# Patient Record
Sex: MALE | Race: WHITE | NOT HISPANIC OR LATINO | Employment: OTHER | URBAN - METROPOLITAN AREA
[De-identification: names, ages, dates, MRNs, and addresses within clinical notes are randomized per-mention and may not be internally consistent; named-entity substitution may affect disease eponyms.]

---

## 2017-12-13 ENCOUNTER — HOSPITAL ENCOUNTER (OUTPATIENT)
Dept: FAMILY MEDICINE CLINIC | Facility: CLINIC | Age: 67
Setting detail: SPECIMEN
Discharge: HOME | End: 2017-12-13
Attending: FAMILY MEDICINE | Admitting: FAMILY MEDICINE

## 2017-12-13 LAB
ALBUMIN SERPL-MCNC: 3.9 G/DL (ref 3.5–4.8)
ALBUMIN/GLOB SERPL: 1.2 {RATIO} (ref 1–1.7)
ALP SERPL-CCNC: 56 IU/L (ref 32–91)
ALT SERPL-CCNC: 14 IU/L (ref 17–63)
ANION GAP SERPL CALC-SCNC: 11.1 MMOL/L (ref 10–20)
AST SERPL-CCNC: 29 IU/L (ref 15–41)
BASOPHILS # BLD AUTO: 0 10*3/UL (ref 0–0.2)
BASOPHILS NFR BLD AUTO: 1 % (ref 0–2)
BILIRUB SERPL-MCNC: 0.5 MG/DL (ref 0.3–1.2)
BUN SERPL-MCNC: 15 MG/DL (ref 8–20)
BUN/CREAT SERPL: 13.6 (ref 6.2–20.3)
CALCIUM SERPL-MCNC: 9.2 MG/DL (ref 8.9–10.3)
CHLORIDE SERPL-SCNC: 99 MMOL/L (ref 101–111)
CHOLEST SERPL-MCNC: 168 MG/DL
CHOLEST/HDLC SERPL: 4.1 {RATIO}
CONV CO2: 31 MMOL/L (ref 22–32)
CONV LDL CHOLESTEROL DIRECT: 114 MG/DL (ref 0–100)
CONV TOTAL PROTEIN: 7.1 G/DL (ref 6.1–7.9)
CREAT UR-MCNC: 1.1 MG/DL (ref 0.7–1.2)
DIFFERENTIAL METHOD BLD: (no result)
EOSINOPHIL # BLD AUTO: 0.2 10*3/UL (ref 0–0.3)
EOSINOPHIL # BLD AUTO: 3 % (ref 0–3)
ERYTHROCYTE [DISTWIDTH] IN BLOOD BY AUTOMATED COUNT: 12.6 % (ref 11.5–14.5)
GLOBULIN UR ELPH-MCNC: 3.2 G/DL (ref 2.5–3.8)
GLUCOSE SERPL-MCNC: 102 MG/DL (ref 65–99)
HCT VFR BLD AUTO: 42 % (ref 40–54)
HDLC SERPL-MCNC: 41 MG/DL
HGB BLD-MCNC: 14.5 G/DL (ref 14–18)
LDLC/HDLC SERPL: 2.8 {RATIO}
LIPID INTERPRETATION: ABNORMAL
LYMPHOCYTES # BLD AUTO: 2.3 10*3/UL (ref 0.8–4.8)
LYMPHOCYTES NFR BLD AUTO: 35 % (ref 18–42)
MCH RBC QN AUTO: 33.5 PG (ref 26–32)
MCHC RBC AUTO-ENTMCNC: 34.4 G/DL (ref 32–36)
MCV RBC AUTO: 97.5 FL (ref 80–94)
MONOCYTES # BLD AUTO: 0.5 10*3/UL (ref 0.1–1.3)
MONOCYTES NFR BLD AUTO: 7 % (ref 2–11)
NEUTROPHILS # BLD AUTO: 3.6 10*3/UL (ref 2.3–8.6)
NEUTROPHILS NFR BLD AUTO: 54 % (ref 50–75)
NRBC BLD AUTO-RTO: 0 /100{WBCS}
NRBC/RBC NFR BLD MANUAL: 0 10*3/UL
PLATELET # BLD AUTO: 177 10*3/UL (ref 150–450)
PMV BLD AUTO: 8.1 FL (ref 7.4–10.4)
POTASSIUM SERPL-SCNC: 4.1 MMOL/L (ref 3.6–5.1)
RBC # BLD AUTO: 4.31 10*6/UL (ref 4.6–6)
SODIUM SERPL-SCNC: 137 MMOL/L (ref 136–144)
TRIGL SERPL-MCNC: 116 MG/DL
TSH SERPL-ACNC: 1.62 UIU/ML (ref 0.34–5.6)
VLDLC SERPL CALC-MCNC: 13.7 MG/DL
WBC # BLD AUTO: 6.6 10*3/UL (ref 4.5–11.5)

## 2019-01-16 ENCOUNTER — HOSPITAL ENCOUNTER (OUTPATIENT)
Dept: LAB | Facility: HOSPITAL | Age: 69
Discharge: HOME | End: 2019-01-16
Attending: OTOLARYNGOLOGY | Admitting: OTOLARYNGOLOGY

## 2019-01-18 ENCOUNTER — HOSPITAL ENCOUNTER (OUTPATIENT)
Dept: FAMILY MEDICINE CLINIC | Facility: CLINIC | Age: 69
Setting detail: SPECIMEN
Discharge: HOME | End: 2019-01-18
Attending: FAMILY MEDICINE | Admitting: FAMILY MEDICINE

## 2019-01-18 LAB — B PERT DNA SPEC QL NAA+PROBE: NOT DETECTED

## 2019-04-18 ENCOUNTER — HOSPITAL ENCOUNTER (OUTPATIENT)
Dept: FAMILY MEDICINE CLINIC | Facility: CLINIC | Age: 69
Setting detail: SPECIMEN
Discharge: HOME | End: 2019-04-18
Attending: FAMILY MEDICINE | Admitting: FAMILY MEDICINE

## 2019-04-18 LAB
ALBUMIN SERPL-MCNC: 3.5 G/DL (ref 3.5–4.8)
ALBUMIN/GLOB SERPL: 0.9 {RATIO} (ref 1–1.7)
ALP SERPL-CCNC: 58 IU/L (ref 32–91)
ALT SERPL-CCNC: 12 IU/L (ref 17–63)
ANION GAP SERPL CALC-SCNC: 13.5 MMOL/L (ref 10–20)
AST SERPL-CCNC: 30 IU/L (ref 15–41)
BASOPHILS # BLD AUTO: 0 10*3/UL (ref 0–0.2)
BASOPHILS NFR BLD AUTO: 0 % (ref 0–2)
BILIRUB SERPL-MCNC: 1.6 MG/DL (ref 0.3–1.2)
BILIRUB UR QL STRIP: NEGATIVE MG/DL
BUN SERPL-MCNC: 13 MG/DL (ref 8–20)
BUN/CREAT SERPL: 13 (ref 6.2–20.3)
CALCIUM SERPL-MCNC: 8.9 MG/DL (ref 8.9–10.3)
CASTS URNS QL MICRO: ABNORMAL /[LPF]
CHLORIDE SERPL-SCNC: 95 MMOL/L (ref 101–111)
COLOR UR: ABNORMAL
CONV BACTERIA IN URINE MICRO: NEGATIVE
CONV CLARITY OF URINE: CLEAR
CONV CO2: 27 MMOL/L (ref 22–32)
CONV HYALINE CASTS IN URINE MICRO: 2 /[LPF] (ref 0–5)
CONV PROTEIN IN URINE BY AUTOMATED TEST STRIP: 30 MG/DL
CONV SMALL ROUND CELLS: ABNORMAL /[HPF]
CONV TOTAL PROTEIN: 7.2 G/DL (ref 6.1–7.9)
CONV UROBILINOGEN IN URINE BY AUTOMATED TEST STRIP: 1 MG/DL
CREAT UR-MCNC: 1 MG/DL (ref 0.7–1.2)
CULTURE INDICATED?: ABNORMAL
DIFFERENTIAL METHOD BLD: (no result)
EOSINOPHIL # BLD AUTO: 0 % (ref 0–3)
EOSINOPHIL # BLD AUTO: 0 10*3/UL (ref 0–0.3)
ERYTHROCYTE [DISTWIDTH] IN BLOOD BY AUTOMATED COUNT: 13.1 % (ref 11.5–14.5)
GLOBULIN UR ELPH-MCNC: 3.7 G/DL (ref 2.5–3.8)
GLUCOSE SERPL-MCNC: 136 MG/DL (ref 65–99)
GLUCOSE UR QL: NEGATIVE MG/DL
HCT VFR BLD AUTO: 40.6 % (ref 40–54)
HGB BLD-MCNC: 13.8 G/DL (ref 14–18)
HGB UR QL STRIP: NEGATIVE
KETONES UR QL STRIP: NEGATIVE MG/DL
LEUKOCYTE ESTERASE UR QL STRIP: NEGATIVE
LYMPHOCYTES # BLD AUTO: 1 10*3/UL (ref 0.8–4.8)
LYMPHOCYTES NFR BLD AUTO: 9 % (ref 18–42)
MCH RBC QN AUTO: 33.1 PG (ref 26–32)
MCHC RBC AUTO-ENTMCNC: 33.8 G/DL (ref 32–36)
MCV RBC AUTO: 97.7 FL (ref 80–94)
MONOCYTES # BLD AUTO: 1.1 10*3/UL (ref 0.1–1.3)
MONOCYTES NFR BLD AUTO: 10 % (ref 2–11)
NEUTROPHILS # BLD AUTO: 9.2 10*3/UL (ref 2.3–8.6)
NEUTROPHILS NFR BLD AUTO: 81 % (ref 50–75)
NITRITE UR QL STRIP: NEGATIVE
NRBC BLD AUTO-RTO: 0 /100{WBCS}
NRBC/RBC NFR BLD MANUAL: 0 10*3/UL
PH UR STRIP.AUTO: 6 [PH] (ref 4.5–8)
PLATELET # BLD AUTO: 142 10*3/UL (ref 150–450)
PMV BLD AUTO: 8.8 FL (ref 7.4–10.4)
POTASSIUM SERPL-SCNC: 3.5 MMOL/L (ref 3.6–5.1)
RBC # BLD AUTO: 4.16 10*6/UL (ref 4.6–6)
RBC #/AREA URNS HPF: 0 /[HPF] (ref 0–3)
SODIUM SERPL-SCNC: 132 MMOL/L (ref 136–144)
SP GR UR: 1.01 (ref 1–1.03)
SPERM URNS QL MICRO: ABNORMAL /[HPF]
SQUAMOUS SPT QL MICRO: 0 /[HPF] (ref 0–5)
UNIDENT CRYS URNS QL MICRO: ABNORMAL /[HPF]
WBC # BLD AUTO: 11.3 10*3/UL (ref 4.5–11.5)
WBC #/AREA URNS HPF: 1 /[HPF] (ref 0–5)
YEAST SPEC QL WET PREP: ABNORMAL /[HPF]

## 2019-04-19 LAB
IRON SATN MFR SERPL: 12 % (ref 20–50)
IRON SERPL-MCNC: 29 UG/DL (ref 45–182)
MAGNESIUM UR-MCNC: 1.6 % (ref 0.5–1.5)
RETICS/RBC NFR MANUAL: 0 10*6/UL
TIBC SERPL-MCNC: 245 UG/DL (ref 228–428)

## 2019-07-17 RX ORDER — SIMVASTATIN 20 MG
TABLET ORAL
Qty: 90 TABLET | Refills: 0 | Status: SHIPPED | OUTPATIENT
Start: 2019-07-17 | End: 2019-10-15 | Stop reason: SDUPTHER

## 2019-07-22 RX ORDER — MONTELUKAST SODIUM 4 MG/1
TABLET, CHEWABLE ORAL
Qty: 360 TABLET | Refills: 1 | Status: SHIPPED | OUTPATIENT
Start: 2019-07-22 | End: 2020-05-11

## 2019-07-30 RX ORDER — MONTELUKAST SODIUM 10 MG/1
TABLET ORAL
Qty: 90 TABLET | Refills: 0 | Status: SHIPPED | OUTPATIENT
Start: 2019-07-30 | End: 2020-01-21

## 2019-09-25 RX ORDER — CHLORHEXIDINE GLUCONATE 0.12 MG/ML
15 RINSE ORAL 2 TIMES DAILY
Qty: 900 ML | Refills: 2 | Status: SHIPPED | OUTPATIENT
Start: 2019-09-25 | End: 2020-01-27

## 2019-09-25 RX ORDER — CHLORHEXIDINE GLUCONATE 0.12 MG/ML
RINSE ORAL
COMMUNITY
Start: 2017-12-03 | End: 2019-09-25 | Stop reason: SDUPTHER

## 2019-10-15 RX ORDER — SIMVASTATIN 20 MG
TABLET ORAL
Qty: 90 TABLET | Refills: 0 | Status: SHIPPED | OUTPATIENT
Start: 2019-10-15 | End: 2020-01-08

## 2020-01-08 RX ORDER — SIMVASTATIN 20 MG
TABLET ORAL
Qty: 90 TABLET | Refills: 0 | Status: SHIPPED | OUTPATIENT
Start: 2020-01-08 | End: 2020-04-12

## 2020-01-21 RX ORDER — MONTELUKAST SODIUM 10 MG/1
TABLET ORAL
Qty: 90 TABLET | Refills: 0 | Status: SHIPPED | OUTPATIENT
Start: 2020-01-21 | End: 2020-04-26

## 2020-01-27 RX ORDER — CHLORHEXIDINE GLUCONATE 0.12 MG/ML
RINSE ORAL
Qty: 946 ML | Refills: 0 | Status: SHIPPED | OUTPATIENT
Start: 2020-01-27 | End: 2020-06-29

## 2020-02-03 PROBLEM — E78.5 HYPERLIPIDEMIA: Status: ACTIVE | Noted: 2020-02-03

## 2020-02-03 PROBLEM — I10 HYPERTENSION: Status: ACTIVE | Noted: 2020-02-03

## 2020-02-03 PROBLEM — D64.9 ANEMIA: Status: ACTIVE | Noted: 2019-04-18

## 2020-02-03 PROBLEM — R05.9 COUGH: Status: ACTIVE | Noted: 2018-12-12

## 2020-02-03 PROBLEM — I50.9 CONGESTIVE HEART FAILURE (HCC): Status: ACTIVE | Noted: 2020-02-03

## 2020-02-03 RX ORDER — AMLODIPINE BESYLATE 2.5 MG/1
TABLET ORAL
COMMUNITY
Start: 2012-05-15 | End: 2022-05-12

## 2020-02-03 RX ORDER — ALBUTEROL SULFATE 90 UG/1
AEROSOL, METERED RESPIRATORY (INHALATION)
COMMUNITY
Start: 2018-08-22 | End: 2020-02-04

## 2020-02-03 RX ORDER — LORATADINE 10 MG/1
TABLET ORAL EVERY 24 HOURS
COMMUNITY
Start: 2018-12-13 | End: 2020-02-04

## 2020-02-04 ENCOUNTER — OFFICE VISIT (OUTPATIENT)
Dept: FAMILY MEDICINE CLINIC | Facility: CLINIC | Age: 70
End: 2020-02-04

## 2020-02-04 VITALS
HEART RATE: 55 BPM | BODY MASS INDEX: 20.39 KG/M2 | RESPIRATION RATE: 16 BRPM | SYSTOLIC BLOOD PRESSURE: 176 MMHG | TEMPERATURE: 97.6 F | WEIGHT: 108 LBS | HEIGHT: 61 IN | OXYGEN SATURATION: 97 % | DIASTOLIC BLOOD PRESSURE: 85 MMHG

## 2020-02-04 DIAGNOSIS — E78.2 MIXED HYPERLIPIDEMIA: ICD-10-CM

## 2020-02-04 DIAGNOSIS — Z00.00 MEDICARE ANNUAL WELLNESS VISIT, SUBSEQUENT: Primary | ICD-10-CM

## 2020-02-04 PROBLEM — R05.9 COUGH: Status: RESOLVED | Noted: 2018-12-12 | Resolved: 2020-02-04

## 2020-02-04 LAB
ALBUMIN SERPL-MCNC: 4.3 G/DL (ref 3.5–5.2)
ALBUMIN/GLOB SERPL: 1.3 G/DL
ALP SERPL-CCNC: 66 U/L (ref 39–117)
ALT SERPL W P-5'-P-CCNC: 9 U/L (ref 1–41)
ANION GAP SERPL CALCULATED.3IONS-SCNC: 12 MMOL/L (ref 5–15)
AST SERPL-CCNC: 26 U/L (ref 1–40)
BASOPHILS # BLD AUTO: 0.03 10*3/MM3 (ref 0–0.2)
BASOPHILS NFR BLD AUTO: 0.4 % (ref 0–1.5)
BILIRUB SERPL-MCNC: 0.9 MG/DL (ref 0.2–1.2)
BUN BLD-MCNC: 10 MG/DL (ref 8–23)
BUN/CREAT SERPL: 9.9 (ref 7–25)
CALCIUM SPEC-SCNC: 9.4 MG/DL (ref 8.6–10.5)
CHLORIDE SERPL-SCNC: 96 MMOL/L (ref 98–107)
CHOLEST SERPL-MCNC: 173 MG/DL (ref 0–200)
CO2 SERPL-SCNC: 29 MMOL/L (ref 22–29)
CREAT BLD-MCNC: 1.01 MG/DL (ref 0.76–1.27)
DEPRECATED RDW RBC AUTO: 41.8 FL (ref 37–54)
EOSINOPHIL # BLD AUTO: 0.35 10*3/MM3 (ref 0–0.4)
EOSINOPHIL NFR BLD AUTO: 4.9 % (ref 0.3–6.2)
ERYTHROCYTE [DISTWIDTH] IN BLOOD BY AUTOMATED COUNT: 12 % (ref 12.3–15.4)
GFR SERPL CREATININE-BSD FRML MDRD: 73 ML/MIN/1.73
GLOBULIN UR ELPH-MCNC: 3.3 GM/DL
GLUCOSE BLD-MCNC: 101 MG/DL (ref 65–99)
HCT VFR BLD AUTO: 41.8 % (ref 37.5–51)
HDLC SERPL-MCNC: 48 MG/DL (ref 40–60)
HGB BLD-MCNC: 14.7 G/DL (ref 13–17.7)
IMM GRANULOCYTES # BLD AUTO: 0.02 10*3/MM3 (ref 0–0.05)
IMM GRANULOCYTES NFR BLD AUTO: 0.3 % (ref 0–0.5)
LDLC SERPL CALC-MCNC: 100 MG/DL (ref 0–100)
LDLC/HDLC SERPL: 2.09 {RATIO}
LYMPHOCYTES # BLD AUTO: 1.93 10*3/MM3 (ref 0.7–3.1)
LYMPHOCYTES NFR BLD AUTO: 26.9 % (ref 19.6–45.3)
MCH RBC QN AUTO: 33.6 PG (ref 26.6–33)
MCHC RBC AUTO-ENTMCNC: 35.2 G/DL (ref 31.5–35.7)
MCV RBC AUTO: 95.4 FL (ref 79–97)
MONOCYTES # BLD AUTO: 0.62 10*3/MM3 (ref 0.1–0.9)
MONOCYTES NFR BLD AUTO: 8.6 % (ref 5–12)
NEUTROPHILS # BLD AUTO: 4.23 10*3/MM3 (ref 1.7–7)
NEUTROPHILS NFR BLD AUTO: 58.9 % (ref 42.7–76)
NRBC BLD AUTO-RTO: 0 /100 WBC (ref 0–0.2)
PLATELET # BLD AUTO: 177 10*3/MM3 (ref 140–450)
PMV BLD AUTO: 10.3 FL (ref 6–12)
POTASSIUM BLD-SCNC: 4.3 MMOL/L (ref 3.5–5.2)
PROT SERPL-MCNC: 7.6 G/DL (ref 6–8.5)
RBC # BLD AUTO: 4.38 10*6/MM3 (ref 4.14–5.8)
SODIUM BLD-SCNC: 137 MMOL/L (ref 136–145)
TRIGL SERPL-MCNC: 124 MG/DL (ref 0–150)
TSH SERPL DL<=0.05 MIU/L-ACNC: 2.06 UIU/ML (ref 0.27–4.2)
VLDLC SERPL-MCNC: 24.8 MG/DL (ref 5–40)
WBC NRBC COR # BLD: 7.18 10*3/MM3 (ref 3.4–10.8)

## 2020-02-04 PROCEDURE — 80053 COMPREHEN METABOLIC PANEL: CPT | Performed by: FAMILY MEDICINE

## 2020-02-04 PROCEDURE — 80061 LIPID PANEL: CPT | Performed by: FAMILY MEDICINE

## 2020-02-04 PROCEDURE — 36415 COLL VENOUS BLD VENIPUNCTURE: CPT | Performed by: FAMILY MEDICINE

## 2020-02-04 PROCEDURE — G0439 PPPS, SUBSEQ VISIT: HCPCS | Performed by: FAMILY MEDICINE

## 2020-02-04 PROCEDURE — 85025 COMPLETE CBC W/AUTO DIFF WBC: CPT | Performed by: FAMILY MEDICINE

## 2020-02-04 PROCEDURE — 84443 ASSAY THYROID STIM HORMONE: CPT | Performed by: FAMILY MEDICINE

## 2020-02-04 RX ORDER — APIXABAN 5 MG/1
5 TABLET, FILM COATED ORAL 2 TIMES DAILY
COMMUNITY
Start: 2020-01-27

## 2020-02-04 RX ORDER — SOTALOL HYDROCHLORIDE 80 MG/1
80 TABLET ORAL 2 TIMES DAILY
COMMUNITY
Start: 2019-11-07 | End: 2022-04-27 | Stop reason: HOSPADM

## 2020-02-04 NOTE — PROGRESS NOTES
Subsequent Medicare Wellness Visit   The ABC's of the Annual Wellness Visit    Chief Complaint   Patient presents with   • Medicare Wellness-subsequent       HPI:  Drake Beth, -1950, is a 69 y.o. male who presents for a Subsequent Medicare Wellness Visit.  He is here today with his brother who is his primary caregiver.  Patient has mild mental retardation.  He is overall doing well.  He is able to provide some history but the majority of the history comes from his brother.  He has good appetite and he is staying active he is interested in the world around him and likes to help his brother in the shop.  He refuses immunizations.  He has never had colonoscopy and his family does not want him to get a colonoscopy or even Cologuard.  He is happy and no signs of depression are noted.  No balance issues or falls reported. Patient denies any chest pain, shortness of breath, dizziness, nausea, vomiting, or diarrhea. No visual issues reported. No headaches, numbness or tingling. No urinary issues. Patient has good appetite and denies any weight changes. No swelling reported. No emotional issues.      Recent Hospitalizations:  No hospitalization(s) within the last year..    Current Medical Providers:  Patient Care Team:  Amada Haskins MD as PCP - General  Am[facility] Haskins MD [facility] PCP [facility]ing Phys[facility] (Cardiology)    Health Habits and Functional and Cognitive Screening and Depression Screening:  Functional & Cognitive Status 2020   Do you have difficulty preparing food and eating? Yes   Do you have difficulty bathing yourself, getting dressed or grooming yourself? No   Do you have difficulty using the toilet? No   Do you have difficulty moving around from place to place? No   Do you have trouble with steps or getting out of a bed or a chair? No   Current Diet Well Balanced Diet   Dental Exam Up to date   Eye Exam Up to date   Exercise (times per week) 0  times per week   Current Exercise Activities Include None   Do you need help using the phone?  No   Are you deaf or do you have serious difficulty hearing?  No   Do you need help with transportation? Yes   Do you need help shopping? Yes   Do you need help preparing meals?  Yes   Do you need help with housework?  Yes   Do you need help with laundry? Yes   Do you need help taking your medications? Yes   Do you need help managing money? Yes   Do you ever drive or ride in a car without wearing a seat belt? No   Have you felt unusual stress, anger or loneliness in the last month? No   Who do you live with? Other   If you need help, do you have trouble finding someone available to you? No   Have you been bothered in the last four weeks by sexual problems? No   Do you have difficulty concentrating, remembering or making decisions? No       Compared to one year ago, the patient feels his physical health is the same and his mental health is the same.    Depression Screen:  PHQ-2/PHQ-9 Depression Screening 2/4/2020   Little interest or pleasure in doing things 0   Feeling down, depressed, or hopeless 0   Total Score 0         Past Medical/Family/Social History:  The following portions of the patient's history were reviewed and updated as appropriate: allergies, current medications, past family history, past medical history, past social history, past surgical history and problem list.    No Known Allergies      Current Outpatient Medications:   •  amLODIPine (NORVASC) 2.5 MG tablet, AMLODIPINE BESYLATE 2.5 MG TABS, Disp: , Rfl:   •  metoprolol tartrate (LOPRESSOR) 25 MG tablet, METOPROLOL TARTRATE 25 MG TABS, Disp: , Rfl:   •  chlorhexidine (PERIDEX) 0.12 % solution, RINSE AND GARGLE 15 ML BY MOUTH OR THROAT TWICE DAILY, Disp: 946 mL, Rfl: 0  •  colestipol (COLESTID) 1 g tablet, TAKE 1 TABLET BY MOUTH FOUR TIMES DAILY, Disp: 360 tablet, Rfl: 1  •  ELIQUIS 5 MG tablet tablet, Take 5 mg by mouth 2 (Two) Times a Day., Disp: ,  Rfl:   •  montelukast (SINGULAIR) 10 MG tablet, TAKE 1 TABLET BY MOUTH DAILY, Disp: 90 tablet, Rfl: 0  •  simvastatin (ZOCOR) 20 MG tablet, TAKE 1 TABLET BY MOUTH DAILY AT BEDTIME, Disp: 90 tablet, Rfl: 0  •  sotalol (BETAPACE) 80 MG tablet, Take 80 mg by mouth 2 (Two) Times a Day., Disp: , Rfl:     Aspirin use counseling: Does not need ASA but is currently taking (advised patient that ASA is not indicated and patient chooses to stop it)    Current medication list contains no high risk medications. Some potential harmful drug interactions identified. Plan of action: monitoring    Family History   Problem Relation Age of Onset   • Coronary artery disease Mother 64   • Hyperlipidemia Sister    • Liver disease Sister         FATTY LIVER   • Rheum arthritis Sister    • Lung cancer Sister    • Hypertension Brother    • Other Brother    • Rheum arthritis Brother        Social History     Tobacco Use   • Smoking status: Never Smoker   • Smokeless tobacco: Never Used   Substance Use Topics   • Alcohol use: Never     Frequency: Never       Past Surgical History:   Procedure Laterality Date   • CORONARY ARTERY BYPASS GRAFT  2008    DR GOINS   • MITRAL VALVE REPLACEMENT  04/2008    WITH BIOPROSTHETIC TISSUE VALVE       Patient Active Problem List   Diagnosis   • Allergic rhinitis   • Anemia   • Atrial fibrillation (CMS/HCC)   • Congestive heart failure (CMS/HCC)   • Coronary artery disease   • Hyperlipidemia   • Hypertension   • Medicare annual wellness visit, subsequent       Review of Systems   Constitutional: Negative for activity change, fatigue and fever.   Respiratory: Negative for cough, shortness of breath and wheezing.    Cardiovascular: Negative for chest pain, palpitations and leg swelling.   Gastrointestinal: Negative for constipation, diarrhea and indigestion.   Skin: Negative for color change, dry skin and rash.   Neurological: Negative for tremors and headache.       Objective     Vitals:    02/04/20 1110   BP:  "176/85   BP Location: Left arm   Patient Position: Sitting   Cuff Size: Adult   Pulse: 55   Resp: 16   Temp: 97.6 °F (36.4 °C)   TempSrc: Oral   SpO2: 97%   Weight: 49 kg (108 lb)   Height: 154.9 cm (61\")       Patient's Body mass index is 20.41 kg/m². BMI is within normal parameters. No follow-up required..      No exam data present    The patient has potential evidence of moderate cognitive impairment. 1/3 items were correctly remembered at 5 minutes.     Physical Exam   Constitutional: He is oriented to person, place, and time. He appears well-developed and well-nourished.   HENT:   Head: Normocephalic and atraumatic.   Eyes: Pupils are equal, round, and reactive to light. Conjunctivae and EOM are normal.   Neck: Normal range of motion. Neck supple.   Cardiovascular: Normal rate, regular rhythm, normal heart sounds and intact distal pulses. Exam reveals no gallop.   No murmur heard.  Pulmonary/Chest: Effort normal and breath sounds normal. No respiratory distress. He has no rales. He exhibits no tenderness.   Musculoskeletal: Normal range of motion. He exhibits no edema or deformity.   Neurological: He is alert and oriented to person, place, and time.   Skin: Skin is warm and dry.   Nursing note and vitals reviewed.      Recent Lab Results:     Lab Results   Component Value Date    CHOL 168 12/13/2017    TRIG 116 12/13/2017    HDL 41 12/13/2017    LDLHDL 2.8 12/13/2017       Assessment/Plan   Age-appropriate Screening Schedule:  Refer to the list below for future screening recommendations based on patient's age, sex and/or medical conditions.      Health Maintenance   Topic Date Due   • TDAP/TD VACCINES (1 - Tdap) 10/17/1962   • ZOSTER VACCINE (1 of 2) 10/17/2001   • INFLUENZA VACCINE  08/01/2019   • LIPID PANEL  02/03/2020   • COLONOSCOPY  03/01/2021 (Originally 9/25/2019)       Medicare Risks and Personalized Health Plan:  Advance Directive Discussion  Cardiovascular risk  Dementia/Memory   Fall " Risk  Immunizations Discussed/Encouraged (specific immunizations; Influenza )      CMS-Preventive Services Quick Reference  Medicare Preventive Services Addressed:  Annual Wellness Visit (AWV)  Colorectal Cancer Screening, Colonoscopy    Advance Care Planning:  ACP discussion was held with the patient during this visit.    Diagnoses and all orders for this visit:    1. Medicare annual wellness visit, subsequent (Primary)    2. Mixed hyperlipidemia  -     CBC Auto Differential  -     Comprehensive Metabolic Panel  -     Lipid Panel  -     TSH    Medicare wellness exam was done today.  I will be getting fasting blood work.  His medical problems and medicines were also reviewed.  He will continue the same medicines.  He is followed by cardiologist.  He is refusing any immunization.  He and his family are refusing colonoscopy or even Cologuard.  Healthy lifestyle was discussed and reinforced.  Fall prevention was also discussed and stressed.  No falls however reported.  His blood pressure was noted to be high today, but his brother reports checking it at home periodically and getting good readings.  They will continue to monitor his blood pressure.    An After Visit Summary and PPPS with all of these plans were given to the patient.      Follow Up:  Return in about 1 year (around 2/4/2021) for Medicare Wellness.

## 2020-04-12 RX ORDER — SIMVASTATIN 20 MG
TABLET ORAL
Qty: 90 TABLET | Refills: 0 | Status: SHIPPED | OUTPATIENT
Start: 2020-04-12 | End: 2020-07-11

## 2020-04-26 RX ORDER — MONTELUKAST SODIUM 10 MG/1
TABLET ORAL
Qty: 90 TABLET | Refills: 2 | Status: SHIPPED | OUTPATIENT
Start: 2020-04-26 | End: 2021-01-14

## 2020-05-11 RX ORDER — MONTELUKAST SODIUM 4 MG/1
TABLET, CHEWABLE ORAL
Qty: 360 TABLET | Refills: 1 | Status: SHIPPED | OUTPATIENT
Start: 2020-05-11 | End: 2020-12-27

## 2020-06-29 RX ORDER — CHLORHEXIDINE GLUCONATE 0.12 MG/ML
RINSE ORAL
Qty: 946 ML | Refills: 0 | Status: SHIPPED | OUTPATIENT
Start: 2020-06-29 | End: 2021-02-05 | Stop reason: SDUPTHER

## 2020-07-11 DIAGNOSIS — E78.2 MIXED HYPERLIPIDEMIA: Primary | ICD-10-CM

## 2020-07-11 RX ORDER — SIMVASTATIN 20 MG
TABLET ORAL
Qty: 90 TABLET | Refills: 1 | Status: SHIPPED | OUTPATIENT
Start: 2020-07-11 | End: 2021-01-07

## 2020-12-09 ENCOUNTER — TELEPHONE (OUTPATIENT)
Dept: FAMILY MEDICINE CLINIC | Facility: CLINIC | Age: 70
End: 2020-12-09

## 2020-12-09 NOTE — TELEPHONE ENCOUNTER
REYNA HAS THE SAME HACKING COUGH HE HAS HAD BEFORE, CAN YOU CALL SOMETHING IN FOR HIM?    THANK YOU

## 2020-12-14 ENCOUNTER — OFFICE VISIT (OUTPATIENT)
Dept: FAMILY MEDICINE CLINIC | Facility: CLINIC | Age: 70
End: 2020-12-14

## 2020-12-14 VITALS
SYSTOLIC BLOOD PRESSURE: 165 MMHG | RESPIRATION RATE: 16 BRPM | HEIGHT: 61 IN | BODY MASS INDEX: 20.77 KG/M2 | DIASTOLIC BLOOD PRESSURE: 83 MMHG | HEART RATE: 61 BPM | TEMPERATURE: 96.9 F | WEIGHT: 110 LBS | OXYGEN SATURATION: 93 %

## 2020-12-14 DIAGNOSIS — J40 BRONCHITIS: Primary | ICD-10-CM

## 2020-12-14 DIAGNOSIS — J40 BRONCHITIS: ICD-10-CM

## 2020-12-14 DIAGNOSIS — R05.9 COUGH: ICD-10-CM

## 2020-12-14 DIAGNOSIS — J30.1 SEASONAL ALLERGIC RHINITIS DUE TO POLLEN: ICD-10-CM

## 2020-12-14 PROCEDURE — 99214 OFFICE O/P EST MOD 30 MIN: CPT | Performed by: FAMILY MEDICINE

## 2020-12-14 RX ORDER — PREDNISONE 20 MG/1
20 TABLET ORAL DAILY
Qty: 10 TABLET | Refills: 0 | Status: SHIPPED | OUTPATIENT
Start: 2020-12-14 | End: 2022-02-17

## 2020-12-14 RX ORDER — PROMETHAZINE HYDROCHLORIDE AND CODEINE PHOSPHATE 6.25; 1 MG/5ML; MG/5ML
5 SOLUTION ORAL EVERY 4 HOURS PRN
Qty: 118 ML | Refills: 0 | Status: SHIPPED | OUTPATIENT
Start: 2020-12-14 | End: 2021-02-05

## 2020-12-14 RX ORDER — ALBUTEROL SULFATE 90 UG/1
2 AEROSOL, METERED RESPIRATORY (INHALATION) EVERY 6 HOURS PRN
Qty: 18 G | Refills: 0 | Status: SHIPPED | OUTPATIENT
Start: 2020-12-14 | End: 2022-02-17

## 2020-12-14 RX ORDER — ALBUTEROL SULFATE 90 UG/1
AEROSOL, METERED RESPIRATORY (INHALATION)
Qty: 54 G | OUTPATIENT
Start: 2020-12-14

## 2020-12-14 RX ORDER — CEFDINIR 300 MG/1
300 CAPSULE ORAL 2 TIMES DAILY
Qty: 20 CAPSULE | Refills: 0 | Status: SHIPPED | OUTPATIENT
Start: 2020-12-14 | End: 2021-02-05

## 2020-12-14 NOTE — PROGRESS NOTES
Subjective   Chief Complaint   Patient presents with   • Cough   • Nasal Congestion   • Allergic Rhinitis     Drake Beth is a 70 y.o. male.     Patient Care Team:  Amada Haskins MD as PCP - [facility], Pedro Luis Garcia MD as Consulting Physician (Cardiology)    He is coming in today with his brother due to recurrent cough.  His brother reports that he has been coughing for the last month or so.  The cough is primarily wet, however he does not really bring stuff up.  The last week seems to be somehow worse and he has even at times cough spells.  He is not running fever, he denies any chest pains or shortness of breath.  He had similar issues with ongoing recurrent cough about 2 years ago.  His brother also tells me that about a month ago he possibly could have been exposed to COVID-19 and at that time he was tested, but he is not sure about the results of the test.  He denies any nausea, vomiting, or diarrhea.  No headaches or fever are reported.  No change in the perception of taste or smell.       The following portions of the patient's history were reviewed and updated as appropriate: allergies, current medications, past family history, past medical history, past social history, past surgical history and problem list.  Past Medical History:   Diagnosis Date   • Allergic rhinitis    • Anemia    • Atrial fibrillation (CMS/HCC)    • Chesty cough    • CHF (congestive heart failure) (CMS/HCC)    • Coronary artery disease    • Cough    • Fever    • Hyperlipidemia    • Hypertension    • Mentally challenged    • MVP (mitral valve prolapse)      Past Surgical History:   Procedure Laterality Date   • COLONOSCOPY      He has never had the test done.  His family is refusing any form of colon cancer screening for him.   • CORONARY ARTERY BYPASS GRAFT  2008    DR GRAMAJO   • MITRAL VALVE REPLACEMENT  04/2008    WITH BIOPROSTHETIC TISSUE VALVE     The patient has a family history of  Family History   Problem Relation  "Age of Onset   • Coronary artery disease Mother 64   • Hyperlipidemia Sister    • Liver disease Sister         FATTY LIVER   • Rheum arthritis Sister    • Lung cancer Sister    • Hypertension Brother    • Other Brother    • Rheum arthritis Brother      Social History     Socioeconomic History   • Marital status: Single     Spouse name: Not on file   • Number of children: Not on file   • Years of education: Not on file   • Highest education level: Not on file   Tobacco Use   • Smoking status: Never Smoker   • Smokeless tobacco: Never Used   Substance and Sexual Activity   • Alcohol use: Never     Frequency: Never   • Drug use: Never   • Sexual activity: Never       Review of Systems   Constitutional: Negative for activity change, appetite change, chills and fever.   HENT: Positive for congestion and postnasal drip. Negative for ear pain, sinus pressure, sore throat and swollen glands.    Respiratory: Positive for cough and chest tightness. Negative for choking, shortness of breath, wheezing and stridor.    Cardiovascular: Negative for chest pain.   Skin: Negative for dry skin and rash.     Visit Vitals  /83 (BP Location: Left arm, Patient Position: Sitting, Cuff Size: Large Adult)   Pulse 61   Temp 96.9 °F (36.1 °C) (Temporal)   Resp 16   Ht 154.9 cm (61\")   Wt 49.9 kg (110 lb)   SpO2 93%   BMI 20.78 kg/m²       Current Outpatient Medications:   •  albuterol sulfate  (90 Base) MCG/ACT inhaler, Inhale 2 puffs Every 6 (Six) Hours As Needed for Wheezing. Please dispense with a spacer, Disp: 18 g, Rfl: 0  •  amLODIPine (NORVASC) 2.5 MG tablet, AMLODIPINE BESYLATE 2.5 MG TABS, Disp: , Rfl:   •  cefdinir (OMNICEF) 300 MG capsule, Take 1 capsule by mouth 2 (Two) Times a Day., Disp: 20 capsule, Rfl: 0  •  chlorhexidine (PERIDEX) 0.12 % solution, RINSE AND GARGLE 15 ML BY MOUTH OR THROAT TWICE DAILY, Disp: 946 mL, Rfl: 0  •  colestipol (COLESTID) 1 g tablet, TAKE 1 TABLET BY MOUTH FOUR TIMES DAILY, Disp: 360 " tablet, Rfl: 1  •  ELIQUIS 5 MG tablet tablet, Take 5 mg by mouth 2 (Two) Times a Day., Disp: , Rfl:   •  metoprolol tartrate (LOPRESSOR) 25 MG tablet, METOPROLOL TARTRATE 25 MG TABS, Disp: , Rfl:   •  montelukast (SINGULAIR) 10 MG tablet, TAKE 1 TABLET BY MOUTH DAILY, Disp: 90 tablet, Rfl: 2  •  predniSONE (DELTASONE) 20 MG tablet, Take 1 tablet by mouth Daily., Disp: 10 tablet, Rfl: 0  •  promethazine-codeine (PHENERGAN with CODEINE) 6.25-10 MG/5ML solution, Take 5 mL by mouth Every 4 (Four) Hours As Needed for Cough., Disp: 118 mL, Rfl: 0  •  simvastatin (ZOCOR) 20 MG tablet, TAKE 1 TABLET BY MOUTH DAILY AT BEDTIME, Disp: 90 tablet, Rfl: 1  •  sotalol (BETAPACE) 80 MG tablet, Take 80 mg by mouth 2 (Two) Times a Day., Disp: , Rfl:     Objective   Physical Exam  Vitals signs and nursing note reviewed.   Constitutional:       Appearance: Normal appearance. He is well-developed.      Comments: Wet cough noted.   HENT:      Head: Normocephalic and atraumatic.   Eyes:      Conjunctiva/sclera: Conjunctivae normal.      Pupils: Pupils are equal, round, and reactive to light.   Neck:      Musculoskeletal: Normal range of motion and neck supple.   Cardiovascular:      Rate and Rhythm: Normal rate and regular rhythm.      Heart sounds: Normal heart sounds. No murmur. No gallop.    Pulmonary:      Effort: Pulmonary effort is normal. No respiratory distress.      Breath sounds: Normal breath sounds. No wheezing, rhonchi or rales.   Chest:      Chest wall: No tenderness.   Musculoskeletal: Normal range of motion.         General: No swelling or deformity.   Skin:     General: Skin is warm and dry.   Neurological:      General: No focal deficit present.      Mental Status: He is alert and oriented to person, place, and time.              No visits with results within 7 Day(s) from this visit.   Latest known visit with results is:   Office Visit on 02/04/2020   Component Date Value Ref Range Status   • WBC 02/04/2020 7.18  3.40  - 10.80 10*3/mm3 Final   • RBC 02/04/2020 4.38  4.14 - 5.80 10*6/mm3 Final   • Hemoglobin 02/04/2020 14.7  13.0 - 17.7 g/dL Final   • Hematocrit 02/04/2020 41.8  37.5 - 51.0 % Final   • MCV 02/04/2020 95.4  79.0 - 97.0 fL Final   • MCH 02/04/2020 33.6* 26.6 - 33.0 pg Final   • MCHC 02/04/2020 35.2  31.5 - 35.7 g/dL Final   • RDW 02/04/2020 12.0* 12.3 - 15.4 % Final   • RDW-SD 02/04/2020 41.8  37.0 - 54.0 fl Final   • MPV 02/04/2020 10.3  6.0 - 12.0 fL Final   • Platelets 02/04/2020 177  140 - 450 10*3/mm3 Final   • Neutrophil % 02/04/2020 58.9  42.7 - 76.0 % Final   • Lymphocyte % 02/04/2020 26.9  19.6 - 45.3 % Final   • Monocyte % 02/04/2020 8.6  5.0 - 12.0 % Final   • Eosinophil % 02/04/2020 4.9  0.3 - 6.2 % Final   • Basophil % 02/04/2020 0.4  0.0 - 1.5 % Final   • Immature Grans % 02/04/2020 0.3  0.0 - 0.5 % Final   • Neutrophils, Absolute 02/04/2020 4.23  1.70 - 7.00 10*3/mm3 Final   • Lymphocytes, Absolute 02/04/2020 1.93  0.70 - 3.10 10*3/mm3 Final   • Monocytes, Absolute 02/04/2020 0.62  0.10 - 0.90 10*3/mm3 Final   • Eosinophils, Absolute 02/04/2020 0.35  0.00 - 0.40 10*3/mm3 Final   • Basophils, Absolute 02/04/2020 0.03  0.00 - 0.20 10*3/mm3 Final   • Immature Grans, Absolute 02/04/2020 0.02  0.00 - 0.05 10*3/mm3 Final   • nRBC 02/04/2020 0.0  0.0 - 0.2 /100 WBC Final   • Glucose 02/04/2020 101* 65 - 99 mg/dL Final   • BUN 02/04/2020 10  8 - 23 mg/dL Final   • Creatinine 02/04/2020 1.01  0.76 - 1.27 mg/dL Final   • Sodium 02/04/2020 137  136 - 145 mmol/L Final   • Potassium 02/04/2020 4.3  3.5 - 5.2 mmol/L Final   • Chloride 02/04/2020 96* 98 - 107 mmol/L Final   • CO2 02/04/2020 29.0  22.0 - 29.0 mmol/L Final   • Calcium 02/04/2020 9.4  8.6 - 10.5 mg/dL Final   • Total Protein 02/04/2020 7.6  6.0 - 8.5 g/dL Final   • Albumin 02/04/2020 4.30  3.50 - 5.20 g/dL Final   • ALT (SGPT) 02/04/2020 9  1 - 41 U/L Final   • AST (SGOT) 02/04/2020 26  1 - 40 U/L Final   • Alkaline Phosphatase 02/04/2020 66  39 - 117  U/L Final   • Total Bilirubin 02/04/2020 0.9  0.2 - 1.2 mg/dL Final   • eGFR Non African Amer 02/04/2020 73  >60 mL/min/1.73 Final   • Globulin 02/04/2020 3.3  gm/dL Final   • A/G Ratio 02/04/2020 1.3  g/dL Final   • BUN/Creatinine Ratio 02/04/2020 9.9  7.0 - 25.0 Final   • Anion Gap 02/04/2020 12.0  5.0 - 15.0 mmol/L Final   • Total Cholesterol 02/04/2020 173  0 - 200 mg/dL Final   • Triglycerides 02/04/2020 124  0 - 150 mg/dL Final   • HDL Cholesterol 02/04/2020 48  40 - 60 mg/dL Final   • LDL Cholesterol  02/04/2020 100  0 - 100 mg/dL Final   • VLDL Cholesterol 02/04/2020 24.8  5 - 40 mg/dL Final   • LDL/HDL Ratio 02/04/2020 2.09   Final   • TSH 02/04/2020 2.060  0.270 - 4.200 uIU/mL Final                 Assessment/Plan   Diagnoses and all orders for this visit:    1. Bronchitis (Primary)  -     albuterol sulfate  (90 Base) MCG/ACT inhaler; Inhale 2 puffs Every 6 (Six) Hours As Needed for Wheezing. Please dispense with a spacer  Dispense: 18 g; Refill: 0  -     predniSONE (DELTASONE) 20 MG tablet; Take 1 tablet by mouth Daily.  Dispense: 10 tablet; Refill: 0  -     cefdinir (OMNICEF) 300 MG capsule; Take 1 capsule by mouth 2 (Two) Times a Day.  Dispense: 20 capsule; Refill: 0  -     promethazine-codeine (PHENERGAN with CODEINE) 6.25-10 MG/5ML solution; Take 5 mL by mouth Every 4 (Four) Hours As Needed for Cough.  Dispense: 118 mL; Refill: 0    2. Cough    3. Seasonal allergic rhinitis due to pollen      I reviewed his symptoms and concerns.  I will be starting him on antibiotic and I also gave him prescription for some prednisone and albuterol inhaler.  I also gave him prescription for cough medicine.  We also talked about possibly testing him for COVID-19 virus, however his brother prefers if this is not being done and he will just continue to quarantine.  He primarily stays at home and does not go around the people much at all.  He was strongly advised to monitor his symptoms and call us back if any  worsening or new symptoms develop.             Requested Prescriptions     Signed Prescriptions Disp Refills   • albuterol sulfate  (90 Base) MCG/ACT inhaler 18 g 0     Sig: Inhale 2 puffs Every 6 (Six) Hours As Needed for Wheezing. Please dispense with a spacer   • predniSONE (DELTASONE) 20 MG tablet 10 tablet 0     Sig: Take 1 tablet by mouth Daily.   • cefdinir (OMNICEF) 300 MG capsule 20 capsule 0     Sig: Take 1 capsule by mouth 2 (Two) Times a Day.   • promethazine-codeine (PHENERGAN with CODEINE) 6.25-10 MG/5ML solution 118 mL 0     Sig: Take 5 mL by mouth Every 4 (Four) Hours As Needed for Cough.

## 2020-12-27 RX ORDER — MONTELUKAST SODIUM 4 MG/1
TABLET, CHEWABLE ORAL
Qty: 360 TABLET | Refills: 0 | Status: SHIPPED | OUTPATIENT
Start: 2020-12-27 | End: 2021-06-09

## 2021-01-07 DIAGNOSIS — E78.2 MIXED HYPERLIPIDEMIA: ICD-10-CM

## 2021-01-07 RX ORDER — SIMVASTATIN 20 MG
TABLET ORAL
Qty: 90 TABLET | Refills: 1 | Status: SHIPPED | OUTPATIENT
Start: 2021-01-07 | End: 2021-07-06

## 2021-01-14 RX ORDER — MONTELUKAST SODIUM 10 MG/1
TABLET ORAL
Qty: 90 TABLET | Refills: 0 | Status: SHIPPED | OUTPATIENT
Start: 2021-01-14 | End: 2021-04-15

## 2021-02-05 ENCOUNTER — OFFICE VISIT (OUTPATIENT)
Dept: FAMILY MEDICINE CLINIC | Facility: CLINIC | Age: 71
End: 2021-02-05

## 2021-02-05 VITALS
DIASTOLIC BLOOD PRESSURE: 78 MMHG | RESPIRATION RATE: 16 BRPM | TEMPERATURE: 96.9 F | SYSTOLIC BLOOD PRESSURE: 140 MMHG | HEART RATE: 104 BPM | OXYGEN SATURATION: 95 % | BODY MASS INDEX: 20.39 KG/M2 | HEIGHT: 61 IN | WEIGHT: 108 LBS

## 2021-02-05 DIAGNOSIS — I10 ESSENTIAL HYPERTENSION: ICD-10-CM

## 2021-02-05 DIAGNOSIS — Z00.00 MEDICARE ANNUAL WELLNESS VISIT, SUBSEQUENT: Primary | ICD-10-CM

## 2021-02-05 DIAGNOSIS — Z12.5 PROSTATE CANCER SCREENING: ICD-10-CM

## 2021-02-05 DIAGNOSIS — Z11.59 NEED FOR HEPATITIS C SCREENING TEST: ICD-10-CM

## 2021-02-05 DIAGNOSIS — E78.2 MIXED HYPERLIPIDEMIA: ICD-10-CM

## 2021-02-05 PROCEDURE — G0439 PPPS, SUBSEQ VISIT: HCPCS | Performed by: FAMILY MEDICINE

## 2021-02-05 RX ORDER — CHLORHEXIDINE GLUCONATE 0.12 MG/ML
15 RINSE ORAL 2 TIMES DAILY
Qty: 946 ML | Refills: 1 | Status: SHIPPED | OUTPATIENT
Start: 2021-02-05 | End: 2021-07-18

## 2021-02-05 NOTE — PROGRESS NOTES
Subsequent Medicare Wellness Visit   The ABC's of the Annual Wellness Visit    Chief Complaint   Patient presents with   • Medicare Wellness-subsequent       HPI:  Drake Beth, -1950, is a 70 y.o. male who presents for a Subsequent Medicare Wellness Visit.  He is here today with his brother who is his primary caregiver.  Adriel is mentally challenged and has needed assistance in care from his family members for all his life.  However he is doing very well and he enjoys his life.  He keeps himself busy working around the house and helping his brother with house tasks.  He is followed by cardiologist due to his heart issues.  He denies any complaints, certainly denies any depression symptoms.  No balance issues reported and no falls. Patient denies any chest pain, shortness of breath, dizziness, nausea, vomiting, or diarrhea. No visual issues reported. No headaches, numbness or tingling. No urinary issues reported like urgency, frequency, or discomfort upon urination. Patient has good appetite and denies any weight changes. No swelling reported.  No rashes or any other skin issues reported.  Patient denies polyuria or polydipsia as well as heat or cold intolerance.  No emotional issues or insomnia.      Recent Hospitalizations:  No hospitalization(s) within the last year..    Current Medical Providers:  Patient Care Team:  Amada Haskins MD as PCP - General  [facility], Pedro Luis Garcia MD as Consulting Physician (Cardiology)  Josr Rodriguez DPM as Consulting Physician (Podiatry)    Health Habits and Functional and Cognitive Screening and Depression Screening:  Functional & Cognitive Status 2021   Do you have difficulty preparing food and eating? Yes   Do you have difficulty bathing yourself, getting dressed or grooming yourself? No   Do you have difficulty using the toilet? No   Do you have difficulty moving around from place to place? No   Do you have trouble with steps or getting out of a bed or a  chair? No   Current Diet Well Balanced Diet   Dental Exam Up to date   Eye Exam Up to date   Exercise (times per week) 3 times per week   Current Exercise Activities Include Walking   Do you need help using the phone?  No   Are you deaf or do you have serious difficulty hearing?  No   Do you need help with transportation? No   Do you need help shopping? Yes   Do you need help preparing meals?  Yes   Do you need help with housework?  Yes   Do you need help with laundry? Yes   Do you need help taking your medications? Yes   Do you need help managing money? Yes   Do you ever drive or ride in a car without wearing a seat belt? No   Have you felt unusual stress, anger or loneliness in the last month? No   Who do you live with? Sibling   If you need help, do you have trouble finding someone available to you? No   Have you been bothered in the last four weeks by sexual problems? No   Do you have difficulty concentrating, remembering or making decisions? No       Compared to one year ago, the patient feels his physical health is the same and his mental health is the same.    Depression Screen:  PHQ-2/PHQ-9 Depression Screening 2/5/2021   Little interest or pleasure in doing things 0   Feeling down, depressed, or hopeless 0   Total Score 0         Past Medical/Family/Social History:  The following portions of the patient's history were reviewed and updated as appropriate: allergies, current medications, past family history, past medical history, past social history, past surgical history and problem list.    No Known Allergies      Current Outpatient Medications:   •  albuterol sulfate  (90 Base) MCG/ACT inhaler, Inhale 2 puffs Every 6 (Six) Hours As Needed for Wheezing. Please dispense with a spacer, Disp: 18 g, Rfl: 0  •  amLODIPine (NORVASC) 2.5 MG tablet, AMLODIPINE BESYLATE 2.5 MG TABS, Disp: , Rfl:   •  chlorhexidine (PERIDEX) 0.12 % solution, Apply 15 mL to the mouth or throat 2 (Two) Times a Day., Disp: 074  mL, Rfl: 1  •  colestipol (COLESTID) 1 g tablet, TAKE 1 TABLET BY MOUTH FOUR TIMES DAILY, Disp: 360 tablet, Rfl: 0  •  ELIQUIS 5 MG tablet tablet, Take 5 mg by mouth 2 (Two) Times a Day., Disp: , Rfl:   •  metoprolol tartrate (LOPRESSOR) 25 MG tablet, METOPROLOL TARTRATE 25 MG TABS, Disp: , Rfl:   •  montelukast (SINGULAIR) 10 MG tablet, TAKE 1 TABLET BY MOUTH DAILY, Disp: 90 tablet, Rfl: 0  •  predniSONE (DELTASONE) 20 MG tablet, Take 1 tablet by mouth Daily., Disp: 10 tablet, Rfl: 0  •  simvastatin (ZOCOR) 20 MG tablet, TAKE 1 TABLET BY MOUTH DAILY AT BEDTIME, Disp: 90 tablet, Rfl: 1  •  sotalol (BETAPACE) 80 MG tablet, Take 80 mg by mouth 2 (Two) Times a Day., Disp: , Rfl:     Aspirin use counseling: Does not need ASA (and currently is not on it)    Current medication list contains no high risk medications.  No harmful drug interactions have been identified.     Family History   Problem Relation Age of Onset   • Coronary artery disease Mother 64   • Hyperlipidemia Sister    • Liver disease Sister         FATTY LIVER   • Rheum arthritis Sister    • Lung cancer Sister    • Hypertension Brother    • Other Brother    • Rheum arthritis Brother        Social History     Tobacco Use   • Smoking status: Never Smoker   • Smokeless tobacco: Never Used   Substance Use Topics   • Alcohol use: Never     Frequency: Never       Past Surgical History:   Procedure Laterality Date   • COLONOSCOPY      He has never had the test done.  His family is refusing any form of colon cancer screening for him.   • CORONARY ARTERY BYPASS GRAFT  2008    DR GOINS   • MITRAL VALVE REPLACEMENT  04/2008    WITH BIOPROSTHETIC TISSUE VALVE       Patient Active Problem List   Diagnosis   • Allergic rhinitis   • Anemia   • Atrial fibrillation (CMS/HCC)   • Congestive heart failure (CMS/HCC)   • Coronary artery disease   • Cough   • Hyperlipidemia   • Hypertension   • Medicare annual wellness visit, subsequent   • Mentally challenged   • Prostate  "cancer screening   • Need for hepatitis C screening test       Review of Systems   Constitutional: Negative for activity change, fatigue and fever.   Respiratory: Negative for cough, shortness of breath and wheezing.    Cardiovascular: Negative for chest pain, palpitations and leg swelling.   Gastrointestinal: Negative for constipation, diarrhea and indigestion.   Skin: Negative for color change, dry skin and rash.   Neurological: Negative for tremors and headache.       Objective     Vitals:    02/05/21 1344   BP: 140/78   BP Location: Left arm   Patient Position: Sitting   Cuff Size: Adult   Pulse: 104   Resp: 16   Temp: 96.9 °F (36.1 °C)   TempSrc: Temporal   SpO2: 95%   Weight: 49 kg (108 lb)   Height: 154.9 cm (61\")       Patient's Body mass index is 20.41 kg/m². BMI is within normal parameters. No follow-up required..      No exam data present    The patient has potential evidence of mild cognitive impairment. 2/3 items were correctly remembered at 5 minutes.    Physical Exam  Vitals signs and nursing note reviewed.   Constitutional:       Appearance: Normal appearance. He is well-developed.   HENT:      Head: Normocephalic and atraumatic.   Cardiovascular:      Rate and Rhythm: Normal rate and regular rhythm.      Heart sounds: Normal heart sounds. No murmur. No gallop.    Pulmonary:      Effort: Pulmonary effort is normal. No respiratory distress.      Breath sounds: Normal breath sounds. No wheezing, rhonchi or rales.   Chest:      Chest wall: No tenderness.   Neurological:      General: No focal deficit present.      Mental Status: He is alert and oriented to person, place, and time. Mental status is at baseline.         Recent Lab Results:     Lab Results   Component Value Date    CHOL 173 02/04/2020    TRIG 124 02/04/2020    HDL 48 02/04/2020    VLDL 24.8 02/04/2020    LDLHDL 2.09 02/04/2020       Assessment/Plan   Age-appropriate Screening Schedule:  Refer to the list below for future screening " recommendations based on patient's age, sex and/or medical conditions.      Health Maintenance   Topic Date Due   • TDAP/TD VACCINES (1 - Tdap) 10/17/1970   • ZOSTER VACCINE (1 of 2) 10/17/2001   • INFLUENZA VACCINE  08/01/2020   • LIPID PANEL  02/04/2021   • COLONOSCOPY  Discontinued       Medicare Risks and Personalized Health Plan:  Advance Directive Discussion  Cardiovascular risk  Colon Cancer Screening  Dementia/Memory   Depression/Dysphoria  Fall Risk  Immunizations Discussed/Encouraged (specific immunizations; Influenza )      CMS-Preventive Services Quick Reference  Medicare Preventive Services Addressed:  Annual Wellness Visit (AWV)  Colorectal Cancer Screening, Colonoscopy  Depression Screening (15 minutes face to face, Code )    Advance Care Planning:  ACP discussion was held with the patient during this visit. Patient has an advance directive in EMR which is still valid.     Diagnoses and all orders for this visit:    1. Medicare annual wellness visit, subsequent (Primary)    2. Mixed hyperlipidemia  -     CBC Auto Differential  -     Comprehensive Metabolic Panel  -     Lipid Panel  -     TSH    3. Prostate cancer screening  -     PSA Screen    4. Need for hepatitis C screening test  -     Hepatitis C Antibody    5. Essential hypertension  -     SARS-CoV-2 Antibodies (Roche); Future    Other orders  -     chlorhexidine (PERIDEX) 0.12 % solution; Apply 15 mL to the mouth or throat 2 (Two) Times a Day.  Dispense: 946 mL; Refill: 1    Medicare wellness exam was done today.  I reviewed his medical problems and his medications.  I will be getting fasting blood work.  Majority of the history and decision making is being done today by his brother who is his primary caregiver and power of  due to Mike's mental challenge.  He has never had colonoscopy and his family does not want him to proceed with that due to his mental issues.  He has never had any immunization and they do not want him to get any  shots at this point.  Healthy lifestyle was discussed and reinforced.    An After Visit Summary and PPPS with all of these plans were given to the patient.      Follow Up:  Return in about 1 year (around 2/5/2022) for Medicare Wellness.        Requested Prescriptions     Signed Prescriptions Disp Refills   • chlorhexidine (PERIDEX) 0.12 % solution 946 mL 1     Sig: Apply 15 mL to the mouth or throat 2 (Two) Times a Day.

## 2021-02-06 PROBLEM — Z12.5 PROSTATE CANCER SCREENING: Status: ACTIVE | Noted: 2021-02-06

## 2021-02-06 PROBLEM — Z11.59 NEED FOR HEPATITIS C SCREENING TEST: Status: ACTIVE | Noted: 2021-02-06

## 2021-02-06 PROBLEM — F79 MENTALLY CHALLENGED: Status: ACTIVE | Noted: 2021-02-06

## 2021-02-08 ENCOUNTER — LAB (OUTPATIENT)
Dept: LAB | Facility: HOSPITAL | Age: 71
End: 2021-02-08

## 2021-02-08 DIAGNOSIS — I10 ESSENTIAL HYPERTENSION: ICD-10-CM

## 2021-02-08 LAB
ALBUMIN SERPL-MCNC: 4.3 G/DL (ref 3.5–5.2)
ALBUMIN/GLOB SERPL: 1.3 G/DL
ALP SERPL-CCNC: 65 U/L (ref 39–117)
ALT SERPL W P-5'-P-CCNC: 8 U/L (ref 1–41)
ANION GAP SERPL CALCULATED.3IONS-SCNC: 7.9 MMOL/L (ref 5–15)
AST SERPL-CCNC: 26 U/L (ref 1–40)
BASOPHILS # BLD AUTO: 0.03 10*3/MM3 (ref 0–0.2)
BASOPHILS NFR BLD AUTO: 0.4 % (ref 0–1.5)
BILIRUB SERPL-MCNC: 0.5 MG/DL (ref 0–1.2)
BUN SERPL-MCNC: 16 MG/DL (ref 8–23)
BUN/CREAT SERPL: 18 (ref 7–25)
CALCIUM SPEC-SCNC: 9.7 MG/DL (ref 8.6–10.5)
CHLORIDE SERPL-SCNC: 99 MMOL/L (ref 98–107)
CHOLEST SERPL-MCNC: 159 MG/DL (ref 0–200)
CO2 SERPL-SCNC: 30.1 MMOL/L (ref 22–29)
CREAT SERPL-MCNC: 0.89 MG/DL (ref 0.76–1.27)
DEPRECATED RDW RBC AUTO: 42.8 FL (ref 37–54)
EOSINOPHIL # BLD AUTO: 0.28 10*3/MM3 (ref 0–0.4)
EOSINOPHIL NFR BLD AUTO: 4.1 % (ref 0.3–6.2)
ERYTHROCYTE [DISTWIDTH] IN BLOOD BY AUTOMATED COUNT: 12.3 % (ref 12.3–15.4)
GFR SERPL CREATININE-BSD FRML MDRD: 85 ML/MIN/1.73
GLOBULIN UR ELPH-MCNC: 3.2 GM/DL
GLUCOSE SERPL-MCNC: 99 MG/DL (ref 65–99)
HCT VFR BLD AUTO: 43.9 % (ref 37.5–51)
HCV AB SER DONR QL: NORMAL
HDLC SERPL-MCNC: 48 MG/DL (ref 40–60)
HGB BLD-MCNC: 15.3 G/DL (ref 13–17.7)
IMM GRANULOCYTES # BLD AUTO: 0.04 10*3/MM3 (ref 0–0.05)
IMM GRANULOCYTES NFR BLD AUTO: 0.6 % (ref 0–0.5)
LDLC SERPL CALC-MCNC: 91 MG/DL (ref 0–100)
LDLC/HDLC SERPL: 1.86 {RATIO}
LYMPHOCYTES # BLD AUTO: 1.8 10*3/MM3 (ref 0.7–3.1)
LYMPHOCYTES NFR BLD AUTO: 26.7 % (ref 19.6–45.3)
MCH RBC QN AUTO: 33.4 PG (ref 26.6–33)
MCHC RBC AUTO-ENTMCNC: 34.9 G/DL (ref 31.5–35.7)
MCV RBC AUTO: 95.9 FL (ref 79–97)
MONOCYTES # BLD AUTO: 0.7 10*3/MM3 (ref 0.1–0.9)
MONOCYTES NFR BLD AUTO: 10.4 % (ref 5–12)
NEUTROPHILS NFR BLD AUTO: 3.9 10*3/MM3 (ref 1.7–7)
NEUTROPHILS NFR BLD AUTO: 57.8 % (ref 42.7–76)
NRBC BLD AUTO-RTO: 0 /100 WBC (ref 0–0.2)
PLATELET # BLD AUTO: 173 10*3/MM3 (ref 140–450)
PMV BLD AUTO: 10.1 FL (ref 6–12)
POTASSIUM SERPL-SCNC: 4.6 MMOL/L (ref 3.5–5.2)
PROT SERPL-MCNC: 7.5 G/DL (ref 6–8.5)
PSA SERPL-MCNC: 2.06 NG/ML (ref 0–4)
RBC # BLD AUTO: 4.58 10*6/MM3 (ref 4.14–5.8)
SODIUM SERPL-SCNC: 137 MMOL/L (ref 136–145)
TRIGL SERPL-MCNC: 109 MG/DL (ref 0–150)
TSH SERPL DL<=0.05 MIU/L-ACNC: 3.5 UIU/ML (ref 0.27–4.2)
VLDLC SERPL-MCNC: 20 MG/DL (ref 5–40)
WBC # BLD AUTO: 6.75 10*3/MM3 (ref 3.4–10.8)

## 2021-02-08 PROCEDURE — 86803 HEPATITIS C AB TEST: CPT | Performed by: FAMILY MEDICINE

## 2021-02-08 PROCEDURE — 85025 COMPLETE CBC W/AUTO DIFF WBC: CPT | Performed by: FAMILY MEDICINE

## 2021-02-08 PROCEDURE — 36415 COLL VENOUS BLD VENIPUNCTURE: CPT | Performed by: FAMILY MEDICINE

## 2021-02-08 PROCEDURE — 80053 COMPREHEN METABOLIC PANEL: CPT | Performed by: FAMILY MEDICINE

## 2021-02-08 PROCEDURE — G0103 PSA SCREENING: HCPCS | Performed by: FAMILY MEDICINE

## 2021-02-08 PROCEDURE — 84443 ASSAY THYROID STIM HORMONE: CPT | Performed by: FAMILY MEDICINE

## 2021-02-08 PROCEDURE — 80061 LIPID PANEL: CPT | Performed by: FAMILY MEDICINE

## 2021-02-08 PROCEDURE — 86769 SARS-COV-2 COVID-19 ANTIBODY: CPT

## 2021-02-09 LAB — SARS-COV-2 AB SERPL QL IA: POSITIVE

## 2021-02-10 ENCOUNTER — TELEPHONE (OUTPATIENT)
Dept: FAMILY MEDICINE CLINIC | Facility: CLINIC | Age: 71
End: 2021-02-10

## 2021-02-10 NOTE — TELEPHONE ENCOUNTER
Caller: PETEY YAO    Relationship: Emergency Contact    Best call back number: [phone]    Caller requesting test results: patients brother    What test was performed: lab resutls    When was the test performed:2/8/2021    Where was the test performed:[facility]    Additional notes: patients brother would like the test results for the patient

## 2021-03-12 ENCOUNTER — TELEPHONE (OUTPATIENT)
Dept: FAMILY MEDICINE CLINIC | Facility: CLINIC | Age: 71
End: 2021-03-12

## 2021-03-12 NOTE — TELEPHONE ENCOUNTER
PATIENTS EMERGENCY CONTACT, PETEY CALLED ABOUT   colestipol (COLESTID) 1 g tablet  CONFUSION ON WHAT THE PATIENT NEEDED, THEYRE NEEDING CONFIRMATION OF HIM TAKING THIS MEDICATION AT HOME OR A CENTER. PLEASE RETURN CALL TO CLARIFY THIS             EMERGENCY CONTACT PHONE:[phone]

## 2021-03-15 NOTE — TELEPHONE ENCOUNTER
Demetrio is asking for a statement to be faxed to [facility] stating Adriel will take all his meds at home. They have to renew meds every year and its easier for them to give it to him at home then to do all the paper work they want for 1 med.

## 2021-03-15 NOTE — TELEPHONE ENCOUNTER
He lives with his brother so he always has been helping him with medications and as far as I know he has been taking his medications at home.  What is [facility]?

## 2021-04-15 RX ORDER — MONTELUKAST SODIUM 10 MG/1
TABLET ORAL
Qty: 90 TABLET | Refills: 2 | Status: SHIPPED | OUTPATIENT
Start: 2021-04-15 | End: 2022-01-08

## 2021-06-09 RX ORDER — MONTELUKAST SODIUM 4 MG/1
TABLET, CHEWABLE ORAL
Qty: 360 TABLET | Refills: 0 | Status: SHIPPED | OUTPATIENT
Start: 2021-06-09 | End: 2021-12-21

## 2021-07-06 DIAGNOSIS — E78.2 MIXED HYPERLIPIDEMIA: ICD-10-CM

## 2021-07-06 RX ORDER — SIMVASTATIN 20 MG
TABLET ORAL
Qty: 90 TABLET | Refills: 1 | Status: SHIPPED | OUTPATIENT
Start: 2021-07-06 | End: 2022-01-02

## 2021-07-18 RX ORDER — CHLORHEXIDINE GLUCONATE 0.12 MG/ML
RINSE ORAL
Qty: 946 ML | Refills: 1 | Status: SHIPPED | OUTPATIENT
Start: 2021-07-18 | End: 2021-09-20

## 2021-09-20 RX ORDER — CHLORHEXIDINE GLUCONATE 0.12 MG/ML
RINSE ORAL
Qty: 946 ML | Refills: 1 | Status: SHIPPED | OUTPATIENT
Start: 2021-09-20 | End: 2022-05-12 | Stop reason: SDUPTHER

## 2021-12-21 RX ORDER — MONTELUKAST SODIUM 4 MG/1
TABLET, CHEWABLE ORAL
Qty: 360 TABLET | Refills: 0 | Status: SHIPPED | OUTPATIENT
Start: 2021-12-21 | End: 2022-03-18

## 2022-01-02 DIAGNOSIS — E78.2 MIXED HYPERLIPIDEMIA: ICD-10-CM

## 2022-01-02 RX ORDER — SIMVASTATIN 20 MG
TABLET ORAL
Qty: 90 TABLET | Refills: 1 | Status: SHIPPED | OUTPATIENT
Start: 2022-01-02 | End: 2022-04-27 | Stop reason: HOSPADM

## 2022-01-08 RX ORDER — MONTELUKAST SODIUM 10 MG/1
TABLET ORAL
Qty: 90 TABLET | Refills: 1 | Status: SHIPPED | OUTPATIENT
Start: 2022-01-08 | End: 2022-05-12 | Stop reason: SDUPTHER

## 2022-02-17 ENCOUNTER — OFFICE VISIT (OUTPATIENT)
Dept: FAMILY MEDICINE CLINIC | Facility: CLINIC | Age: 72
End: 2022-02-17

## 2022-02-17 VITALS
OXYGEN SATURATION: 96 % | HEART RATE: 54 BPM | BODY MASS INDEX: 20.89 KG/M2 | HEIGHT: 60 IN | TEMPERATURE: 97.1 F | DIASTOLIC BLOOD PRESSURE: 109 MMHG | WEIGHT: 106.4 LBS | SYSTOLIC BLOOD PRESSURE: 180 MMHG | RESPIRATION RATE: 16 BRPM

## 2022-02-17 DIAGNOSIS — Z00.00 MEDICARE ANNUAL WELLNESS VISIT, SUBSEQUENT: Primary | ICD-10-CM

## 2022-02-17 DIAGNOSIS — I50.32 CHRONIC DIASTOLIC CONGESTIVE HEART FAILURE: ICD-10-CM

## 2022-02-17 DIAGNOSIS — I48.20 CHRONIC ATRIAL FIBRILLATION: ICD-10-CM

## 2022-02-17 DIAGNOSIS — R19.7 DIARRHEA, UNSPECIFIED TYPE: ICD-10-CM

## 2022-02-17 PROBLEM — Z11.59 NEED FOR HEPATITIS C SCREENING TEST: Status: RESOLVED | Noted: 2021-02-06 | Resolved: 2022-02-17

## 2022-02-17 PROBLEM — R05.9 COUGH: Status: RESOLVED | Noted: 2018-12-12 | Resolved: 2022-02-17

## 2022-02-17 PROCEDURE — 1159F MED LIST DOCD IN RCRD: CPT | Performed by: FAMILY MEDICINE

## 2022-02-17 PROCEDURE — 1170F FXNL STATUS ASSESSED: CPT | Performed by: FAMILY MEDICINE

## 2022-02-17 PROCEDURE — G0439 PPPS, SUBSEQ VISIT: HCPCS | Performed by: FAMILY MEDICINE

## 2022-02-17 NOTE — PROGRESS NOTES
Subsequent Medicare Wellness Visit   The ABC's of the Annual Wellness Visit    Chief Complaint   Patient presents with   • Medicare Wellness-subsequent   • GI Problem       HPI:  Drake Beth, -1950, is a 71 y.o. male who presents for a Subsequent Medicare Wellness Visit.  He is here today with his brother who he lives with.  His brother is his primary caregiver.  Patient is mentally challenged, however he is well functioning with assistance.  He is closely followed by cardiologist and he is being scheduled for a pacemaker placement in near future.  His brother reports that for some time, probably over a year he has been having some issues with bowel movements.  He has diarrhea periodically and sometimes he cannot even make it to the bathroom and there is a seepage of stool in his underwear.  He also seems to be going more frequently.  He has never had colonoscopy and his brother does not really want him to proceed with that due to his overall mental functioning.  He has been dealing with constipation for a long time and was even on medication in the past.  He is currently on colestipol, but this medication was started several years ago to help with his cholesterol per his brother report.    Few months BM issues, diarrhea, cannot make it to the bathroom, goes more frequently    Recent Hospitalizations:  No hospitalization(s) within the last year..    Current Medical Providers:  Patient Care Team:  Amada Haskins MD as PCP - General  [facility], Pedro Luis Garcia MD as Consulting Physician (Cardiology)  Josr Rodriguez DPM as Consulting Physician (Podiatry)    Health Habits and Functional and Cognitive Screening and Depression Screening:  Functional & Cognitive Status 2022   Do you have difficulty preparing food and eating? Yes   Do you have difficulty bathing yourself, getting dressed or grooming yourself? No   Do you have difficulty using the toilet? No   Do you have difficulty moving around from  place to place? No   Do you have trouble with steps or getting out of a bed or a chair? No   Current Diet Limited Junk Food   Dental Exam Not up to date   Eye Exam Not up to date   Exercise (times per week) 3 times per week   Current Exercises Include Walking   Current Exercise Activities Include -   Do you need help using the phone?  No   Are you deaf or do you have serious difficulty hearing?  No   Do you need help with transportation? Yes   Do you need help shopping? Yes   Do you need help preparing meals?  Yes   Do you need help with housework?  Yes   Do you need help with laundry? Yes   Do you need help taking your medications? Yes   Do you need help managing money? Yes   Do you ever drive or ride in a car without wearing a seat belt? No   Have you felt unusual stress, anger or loneliness in the last month? No   Who do you live with? Sibling   If you need help, do you have trouble finding someone available to you? No   Have you been bothered in the last four weeks by sexual problems? -   Do you have difficulty concentrating, remembering or making decisions? No       Compared to one year ago, the patient feels his physical health is the same and his mental health is the same.    Depression Screen:  PHQ-2/PHQ-9 Depression Screening 2/17/2022   Little interest or pleasure in doing things 0   Feeling down, depressed, or hopeless 0   Total Score 0         Past Medical/Family/Social History:  The following portions of the patient's history were reviewed and updated as appropriate: allergies, current medications, past family history, past medical history, past social history, past surgical history and problem list.    No Known Allergies      Current Outpatient Medications:   •  amLODIPine (NORVASC) 2.5 MG tablet, AMLODIPINE BESYLATE 2.5 MG TABS, Disp: , Rfl:   •  chlorhexidine (PERIDEX) 0.12 % solution, RINSE AND GARGLE 15 ML BY MOUTH OR THROAT TWICE DAILY, Disp: 946 mL, Rfl: 1  •  colestipol (COLESTID) 1 g tablet,  TAKE 1 TABLET BY MOUTH FOUR TIMES DAILY, Disp: 360 tablet, Rfl: 0  •  ELIQUIS 5 MG tablet tablet, Take 5 mg by mouth 2 (Two) Times a Day., Disp: , Rfl:   •  metoprolol tartrate (LOPRESSOR) 25 MG tablet, METOPROLOL TARTRATE 25 MG TABS, Disp: , Rfl:   •  montelukast (SINGULAIR) 10 MG tablet, TAKE 1 TABLET BY MOUTH DAILY, Disp: 90 tablet, Rfl: 1  •  simvastatin (ZOCOR) 20 MG tablet, TAKE 1 TABLET BY MOUTH DAILY AT BEDTIME, Disp: 90 tablet, Rfl: 1  •  sotalol (BETAPACE) 80 MG tablet, Take 80 mg by mouth 2 (Two) Times a Day., Disp: , Rfl:     Aspirin use counseling: Does not need ASA (and currently is not on it)    Current medication list contains no high risk medications.  No harmful drug interactions have been identified.     Family History   Problem Relation Age of Onset   • Coronary artery disease Mother 64   • Hyperlipidemia Sister    • Liver disease Sister         FATTY LIVER   • Rheum arthritis Sister    • Lung cancer Sister    • Hypertension Brother    • Other Brother    • Rheum arthritis Brother        Social History     Tobacco Use   • Smoking status: Never Smoker   • Smokeless tobacco: Never Used   Substance Use Topics   • Alcohol use: Never       Past Surgical History:   Procedure Laterality Date   • COLONOSCOPY      He has never had the test done.  His family is refusing any form of colon cancer screening for him.   • CORONARY ARTERY BYPASS GRAFT  2008    DR GOINS   • MITRAL VALVE REPLACEMENT  04/2008    WITH BIOPROSTHETIC TISSUE VALVE       Patient Active Problem List   Diagnosis   • Allergic rhinitis   • Anemia   • Atrial fibrillation (HCC)   • Congestive heart failure (HCC)   • Coronary artery disease   • Hyperlipidemia   • Hypertension   • Medicare annual wellness visit, subsequent   • Mentally challenged   • Prostate cancer screening   • Diarrhea       Review of Systems   Constitutional: Negative for activity change, fatigue and fever.   Respiratory: Negative for cough, shortness of breath and wheezing.  "   Cardiovascular: Negative for chest pain, palpitations and leg swelling.   Gastrointestinal: Positive for diarrhea. Negative for constipation and indigestion.   Skin: Negative for color change, dry skin and rash.   Neurological: Negative for tremors and headache.       Objective     Vitals:    02/17/22 1507   BP: (!) 180/109   BP Location: Right arm   Patient Position: Sitting   Cuff Size: Adult   Pulse: 54   Resp: 16   Temp: 97.1 °F (36.2 °C)   SpO2: 96%   Weight: 48.3 kg (106 lb 6.4 oz)   Height: 152.4 cm (60\")       Patient's Body mass index is 20.78 kg/m². indicating that he is within normal range (BMI 18.5-24.9). No BMI management plan needed..      No exam data present    The patient has potential evidence of mild cognitive impairment. 2/3 items were correctly remembered at 5 minutes.    Physical Exam  Vitals and nursing note reviewed.   Constitutional:       General: He is not in acute distress.     Appearance: Normal appearance. He is well-developed. He is not ill-appearing.   HENT:      Head: Normocephalic and atraumatic.   Cardiovascular:      Rate and Rhythm: Normal rate and regular rhythm.      Heart sounds: Normal heart sounds. No murmur heard.  No gallop.    Pulmonary:      Effort: Pulmonary effort is normal. No respiratory distress.      Breath sounds: Normal breath sounds. No wheezing, rhonchi or rales.   Chest:      Chest wall: No tenderness.   Abdominal:      General: There is no distension.      Palpations: There is no mass.      Tenderness: There is no abdominal tenderness. There is no guarding or rebound.      Hernia: No hernia is present.   Musculoskeletal:      Cervical back: Normal range of motion and neck supple.   Neurological:      General: No focal deficit present.      Mental Status: He is alert and oriented to person, place, and time. Mental status is at baseline.   Psychiatric:         Mood and Affect: Mood normal.         Recent Lab Results:     Lab Results   Component Value Date    " CHOL 159 02/08/2021    TRIG 109 02/08/2021    HDL 48 02/08/2021    VLDL 20 02/08/2021    LDLHDL 1.86 02/08/2021       Assessment/Plan   Age-appropriate Screening Schedule:  Refer to the list below for future screening recommendations based on patient's age, sex and/or medical conditions.      Health Maintenance   Topic Date Due   • TDAP/TD VACCINES (1 - Tdap) Never done   • ZOSTER VACCINE (1 of 2) Never done   • INFLUENZA VACCINE  Never done   • LIPID PANEL  02/08/2022       Medicare Risks and Personalized Health Plan:  Advance Directive Discussion  Cardiovascular risk  Colon Cancer Screening  Dementia/Memory   Depression/Dysphoria  Diabetic Lab Screening   Immunizations Discussed/Encouraged (specific immunizations; Shingrix and COVID19 )      CMS-Preventive Services Quick Reference  Medicare Preventive Services Addressed:  Annual Wellness Visit (AWV)  Cardiovascular Disease Screening Tests (may do this order every 5 years in beneficiaries without signs or symptoms of cardiovascular disease)  Colorectal Cancer Screening, Colonoscopy    Advance Care Planning:  ACP discussion was held with the patient during this visit. Patient has an advance directive (not in EMR), copy requested.    Diagnoses and all orders for this visit:    1. Medicare annual wellness visit, subsequent (Primary)    2. Diarrhea, unspecified type  -     XR Abdomen KUB; Future    3. Chronic diastolic congestive heart failure (HCC)    4. Chronic atrial fibrillation (HCC)    Medicare wellness exam was done today.  He is closely being followed by cardiologist and he is being scheduled for the pacemaker placement in near future.  I also reviewed his GI issues and on and off diarrhea and stool incontinence.  I will be getting KUB to rule out constipation as a cause of his possible overflow diarrhea.  He definitely would benefit from colonoscopy as he has never had this done, but his brother does not want him to proceed with that due to his mental  functioning.  He also declined rectal exam today.  He is vaccinated against COVID-19 and booster shot was recommended.    An After Visit Summary and PPPS with all of these plans were given to the patient.      Follow Up:  Return in about 1 year (around 2/17/2023) for Medicare Wellness.        Requested Prescriptions      No prescriptions requested or ordered in this encounter

## 2022-02-25 ENCOUNTER — TELEPHONE (OUTPATIENT)
Dept: FAMILY MEDICINE CLINIC | Facility: CLINIC | Age: 72
End: 2022-02-25

## 2022-03-18 RX ORDER — MONTELUKAST SODIUM 4 MG/1
TABLET, CHEWABLE ORAL
Qty: 360 TABLET | Refills: 0 | Status: SHIPPED | OUTPATIENT
Start: 2022-03-18 | End: 2022-07-07

## 2022-04-22 ENCOUNTER — PREP FOR SURGERY (OUTPATIENT)
Dept: OTHER | Facility: HOSPITAL | Age: 72
End: 2022-04-22

## 2022-04-22 ENCOUNTER — OFFICE VISIT (OUTPATIENT)
Dept: CARDIOLOGY | Facility: CLINIC | Age: 72
End: 2022-04-22

## 2022-04-22 VITALS
DIASTOLIC BLOOD PRESSURE: 81 MMHG | HEIGHT: 60 IN | BODY MASS INDEX: 20.42 KG/M2 | OXYGEN SATURATION: 100 % | HEART RATE: 52 BPM | WEIGHT: 104 LBS | SYSTOLIC BLOOD PRESSURE: 150 MMHG

## 2022-04-22 DIAGNOSIS — Z95.2 MITRAL VALVE REPLACED: ICD-10-CM

## 2022-04-22 DIAGNOSIS — T82.7XXA PACEMAKER INFECTION, INITIAL ENCOUNTER: Primary | ICD-10-CM

## 2022-04-22 DIAGNOSIS — T82.7XXA PACEMAKER INFECTION: Primary | ICD-10-CM

## 2022-04-22 DIAGNOSIS — I48.0 PAROXYSMAL ATRIAL FIBRILLATION: ICD-10-CM

## 2022-04-22 PROCEDURE — 99204 OFFICE O/P NEW MOD 45 MIN: CPT | Performed by: INTERNAL MEDICINE

## 2022-04-22 RX ORDER — SODIUM CHLORIDE 0.9 % (FLUSH) 0.9 %
3-10 SYRINGE (ML) INJECTION AS NEEDED
Status: CANCELLED | OUTPATIENT
Start: 2022-04-22

## 2022-04-22 RX ORDER — SODIUM CHLORIDE 0.9 % (FLUSH) 0.9 %
3 SYRINGE (ML) INJECTION EVERY 12 HOURS SCHEDULED
Status: CANCELLED | OUTPATIENT
Start: 2022-04-22

## 2022-04-22 RX ORDER — SULFAMETHOXAZOLE AND TRIMETHOPRIM 400; 80 MG/1; MG/1
1 TABLET ORAL
Status: ON HOLD | COMMUNITY
Start: 2022-04-19 | End: 2022-04-26

## 2022-04-22 NOTE — H&P (VIEW-ONLY)
Progress note      Name: Drake Beth ADMIT: (Not on file)   : 1950  PCP: Amada Haskins MD    MRN: [id number] LOS: 0 days   AGE/SEX: 71 y.o. male  ROOM: Room/bed info not found     Chief Complaint   Patient presents with   • Consult     INFECTED PACEMAKER       Subjective       History of present illness  Drake Beth is a 71-year-old male patient who has history of coronary artery disease status post CABG as well as bioprosthetic mitral valve in , also hypertension, developmental delay, paroxysmal atrial fibrillation.  Patient receives cardiac care at [facility], he had a loop recorder which showed sick sinus syndrome and therefore he underwent a dual-chamber pacemaker implantation [employer] on 3/11/2022.  About 2 weeks following pacemaker implant however patient noticed significant swelling and redness around the site of the incision.  He was prescribed antibiotics and was evaluated by his cardiologist who determined that the pacemaker needs to be explanted.  Patient was referred to me for consideration of pacemaker extraction.  Since starting antibiotics however the swelling has gone down.  Patient denies having any fevers or chills.        Past Medical History:   Diagnosis Date   • Allergic rhinitis    • Anemia    • Atrial fibrillation (HCC)    • Chesty cough    • CHF (congestive heart failure) (HCC)    • Coronary artery disease    • Cough    • Fever    • Hyperlipidemia    • Hypertension    • Mentally challenged    • MVP (mitral valve prolapse)      Past Surgical History:   Procedure Laterality Date   • COLONOSCOPY      He has never had the test done.  His family is refusing any form of colon cancer screening for him.   • CORONARY ARTERY BYPASS GRAFT      DR GOINS   • MITRAL VALVE REPLACEMENT  2008    WITH BIOPROSTHETIC TISSUE VALVE     Family History   Problem Relation Age of Onset   • Coronary artery disease Mother 64   • Hyperlipidemia Sister    • Liver disease Sister          FATTY LIVER   • Rheum arthritis Sister    • Lung cancer Sister    • Hypertension Brother    • Other Brother    • Rheum arthritis Brother      Social History     Tobacco Use   • Smoking status: Never Smoker   • Smokeless tobacco: Never Used   Vaping Use   • Vaping Use: Never used   Substance Use Topics   • Alcohol use: Never   • Drug use: Never     (Not in a hospital admission)    Allergies:  Patient has no known allergies.      Physical Exam  VITALS REVIEWED    General:      well developed, in no acute distress.    Head:      normocephalic and atraumatic.    Eyes:      PERRL/EOM intact, conjunctiva and sclera clear with out nystagmus.    Neck:      no masses, thyromegaly,  trachea central with normal respiratory effort and PMI displaced laterally  Lungs:      Clear to auscultation bilaterally  Heart:       regular rate and rhythm  Msk:      no deformity or scoliosis noted of thoracic or lumbar spine.    Pulses:      pulses normal in all 4 extremities.    Extremities:       no lower extremity edema  Neurologic:      no focal deficits.   alert oriented x3  Skin:      intact without lesions or rashes.    Psych:      alert and cooperative; normal mood and affect; normal attention span and concentration.      Result Review :                   Procedures        Assessment and Plan      Drake Beth is a 71-year-old male patient who has sick sinus syndrome and symptomatic bradycardia, paroxysmal atrial fibrillation, history of CAD status post CABG with bioprosthetic mitral valve replacement, is here today for consideration of pacemaker extraction.  He had a dual-chamber pacemaker implanted on 3/11/2022 and about 2 weeks after the pacemaker implant he started developing swelling and redness around the pacemaker site.  His brother showed me pictures of the pacemaker site at that time, it was very swollen and very erythematous with scaling noted over the skin.  He was started on p.o. antibiotics and remarkably the  swelling and redness have decreased.  I had a long discussion with the patient and his brother who is a caregiver and I expressed my concerns for pacemaker pocket infection, despite the fact that antibiotics had improved that.  I am particularly worried because he has a bioprosthetic valve which can get infected as well.  We performed a quick device interrogation which showed that he is not pacemaker dependent, he is atrial paced at 70% but 0% ventricular paced.  The patient and his brother are agreeable to proceed with pacemaker extraction.  We will perform procedure next week.  For now continue oral antibiotics.  If needed we can reimplant the pacemaker on the right side.      Diagnoses and all orders for this visit:    1. Pacemaker infection, initial encounter (HCC) (Primary)  Overview:  Added automatically from request for surgery [id number]      2. Paroxysmal atrial fibrillation (HCC)    3. Mitral valve replaced         No follow-ups on file.  Patient was given instructions and counseling regarding his condition or for health maintenance advice. Please see specific information pulled into the AVS if appropriate.

## 2022-04-22 NOTE — PROGRESS NOTES
Progress note      Name: Drake Beth ADMIT: (Not on file)   : 1950  PCP: Amada Haskins MD    MRN: [id number] LOS: 0 days   AGE/SEX: 71 y.o. male  ROOM: Room/bed info not found     Chief Complaint   Patient presents with   • Consult     INFECTED PACEMAKER       Subjective       History of present illness  Drake Beth is a 71-year-old male patient who has history of coronary artery disease status post CABG as well as bioprosthetic mitral valve in , also hypertension, developmental delay, paroxysmal atrial fibrillation.  Patient receives cardiac care at [facility], he had a loop recorder which showed sick sinus syndrome and therefore he underwent a dual-chamber pacemaker implantation [employer] on 3/11/2022.  About 2 weeks following pacemaker implant however patient noticed significant swelling and redness around the site of the incision.  He was prescribed antibiotics and was evaluated by his cardiologist who determined that the pacemaker needs to be explanted.  Patient was referred to me for consideration of pacemaker extraction.  Since starting antibiotics however the swelling has gone down.  Patient denies having any fevers or chills.        Past Medical History:   Diagnosis Date   • Allergic rhinitis    • Anemia    • Atrial fibrillation (HCC)    • Chesty cough    • CHF (congestive heart failure) (HCC)    • Coronary artery disease    • Cough    • Fever    • Hyperlipidemia    • Hypertension    • Mentally challenged    • MVP (mitral valve prolapse)      Past Surgical History:   Procedure Laterality Date   • COLONOSCOPY      He has never had the test done.  His family is refusing any form of colon cancer screening for him.   • CORONARY ARTERY BYPASS GRAFT      DR GOINS   • MITRAL VALVE REPLACEMENT  2008    WITH BIOPROSTHETIC TISSUE VALVE     Family History   Problem Relation Age of Onset   • Coronary artery disease Mother 64   • Hyperlipidemia Sister    • Liver disease Sister          FATTY LIVER   • Rheum arthritis Sister    • Lung cancer Sister    • Hypertension Brother    • Other Brother    • Rheum arthritis Brother      Social History     Tobacco Use   • Smoking status: Never Smoker   • Smokeless tobacco: Never Used   Vaping Use   • Vaping Use: Never used   Substance Use Topics   • Alcohol use: Never   • Drug use: Never     (Not in a hospital admission)    Allergies:  Patient has no known allergies.      Physical Exam  VITALS REVIEWED    General:      well developed, in no acute distress.    Head:      normocephalic and atraumatic.    Eyes:      PERRL/EOM intact, conjunctiva and sclera clear with out nystagmus.    Neck:      no masses, thyromegaly,  trachea central with normal respiratory effort and PMI displaced laterally  Lungs:      Clear to auscultation bilaterally  Heart:       regular rate and rhythm  Msk:      no deformity or scoliosis noted of thoracic or lumbar spine.    Pulses:      pulses normal in all 4 extremities.    Extremities:       no lower extremity edema  Neurologic:      no focal deficits.   alert oriented x3  Skin:      intact without lesions or rashes.    Psych:      alert and cooperative; normal mood and affect; normal attention span and concentration.      Result Review :                   Procedures        Assessment and Plan      Drake Beth is a 71-year-old male patient who has sick sinus syndrome and symptomatic bradycardia, paroxysmal atrial fibrillation, history of CAD status post CABG with bioprosthetic mitral valve replacement, is here today for consideration of pacemaker extraction.  He had a dual-chamber pacemaker implanted on 3/11/2022 and about 2 weeks after the pacemaker implant he started developing swelling and redness around the pacemaker site.  His brother showed me pictures of the pacemaker site at that time, it was very swollen and very erythematous with scaling noted over the skin.  He was started on p.o. antibiotics and remarkably the  swelling and redness have decreased.  I had a long discussion with the patient and his brother who is a caregiver and I expressed my concerns for pacemaker pocket infection, despite the fact that antibiotics had improved that.  I am particularly worried because he has a bioprosthetic valve which can get infected as well.  We performed a quick device interrogation which showed that he is not pacemaker dependent, he is atrial paced at 70% but 0% ventricular paced.  The patient and his brother are agreeable to proceed with pacemaker extraction.  We will perform procedure next week.  For now continue oral antibiotics.  If needed we can reimplant the pacemaker on the right side.      Diagnoses and all orders for this visit:    1. Pacemaker infection, initial encounter (HCC) (Primary)  Overview:  Added automatically from request for surgery [id number]      2. Paroxysmal atrial fibrillation (HCC)    3. Mitral valve replaced         No follow-ups on file.  Patient was given instructions and counseling regarding his condition or for health maintenance advice. Please see specific information pulled into the AVS if appropriate.

## 2022-04-26 ENCOUNTER — ANESTHESIA EVENT (OUTPATIENT)
Dept: CARDIOLOGY | Facility: HOSPITAL | Age: 72
End: 2022-04-26

## 2022-04-26 ENCOUNTER — ANESTHESIA (OUTPATIENT)
Dept: CARDIOLOGY | Facility: HOSPITAL | Age: 72
End: 2022-04-26

## 2022-04-26 ENCOUNTER — LAB (OUTPATIENT)
Dept: LAB | Facility: HOSPITAL | Age: 72
End: 2022-04-26

## 2022-04-26 ENCOUNTER — HOSPITAL ENCOUNTER (OUTPATIENT)
Facility: HOSPITAL | Age: 72
Discharge: HOME | End: 2022-04-27
Attending: INTERNAL MEDICINE | Admitting: INTERNAL MEDICINE

## 2022-04-26 DIAGNOSIS — T82.7XXA PACEMAKER INFECTION: ICD-10-CM

## 2022-04-26 LAB
ALBUMIN SERPL-MCNC: 4.4 G/DL (ref 3.5–5.2)
ALBUMIN/GLOB SERPL: 1.2 G/DL
ALP SERPL-CCNC: 80 U/L (ref 39–117)
ALT SERPL W P-5'-P-CCNC: 16 U/L (ref 1–41)
ANION GAP SERPL CALCULATED.3IONS-SCNC: 11 MMOL/L (ref 5–15)
AST SERPL-CCNC: 35 U/L (ref 1–40)
BASOPHILS # BLD AUTO: 0.1 10*3/MM3 (ref 0–0.2)
BASOPHILS NFR BLD AUTO: 0.8 % (ref 0–1.5)
BILIRUB SERPL-MCNC: 0.6 MG/DL (ref 0–1.2)
BUN SERPL-MCNC: 16 MG/DL (ref 8–23)
BUN/CREAT SERPL: 14.8 (ref 7–25)
CALCIUM SPEC-SCNC: 9.2 MG/DL (ref 8.6–10.5)
CHLORIDE SERPL-SCNC: 99 MMOL/L (ref 98–107)
CO2 SERPL-SCNC: 26 MMOL/L (ref 22–29)
CREAT SERPL-MCNC: 1.08 MG/DL (ref 0.76–1.27)
DEPRECATED RDW RBC AUTO: 43.8 FL (ref 37–54)
EGFRCR SERPLBLD CKD-EPI 2021: 73.8 ML/MIN/1.73
EOSINOPHIL # BLD AUTO: 0.5 10*3/MM3 (ref 0–0.4)
EOSINOPHIL NFR BLD AUTO: 7.2 % (ref 0.3–6.2)
ERYTHROCYTE [DISTWIDTH] IN BLOOD BY AUTOMATED COUNT: 12.9 % (ref 12.3–15.4)
GLOBULIN UR ELPH-MCNC: 3.6 GM/DL
GLUCOSE SERPL-MCNC: 96 MG/DL (ref 65–99)
HCT VFR BLD AUTO: 41.3 % (ref 37.5–51)
HGB BLD-MCNC: 14.1 G/DL (ref 13–17.7)
LYMPHOCYTES # BLD AUTO: 1.8 10*3/MM3 (ref 0.7–3.1)
LYMPHOCYTES NFR BLD AUTO: 26 % (ref 19.6–45.3)
MAGNESIUM SERPL-MCNC: 1.9 MG/DL (ref 1.6–2.4)
MCH RBC QN AUTO: 33 PG (ref 26.6–33)
MCHC RBC AUTO-ENTMCNC: 34.2 G/DL (ref 31.5–35.7)
MCV RBC AUTO: 96.5 FL (ref 79–97)
MONOCYTES # BLD AUTO: 0.6 10*3/MM3 (ref 0.1–0.9)
MONOCYTES NFR BLD AUTO: 8.9 % (ref 5–12)
NEUTROPHILS NFR BLD AUTO: 4.1 10*3/MM3 (ref 1.7–7)
NEUTROPHILS NFR BLD AUTO: 57.1 % (ref 42.7–76)
NRBC BLD AUTO-RTO: 0 /100 WBC (ref 0–0.2)
PLATELET # BLD AUTO: 152 10*3/MM3 (ref 140–450)
PMV BLD AUTO: 7.3 FL (ref 6–12)
POTASSIUM SERPL-SCNC: 4.9 MMOL/L (ref 3.5–5.2)
PROT SERPL-MCNC: 8 G/DL (ref 6–8.5)
RBC # BLD AUTO: 4.28 10*6/MM3 (ref 4.14–5.8)
SARS-COV-2 RNA RESP QL NAA+PROBE: NOT DETECTED
SODIUM SERPL-SCNC: 136 MMOL/L (ref 136–145)
WBC NRBC COR # BLD: 7.1 10*3/MM3 (ref 3.4–10.8)

## 2022-04-26 PROCEDURE — U0005 INFEC AGEN DETEC AMPLI PROBE: HCPCS

## 2022-04-26 PROCEDURE — 33235 REMOVAL PACEMAKER ELECTRODE: CPT | Performed by: INTERNAL MEDICINE

## 2022-04-26 PROCEDURE — 25010000002 CEFAZOLIN PER 500 MG: Performed by: INTERNAL MEDICINE

## 2022-04-26 PROCEDURE — C9803 HOPD COVID-19 SPEC COLLECT: HCPCS

## 2022-04-26 PROCEDURE — 25010000002 PROPOFOL 1000 MG/100ML EMULSION: Performed by: ANESTHESIOLOGY

## 2022-04-26 PROCEDURE — U0003 INFECTIOUS AGENT DETECTION BY NUCLEIC ACID (DNA OR RNA); SEVERE ACUTE RESPIRATORY SYNDROME CORONAVIRUS 2 (SARS-COV-2) (CORONAVIRUS DISEASE [COVID-19]), AMPLIFIED PROBE TECHNIQUE, MAKING USE OF HIGH THROUGHPUT TECHNOLOGIES AS DESCRIBED BY CMS-2020-01-R: HCPCS

## 2022-04-26 PROCEDURE — 33233 REMOVAL OF PM GENERATOR: CPT | Performed by: INTERNAL MEDICINE

## 2022-04-26 PROCEDURE — 83735 ASSAY OF MAGNESIUM: CPT

## 2022-04-26 PROCEDURE — 80053 COMPREHEN METABOLIC PANEL: CPT

## 2022-04-26 PROCEDURE — A9270 NON-COVERED ITEM OR SERVICE: HCPCS | Performed by: INTERNAL MEDICINE

## 2022-04-26 PROCEDURE — G0378 HOSPITAL OBSERVATION PER HR: HCPCS

## 2022-04-26 PROCEDURE — 36415 COLL VENOUS BLD VENIPUNCTURE: CPT

## 2022-04-26 PROCEDURE — 85025 COMPLETE CBC W/AUTO DIFF WBC: CPT

## 2022-04-26 PROCEDURE — 63710000001 ATORVASTATIN 10 MG TABLET: Performed by: INTERNAL MEDICINE

## 2022-04-26 PROCEDURE — 87070 CULTURE OTHR SPECIMN AEROBIC: CPT | Performed by: INTERNAL MEDICINE

## 2022-04-26 PROCEDURE — 87205 SMEAR GRAM STAIN: CPT | Performed by: INTERNAL MEDICINE

## 2022-04-26 PROCEDURE — 63710000001 MONTELUKAST 10 MG TABLET: Performed by: INTERNAL MEDICINE

## 2022-04-26 RX ORDER — EPHEDRINE SULFATE 5 MG/ML
INJECTION INTRAVENOUS AS NEEDED
Status: DISCONTINUED | OUTPATIENT
Start: 2022-04-26 | End: 2022-04-26 | Stop reason: SURG

## 2022-04-26 RX ORDER — GLYCOPYRROLATE 0.2 MG/ML
INJECTION INTRAMUSCULAR; INTRAVENOUS AS NEEDED
Status: DISCONTINUED | OUTPATIENT
Start: 2022-04-26 | End: 2022-04-26 | Stop reason: SURG

## 2022-04-26 RX ORDER — ATORVASTATIN CALCIUM 10 MG/1
10 TABLET, FILM COATED ORAL NIGHTLY
Refills: 1 | Status: DISCONTINUED | OUTPATIENT
Start: 2022-04-26 | End: 2022-04-27 | Stop reason: HOSPADM

## 2022-04-26 RX ORDER — SODIUM CHLORIDE 0.9 % (FLUSH) 0.9 %
3-10 SYRINGE (ML) INJECTION AS NEEDED
Status: DISCONTINUED | OUTPATIENT
Start: 2022-04-26 | End: 2022-04-26

## 2022-04-26 RX ORDER — LIDOCAINE HYDROCHLORIDE AND EPINEPHRINE BITARTRATE 20; .01 MG/ML; MG/ML
INJECTION, SOLUTION SUBCUTANEOUS AS NEEDED
Status: DISCONTINUED | OUTPATIENT
Start: 2022-04-26 | End: 2022-04-26 | Stop reason: HOSPADM

## 2022-04-26 RX ORDER — PROPOFOL 10 MG/ML
INJECTION, EMULSION INTRAVENOUS CONTINUOUS PRN
Status: DISCONTINUED | OUTPATIENT
Start: 2022-04-26 | End: 2022-04-26 | Stop reason: SURG

## 2022-04-26 RX ORDER — AMLODIPINE BESYLATE 5 MG/1
2.5 TABLET ORAL
Status: DISCONTINUED | OUTPATIENT
Start: 2022-04-26 | End: 2022-04-27 | Stop reason: HOSPADM

## 2022-04-26 RX ORDER — SOTALOL HYDROCHLORIDE 80 MG/1
80 TABLET ORAL 2 TIMES DAILY
Status: DISCONTINUED | OUTPATIENT
Start: 2022-04-26 | End: 2022-04-27 | Stop reason: HOSPADM

## 2022-04-26 RX ORDER — SODIUM CHLORIDE 9 MG/ML
INJECTION, SOLUTION INTRAVENOUS CONTINUOUS PRN
Status: DISCONTINUED | OUTPATIENT
Start: 2022-04-26 | End: 2022-04-26 | Stop reason: SURG

## 2022-04-26 RX ORDER — MONTELUKAST SODIUM 10 MG/1
10 TABLET ORAL DAILY
Status: DISCONTINUED | OUTPATIENT
Start: 2022-04-26 | End: 2022-04-27 | Stop reason: HOSPADM

## 2022-04-26 RX ORDER — SODIUM CHLORIDE 0.9 % (FLUSH) 0.9 %
3 SYRINGE (ML) INJECTION EVERY 12 HOURS SCHEDULED
Status: DISCONTINUED | OUTPATIENT
Start: 2022-04-26 | End: 2022-04-26

## 2022-04-26 RX ADMIN — ATORVASTATIN CALCIUM 10 MG: 10 TABLET, FILM COATED ORAL at 22:22

## 2022-04-26 RX ADMIN — SODIUM CHLORIDE: 0.9 INJECTION, SOLUTION INTRAVENOUS at 17:56

## 2022-04-26 RX ADMIN — MONTELUKAST 10 MG: 10 TABLET, FILM COATED ORAL at 22:22

## 2022-04-26 RX ADMIN — GLYCOPYRROLATE 0.2 MG: 0.2 INJECTION INTRAMUSCULAR; INTRAVENOUS at 17:59

## 2022-04-26 RX ADMIN — CEFAZOLIN 2 G: 2 INJECTION, POWDER, FOR SOLUTION INTRAMUSCULAR; INTRAVENOUS at 17:28

## 2022-04-26 RX ADMIN — PROPOFOL 100 MCG/KG/MIN: 10 INJECTION, EMULSION INTRAVENOUS at 17:57

## 2022-04-26 RX ADMIN — EPHEDRINE SULFATE 5 MG: 5 INJECTION INTRAVENOUS at 18:00

## 2022-04-27 ENCOUNTER — APPOINTMENT (OUTPATIENT)
Dept: RESPIRATORY THERAPY | Facility: HOSPITAL | Age: 72
End: 2022-04-27

## 2022-04-27 ENCOUNTER — READMISSION MANAGEMENT (OUTPATIENT)
Dept: CALL CENTER | Facility: HOSPITAL | Age: 72
End: 2022-04-27

## 2022-04-27 VITALS
DIASTOLIC BLOOD PRESSURE: 77 MMHG | WEIGHT: 102.95 LBS | HEART RATE: 63 BPM | BODY MASS INDEX: 20.21 KG/M2 | OXYGEN SATURATION: 96 % | TEMPERATURE: 97.8 F | RESPIRATION RATE: 18 BRPM | HEIGHT: 60 IN | SYSTOLIC BLOOD PRESSURE: 155 MMHG

## 2022-04-27 PROCEDURE — 63710000001 AMLODIPINE 5 MG TABLET: Performed by: INTERNAL MEDICINE

## 2022-04-27 PROCEDURE — 99024 POSTOP FOLLOW-UP VISIT: CPT | Performed by: NURSE PRACTITIONER

## 2022-04-27 PROCEDURE — 25010000002 CEFAZOLIN PER 500 MG: Performed by: INTERNAL MEDICINE

## 2022-04-27 PROCEDURE — 63710000001 MONTELUKAST 10 MG TABLET: Performed by: INTERNAL MEDICINE

## 2022-04-27 PROCEDURE — A9270 NON-COVERED ITEM OR SERVICE: HCPCS | Performed by: INTERNAL MEDICINE

## 2022-04-27 PROCEDURE — 63710000001 SOTALOL 80 MG TABLET: Performed by: INTERNAL MEDICINE

## 2022-04-27 PROCEDURE — 93246 EXT ECG>7D<15D RECORDING: CPT

## 2022-04-27 PROCEDURE — G0378 HOSPITAL OBSERVATION PER HR: HCPCS

## 2022-04-27 RX ORDER — AMLODIPINE BESYLATE 2.5 MG/1
2.5 TABLET ORAL
Qty: 30 TABLET | Refills: 1 | Status: SHIPPED | OUTPATIENT
Start: 2022-04-27 | End: 2022-06-26

## 2022-04-27 RX ORDER — CEPHALEXIN 500 MG/1
500 CAPSULE ORAL 2 TIMES DAILY
Qty: 10 CAPSULE | Refills: 0 | Status: SHIPPED | OUTPATIENT
Start: 2022-04-27 | End: 2022-05-02

## 2022-04-27 RX ORDER — SOTALOL HYDROCHLORIDE 80 MG/1
40 TABLET ORAL 2 TIMES DAILY
Qty: 30 TABLET | Refills: 0 | Status: SHIPPED | OUTPATIENT
Start: 2022-04-27 | End: 2022-10-10 | Stop reason: SDUPTHER

## 2022-04-27 RX ORDER — ATORVASTATIN CALCIUM 10 MG/1
10 TABLET, FILM COATED ORAL NIGHTLY
Qty: 30 TABLET | Refills: 1 | Status: SHIPPED | OUTPATIENT
Start: 2022-04-27 | End: 2022-05-27

## 2022-04-27 RX ADMIN — SOTALOL HYDROCHLORIDE 40 MG: 80 TABLET ORAL at 09:51

## 2022-04-27 RX ADMIN — MONTELUKAST 10 MG: 10 TABLET, FILM COATED ORAL at 09:51

## 2022-04-27 RX ADMIN — AMLODIPINE BESYLATE 2.5 MG: 5 TABLET ORAL at 09:51

## 2022-04-27 RX ADMIN — CEFAZOLIN 2 G: 2 INJECTION, POWDER, FOR SOLUTION INTRAMUSCULAR; INTRAVENOUS at 09:50

## 2022-04-27 RX ADMIN — CEFAZOLIN 2 G: 2 INJECTION, POWDER, FOR SOLUTION INTRAMUSCULAR; INTRAVENOUS at 00:55

## 2022-04-27 NOTE — DISCHARGE INSTRUCTIONS
Pacemaker DC Instructions    After Procedure:    Avoid shoulder motion for the first twenty-four (24) hours.  DO NOT:    Raise your arm on the operative side above your head for 2 weeks  Perform strenuous upper body work using the arm on the operative side for 2 weeks.    Wear arm sling, if ordered by physician, to help decrease the use of arm on operative side.    After Discharge:      We recommend you wear a MEDIC-ALERT bracelet or necklace to alert medical personnel  A topical skin adhesive may be used to close the incision.  DO NOT rub, scratch, or pick at the wound.  Keep wound dry.  Avoid topical ointments.  Protect the wound from prolonged exposure to sunlight.  If steri strips are used, they should be left in place until seen by physician.  No driving until after your follow-up appointment with the Cardiologist.   Sponge bath, keeping dressing dry.Change dressing every 3 days until appointment    **Please be sure to read the pacemaker booklet given to you at time of discharge**

## 2022-04-27 NOTE — PLAN OF CARE
Goal Outcome Evaluation:  Plan of Care Reviewed With: patient, family        Progress: improving  Discussed care with brothers and pt. Pt mentally challenged but pleasant and cooperative.  Plan to be discharged in am,  Pt resides with brother, who will be driving pt home.

## 2022-04-27 NOTE — DISCHARGE SUMMARY
[facility]    Date of Admission: 4/26/2022    Date of Discharge:  4/27/2022    Length of stay:  LOS: 0 days     Admission Diagnosis: pacemaker infection    Discharge Diagnosis: s/p pacemaker removal    Presenting Problem/History of Present Illness  Active Hospital Problems    Diagnosis  POA   • **Pacemaker infection (HCC) [T82.7XXA]  Unknown      Resolved Hospital Problems   No resolved problems to display.        Hospital Course  Drake Beth is a 71 y.o. male presented for an elective pacemaker removal due to infection at surgical site. No procedural complications, patient was seen and examined this morning, no bleeding or hematoma at surgical incision site.  Device interrogation showed appropriate function and chest x-ray showed good lead positioning.  Patient will be discharged home today in a stable condition with Keflex for 5 days.  We will do a 2-week wound check appointment and I will see him in 2 months for follow-up.        Past Medical History:   Diagnosis Date   • Allergic rhinitis    • Anemia    • Atrial fibrillation (HCC)    • Chesty cough    • CHF (congestive heart failure) (HCC)    • Coronary artery disease    • Cough    • Fever    • Hyperlipidemia    • Hypertension    • Mentally challenged    • MVP (mitral valve prolapse)      Past Surgical History:   Procedure Laterality Date   • COLONOSCOPY      He has never had the test done.  His family is refusing any form of colon cancer screening for him.   • CORONARY ARTERY BYPASS GRAFT  2008    DR GOINS   • MITRAL VALVE REPLACEMENT  04/2008    WITH BIOPROSTHETIC TISSUE VALVE     Social History     Socioeconomic History   • Marital status: Single   Tobacco Use   • Smoking status: Never Smoker   • Smokeless tobacco: Never Used   Vaping Use   • Vaping Use: Never used   Substance and Sexual Activity   • Alcohol use: Never   • Drug use: Never   • Sexual activity: Never       Procedures Performed extraction of infected pacemaker       Consults:   Consulting  "Physician(s)             None            Pertinent Test Results:     Lab Results (last 72 hours)     Procedure Component Value Units Date/Time    Wound Culture - Surgical Site, Chest, Left [[id number]] Collected: 04/26/22 1908    Specimen: Surgical Site from Chest, Left Updated: 04/26/22 2101     Gram Stain Rare (1+) WBCs per low power field      No organisms seen    Wound Culture - Surgical Site, Chest, Left [[id number]] Collected: 04/26/22 1908    Specimen: Surgical Site from Chest, Left Updated: 04/26/22 2101     Gram Stain Rare (1+) WBCs per low power field      No organisms seen    Wound Culture - Surgical Site, Chest, Left [[id number]] Collected: 04/26/22 1908    Specimen: Surgical Site from Chest, Left Updated: 04/26/22 2100     Gram Stain Rare (1+) WBCs per low power field      No organisms seen              Imaging Results (Last 72 Hours)     ** No results found for the last 72 hours. **            Condition on Discharge: Stable    Vital Signs  /77 (BP Location: Right arm, Patient Position: Sitting)   Pulse 63   Temp 97.8 °F (36.6 °C) (Oral)   Resp 18   Ht 148.6 cm (58.5\")   Wt 46.7 kg (102 lb 15.3 oz)   SpO2 96%   BMI 21.15 kg/m²     Physical Exam  Vitals reviewed.   Constitutional:       Appearance: Normal appearance.   HENT:      Head: Normocephalic.   Eyes:      Conjunctiva/sclera: Conjunctivae normal.   Cardiovascular:      Rate and Rhythm: Normal rate and regular rhythm.      Pulses: Normal pulses.      Heart sounds: Normal heart sounds.   Pulmonary:      Effort: Pulmonary effort is normal.      Breath sounds: Normal breath sounds.   Abdominal:      General: Bowel sounds are normal.      Palpations: Abdomen is soft.   Skin:     General: Skin is warm and dry.   Neurological:      General: No focal deficit present.      Mental Status: He is alert and oriented to person, place, and time.         Discharge Disposition  Home or Self Care    Discharge Medications     Discharge Medications      New " Medications      Instructions Start Date   atorvastatin 10 MG tablet  Commonly known as: LIPITOR  Replaces: simvastatin 20 MG tablet   10 mg, Oral, Nightly      cephalexin 500 MG capsule  Commonly known as: Keflex   500 mg, Oral, 2 Times Daily         Changes to Medications      Instructions Start Date   amLODIPine 2.5 MG tablet  Commonly known as: NORVASC  What changed: Another medication with the same name was added. Make sure you understand how and when to take each.   AMLODIPINE BESYLATE 2.5 MG TABS      amLODIPine 2.5 MG tablet  Commonly known as: NORVASC  What changed: You were already taking a medication with the same name, and this prescription was added. Make sure you understand how and when to take each.   2.5 mg, Oral, Every 24 Hours Scheduled      sotalol 80 MG tablet  Commonly known as: BETAPACE  What changed: how much to take   40 mg, Oral, 2 Times Daily         Continue These Medications      Instructions Start Date   chlorhexidine 0.12 % solution  Commonly known as: PERIDEX   RINSE AND GARGLE 15 ML BY MOUTH OR THROAT TWICE DAILY      colestipol 1 g tablet  Commonly known as: COLESTID   TAKE 1 TABLET BY MOUTH FOUR TIMES DAILY      Eliquis 5 MG tablet tablet  Generic drug: apixaban   5 mg, Oral, 2 Times Daily      montelukast 10 MG tablet  Commonly known as: SINGULAIR   TAKE 1 TABLET BY MOUTH DAILY         Stop These Medications    metoprolol tartrate 25 MG tablet  Commonly known as: LOPRESSOR     simvastatin 20 MG tablet  Commonly known as: ZOCOR  Replaced by: atorvastatin 10 MG tablet            Discharge Diet: Cardiac    Activity at Discharge: Temporary restrictions regarding left arm movement were given to patient    Follow-up Appointments  Future Appointments   Date Time Provider Department Center   5/11/2022 11:30 AM [facility]       Risk for Readmission (LACE) Score: 3 (4/27/2022  6:01 AM)      Ansley Tomlin, IZAIAH  04/27/22  09:20 EDT    Electronically signed by IZAIAH Thapa, 04/27/22, 12:50 PM EDT.

## 2022-04-27 NOTE — OUTREACH NOTE
Prep Survey    Flowsheet Row Responses   [facility] patient discharged from? [facility]   Is LACE score < 7 ? Yes   Emergency Room discharge w/ pulse ox? No   Eligibility [facility]   Date of Admission 04/26/22   Date of Discharge 04/27/22   Discharge Disposition Home or Self Care   Discharge diagnosis Pacemaker infection w/ removal   Does the patient have one of the following disease processes/diagnoses(primary or secondary)? General Surgery   Does the patient have Home health ordered? No   Is there a DME ordered? No   Prep survey completed? Yes          ZURI Hood Registered Nurse

## 2022-04-28 ENCOUNTER — TRANSITIONAL CARE MANAGEMENT TELEPHONE ENCOUNTER (OUTPATIENT)
Dept: CALL CENTER | Facility: HOSPITAL | Age: 72
End: 2022-04-28

## 2022-04-28 NOTE — OUTREACH NOTE
Call Center TCM Note    Flowsheet Row Responses   [facility] facility patient discharged from? [facility]   Does the patient have one of the following disease processes/diagnoses(primary or secondary)? General Surgery   TCM attempt successful? No   Unsuccessful attempts Attempt 2          Sally Funes RN    4/28/2022, 16:17 EDT

## 2022-04-28 NOTE — OUTREACH NOTE
Call Center TCM Note    Flowsheet Row Responses   [facility] facility patient discharged from? [facility]   Does the patient have one of the following disease processes/diagnoses(primary or secondary)? General Surgery   TCM attempt successful? No   Unsuccessful attempts Attempt 1          Sally Funes RN    4/28/2022, 13:18 EDT

## 2022-04-29 ENCOUNTER — TRANSITIONAL CARE MANAGEMENT TELEPHONE ENCOUNTER (OUTPATIENT)
Dept: CALL CENTER | Facility: HOSPITAL | Age: 72
End: 2022-04-29

## 2022-04-29 ENCOUNTER — CLINICAL SUPPORT NO REQUIREMENTS (OUTPATIENT)
Dept: CARDIOLOGY | Facility: CLINIC | Age: 72
End: 2022-04-29

## 2022-04-29 DIAGNOSIS — T82.7XXA PACEMAKER INFECTION, INITIAL ENCOUNTER: Primary | ICD-10-CM

## 2022-04-29 LAB
BACTERIA SPEC AEROBE CULT: NORMAL
GRAM STN SPEC: NORMAL

## 2022-04-29 NOTE — OUTREACH NOTE
Call Center TCM Note    Flowsheet Row Responses   [facility] facility patient discharged from? [facility]   Does the patient have one of the following disease processes/diagnoses(primary or secondary)? General Surgery   TCM attempt successful? No   Unsuccessful attempts Attempt 3          Sally Funes RN    4/29/2022, 08:40 EDT

## 2022-04-29 NOTE — PROGRESS NOTES
Patient was here today to have his incision checked. Pacemaker explanted due to infection. Steri strips are intact, some dark red blood present. Applied telfa and tegaderm and will remove dressing on 5/11/22 when he comes in today. NC.

## 2022-05-05 NOTE — ANESTHESIA POSTPROCEDURE EVALUATION
Patient: Drake Beth    Procedure Summary     Date: 04/26/22 Room / Location: [facility] CATH LAB 3 / [facility] CATH INVASIVE LOCATION    Anesthesia Start: 1756 Anesthesia Stop: 1902    Procedure: PACEMAKER EXTRACTION (N/A ) Diagnosis:       Pacemaker infection (HCC)      (Infected pacemaker)    Providers: Rj Dominique MD Provider: Ana Mike MD    Anesthesia Type: MAC ASA Status: 4          Anesthesia Type: MAC    Vitals  Vitals Value Taken Time   /66 04/27/22 0031   Temp     Pulse 61 04/27/22 0031   Resp     SpO2 95 % 04/27/22 0000           Post Anesthesia Care and Evaluation    Patient location during evaluation: PACU  Patient participation: complete - patient participated  Level of consciousness: awake and alert  Pain management: satisfactory to patient  Airway patency: patent  Anesthetic complications: No anesthetic complications  PONV Status: none  Cardiovascular status: acceptable  Respiratory status: acceptable  Hydration status: acceptable

## 2022-05-06 NOTE — TELEPHONE ENCOUNTER
Please call his brother to see what clarifications he needs.  It looks like he is on colestipol and is supposed to take 4 pills a day.  Thank you.   right normal/left normal

## 2022-05-11 ENCOUNTER — CLINICAL SUPPORT NO REQUIREMENTS (OUTPATIENT)
Dept: CARDIOLOGY | Facility: CLINIC | Age: 72
End: 2022-05-11

## 2022-05-11 DIAGNOSIS — T82.7XXD PACEMAKER INFECTION, SUBSEQUENT ENCOUNTER: Primary | ICD-10-CM

## 2022-05-11 NOTE — PROGRESS NOTES
Patient was in office today s/p pacemaker explant. Removed dressing with no issues. Telfa was moist and skin telfa was against was dark pink and irritated in color. Incision was well approximated and healed. No heat, swelling or drainage noted.NC, follow up with Dr Dominique scheduled.

## 2022-05-12 ENCOUNTER — OFFICE VISIT (OUTPATIENT)
Dept: FAMILY MEDICINE CLINIC | Facility: CLINIC | Age: 72
End: 2022-05-12

## 2022-05-12 VITALS
DIASTOLIC BLOOD PRESSURE: 78 MMHG | WEIGHT: 104.8 LBS | BODY MASS INDEX: 20.58 KG/M2 | OXYGEN SATURATION: 97 % | TEMPERATURE: 98.2 F | HEIGHT: 60 IN | RESPIRATION RATE: 16 BRPM | HEART RATE: 59 BPM | SYSTOLIC BLOOD PRESSURE: 152 MMHG

## 2022-05-12 DIAGNOSIS — I10 PRIMARY HYPERTENSION: ICD-10-CM

## 2022-05-12 DIAGNOSIS — I48.0 PAROXYSMAL ATRIAL FIBRILLATION: ICD-10-CM

## 2022-05-12 DIAGNOSIS — R19.7 DIARRHEA, UNSPECIFIED TYPE: Primary | ICD-10-CM

## 2022-05-12 DIAGNOSIS — E78.2 MIXED HYPERLIPIDEMIA: ICD-10-CM

## 2022-05-12 DIAGNOSIS — K05.10 GINGIVITIS, CHRONIC: ICD-10-CM

## 2022-05-12 DIAGNOSIS — J30.1 SEASONAL ALLERGIC RHINITIS DUE TO POLLEN: ICD-10-CM

## 2022-05-12 PROCEDURE — 99214 OFFICE O/P EST MOD 30 MIN: CPT | Performed by: FAMILY MEDICINE

## 2022-05-12 RX ORDER — CHLORHEXIDINE GLUCONATE 0.12 MG/ML
15 RINSE ORAL 2 TIMES DAILY
Qty: 946 ML | Refills: 1 | Status: SHIPPED | OUTPATIENT
Start: 2022-05-12 | End: 2022-07-22

## 2022-05-12 RX ORDER — MONTELUKAST SODIUM 10 MG/1
10 TABLET ORAL DAILY
Qty: 90 TABLET | Refills: 1 | Status: SHIPPED | OUTPATIENT
Start: 2022-05-12 | End: 2023-01-02

## 2022-05-12 NOTE — PROGRESS NOTES
Subjective   Chief Complaint   Patient presents with   • Med Refill   • sugery f/u   • Hyperlipidemia   • Hypertension   • GI Problem   • Atrial Fibrillation   • Allergic Rhinitis     Drake Beth is a 71 y.o. male.     Patient Care Team:  Amada Haskins MD as PCP - General  Josr Rodriguez DPM as Consulting Physician (Podiatry)  Rj Dominique MD as Consulting Physician (Cardiology)  Rj Dominique MD as Consulting Physician (Cardiology)    Amber is coming in today with his brother to follow-up on his chronic medical problems and his medications and also his recent pacemaker placement.  We are addressing his hypertension, hyperlipidemia, seasonal allergies, atrial fibrillation, stomatitis, and chronic bowel movement issues.  This is a patient with mental disability.  He lives with his older brother who helps take care of him.  He is followed by cardiology.  He has been having issues with some chronic diarrhea and loose stools for a long time.  He has been on colestipol for many years.  Over the last few months his brother noted that he goes to the bathroom more frequently especially in the morning and at times his bowel movement is loose, however patient himself does not seem to be bothered by that and does not complain.  He is in his regular self, he has good appetite.       The following portions of the patient's history were reviewed and updated as appropriate: allergies, current medications, past family history, past medical history, past social history, past surgical history and problem list.  Past Medical History:   Diagnosis Date   • Allergic rhinitis    • Anemia    • Atrial fibrillation (HCC)    • Chesty cough    • CHF (congestive heart failure) (HCC)    • Coronary artery disease    • Cough    • Fever    • Hyperlipidemia    • Hypertension    • Mentally challenged    • MVP (mitral valve prolapse)      Past Surgical History:   Procedure Laterality Date   • CARDIAC ELECTROPHYSIOLOGY  "PROCEDURE N/A 4/26/2022    Procedure: PACEMAKER EXTRACTION;  Surgeon: Rj Dominique MD;  Location: [facility] CATH INVASIVE LOCATION;  Service: Cardiology;  Laterality: N/A;   • COLONOSCOPY      He has never had the test done.  His family is refusing any form of colon cancer screening for him.   • CORONARY ARTERY BYPASS GRAFT  2008    DR GOINS   • MITRAL VALVE REPLACEMENT  04/2008    WITH BIOPROSTHETIC TISSUE VALVE     The patient has a family history of  Family History   Problem Relation Age of Onset   • Coronary artery disease Mother 64   • Hyperlipidemia Sister    • Liver disease Sister         FATTY LIVER   • Rheum arthritis Sister    • Lung cancer Sister    • Hypertension Brother    • Other Brother    • Rheum arthritis Brother      Social History     Socioeconomic History   • Marital status: Single   Tobacco Use   • Smoking status: Never Smoker   • Smokeless tobacco: Never Used   Vaping Use   • Vaping Use: Never used   Substance and Sexual Activity   • Alcohol use: Never   • Drug use: Never   • Sexual activity: Never       Review of Systems   Constitutional: Negative for activity change, fatigue and fever.   Respiratory: Negative for shortness of breath and wheezing.    Cardiovascular: Negative for chest pain, palpitations and leg swelling.   Gastrointestinal: Positive for diarrhea.   Musculoskeletal: Negative for arthralgias and back pain.   Skin: Negative for rash.   Neurological: Negative for tremors and headache.     Visit Vitals  /78 (BP Location: Left arm, Patient Position: Sitting, Cuff Size: Adult)   Pulse 59   Temp 98.2 °F (36.8 °C) (Temporal)   Resp 16   Ht 148.6 cm (58.5\")   Wt 47.5 kg (104 lb 12.8 oz)   SpO2 97%   BMI 21.53 kg/m²       Current Outpatient Medications:   •  amLODIPine (NORVASC) 2.5 MG tablet, Take 1 tablet by mouth Daily for 60 days., Disp: 30 tablet, Rfl: 1  •  atorvastatin (LIPITOR) 10 MG tablet, Take 1 tablet by mouth Every Night for 30 days., Disp: 30 tablet, Rfl: 1  •  " chlorhexidine (PERIDEX) 0.12 % solution, Apply 15 mL to the mouth or throat 2 (Two) Times a Day., Disp: 946 mL, Rfl: 1  •  colestipol (COLESTID) 1 g tablet, TAKE 1 TABLET BY MOUTH FOUR TIMES DAILY, Disp: 360 tablet, Rfl: 0  •  ELIQUIS 5 MG tablet tablet, Take 5 mg by mouth 2 (Two) Times a Day., Disp: , Rfl:   •  montelukast (SINGULAIR) 10 MG tablet, Take 1 tablet by mouth Daily., Disp: 90 tablet, Rfl: 1  •  sotalol (BETAPACE) 80 MG tablet, Take 0.5 tablets by mouth 2 (Two) Times a Day for 30 days., Disp: 30 tablet, Rfl: 0    Objective   Physical Exam  Constitutional:       General: He is not in acute distress.     Appearance: Normal appearance. He is well-developed. He is not ill-appearing or diaphoretic.      Comments: Patient is in no distress, patient has normal voice and speech.  Normal respiratory effort.   HENT:      Head: Normocephalic and atraumatic.   Pulmonary:      Effort: Pulmonary effort is normal.   Musculoskeletal:      Cervical back: Normal range of motion and neck supple.   Neurological:      General: No focal deficit present.      Mental Status: He is alert and oriented to person, place, and time. Mental status is at baseline.   Psychiatric:         Mood and Affect: Mood normal.       CMP    CMP 4/26/22   Glucose 96   BUN 16   Creatinine 1.08   Sodium 136   Potassium 4.9   Chloride 99   Calcium 9.2   Albumin 4.40   Total Bilirubin 0.6   Alkaline Phosphatase 80   AST (SGOT) 35   ALT (SGPT) 16           CBC    CBC 4/26/22   WBC 7.10   RBC 4.28   Hemoglobin 14.1   Hematocrit 41.3   MCV 96.5   MCH 33.0   MCHC 34.2   RDW 12.9   Platelets 152               Assessment & Plan   Diagnoses and all orders for this visit:    1. Diarrhea, unspecified type (Primary)    2. Paroxysmal atrial fibrillation (HCC)    3. Primary hypertension    4. Mixed hyperlipidemia    5. Gingivitis, chronic  -     chlorhexidine (PERIDEX) 0.12 % solution; Apply 15 mL to the mouth or throat 2 (Two) Times a Day.  Dispense: 946 mL;  Refill: 1    6. Seasonal allergic rhinitis due to pollen  -     montelukast (SINGULAIR) 10 MG tablet; Take 1 tablet by mouth Daily.  Dispense: 90 tablet; Refill: 1      I reviewed his medical problems and his medications.  He is overall doing pretty well.  He has been having issues with some chronic diarrhea, which has been going on for years.  His brother noted possible worsening of the symptoms lately, but patient is not bothered by that.  He has never had colonoscopy and his family does not wish for him to proceed with that test due to his mental impairment.  He will continue his current medicines including colestipol.          Return in about 1 year (around 5/12/2023) for Medicare Wellness.    Requested Prescriptions     Signed Prescriptions Disp Refills   • chlorhexidine (PERIDEX) 0.12 % solution 946 mL 1     Sig: Apply 15 mL to the mouth or throat 2 (Two) Times a Day.   • montelukast (SINGULAIR) 10 MG tablet 90 tablet 1     Sig: Take 1 tablet by mouth Daily.

## 2022-05-22 PROCEDURE — 93248 EXT ECG>7D<15D REV&INTERPJ: CPT | Performed by: INTERNAL MEDICINE

## 2022-06-07 ENCOUNTER — OFFICE VISIT (OUTPATIENT)
Dept: CARDIOLOGY | Facility: CLINIC | Age: 72
End: 2022-06-07

## 2022-06-07 VITALS
DIASTOLIC BLOOD PRESSURE: 81 MMHG | OXYGEN SATURATION: 98 % | HEART RATE: 52 BPM | HEIGHT: 60 IN | SYSTOLIC BLOOD PRESSURE: 117 MMHG | BODY MASS INDEX: 20.27 KG/M2 | WEIGHT: 103.25 LBS

## 2022-06-07 DIAGNOSIS — I48.0 PAROXYSMAL ATRIAL FIBRILLATION: ICD-10-CM

## 2022-06-07 DIAGNOSIS — T82.7XXD PACEMAKER INFECTION, SUBSEQUENT ENCOUNTER: Primary | ICD-10-CM

## 2022-06-07 DIAGNOSIS — E78.2 MIXED HYPERLIPIDEMIA: ICD-10-CM

## 2022-06-07 DIAGNOSIS — I10 PRIMARY HYPERTENSION: ICD-10-CM

## 2022-06-07 PROCEDURE — 99213 OFFICE O/P EST LOW 20 MIN: CPT | Performed by: INTERNAL MEDICINE

## 2022-06-07 NOTE — PROGRESS NOTES
Progress note      Name: Drake Beth ADMIT: (Not on file)   : 1950  PCP: Amada Haskins MD    MRN: [id number] LOS: 0 days   AGE/SEX: 71 y.o. male  ROOM: Room/bed info not found     Chief Complaint   Patient presents with   • Pacemaker Check     PACEMAKER REMOVAL F/U       Subjective       History of present illness  Drake Beth is a 71-year-old male patient who has history of coronary artery disease status post CABG as well as bioprosthetic mitral valve in , also hypertension, developmental delay, paroxysmal atrial fibrillation.  Patient had a [employer] dual-chamber pacemaker implantation on 3/11/2022, however about 2 weeks later he developed pocket infection and pacemaker extraction was done on 2022.  Patient is here today for follow-up.  He is doing well denies having any chest pain or shortness of breath, no palpitations no lower extremity edema no syncopal episodes.    Past Medical History:   Diagnosis Date   • Allergic rhinitis    • Anemia    • Atrial fibrillation (HCC)    • Chesty cough    • CHF (congestive heart failure) (HCC)    • Coronary artery disease    • Cough    • Fever    • Hyperlipidemia    • Hypertension    • Mentally challenged    • MVP (mitral valve prolapse)      Past Surgical History:   Procedure Laterality Date   • CARDIAC ELECTROPHYSIOLOGY PROCEDURE N/A 2022    Procedure: PACEMAKER EXTRACTION;  Surgeon: Rj Dominique MD;  Location: [facility] INVASIVE LOCATION;  Service: Cardiology;  Laterality: N/A;   • COLONOSCOPY      He has never had the test done.  His family is refusing any form of colon cancer screening for him.   • CORONARY ARTERY BYPASS GRAFT      DR GOINS   • MITRAL VALVE REPLACEMENT  2008    WITH BIOPROSTHETIC TISSUE VALVE     Family History   Problem Relation Age of Onset   • Coronary artery disease Mother 64   • Hyperlipidemia Sister    • Liver disease Sister         FATTY LIVER   • Rheum arthritis Sister    • Lung cancer  Sister    • Hypertension Brother    • Other Brother    • Rheum arthritis Brother      Social History     Tobacco Use   • Smoking status: Never Smoker   • Smokeless tobacco: Never Used   Vaping Use   • Vaping Use: Never used   Substance Use Topics   • Alcohol use: Never   • Drug use: Never     (Not in a hospital admission)    Allergies:  Patient has no known allergies.      Physical Exam  VITALS REVIEWED    General:      well developed, in no acute distress.    Head:      normocephalic and atraumatic.    Eyes:      PERRL/EOM intact, conjunctiva and sclera clear with out nystagmus.    Neck:      no masses, thyromegaly,  trachea central with normal respiratory effort and PMI displaced laterally  Lungs:      Clear to auscultation bilaterally  Heart:       Regular rate and rhythm  Msk:      no deformity or scoliosis noted of thoracic or lumbar spine.    Pulses:      pulses normal in all 4 extremities.    Extremities:       No lower extremity edema  Neurologic:      no focal deficits.   alert oriented x3  Skin:      intact without lesions or rashes.    Psych:      alert and cooperative; normal mood and affect; normal attention span and concentration.      Result Review :               Pertinent cardiac workup    1.  Holter monitor for 11 days starting on 4/27/2022 showed sinus rhythm throughout with an average rate of 55 bpm, no A. fib no significant bradycardia.      Procedures        Assessment and Plan      Drake Beth is a 71-year-old male patient was history of CABG, bioprosthetic mitral valve, sick sinus syndrome and had a dual-chamber pacemaker implanted in March 2022 in an outside facility with subsequent pocket infection.  The system was extracted on 4/26/2022.  Following extraction patient had a Holter monitor which did not show any A. fib or significant bradycardia.  He remains on sotalol 40 twice a day as well as Eliquis for stroke prevention.  Since he is doing well for now we will continue same therapy  and I will see him in follow-up in 6 months.  We will need to do echocardiograms in the future to assess his mitral valve and cardiac function.    Diagnoses and all orders for this visit:    1. Pacemaker infection, subsequent encounter (Primary)  Overview:  Added automatically from request for surgery [id number]      2. Paroxysmal atrial fibrillation (HCC)    3. Mixed hyperlipidemia    4. Primary hypertension           Return in about 6 months (around 12/7/2022).  Patient was given instructions and counseling regarding his condition or for health maintenance advice. Please see specific information pulled into the AVS if appropriate.

## 2022-06-24 ENCOUNTER — TELEPHONE (OUTPATIENT)
Dept: CARDIOLOGY | Facility: CLINIC | Age: 72
End: 2022-06-24

## 2022-06-24 RX ORDER — ATORVASTATIN CALCIUM 10 MG/1
10 TABLET, FILM COATED ORAL DAILY
Qty: 90 TABLET | Refills: 3 | Status: SHIPPED | OUTPATIENT
Start: 2022-06-24

## 2022-06-24 RX ORDER — ATORVASTATIN CALCIUM 10 MG/1
10 TABLET, FILM COATED ORAL DAILY
COMMUNITY
End: 2022-06-24 | Stop reason: SDUPTHER

## 2022-06-24 NOTE — TELEPHONE ENCOUNTER
Caller: Drake Beth    Relationship: Self    Best call back number: [phone]    What medications are you currently taking:   Current Outpatient Medications on File Prior to Visit   Medication Sig Dispense Refill   • amLODIPine (NORVASC) 2.5 MG tablet Take 1 tablet by mouth Daily for 60 days. 30 tablet 1   • chlorhexidine (PERIDEX) 0.12 % solution Apply 15 mL to the mouth or throat 2 (Two) Times a Day. 946 mL 1   • colestipol (COLESTID) 1 g tablet TAKE 1 TABLET BY MOUTH FOUR TIMES DAILY 360 tablet 0   • ELIQUIS 5 MG tablet tablet Take 5 mg by mouth 2 (Two) Times a Day.     • montelukast (SINGULAIR) 10 MG tablet Take 1 tablet by mouth Daily. 90 tablet 1   • sotalol (BETAPACE) 80 MG tablet Take 0.5 tablets by mouth 2 (Two) Times a Day for 30 days. 30 tablet 0     No current facility-administered medications on file prior to visit.          When did you start taking these medications: FROM THE LAST OFFICE VISIT WITH DR ALMARAZ    Which medication are you concerned about: JUDY    Who prescribed you this medication: DR GONZALEZ    What are your concerns: PT DOES NOT HAVE THE MEDICATION LISTED SO CANT REFILL - ONLY HAS 3 DAYS LEFT FOR SUPPLY.

## 2022-06-24 NOTE — TELEPHONE ENCOUNTER
Rx Refill Note  Requested Prescriptions      No prescriptions requested or ordered in this encounter      Last office visit with prescribing clinician: 6/7/2022      Next office visit with prescribing clinician: 12/13/2022            Blanca Steiner MA  06/24/22, 09:55 EDT

## 2022-07-01 DIAGNOSIS — E78.2 MIXED HYPERLIPIDEMIA: ICD-10-CM

## 2022-07-01 RX ORDER — SIMVASTATIN 20 MG
TABLET ORAL
Qty: 90 TABLET | Refills: 1 | OUTPATIENT
Start: 2022-07-01

## 2022-07-07 RX ORDER — MONTELUKAST SODIUM 4 MG/1
TABLET, CHEWABLE ORAL
Qty: 360 TABLET | Refills: 0 | Status: SHIPPED | OUTPATIENT
Start: 2022-07-07 | End: 2022-10-06

## 2022-07-22 DIAGNOSIS — K05.10 GINGIVITIS, CHRONIC: ICD-10-CM

## 2022-07-22 RX ORDER — CHLORHEXIDINE GLUCONATE 0.12 MG/ML
RINSE ORAL
Qty: 946 ML | Refills: 1 | Status: SHIPPED | OUTPATIENT
Start: 2022-07-22 | End: 2023-01-03

## 2022-10-06 RX ORDER — MONTELUKAST SODIUM 4 MG/1
TABLET, CHEWABLE ORAL
Qty: 360 TABLET | Refills: 0 | Status: SHIPPED | OUTPATIENT
Start: 2022-10-06 | End: 2023-01-02

## 2022-10-10 RX ORDER — SOTALOL HYDROCHLORIDE 80 MG/1
40 TABLET ORAL 2 TIMES DAILY
Qty: 90 TABLET | Refills: 3 | Status: SHIPPED | OUTPATIENT
Start: 2022-10-10 | End: 2022-11-09

## 2022-10-10 NOTE — TELEPHONE ENCOUNTER
Rx Refill Note  Requested Prescriptions     Pending Prescriptions Disp Refills   • sotalol (BETAPACE) 80 MG tablet 30 tablet 0     Sig: Take 0.5 tablets by mouth 2 (Two) Times a Day for 30 days.      Last office visit with prescribing clinician: 6/7/2022      Next office visit with prescribing clinician: 12/13/2022            Blanca Steiner MA  10/10/22, 15:19 EDT

## 2022-10-10 NOTE — TELEPHONE ENCOUNTER
Caller: MAXIMILIANPETEY    Relationship: Emergency Contact    Best call back number: [phone]    Requested Prescriptions:   Requested Prescriptions     Pending Prescriptions Disp Refills   • sotalol (BETAPACE) 80 MG tablet 30 tablet 0     Sig: Take 0.5 tablets by mouth 2 (Two) Times a Day for 30 days.        Pharmacy where request should be sent: [id number] - [place], [place] - [address] - [phone] [facility] [phone] FX     Additional details provided by patient:  PATIENT CURRENTLY HAS 1 WEEK OF MEDICATION ON HAND     Does the patient have less than a 3 day supply:  [] Yes  [x] No    David Varela Rep   10/10/22 15:10 EDT

## 2022-12-08 ENCOUNTER — TELEPHONE (OUTPATIENT)
Dept: FAMILY MEDICINE CLINIC | Facility: CLINIC | Age: 72
End: 2022-12-08

## 2022-12-08 ENCOUNTER — OFFICE VISIT (OUTPATIENT)
Dept: FAMILY MEDICINE CLINIC | Facility: CLINIC | Age: 72
End: 2022-12-08

## 2022-12-08 VITALS
WEIGHT: 99.8 LBS | HEIGHT: 60 IN | RESPIRATION RATE: 16 BRPM | SYSTOLIC BLOOD PRESSURE: 149 MMHG | HEART RATE: 102 BPM | TEMPERATURE: 98 F | BODY MASS INDEX: 19.59 KG/M2 | OXYGEN SATURATION: 95 % | DIASTOLIC BLOOD PRESSURE: 85 MMHG

## 2022-12-08 DIAGNOSIS — J20.9 ACUTE BRONCHITIS, UNSPECIFIED ORGANISM: Primary | ICD-10-CM

## 2022-12-08 PROBLEM — J06.9 ACUTE URI: Status: ACTIVE | Noted: 2022-12-08

## 2022-12-08 PROCEDURE — 99213 OFFICE O/P EST LOW 20 MIN: CPT | Performed by: FAMILY MEDICINE

## 2022-12-08 RX ORDER — PROMETHAZINE HYDROCHLORIDE AND CODEINE PHOSPHATE 6.25; 1 MG/5ML; MG/5ML
5 SYRUP ORAL EVERY 4 HOURS PRN
Qty: 118 ML | Refills: 0 | Status: SHIPPED | OUTPATIENT
Start: 2022-12-08 | End: 2022-12-15 | Stop reason: SDUPTHER

## 2022-12-08 RX ORDER — AMLODIPINE BESYLATE 2.5 MG/1
2.5 TABLET ORAL DAILY
COMMUNITY
Start: 2022-12-02 | End: 2023-02-02

## 2022-12-08 RX ORDER — CEFDINIR 300 MG/1
300 CAPSULE ORAL 2 TIMES DAILY
Qty: 14 CAPSULE | Refills: 0 | Status: SHIPPED | OUTPATIENT
Start: 2022-12-08

## 2022-12-08 RX ORDER — PROMETHAZINE HYDROCHLORIDE AND CODEINE PHOSPHATE 6.25; 1 MG/5ML; MG/5ML
5 SYRUP ORAL EVERY 4 HOURS PRN
Qty: 118 ML | Refills: 0 | Status: SHIPPED | OUTPATIENT
Start: 2022-12-08 | End: 2022-12-08 | Stop reason: SDUPTHER

## 2022-12-08 RX ORDER — PREDNISONE 20 MG/1
20 TABLET ORAL 2 TIMES DAILY
Qty: 10 TABLET | Refills: 0 | Status: SHIPPED | OUTPATIENT
Start: 2022-12-08 | End: 2022-12-27

## 2022-12-08 NOTE — PROGRESS NOTES
Subjective   Chief Complaint   Patient presents with   • Cough   • URI     Lost appetite     Drake Beth is a 72 y.o. male.     Patient Care Team:  Amada Haskins MD as PCP - General  Josr Rodriguez DPM as Consulting Physician (Podiatry)  Rj Dominique MD as Consulting Physician (Cardiology)    History of Present Illness  He is coming in today with his brother who is his caregiver.  He reports that the patient has been sick for the last 10 days or so.  He has been experiencing a lot of cough and congestion.  No fever, wheezing, chest pains, vomiting, or diarrhea reported.  He was seen at urgent care last week and was given some cough medicine which did not really help.  The cough is still present.  It affects his daily functioning, but also affects his sleep.  His energy level is somehow lower than it is typical for him.  He is able to drink just fine, his appetite however seems to be decreased       The following portions of the patient's history were reviewed and updated as appropriate: allergies, current medications, past family history, past medical history, past social history, past surgical history and problem list.  Past Medical History:   Diagnosis Date   • Allergic rhinitis    • Anemia    • Atrial fibrillation (HCC)    • Chesty cough    • CHF (congestive heart failure) (HCC)    • Coronary artery disease    • Cough    • Fever    • Hyperlipidemia    • Hypertension    • Mentally challenged    • MVP (mitral valve prolapse)      Past Surgical History:   Procedure Laterality Date   • CARDIAC ELECTROPHYSIOLOGY PROCEDURE N/A 4/26/2022    Procedure: PACEMAKER EXTRACTION;  Surgeon: Rj Dominique MD;  Location: [facility] INVASIVE LOCATION;  Service: Cardiology;  Laterality: N/A;   • COLONOSCOPY      He has never had the test done.  His family is refusing any form of colon cancer screening for him.   • CORONARY ARTERY BYPASS GRAFT  2008    DR GOINS   • MITRAL VALVE REPLACEMENT  04/2008    WITH  "BIOPROSTHETIC TISSUE VALVE     The patient has a family history of  Family History   Problem Relation Age of Onset   • Coronary artery disease Mother 64   • Hyperlipidemia Sister    • Liver disease Sister         FATTY LIVER   • Rheum arthritis Sister    • Lung cancer Sister    • Hypertension Brother    • Other Brother    • Rheum arthritis Brother      Social History     Socioeconomic History   • Marital status: Single   Tobacco Use   • Smoking status: Never   • Smokeless tobacco: Never   Vaping Use   • Vaping Use: Never used   Substance and Sexual Activity   • Alcohol use: Never   • Drug use: Never   • Sexual activity: Never       Review of Systems   Constitutional: Negative for activity change, appetite change, chills and fever.   HENT: Positive for congestion. Negative for ear pain, postnasal drip, sinus pressure, sore throat and swollen glands.    Respiratory: Positive for cough. Negative for choking, chest tightness, shortness of breath, wheezing and stridor.    Cardiovascular: Negative for chest pain.   Skin: Negative for dry skin and rash.     Visit Vitals  /85 (BP Location: Left arm, Patient Position: Sitting, Cuff Size: Adult)   Pulse 102   Temp 98 °F (36.7 °C) (Temporal)   Resp 16   Ht 148.6 cm (58.5\")   Wt 45.3 kg (99 lb 12.8 oz)   SpO2 95%   BMI 20.50 kg/m²       Current Outpatient Medications:   •  amLODIPine (NORVASC) 2.5 MG tablet, Take 2.5 mg by mouth Daily., Disp: , Rfl:   •  atorvastatin (LIPITOR) 10 MG tablet, Take 1 tablet by mouth Daily., Disp: 90 tablet, Rfl: 3  •  chlorhexidine (PERIDEX) 0.12 % solution, RINSE AND GARGLE 15 ML BY MOUTH OR THROAT TWICE DAILY, Disp: 946 mL, Rfl: 1  •  colestipol (COLESTID) 1 g tablet, TAKE 1 TABLET BY MOUTH FOUR TIMES DAILY, Disp: 360 tablet, Rfl: 0  •  ELIQUIS 5 MG tablet tablet, Take 5 mg by mouth 2 (Two) Times a Day., Disp: , Rfl:   •  montelukast (SINGULAIR) 10 MG tablet, Take 1 tablet by mouth Daily., Disp: 90 tablet, Rfl: 1  •  promethazine-codeine " (PHENERGAN with CODEINE) 6.25-10 MG/5ML syrup, Take 5 mL by mouth Every 4 (Four) Hours As Needed for Cough., Disp: 118 mL, Rfl: 0  •  cefdinir (OMNICEF) 300 MG capsule, Take 1 capsule by mouth 2 (Two) Times a Day., Disp: 14 capsule, Rfl: 0  •  predniSONE (DELTASONE) 20 MG tablet, Take 1 tablet by mouth 2 (Two) Times a Day., Disp: 10 tablet, Rfl: 0  •  sotalol (BETAPACE) 80 MG tablet, Take 0.5 tablets by mouth 2 (Two) Times a Day for 30 days., Disp: 90 tablet, Rfl: 3    Objective   Physical Exam  Vitals and nursing note reviewed.   Constitutional:       General: He is not in acute distress.     Appearance: He is well-developed.   HENT:      Head: Normocephalic and atraumatic.      Comments: Wet cough noted.     Right Ear: External ear normal.      Left Ear: External ear normal.      Mouth/Throat:      Pharynx: No oropharyngeal exudate.   Eyes:      Conjunctiva/sclera: Conjunctivae normal.      Pupils: Pupils are equal, round, and reactive to light.   Cardiovascular:      Rate and Rhythm: Normal rate and regular rhythm.      Heart sounds: Normal heart sounds.   Pulmonary:      Effort: Pulmonary effort is normal. No respiratory distress.      Breath sounds: Normal breath sounds. No wheezing or rales.      Comments: Somewhat sounds noted primarily on the left lung base.  Musculoskeletal:      Cervical back: Normal range of motion and neck supple.   Skin:     General: Skin is warm and dry.      Findings: No rash.           Assessment & Plan   Diagnoses and all orders for this visit:    1. Acute bronchitis, unspecified organism (Primary)  -     predniSONE (DELTASONE) 20 MG tablet; Take 1 tablet by mouth 2 (Two) Times a Day.  Dispense: 10 tablet; Refill: 0  -     cefdinir (OMNICEF) 300 MG capsule; Take 1 capsule by mouth 2 (Two) Times a Day.  Dispense: 14 capsule; Refill: 0  -     Discontinue: promethazine-codeine (PHENERGAN with CODEINE) 6.25-10 MG/5ML syrup; Take 5 mL by mouth Every 4 (Four) Hours As Needed for Cough.   Dispense: 118 mL; Refill: 0  -     promethazine-codeine (PHENERGAN with CODEINE) 6.25-10 MG/5ML syrup; Take 5 mL by mouth Every 4 (Four) Hours As Needed for Cough.  Dispense: 118 mL; Refill: 0      I will be starting him on antibiotic and I also gave her prescription for prednisone.  Cough medication was also given.  I suggested to do the chest x-ray, but his further who is his primary caregiver prefers not to do it at this time as this is a patient with developmental delay and the family prefers to limit the testing if possible.  They will closely monitor the symptoms and contact us back if no improvement.        Return if symptoms worsen or fail to improve, for Recheck.    Requested Prescriptions     Signed Prescriptions Disp Refills   • predniSONE (DELTASONE) 20 MG tablet 10 tablet 0     Sig: Take 1 tablet by mouth 2 (Two) Times a Day.   • cefdinir (OMNICEF) 300 MG capsule 14 capsule 0     Sig: Take 1 capsule by mouth 2 (Two) Times a Day.   • promethazine-codeine (PHENERGAN with CODEINE) 6.25-10 MG/5ML syrup 118 mL 0     Sig: Take 5 mL by mouth Every 4 (Four) Hours As Needed for Cough.

## 2022-12-08 NOTE — TELEPHONE ENCOUNTER
I called patient brother Demetrio and informed him prescription was sent to [facility]

## 2022-12-08 NOTE — TELEPHONE ENCOUNTER
I spoke with Patient brother Demetrio and he would like the phenergan DM syrup called into [facility] on [address] please    Thank You

## 2022-12-15 ENCOUNTER — TELEPHONE (OUTPATIENT)
Dept: FAMILY MEDICINE CLINIC | Facility: CLINIC | Age: 72
End: 2022-12-15

## 2022-12-15 DIAGNOSIS — J20.9 ACUTE BRONCHITIS, UNSPECIFIED ORGANISM: ICD-10-CM

## 2022-12-15 DIAGNOSIS — R05.2 SUBACUTE COUGH: Primary | ICD-10-CM

## 2022-12-15 RX ORDER — PROMETHAZINE HYDROCHLORIDE AND CODEINE PHOSPHATE 6.25; 1 MG/5ML; MG/5ML
5 SYRUP ORAL EVERY 4 HOURS PRN
Qty: 118 ML | Refills: 0 | Status: SHIPPED | OUTPATIENT
Start: 2022-12-15 | End: 2022-12-27

## 2022-12-15 NOTE — TELEPHONE ENCOUNTER
I spoke to patient brother Demetrio and he will take him to [facility] today since I faxed the order. Demetrio states Adriel cough is dry and hacky and he is out of cough syrup.    Can you call into [facility] please

## 2022-12-15 NOTE — TELEPHONE ENCOUNTER
I put order for chest x-ray into his chart.  Please fax to [facility] and notified patient.  Thank you.

## 2022-12-15 NOTE — TELEPHONE ENCOUNTER
Caller: PETEY YAO    Relationship: Emergency Contact    Best call back number: [phone]    What is the medical concern/diagnosis: CHEST XRAY FOR A COUGH THAT WON'T GO AWAY.    What specialty or service is being requested: REFERRAL ORDER    What is the provider, practice or medical service name: [facility]

## 2022-12-16 ENCOUNTER — TELEPHONE (OUTPATIENT)
Dept: FAMILY MEDICINE CLINIC | Facility: CLINIC | Age: 72
End: 2022-12-16

## 2022-12-16 RX ORDER — BUDESONIDE AND FORMOTEROL FUMARATE DIHYDRATE 160; 4.5 UG/1; UG/1
AEROSOL RESPIRATORY (INHALATION)
Qty: 30.6 G | OUTPATIENT
Start: 2022-12-16

## 2022-12-16 RX ORDER — BUDESONIDE AND FORMOTEROL FUMARATE DIHYDRATE 160; 4.5 UG/1; UG/1
2 AEROSOL RESPIRATORY (INHALATION)
Qty: 10.2 G | Refills: 0 | Status: SHIPPED | OUTPATIENT
Start: 2022-12-16

## 2022-12-16 NOTE — TELEPHONE ENCOUNTER
I spoke with Demetrio patient brother and he verbally understood the results. He says patient sees the cardiologist regularly due to his enlarged heart. He would like an inhaler with spacer sent to [facility] [address]

## 2022-12-16 NOTE — TELEPHONE ENCOUNTER
Caller: PETEY YAO    Relationship: Emergency Contact    Best call back number: [phone]     Caller requesting test results: PETEY FLEMING    What test was performed: CHEST X-RAY    When was the test performed: 12/15/2022    Where was the test performed: [facility]    Additional notes: PATIENT'S BROTHER IS CALLING TO REQUEST A CALL WITH THE RESULTS OF HIS CXR DONE YESTERDAY.    PLEASE ADVISE.

## 2022-12-16 NOTE — TELEPHONE ENCOUNTER
Please advise the patient that his chest x-ray does not show any acute changes, no pneumonia.  He has some chronic changes in his lungs which might be possibly pointing towards chronic bronchitis.  The chest x-ray also shows enlarged heart and he is already followed by cardiologist.  I do recommend to try an inhaler on regular basis like Symbicort.  I note that patient might have some issues with using the inhaler due to his mental delay, however I can send prescription for the inhaler with a spacer which should be easier for patient to use if somebody assists him.  I suspect that his cough in part is due to inflammation in the bronchi and inhaler should help with that.  Let me know if they would agree to that so I can send prescription to the pharmacy.  Thank you.

## 2022-12-27 ENCOUNTER — OFFICE VISIT (OUTPATIENT)
Dept: CARDIOLOGY | Facility: CLINIC | Age: 72
End: 2022-12-27

## 2022-12-27 ENCOUNTER — OFFICE VISIT (OUTPATIENT)
Dept: FAMILY MEDICINE CLINIC | Facility: CLINIC | Age: 72
End: 2022-12-27

## 2022-12-27 VITALS
DIASTOLIC BLOOD PRESSURE: 68 MMHG | BODY MASS INDEX: 19.44 KG/M2 | HEART RATE: 65 BPM | OXYGEN SATURATION: 99 % | HEIGHT: 60 IN | WEIGHT: 99 LBS | SYSTOLIC BLOOD PRESSURE: 125 MMHG

## 2022-12-27 VITALS
HEART RATE: 64 BPM | DIASTOLIC BLOOD PRESSURE: 76 MMHG | WEIGHT: 99.2 LBS | HEIGHT: 60 IN | OXYGEN SATURATION: 98 % | SYSTOLIC BLOOD PRESSURE: 129 MMHG | BODY MASS INDEX: 19.48 KG/M2 | RESPIRATION RATE: 16 BRPM | TEMPERATURE: 97.5 F

## 2022-12-27 DIAGNOSIS — T82.7XXD PACEMAKER INFECTION, SUBSEQUENT ENCOUNTER: ICD-10-CM

## 2022-12-27 DIAGNOSIS — I48.0 PAROXYSMAL ATRIAL FIBRILLATION: Primary | ICD-10-CM

## 2022-12-27 DIAGNOSIS — I10 PRIMARY HYPERTENSION: ICD-10-CM

## 2022-12-27 DIAGNOSIS — R19.7 DIARRHEA, UNSPECIFIED TYPE: ICD-10-CM

## 2022-12-27 DIAGNOSIS — J06.9 ACUTE URI: Primary | ICD-10-CM

## 2022-12-27 PROCEDURE — 99214 OFFICE O/P EST MOD 30 MIN: CPT | Performed by: INTERNAL MEDICINE

## 2022-12-27 PROCEDURE — 93000 ELECTROCARDIOGRAM COMPLETE: CPT | Performed by: INTERNAL MEDICINE

## 2022-12-27 PROCEDURE — 99214 OFFICE O/P EST MOD 30 MIN: CPT | Performed by: FAMILY MEDICINE

## 2022-12-27 RX ORDER — DOXYCYCLINE HYCLATE 100 MG
100 TABLET ORAL 2 TIMES DAILY
Qty: 14 TABLET | Refills: 0 | Status: SHIPPED | OUTPATIENT
Start: 2022-12-27 | End: 2023-01-03

## 2022-12-27 RX ORDER — INHALER, ASSIST DEVICES
SPACER (EA) MISCELLANEOUS
COMMUNITY
Start: 2022-12-16

## 2022-12-27 NOTE — PROGRESS NOTES
Subjective   Chief Complaint   Patient presents with   • Cough   • Nose Bleed     On & off every day   • Diarrhea   • URI     Drake Beth is a 72 y.o. male.     Patient Care Team:  Amada Haskins MD as PCP - General  Josr Rodriguez DPM as Consulting Physician (Podiatry)  Rj Dominique MD as Consulting Physician (Cardiology)    History of Present Illness  He is coming in today with his brother to follow-up on ongoing cough which he has been dealing with for the last month or so.  He was originally seen at urgent care and also later on here.  He was treated with a course of antibiotic and steroid.  He was given cough medicine.  We also did a chest x-ray.  He is somehow better, cough is not as frequent, however still present.  No fever reported.  Patient has developmental delay and the majority of the history is being provided by his brother.  He was given prescription for the inhaler, which he was supposed to use with spacer.  However his brother thinks that he might not be getting all the medicine anyways.  He also wants to talk about the diarrhea which has been present for the last several days.  However patient has dealt with diarrhea on and off for many years.  He is on colestipol.  It was previously discussed with the family that he might have overflow diarrhea and he even tried a course of MiraLAX earlier this year which did not seem to help.  Patient has never had colonoscopy as family felt somehow reluctant to proceed with that due to his developmental delay, but they are now open to it.       The following portions of the patient's history were reviewed and updated as appropriate: allergies, current medications, past family history, past medical history, past social history, past surgical history and problem list.  Past Medical History:   Diagnosis Date   • Allergic rhinitis    • Anemia    • Atrial fibrillation (HCC)    • Chesty cough    • CHF (congestive heart failure) (HCC)    • Coronary  "artery disease    • Cough    • Fever    • Hyperlipidemia    • Hypertension    • Mentally challenged    • MVP (mitral valve prolapse)      Past Surgical History:   Procedure Laterality Date   • CARDIAC ELECTROPHYSIOLOGY PROCEDURE N/A 4/26/2022    Procedure: PACEMAKER EXTRACTION;  Surgeon: Rj Dominique MD;  Location: [facility] CATH INVASIVE LOCATION;  Service: Cardiology;  Laterality: N/A;   • COLONOSCOPY      He has never had the test done.  His family is refusing any form of colon cancer screening for him.   • CORONARY ARTERY BYPASS GRAFT  2008    DR GOINS   • MITRAL VALVE REPLACEMENT  04/2008    WITH BIOPROSTHETIC TISSUE VALVE     The patient has a family history of  Family History   Problem Relation Age of Onset   • Coronary artery disease Mother 64   • Hyperlipidemia Sister    • Liver disease Sister         FATTY LIVER   • Rheum arthritis Sister    • Lung cancer Sister    • Hypertension Brother    • Other Brother    • Rheum arthritis Brother      Social History     Socioeconomic History   • Marital status: Single   Tobacco Use   • Smoking status: Never   • Smokeless tobacco: Never   Vaping Use   • Vaping Use: Never used   Substance and Sexual Activity   • Alcohol use: Never   • Drug use: Never   • Sexual activity: Never       Review of Systems   Constitutional: Negative for activity change, appetite change, chills and fever.   HENT: Positive for congestion. Negative for ear pain, postnasal drip, sinus pressure, sore throat and swollen glands.    Respiratory: Positive for cough. Negative for choking, chest tightness, shortness of breath, wheezing and stridor.    Cardiovascular: Negative for chest pain.   Gastrointestinal: Positive for diarrhea. Negative for abdominal pain, nausea and vomiting.   Skin: Negative for dry skin and rash.     Visit Vitals  /76 (BP Location: Right arm, Patient Position: Sitting, Cuff Size: Adult)   Pulse 64   Temp 97.5 °F (36.4 °C) (Temporal)   Resp 16   Ht 148.6 cm (58.5\")   Wt " 45 kg (99 lb 3.2 oz)   SpO2 98%   BMI 20.38 kg/m²       Current Outpatient Medications:   •  amLODIPine (NORVASC) 2.5 MG tablet, Take 2.5 mg by mouth Daily., Disp: , Rfl:   •  atorvastatin (LIPITOR) 10 MG tablet, Take 1 tablet by mouth Daily., Disp: 90 tablet, Rfl: 3  •  budesonide-formoterol (Symbicort) 160-4.5 MCG/ACT inhaler, Inhale 2 puffs 2 (Two) Times a Day. Please dispense with a spacer, Disp: 10.2 g, Rfl: 0  •  cefdinir (OMNICEF) 300 MG capsule, Take 1 capsule by mouth 2 (Two) Times a Day., Disp: 14 capsule, Rfl: 0  •  chlorhexidine (PERIDEX) 0.12 % solution, RINSE AND GARGLE 15 ML BY MOUTH OR THROAT TWICE DAILY, Disp: 946 mL, Rfl: 1  •  colestipol (COLESTID) 1 g tablet, TAKE 1 TABLET BY MOUTH FOUR TIMES DAILY, Disp: 360 tablet, Rfl: 0  •  ELIQUIS 5 MG tablet tablet, Take 5 mg by mouth 2 (Two) Times a Day., Disp: , Rfl:   •  montelukast (SINGULAIR) 10 MG tablet, Take 1 tablet by mouth Daily., Disp: 90 tablet, Rfl: 1  •  Spacer/Aero-Holding Chambers (Valved Holding Chamber) device, USE WITH SYMBICORT INHALER AS DIRECTED, Disp: , Rfl:   •  doxycycline (VIBRAMYICN) 100 MG tablet, Take 1 tablet by mouth 2 (Two) Times a Day for 7 days., Disp: 14 tablet, Rfl: 0  •  sotalol (BETAPACE) 80 MG tablet, Take 0.5 tablets by mouth 2 (Two) Times a Day for 30 days., Disp: 90 tablet, Rfl: 3    Objective   Physical Exam  Vitals and nursing note reviewed.   Constitutional:       General: He is not in acute distress.     Appearance: He is well-developed.   HENT:      Head: Normocephalic and atraumatic.      Right Ear: External ear normal.      Left Ear: External ear normal.      Mouth/Throat:      Pharynx: No oropharyngeal exudate.   Eyes:      Conjunctiva/sclera: Conjunctivae normal.      Pupils: Pupils are equal, round, and reactive to light.   Cardiovascular:      Rate and Rhythm: Normal rate and regular rhythm.      Heart sounds: Normal heart sounds.   Pulmonary:      Effort: Pulmonary effort is normal. No respiratory  distress.      Breath sounds: Normal breath sounds. No stridor. No wheezing, rhonchi or rales.   Musculoskeletal:      Cervical back: Normal range of motion and neck supple.   Skin:     General: Skin is warm and dry.      Findings: No rash.       CMP    CMP 4/26/22   Glucose 96   BUN 16   Creatinine 1.08   eGFR 73.8   Sodium 136   Potassium 4.9   Chloride 99   Calcium 9.2   Total Protein 8.0   Albumin 4.40   Globulin 3.6   Total Bilirubin 0.6   Alkaline Phosphatase 80   AST (SGOT) 35   ALT (SGPT) 16   Albumin/Globulin Ratio 1.2   BUN/Creatinine Ratio 14.8   Anion Gap 11.0      Comments are available for some flowsheets but are not being displayed.           CBC    CBC 4/26/22   WBC 7.10   RBC 4.28   Hemoglobin 14.1   Hematocrit 41.3   MCV 96.5   MCH 33.0   MCHC 34.2   RDW 12.9   Platelets 152               Assessment & Plan   Diagnoses and all orders for this visit:    1. Acute URI (Primary)  -     doxycycline (VIBRAMYICN) 100 MG tablet; Take 1 tablet by mouth 2 (Two) Times a Day for 7 days.  Dispense: 14 tablet; Refill: 0    2. Diarrhea, unspecified type  -     Ambulatory Referral to Gastroenterology  -     Clostridioides difficile EIA - Stool, Per Rectum; Future  -     Gastrointestinal Panel, PCR - Stool, Per Rectum; Future      I reviewed his symptoms and concerns.  I will be starting him on doxycycline.  He may continue inhaler as prescribed previously.  He will need assistance from his siblings due to his developmental delay and they have been provided a great assistance.  I also addressed his on and off diarrhea issues which seems to be getting worse lately.  I will be getting stool studies.  I once again discussed with the family to proceed with possible colonoscopy and they would like to see the GI to discuss that.  He may continue colestipol.        Return if symptoms worsen or fail to improve, for Recheck.    Requested Prescriptions     Signed Prescriptions Disp Refills   • doxycycline (VIBRAMYICN) 100 MG  tablet 14 tablet 0     Sig: Take 1 tablet by mouth 2 (Two) Times a Day for 7 days.

## 2022-12-27 NOTE — PROGRESS NOTES
Progress note      Name: Drake Beth ADMIT: (Not on file)   : 1950  PCP: Amada Haskins MD    MRN: [id number] LOS: 0 days   AGE/SEX: 72 y.o. male  ROOM: Room/bed info not found     Chief Complaint   Patient presents with   • Follow-up     6 month follow up          Subjective       History of present illness  Drake Beth is a 71-year-old male patient who has history of coronary artery disease status post CABG as well as bioprosthetic mitral valve in , also hypertension, developmental delay, paroxysmal atrial fibrillation.  Patient had a [employer] dual-chamber pacemaker implantation on 3/11/2022 at an outside facility, however about 2 weeks later he developed pocket infection and pacemaker extraction was done on 2022.  Patient is here today for follow-up, since the pacemaker extraction, he has not had any significant issues, here lately he developed a cough but denies having any chest pain, no palpitations no lower extremity edema no syncopal episodes.    Past Medical History:   Diagnosis Date   • Allergic rhinitis    • Anemia    • Atrial fibrillation (HCC)    • Chesty cough    • CHF (congestive heart failure) (HCC)    • Coronary artery disease    • Cough    • Fever    • Hyperlipidemia    • Hypertension    • Mentally challenged    • MVP (mitral valve prolapse)      Past Surgical History:   Procedure Laterality Date   • CARDIAC ELECTROPHYSIOLOGY PROCEDURE N/A 2022    Procedure: PACEMAKER EXTRACTION;  Surgeon: Rj Dominique MD;  Location: [facility] INVASIVE LOCATION;  Service: Cardiology;  Laterality: N/A;   • COLONOSCOPY      He has never had the test done.  His family is refusing any form of colon cancer screening for him.   • CORONARY ARTERY BYPASS GRAFT      DR GOINS   • MITRAL VALVE REPLACEMENT  2008    WITH BIOPROSTHETIC TISSUE VALVE     Family History   Problem Relation Age of Onset   • Coronary artery disease Mother 64   • Hyperlipidemia Sister    • Liver  disease Sister         FATTY LIVER   • Rheum arthritis Sister    • Lung cancer Sister    • Hypertension Brother    • Other Brother    • Rheum arthritis Brother      Social History     Tobacco Use   • Smoking status: Never   • Smokeless tobacco: Never   Vaping Use   • Vaping Use: Never used   Substance Use Topics   • Alcohol use: Never   • Drug use: Never     (Not in a hospital admission)    Allergies:  Patient has no known allergies.      Physical Exam  VITALS REVIEWED    General:      well developed, in no acute distress.    Head:      normocephalic and atraumatic.    Eyes:      PERRL/EOM intact, conjunctiva and sclera clear with out nystagmus.    Neck:      no masses, thyromegaly,  trachea central with normal respiratory effort and PMI displaced laterally  Lungs:      Clear to auscultation bilaterally  Heart:       Regular rate and rhythm  Msk:      no deformity or scoliosis noted of thoracic or lumbar spine.    Pulses:      pulses normal in all 4 extremities.    Extremities:       No lower extremity edema  Neurologic:      no focal deficits.   alert oriented x3  Skin:      intact without lesions or rashes.    Psych:      alert and cooperative; normal mood and affect; normal attention span and concentration.      Result Review :               Pertinent cardiac workup    1. Holter monitor for 11 days starting on 4/27/2022, showed sinus rhythm with an average rate of 55, no A. fib, no significant bradycardia.        ECG 12 Lead    Date/Time: 12/27/2022 3:43 PM  Performed by: Rj Dominique MD  Authorized by: Rj Dominique MD   Comparison: compared with previous ECG   Similar to previous ECG  Rhythm: sinus rhythm  Rate: normal  BPM: 62  Conduction: conduction normal  ST Segments: ST segments normal  T Waves: T waves normal  QRS axis: normal                  Assessment and Plan      Drake Beth is a 72-year-old male patient was history of CABG, bioprosthetic mitral valve, sick sinus syndrome, dual-chamber  pacemaker implanted in March 2022 in an outside facility with subsequent pocket infection.  System was extracted on 4/26/2022.  Following extraction, she had a Holter monitor which did not show any significant bradycardia.  He remains on sotalol 40 mg twice a day as well as Eliquis for stroke prevention.  Today's EKG also shows normal sinus rhythm.  He does not have any anginal symptoms or CHF symptoms, he is on Lipitor and his last LDL was 91 in February 2021.  For now continue same therapy, we will see him in follow-up in 6 months.    Diagnoses and all orders for this visit:    1. Paroxysmal atrial fibrillation (HCC) (Primary)    2. Primary hypertension    3. Pacemaker infection, subsequent encounter  Overview:  Added automatically from request for surgery [id number]             Return in about 6 months (around 6/27/2023).  Patient was given instructions and counseling regarding his condition or for health maintenance advice. Please see specific information pulled into the AVS if appropriate.

## 2022-12-28 ENCOUNTER — TELEPHONE (OUTPATIENT)
Dept: FAMILY MEDICINE CLINIC | Facility: CLINIC | Age: 72
End: 2022-12-28

## 2022-12-28 DIAGNOSIS — R05.2 SUBACUTE COUGH: Primary | ICD-10-CM

## 2022-12-28 RX ORDER — PROMETHAZINE HYDROCHLORIDE AND CODEINE PHOSPHATE 6.25; 1 MG/5ML; MG/5ML
5 SYRUP ORAL EVERY 4 HOURS PRN
Qty: 180 ML | Refills: 0 | Status: SHIPPED | OUTPATIENT
Start: 2022-12-28

## 2022-12-28 NOTE — TELEPHONE ENCOUNTER
Caller: PETEY YAO    Relationship: Emergency Contact    Best call back number: [phone]    What medication are you requesting: COUGH SYRUP     What are your current symptoms: COUGH     How long have you been experiencing symptoms: 4 WEEKS     Have you had these symptoms before:    [x] Yes  [] No    Have you been treated for these symptoms before:   [x] Yes  [] No    If a prescription is needed, what is your preferred pharmacy and phone number: [facility], [place] -[id number] [facility] [place] - [address] [facility] [phone] [facility] [phone]          Additional noteS: PATIENTS BROTHER STATES THAT THE PATIENT DENIED A COUGH MEDICATION AT HIS 12/27/22 APPOINTMENT, BUT HAS NOT BEEN ABLE TO GET OVER THE COUGH.     PATIENTS BROTHER IS REQUESTING A MEDICATION TO HELP.

## 2023-01-01 DIAGNOSIS — J30.1 SEASONAL ALLERGIC RHINITIS DUE TO POLLEN: ICD-10-CM

## 2023-01-02 RX ORDER — MONTELUKAST SODIUM 4 MG/1
TABLET, CHEWABLE ORAL
Qty: 360 TABLET | Refills: 0 | Status: SHIPPED | OUTPATIENT
Start: 2023-01-02 | End: 2023-03-28

## 2023-01-02 RX ORDER — MONTELUKAST SODIUM 10 MG/1
10 TABLET ORAL DAILY
Qty: 90 TABLET | Refills: 1 | Status: SHIPPED | OUTPATIENT
Start: 2023-01-02

## 2023-01-03 DIAGNOSIS — K05.10 GINGIVITIS, CHRONIC: ICD-10-CM

## 2023-01-03 RX ORDER — CHLORHEXIDINE GLUCONATE 0.12 MG/ML
RINSE ORAL
Qty: 946 ML | Refills: 1 | Status: SHIPPED | OUTPATIENT
Start: 2023-01-03

## 2023-01-06 ENCOUNTER — TELEPHONE (OUTPATIENT)
Dept: FAMILY MEDICINE CLINIC | Facility: CLINIC | Age: 73
End: 2023-01-06

## 2023-01-06 NOTE — TELEPHONE ENCOUNTER
Caller: PETEY YAO    Relationship: Emergency Contact    Best call back number:     What is the best time to reach you:     Who are you requesting to speak with (clinical staff, provider,  specific staff member):     Do you know the name of the person who called:     What was the call regarding: PATIENTS BROTHER PETEY IS CALLING IN TO REQUEST A CALL BACK AS HE NEEDS TO DISCUSS A STOOL SAMPLE THAT THE PATIENT WAS TO HAVE.     Do you require a callback: YES

## 2023-01-06 NOTE — TELEPHONE ENCOUNTER
I called patient brother Demetrio and apparently, on Monday Drake started having normal solid Bowel movements/ they are going to monitor/ If it continues to be Normal BM's they will not see GSI or do stool kit.      Thank You

## 2023-02-02 RX ORDER — AMLODIPINE BESYLATE 2.5 MG/1
TABLET ORAL
Qty: 90 TABLET | Refills: 2 | Status: SHIPPED | OUTPATIENT
Start: 2023-02-02

## 2023-02-02 NOTE — TELEPHONE ENCOUNTER
Rx Refill Note  Requested Prescriptions     Pending Prescriptions Disp Refills   • amLODIPine (NORVASC) 2.5 MG tablet [Pharmacy Med Name: AMLODIPINE BESYLATE 2.5MG TABLETS] 30 tablet      Sig: TAKE 1 TABLET BY MOUTH DAILY      Last office visit with prescribing clinician: 12/27/2023   Next office visit with prescribing clinician: 6/27/23                         Would you like a call back once the refill request has been completed: [] Yes [] No    If the office needs to give you a call back, can they leave a voicemail: [] Yes [] No    Blanca Steiner MA  02/02/23, 09:57 EST

## 2023-03-28 RX ORDER — MONTELUKAST SODIUM 4 MG/1
TABLET, CHEWABLE ORAL
Qty: 360 TABLET | Refills: 0 | Status: SHIPPED | OUTPATIENT
Start: 2023-03-28

## 2023-05-12 DIAGNOSIS — K05.10 GINGIVITIS, CHRONIC: ICD-10-CM

## 2023-05-12 RX ORDER — CHLORHEXIDINE GLUCONATE 0.12 MG/ML
RINSE ORAL
Qty: 946 ML | Refills: 1 | Status: SHIPPED | OUTPATIENT
Start: 2023-05-12

## 2023-06-12 DIAGNOSIS — E78.2 MIXED HYPERLIPIDEMIA: Primary | ICD-10-CM

## 2023-06-12 DIAGNOSIS — I10 PRIMARY HYPERTENSION: ICD-10-CM

## 2023-06-12 RX ORDER — ATORVASTATIN CALCIUM 10 MG/1
10 TABLET, FILM COATED ORAL DAILY
Qty: 90 TABLET | Refills: 3 | Status: SHIPPED | OUTPATIENT
Start: 2023-06-12

## 2023-06-14 NOTE — TELEPHONE ENCOUNTER
Spoke with patient brother Richi. Informed patient needs to have labs done. Patients brother voiced understanding

## 2023-07-25 ENCOUNTER — OFFICE VISIT (OUTPATIENT)
Dept: CARDIOLOGY | Facility: CLINIC | Age: 73
End: 2023-07-25
Payer: MEDICARE

## 2023-07-25 VITALS
OXYGEN SATURATION: 98 % | BODY MASS INDEX: 19.36 KG/M2 | SYSTOLIC BLOOD PRESSURE: 141 MMHG | DIASTOLIC BLOOD PRESSURE: 90 MMHG | HEIGHT: 60 IN | WEIGHT: 98.6 LBS | HEART RATE: 94 BPM

## 2023-07-25 DIAGNOSIS — I48.92 ATRIAL FLUTTER WITH CONTROLLED RESPONSE: ICD-10-CM

## 2023-07-25 DIAGNOSIS — I35.0 NONRHEUMATIC AORTIC VALVE STENOSIS: Primary | ICD-10-CM

## 2023-07-25 PROCEDURE — 93000 ELECTROCARDIOGRAM COMPLETE: CPT | Performed by: INTERNAL MEDICINE

## 2023-07-25 PROCEDURE — 99213 OFFICE O/P EST LOW 20 MIN: CPT | Performed by: INTERNAL MEDICINE

## 2023-07-25 PROCEDURE — 3077F SYST BP >= 140 MM HG: CPT | Performed by: INTERNAL MEDICINE

## 2023-07-25 PROCEDURE — 1159F MED LIST DOCD IN RCRD: CPT | Performed by: INTERNAL MEDICINE

## 2023-07-25 PROCEDURE — 1160F RVW MEDS BY RX/DR IN RCRD: CPT | Performed by: INTERNAL MEDICINE

## 2023-07-25 PROCEDURE — 3080F DIAST BP >= 90 MM HG: CPT | Performed by: INTERNAL MEDICINE

## 2023-07-25 RX ORDER — SOTALOL HYDROCHLORIDE 80 MG/1
TABLET ORAL
Qty: 180 TABLET | Refills: 1 | Status: SHIPPED | OUTPATIENT
Start: 2023-07-25

## 2023-07-25 NOTE — PROGRESS NOTES
Progress note      Name: Drake Beth ADMIT: (Not on file)   : 1950  PCP: Amada Haskins MD    MRN: [id number] LOS: 0 days   AGE/SEX: 72 y.o. male  ROOM: Room/bed info not found     Chief Complaint   Patient presents with    Follow-up     6 mo       Subjective       History of present illness  Drake Beth is a 72-year-old male patient who has history of coronary artery disease status post CABG as well as bioprosthetic mitral valve in , also hypertension, developmental delay, paroxysmal atrial fibrillation.  Patient had a [employer] dual-chamber pacemaker implantation on 3/11/2022 at an outside facility, however about 2 weeks later he developed pocket infection and pacemaker extraction was done on 2022.   Patient is here today for follow-up, he is doing well denies any chest pain or shortness of breath, no palpitations no lower extremity edema no syncopal episodes.    Past Medical History:   Diagnosis Date    Allergic rhinitis     Anemia     Atrial fibrillation     Chesty cough     CHF (congestive heart failure)     Coronary artery disease     Cough     Fever     Hyperlipidemia     Hypertension     Mentally challenged     MVP (mitral valve prolapse)      Past Surgical History:   Procedure Laterality Date    CARDIAC ELECTROPHYSIOLOGY PROCEDURE N/A 2022    Procedure: PACEMAKER EXTRACTION;  Surgeon: Rj Dominique MD;  Location: [facility] CATH INVASIVE LOCATION;  Service: Cardiology;  Laterality: N/A;    COLONOSCOPY      He has never had the test done.  His family is refusing any form of colon cancer screening for him.    CORONARY ARTERY BYPASS GRAFT      DR SWEAT    MITRAL VALVE REPLACEMENT  2008    WITH BIOPROSTHETIC TISSUE VALVE     Family History   Problem Relation Age of Onset    Coronary artery disease Mother 64    Hyperlipidemia Sister     Liver disease Sister         FATTY LIVER    Rheum arthritis Sister     Lung cancer Sister     Hypertension Brother     Other  Brother     Rheum arthritis Brother      Social History     Tobacco Use    Smoking status: Never    Smokeless tobacco: Never   Vaping Use    Vaping Use: Never used   Substance Use Topics    Alcohol use: Never    Drug use: Never     (Not in a hospital admission)    Allergies:  Patient has no known allergies.      Physical Exam  VITALS REVIEWED    General:      well developed, in no acute distress.    Head:      normocephalic and atraumatic.    Eyes:      PERRL/EOM intact, conjunctiva and sclera clear with out nystagmus.    Neck:      no masses, thyromegaly,  trachea central with normal respiratory effort and PMI displaced laterally  Lungs:      Clear to auscultation bilaterally  Heart:       Regular rate and rhythm  Msk:      no deformity or scoliosis noted of thoracic or lumbar spine.    Pulses:      pulses normal in all 4 extremities.    Extremities:       No lower extremity edema  Neurologic:      no focal deficits.   alert oriented x3  Skin:      intact without lesions or rashes.    Psych:      alert and cooperative; normal mood and affect; normal attention span and concentration.      Result Review :               Pertinent cardiac workup    Holter monitor for 11 days starting on 4/27/2022, showed sinus rhythm with an average rate of 55, no A. fib, no significant bradycardia.         ECG 12 Lead    Date/Time: 7/25/2023 3:10 PM  Performed by: Rj Dominique MD  Authorized by: Rj Dominique MD   Comparison: compared with previous ECG   Comparison to previous ECG: Previously in sinus  Rhythm: atrial flutter  Rate: normal  BPM: 93  Conduction: conduction normal  ST Segments: ST segments normal  QRS axis: normal  Other findings: non-specific ST-T wave changes    Clinical impression: abnormal EKG            Assessment and Plan      Drake Beth is a 72-year-old male patient with history of CABG, bioprosthetic mitral valve, sick sinus syndrome, dual-chamber pacemaker implanted in March 2022 at an outside  facility with subsequent pocket infection.  System was extracted on 4/26/2022.  Following extraction, he had a Holter monitor which did not show any significant bradycardia.  Patient had been on sotalol 40 mg twice a day along with Eliquis for stroke prevention.  His EKG on his previous visit had shown sinus rhythm, however today he is in atrial flutter with slow cycle length and slow ventricular response in the 90s.  Possibly atypical flutter given his history of mitral valve surgery.  I had a long discussion with the patient's brother who is usually present with his office visits, they would like to avoid invasive procedures if all possible.  So for now I will increase sotalol to 80 mg twice a day, bring him back for a visit in about a month we will get an EKG as well as an echocardiogram which is needed to assess the valve as well.    Diagnoses and all orders for this visit:    1. Nonrheumatic aortic valve stenosis (Primary)  -     Adult Transthoracic Echo Complete W/ Cont if Necessary Per Protocol; Future    2. Atrial flutter with controlled response    Other orders  -     Sotalol HCl AF 80 MG tablet; Take 1 tablet by mouth 2 (Two) Times a Day.  Dispense: 60 tablet; Refill: 3  -     ECG 12 Lead           Return 1 month with echo and ekg.  Patient was given instructions and counseling regarding his condition or for health maintenance advice. Please see specific information pulled into the AVS if appropriate.

## 2023-07-25 NOTE — TELEPHONE ENCOUNTER
Rx Refill Note  Requested Prescriptions     Pending Prescriptions Disp Refills    sotalol (BETAPACE AF) 80 MG tablet tablet [Pharmacy Med Name: SOTALOL AF 80MG TABLETS] 180 tablet 1     Sig: TAKE 1 TABLET BY MOUTH TWICE DAILY      Last office visit with prescribing clinician: 7/25/2023   Last telemedicine visit with prescribing clinician: Visit date not found   Next office visit with prescribing clinician: 9/6/2023                         Would you like a call back once the refill request has been completed: [] Yes [] No    If the office needs to give you a call back, can they leave a voicemail: [] Yes [] No    Ashley Schwab MA  07/25/23, 16:57 EDT

## 2023-09-06 ENCOUNTER — HOSPITAL ENCOUNTER (OUTPATIENT)
Dept: CARDIOLOGY | Facility: HOSPITAL | Age: 73
Discharge: HOME | End: 2023-09-06
Payer: MEDICARE

## 2023-09-06 ENCOUNTER — OFFICE VISIT (OUTPATIENT)
Dept: CARDIOLOGY | Facility: CLINIC | Age: 73
End: 2023-09-06
Payer: MEDICARE

## 2023-09-06 VITALS
SYSTOLIC BLOOD PRESSURE: 137 MMHG | HEART RATE: 94 BPM | DIASTOLIC BLOOD PRESSURE: 91 MMHG | OXYGEN SATURATION: 99 % | HEIGHT: 60 IN | WEIGHT: 100.5 LBS | BODY MASS INDEX: 19.73 KG/M2

## 2023-09-06 VITALS
DIASTOLIC BLOOD PRESSURE: 94 MMHG | SYSTOLIC BLOOD PRESSURE: 147 MMHG | HEIGHT: 60 IN | WEIGHT: 98 LBS | BODY MASS INDEX: 19.24 KG/M2

## 2023-09-06 DIAGNOSIS — I35.0 NONRHEUMATIC AORTIC VALVE STENOSIS: ICD-10-CM

## 2023-09-06 DIAGNOSIS — Z95.2 MITRAL VALVE REPLACED: ICD-10-CM

## 2023-09-06 DIAGNOSIS — T82.7XXD PACEMAKER INFECTION, SUBSEQUENT ENCOUNTER: ICD-10-CM

## 2023-09-06 DIAGNOSIS — I25.10 CORONARY ARTERY DISEASE INVOLVING NATIVE CORONARY ARTERY OF NATIVE HEART WITHOUT ANGINA PECTORIS: ICD-10-CM

## 2023-09-06 DIAGNOSIS — I48.92 ATRIAL FLUTTER WITH CONTROLLED RESPONSE: Primary | ICD-10-CM

## 2023-09-06 LAB
BH CV ECHO MEAS - AI P1/2T: 1033 MSEC
BH CV ECHO MEAS - AO MAX PG: 5.2 MMHG
BH CV ECHO MEAS - AO MEAN PG: 2.6 MMHG
BH CV ECHO MEAS - AO ROOT DIAM: 2.9 CM
BH CV ECHO MEAS - AO V2 MAX: 114.4 CM/SEC
BH CV ECHO MEAS - AO V2 VTI: 22.5 CM
BH CV ECHO MEAS - AVA(I,D): 0.83 CM2
BH CV ECHO MEAS - EDV(CUBED): 44.7 ML
BH CV ECHO MEAS - EDV(MOD-SP4): 39.4 ML
BH CV ECHO MEAS - EF(MOD-BP): 53 %
BH CV ECHO MEAS - EF(MOD-SP4): 52.8 %
BH CV ECHO MEAS - ESV(CUBED): 22.1 ML
BH CV ECHO MEAS - ESV(MOD-SP4): 18.6 ML
BH CV ECHO MEAS - FS: 20.9 %
BH CV ECHO MEAS - IVS/LVPW: 0.9 CM
BH CV ECHO MEAS - IVSD: 0.91 CM
BH CV ECHO MEAS - LA DIMENSION: 4 CM
BH CV ECHO MEAS - LV MASS(C)D: 100.3 GRAMS
BH CV ECHO MEAS - LV MAX PG: 0.52 MMHG
BH CV ECHO MEAS - LV MEAN PG: 0.31 MMHG
BH CV ECHO MEAS - LV V1 MAX: 36 CM/SEC
BH CV ECHO MEAS - LV V1 VTI: 7.2 CM
BH CV ECHO MEAS - LVIDD: 3.5 CM
BH CV ECHO MEAS - LVIDS: 2.8 CM
BH CV ECHO MEAS - LVOT AREA: 2.6 CM2
BH CV ECHO MEAS - LVOT DIAM: 1.81 CM
BH CV ECHO MEAS - LVPWD: 1.02 CM
BH CV ECHO MEAS - MR MAX PG: 57.5 MMHG
BH CV ECHO MEAS - MR MAX VEL: 367.3 CM/SEC
BH CV ECHO MEAS - MV E MAX VEL: 161.4 CM/SEC
BH CV ECHO MEAS - MV MAX PG: 13.7 MMHG
BH CV ECHO MEAS - MV MEAN PG: 5.3 MMHG
BH CV ECHO MEAS - MV V2 VTI: 25 CM
BH CV ECHO MEAS - MVA(VTI): 0.74 CM2
BH CV ECHO MEAS - PA V2 MAX: 53 CM/SEC
BH CV ECHO MEAS - RAP SYSTOLE: 3 MMHG
BH CV ECHO MEAS - RV MAX PG: 0.53 MMHG
BH CV ECHO MEAS - RV V1 MAX: 36.3 CM/SEC
BH CV ECHO MEAS - RV V1 VTI: 8.1 CM
BH CV ECHO MEAS - SV(LVOT): 18.5 ML
BH CV ECHO MEAS - SV(MOD-SP4): 20.8 ML

## 2023-09-06 PROCEDURE — 93306 TTE W/DOPPLER COMPLETE: CPT

## 2023-09-06 NOTE — PROGRESS NOTES
Progress note      Name: Drake Beth ADMIT: (Not on file)   : 1950  PCP: Amada Haskins MD    MRN: [id number] LOS: 0 days   AGE/SEX: 72 y.o. male  ROOM: Room/bed info not found     Chief Complaint   Patient presents with    Follow-up       Subjective       History of present illness  Drake Beth is a 72-year-old male patient who has history of coronary artery disease status post CABG as well as bioprosthetic mitral valve in , also hypertension, developmental delay, paroxysmal atrial fibrillation.  Patient had a [employer] dual-chamber pacemaker implantation on 3/11/2022 at an outside facility, however about 2 weeks later he developed pocket infection and pacemaker extraction was done on 2022.    On 2023, he was seen for a follow-up and he was found to be in atrial flutter with rapid ventricular rates.  His sotalol was increased to 80 mg twice a day and he is here today for a follow-up with an echocardiogram and EKG.  He is still feeling well, denies any complaints.    Past Medical History:   Diagnosis Date    Allergic rhinitis     Anemia     Atrial fibrillation     Chesty cough     CHF (congestive heart failure)     Coronary artery disease     Cough     Fever     Hyperlipidemia     Hypertension     Mentally challenged     MVP (mitral valve prolapse)      Past Surgical History:   Procedure Laterality Date    CARDIAC ELECTROPHYSIOLOGY PROCEDURE N/A 2022    Procedure: PACEMAKER EXTRACTION;  Surgeon: Rj Dominique MD;  Location: [facility] INVASIVE LOCATION;  Service: Cardiology;  Laterality: N/A;    COLONOSCOPY      He has never had the test done.  His family is refusing any form of colon cancer screening for him.    CORONARY ARTERY BYPASS GRAFT      DR SWEAT    MITRAL VALVE REPLACEMENT  2008    WITH BIOPROSTHETIC TISSUE VALVE     Family History   Problem Relation Age of Onset    Coronary artery disease Mother 64    Hyperlipidemia Sister     Liver disease  Sister         FATTY LIVER    Rheum arthritis Sister     Lung cancer Sister     Hypertension Brother     Other Brother     Rheum arthritis Brother      Social History     Tobacco Use    Smoking status: Never    Smokeless tobacco: Never   Vaping Use    Vaping Use: Never used   Substance Use Topics    Alcohol use: Never    Drug use: Never       Current Outpatient Medications:     amLODIPine (NORVASC) 2.5 MG tablet, TAKE 1 TABLET BY MOUTH DAILY, Disp: 90 tablet, Rfl: 2    atorvastatin (LIPITOR) 10 MG tablet, TAKE 1 TABLET BY MOUTH DAILY, Disp: 90 tablet, Rfl: 3    budesonide-formoterol (Symbicort) 160-4.5 MCG/ACT inhaler, Inhale 2 puffs 2 (Two) Times a Day. Please dispense with a spacer, Disp: 10.2 g, Rfl: 0    cefdinir (OMNICEF) 300 MG capsule, Take 1 capsule by mouth 2 (Two) Times a Day., Disp: 14 capsule, Rfl: 0    chlorhexidine (PERIDEX) 0.12 % solution, RINSE AND GARGLE 15 ML BY MOUTH OR THROAT TWICE DAILY, Disp: 946 mL, Rfl: 1    colestipol (COLESTID) 1 g tablet, TAKE 1 TABLET BY MOUTH FOUR TIMES DAILY, Disp: 360 tablet, Rfl: 0    ELIQUIS 5 MG tablet tablet, Take 1 tablet by mouth 2 (Two) Times a Day., Disp: , Rfl:     montelukast (SINGULAIR) 10 MG tablet, TAKE 1 TABLET BY MOUTH DAILY, Disp: 90 tablet, Rfl: 0    promethazine-codeine (PHENERGAN with CODEINE) 6.25-10 MG/5ML syrup, Take 5 mL by mouth Every 4 (Four) Hours As Needed for Cough., Disp: 180 mL, Rfl: 0    sotalol (BETAPACE AF) 80 MG tablet tablet, TAKE 1 TABLET BY MOUTH TWICE DAILY, Disp: 180 tablet, Rfl: 1    Spacer/Aero-Holding Chambers (Valved Holding Chamber) device, USE WITH SYMBICORT INHALER AS DIRECTED, Disp: , Rfl:   Allergies:  Patient has no known allergies.      Physical Exam  VITALS REVIEWED    General:      well developed, in no acute distress.    Head:      normocephalic and atraumatic.    Eyes:      PERRL/EOM intact, conjunctiva and sclera clear with out nystagmus.    Neck:      no masses, thyromegaly,  trachea central with normal  respiratory effort and PMI displaced laterally  Lungs:      Clear to auscultation bilaterally  Heart:       Irregular rhythm:  Clear  Msk:      no deformity or scoliosis noted of thoracic or lumbar spine.    Pulses:      pulses normal in all 4 extremities.    Extremities:       No lower extremity edema  Neurologic:      no focal deficits.   alert oriented x3  Skin:      intact without lesions or rashes.    Psych:      alert and cooperative; normal mood and affect; normal attention span and concentration.      Result Review :               Pertinent cardiac workup    Holter monitor for 11 days starting on 4/27/2022, showed sinus rhythm with an average rate of 55, no A. fib, no significant bradycardia.   Echo 9/6/2023 normal EF, bioprosthetic valve mitral okay  EKG 7/25/2023 atrial flutter.        ECG 12 Lead    Date/Time: 9/6/2023 1:40 PM  Performed by: Rj Dominique MD  Authorized by: Rj Dominique MD   Comparison: compared with previous ECG   Similar to previous ECG  Rhythm: atrial flutter  Rate: normal  BPM: 80  Conduction: conduction normal  ST Segments: ST segments normal  QRS axis: normal  Other findings: non-specific ST-T wave changes            Assessment and Plan      Drake Coombs a 72-year-old male patient with history of CABG, bioprosthetic mitral valve, sick sinus syndrome, dual-chamber pacemaker implanted in March 2022 at an outside facility with subsequent pocket infection. System was extracted on 4/26/2022. Following extraction, he had a Holter monitor which did not show any significant bradycardia.   Patient was found to be in atrial flutter in July 2023.  At that time I had a long discussion with him and his brother about rhythm management options, they did not seem to want any invasive measures and for that reason we increased his sotalol to 80 twice a day.  Today his heart rate is controlled but is still in arrhythmia.  Echocardiogram shows normal EF, bioprosthetic mitral valve is  functioning appropriately.  He is on Eliquis for stroke prevention.  Today I spoke with his brother again about ablation, still they are not convinced about invasive measures.  He would like to come back for follow-up in 4 months.    Diagnoses and all orders for this visit:    1. Atrial flutter with controlled response (Primary)    2. Mitral valve replaced    3. Coronary artery disease involving native coronary artery of native heart without angina pectoris    4. Pacemaker infection, subsequent encounter  Overview:  Added automatically from request for surgery [id number]      Other orders  -     ECG 12 Lead           Return in about 4 months (around 1/6/2024).  Patient was given instructions and counseling regarding his condition or for health maintenance advice. Please see specific information pulled into the AVS if appropriate.

## 2023-09-27 DIAGNOSIS — J30.1 SEASONAL ALLERGIC RHINITIS DUE TO POLLEN: ICD-10-CM

## 2023-09-27 RX ORDER — MONTELUKAST SODIUM 10 MG/1
10 TABLET ORAL DAILY
Qty: 90 TABLET | Refills: 0 | Status: SHIPPED | OUTPATIENT
Start: 2023-09-27

## 2023-10-20 RX ORDER — AMLODIPINE BESYLATE 2.5 MG/1
TABLET ORAL
Qty: 90 TABLET | Refills: 2 | Status: SHIPPED | OUTPATIENT
Start: 2023-10-20

## 2023-10-20 NOTE — TELEPHONE ENCOUNTER
Rx Refill Note  Requested Prescriptions     Pending Prescriptions Disp Refills    amLODIPine (NORVASC) 2.5 MG tablet [Pharmacy Med Name: AMLODIPINE BESYLATE 2.5MG TABLETS] 90 tablet 2     Sig: TAKE 1 TABLET BY MOUTH DAILY      Last office visit with prescribing clinician: 9/6/2023   Last telemedicine visit with prescribing clinician: Visit date not found   Next office visit with prescribing clinician: 1/9/2024                         Would you like a call back once the refill request has been completed: [] Yes [] No    If the office needs to give you a call back, can they leave a voicemail: [] Yes [] No    Neha Kay MA  10/20/23, 11:58 EDT

## 2023-11-16 RX ORDER — SOTALOL HYDROCHLORIDE 80 MG/1
TABLET ORAL
Qty: 60 TABLET | Refills: 5 | Status: SHIPPED | OUTPATIENT
Start: 2023-11-16

## 2023-12-29 DIAGNOSIS — J30.1 SEASONAL ALLERGIC RHINITIS DUE TO POLLEN: ICD-10-CM

## 2023-12-29 RX ORDER — MONTELUKAST SODIUM 10 MG/1
10 TABLET ORAL DAILY
Qty: 30 TABLET | Refills: 0 | Status: SHIPPED | OUTPATIENT
Start: 2023-12-29

## 2024-01-22 RX ORDER — MONTELUKAST SODIUM 4 MG/1
TABLET, CHEWABLE ORAL
Qty: 360 TABLET | Refills: 0 | Status: SHIPPED | OUTPATIENT
Start: 2024-01-22

## 2024-01-23 ENCOUNTER — OFFICE VISIT (OUTPATIENT)
Dept: CARDIOLOGY | Facility: CLINIC | Age: 74
End: 2024-01-23
Payer: MEDICARE

## 2024-01-23 VITALS
OXYGEN SATURATION: 79 % | HEIGHT: 60 IN | SYSTOLIC BLOOD PRESSURE: 151 MMHG | WEIGHT: 102.4 LBS | DIASTOLIC BLOOD PRESSURE: 85 MMHG | BODY MASS INDEX: 20.1 KG/M2 | HEART RATE: 71 BPM

## 2024-01-23 DIAGNOSIS — I10 PRIMARY HYPERTENSION: ICD-10-CM

## 2024-01-23 DIAGNOSIS — Z95.2 MITRAL VALVE REPLACED: ICD-10-CM

## 2024-01-23 DIAGNOSIS — T82.7XXD PACEMAKER INFECTION, SUBSEQUENT ENCOUNTER: ICD-10-CM

## 2024-01-23 DIAGNOSIS — I48.92 ATRIAL FLUTTER WITH CONTROLLED RESPONSE: Primary | ICD-10-CM

## 2024-01-23 PROCEDURE — 1160F RVW MEDS BY RX/DR IN RCRD: CPT | Performed by: INTERNAL MEDICINE

## 2024-01-23 PROCEDURE — 3077F SYST BP >= 140 MM HG: CPT | Performed by: INTERNAL MEDICINE

## 2024-01-23 PROCEDURE — 1159F MED LIST DOCD IN RCRD: CPT | Performed by: INTERNAL MEDICINE

## 2024-01-23 PROCEDURE — 99213 OFFICE O/P EST LOW 20 MIN: CPT | Performed by: INTERNAL MEDICINE

## 2024-01-23 PROCEDURE — 3079F DIAST BP 80-89 MM HG: CPT | Performed by: INTERNAL MEDICINE

## 2024-01-23 NOTE — PROGRESS NOTES
Progress note      Name: Drake Beth ADMIT: (Not on file)   : 1950  PCP: Amada Haskins MD    MRN: [id number] LOS: 0 days   AGE/SEX: 73 y.o. male  ROOM: Room/bed info not found     Chief Complaint   Patient presents with    Follow-up     4 month  f/u       Subjective       History of present illness  Drake Beth is a 73-year-old male patient who has history of coronary artery disease status post CABG as well as bioprosthetic mitral valve in , also hypertension, developmental delay, paroxysmal atrial fibrillation.  Patient had a [employer] dual-chamber pacemaker implantation on 3/11/2022 at an outside facility, however about 2 weeks later he developed pocket infection and pacemaker extraction was done on 2022.    On 2023, he was seen for a follow-up and he was found to be in atrial flutter with rapid ventricular rates.  His sotalol was increased to 80 mg twice a day.  He is doing well, denies having any cardiac symptoms.    Past Medical History:   Diagnosis Date    Allergic rhinitis     Anemia     Atrial fibrillation     Chesty cough     CHF (congestive heart failure)     Coronary artery disease     Cough     Fever     Hyperlipidemia     Hypertension     Mentally challenged     MVP (mitral valve prolapse)      Past Surgical History:   Procedure Laterality Date    CARDIAC ELECTROPHYSIOLOGY PROCEDURE N/A 2022    Procedure: PACEMAKER EXTRACTION;  Surgeon: Rj Dominique MD;  Location: [facility] INVASIVE LOCATION;  Service: Cardiology;  Laterality: N/A;    COLONOSCOPY      He has never had the test done.  His family is refusing any form of colon cancer screening for him.    CORONARY ARTERY BYPASS GRAFT      DR SWEAT    MITRAL VALVE REPLACEMENT  2008    WITH BIOPROSTHETIC TISSUE VALVE     Family History   Problem Relation Age of Onset    Coronary artery disease Mother 64    Hyperlipidemia Sister     Liver disease Sister         FATTY LIVER    Rheum arthritis  Sister     Lung cancer Sister     Hypertension Brother     Other Brother     Rheum arthritis Brother      Social History     Tobacco Use    Smoking status: Never    Smokeless tobacco: Never   Vaping Use    Vaping Use: Never used   Substance Use Topics    Alcohol use: Never    Drug use: Never       Current Outpatient Medications:     amLODIPine (NORVASC) 2.5 MG tablet, TAKE 1 TABLET BY MOUTH DAILY, Disp: 90 tablet, Rfl: 2    atorvastatin (LIPITOR) 10 MG tablet, TAKE 1 TABLET BY MOUTH DAILY, Disp: 90 tablet, Rfl: 3    budesonide-formoterol (Symbicort) 160-4.5 MCG/ACT inhaler, Inhale 2 puffs 2 (Two) Times a Day. Please dispense with a spacer, Disp: 10.2 g, Rfl: 0    cefdinir (OMNICEF) 300 MG capsule, Take 1 capsule by mouth 2 (Two) Times a Day., Disp: 14 capsule, Rfl: 0    chlorhexidine (PERIDEX) 0.12 % solution, RINSE AND GARGLE 15 ML BY MOUTH OR THROAT TWICE DAILY, Disp: 946 mL, Rfl: 1    colestipol (COLESTID) 1 g tablet, TAKE 1 TABLET BY MOUTH FOUR TIMES DAILY, Disp: 360 tablet, Rfl: 0    ELIQUIS 5 MG tablet tablet, Take 1 tablet by mouth 2 (Two) Times a Day., Disp: , Rfl:     montelukast (SINGULAIR) 10 MG tablet, TAKE 1 TABLET BY MOUTH DAILY, Disp: 30 tablet, Rfl: 0    promethazine-codeine (PHENERGAN with CODEINE) 6.25-10 MG/5ML syrup, Take 5 mL by mouth Every 4 (Four) Hours As Needed for Cough., Disp: 180 mL, Rfl: 0    sotalol (BETAPACE AF) 80 MG tablet tablet, TAKE 1 TABLET BY MOUTH TWICE DAILY, Disp: 60 tablet, Rfl: 5    Spacer/Aero-Holding Chambers (Valved Holding Chamber) device, USE WITH SYMBICORT INHALER AS DIRECTED, Disp: , Rfl:   Allergies:  Patient has no known allergies.      Physical Exam  VITALS REVIEWED    General:      well developed, in no acute distress.    Head:      normocephalic and atraumatic.    Eyes:      PERRL/EOM intact, conjunctiva and sclera clear with out nystagmus.    Neck:      no masses, thyromegaly,  trachea central with normal respiratory effort and PMI displaced laterally  Lungs:       Clear to auscultation bilaterally  Heart:       Regular rate and rhythm  Msk:      no deformity or scoliosis noted of thoracic or lumbar spine.    Pulses:      pulses normal in all 4 extremities.    Extremities:       No lower extremity edema  Neurologic:      no focal deficits.   alert oriented x3  Skin:      intact without lesions or rashes.    Psych:      alert and cooperative; normal mood and affect; normal attention span and concentration.      Result Review :               Pertinent cardiac workup    Holter monitor for 11 days starting on 4/27/2022, showed sinus rhythm with an average rate of 55, no A. fib, no significant bradycardia.   Echo 9/6/2023 normal EF, bioprosthetic valve mitral okay  EKG 7/25/2023 and 9/6/2023 atrial flutter.      Procedures        Assessment and Plan      Drake Beth is a 73-year-old male patient with history of CABG, bioprosthetic mitral valve, sick sinus syndrome, dual-chamber pacemaker implanted in March 2022 at an outside facility with subsequent pocket infection. System was extracted on 4/26/2022. Following extraction, he had a Holter monitor which did not show any significant bradycardia.   Patient was found to be in atrial flutter in July 2023.   At that time I had discussed with him and his brother about rhythm management options, however they were not interested and wanted to pursue a minimalistic approach.  His sotalol was increased to 80 mg twice a day which is the current dose.  They brought an EKG report from 10/9/2023 at [place] which is interpreted as sinus bradycardia at 41 bpm.  For now we will continue same therapy as mentioned earlier they are not interested in any invasive measures.  Will see him in follow-up in 6 months.    Diagnoses and all orders for this visit:    1. Atrial flutter with controlled response (Primary)    2. Pacemaker infection, subsequent encounter  Overview:  Added automatically from request for surgery [id number]      3. Primary  hypertension    4. Mitral valve replaced           Return in about 6 months (around 7/23/2024).  Patient was given instructions and counseling regarding his condition or for health maintenance advice. Please see specific information pulled into the AVS if appropriate.

## 2024-01-25 RX ORDER — APIXABAN 5 MG/1
5 TABLET, FILM COATED ORAL 2 TIMES DAILY
Qty: 180 TABLET | Refills: 3 | Status: SHIPPED | OUTPATIENT
Start: 2024-01-25

## 2024-01-25 NOTE — TELEPHONE ENCOUNTER
Rx Refill Note  Requested Prescriptions     Pending Prescriptions Disp Refills    Eliquis 5 MG tablet tablet [Pharmacy Med Name: ELIQUIS 5MG TABLETS] 180 tablet 3     Sig: TAKE 1 TABLET BY MOUTH TWICE DAILY      Last office visit with prescribing clinician: 1/23/2024   Last telemedicine visit with prescribing clinician: Visit date not found   Next office visit with prescribing clinician: 7/23/2024                         Would you like a call back once the refill request has been completed: [] Yes [] No    If the office needs to give you a call back, can they leave a voicemail: [] Yes [] No    Adwoa Escalante MA  01/25/24, 11:13 EST

## 2024-02-02 DIAGNOSIS — J30.1 SEASONAL ALLERGIC RHINITIS DUE TO POLLEN: ICD-10-CM

## 2024-02-02 RX ORDER — MONTELUKAST SODIUM 10 MG/1
10 TABLET ORAL DAILY
Qty: 30 TABLET | Refills: 0 | Status: SHIPPED | OUTPATIENT
Start: 2024-02-02 | End: 2024-02-02

## 2024-02-02 RX ORDER — MONTELUKAST SODIUM 10 MG/1
10 TABLET ORAL DAILY
Qty: 90 TABLET | Refills: 0 | Status: SHIPPED | OUTPATIENT
Start: 2024-02-02

## 2024-02-05 ENCOUNTER — OFFICE VISIT (OUTPATIENT)
Dept: FAMILY MEDICINE CLINIC | Facility: CLINIC | Age: 74
End: 2024-02-05
Payer: MEDICARE

## 2024-02-05 ENCOUNTER — LAB (OUTPATIENT)
Dept: FAMILY MEDICINE CLINIC | Facility: CLINIC | Age: 74
End: 2024-02-05
Payer: MEDICARE

## 2024-02-05 VITALS
SYSTOLIC BLOOD PRESSURE: 148 MMHG | HEIGHT: 60 IN | BODY MASS INDEX: 19.83 KG/M2 | OXYGEN SATURATION: 95 % | WEIGHT: 101 LBS | DIASTOLIC BLOOD PRESSURE: 88 MMHG | HEART RATE: 92 BPM | RESPIRATION RATE: 16 BRPM | TEMPERATURE: 97.8 F

## 2024-02-05 DIAGNOSIS — I10 PRIMARY HYPERTENSION: ICD-10-CM

## 2024-02-05 DIAGNOSIS — Z00.00 MEDICARE ANNUAL WELLNESS VISIT, SUBSEQUENT: Primary | ICD-10-CM

## 2024-02-05 PROBLEM — R19.7 DIARRHEA: Status: RESOLVED | Noted: 2022-02-17 | Resolved: 2024-02-05

## 2024-02-05 PROBLEM — J06.9 ACUTE URI: Status: RESOLVED | Noted: 2022-12-08 | Resolved: 2024-02-05

## 2024-02-05 PROCEDURE — 36415 COLL VENOUS BLD VENIPUNCTURE: CPT | Performed by: FAMILY MEDICINE

## 2024-02-05 PROCEDURE — 84443 ASSAY THYROID STIM HORMONE: CPT | Performed by: FAMILY MEDICINE

## 2024-02-05 PROCEDURE — G0439 PPPS, SUBSEQ VISIT: HCPCS | Performed by: FAMILY MEDICINE

## 2024-02-05 PROCEDURE — 80053 COMPREHEN METABOLIC PANEL: CPT | Performed by: FAMILY MEDICINE

## 2024-02-05 PROCEDURE — 3077F SYST BP >= 140 MM HG: CPT | Performed by: FAMILY MEDICINE

## 2024-02-05 PROCEDURE — 85025 COMPLETE CBC W/AUTO DIFF WBC: CPT | Performed by: FAMILY MEDICINE

## 2024-02-05 PROCEDURE — 1170F FXNL STATUS ASSESSED: CPT | Performed by: FAMILY MEDICINE

## 2024-02-05 PROCEDURE — 3079F DIAST BP 80-89 MM HG: CPT | Performed by: FAMILY MEDICINE

## 2024-02-05 NOTE — PROGRESS NOTES
Subsequent Medicare Wellness Visit   The ABC's of the Annual Wellness Visit    Chief Complaint   Patient presents with    Medicare Wellness-subsequent       HPI:  Drake Beth, -1950, is a 73 y.o. male who presents for a Subsequent Medicare Wellness Visit.  He is here today with his brother.  He lives with his brother who assists with his needs.  Patient is also followed by cardiologist due to CAD and atrial fibrillation.  He enjoys dancing and he goes dancing every Saturday. Patient does not report any chest pain, shortness of breath, dizziness, nausea, vomiting, or diarrhea, visual issues, headaches, numbness or tingling. No urinary issues reported like urgency, frequency, or discomfort upon urination.  No significant weight changes reported.  No swelling reported.  No rashes or any other skin issues reported. No emotional issues or insomnia.    Recent Hospitalizations:  No hospitalization(s) within the last year..    Current Medical Providers:  Patient Care Team:  Amada Haskins MD as PCP - Josr Delgado DPM as Consulting Physician (Podiatry)  Rj Dominique MD as Consulting Physician (Cardiology)    Health Habits and Functional and Cognitive Screening and Depression Screenin/5/2024     2:13 PM   Functional & Cognitive Status   Do you have difficulty preparing food and eating? Yes   Do you have difficulty bathing yourself, getting dressed or grooming yourself? Yes   Do you have difficulty using the toilet? No   Do you have difficulty moving around from place to place? No   Do you have trouble with steps or getting out of a bed or a chair? No   Current Diet Limited Junk Food   Dental Exam Up to date   Eye Exam Up to date   Exercise (times per week) 0 times per week   Current Exercises Include No Regular Exercise   Do you need help using the phone?  No   Are you deaf or do you have serious difficulty hearing?  No   Do you need help to go to places out of walking distance? No    Do you need help shopping? Yes   Do you need help preparing meals?  Yes   Do you need help with housework?  Yes   Do you need help with laundry? Yes   Do you need help taking your medications? Yes   Do you need help managing money? Yes   Do you ever drive or ride in a car without wearing a seat belt? No   Have you felt unusual stress, anger or loneliness in the last month? No   Who do you live with? Sibling   If you need help, do you have trouble finding someone available to you? No   Do you have difficulty concentrating, remembering or making decisions? No       Compared to one year ago, the patient feels his physical health is the same and his mental health is the same.    Depression Screen:      2/5/2024     2:03 PM   PHQ-2/PHQ-9 Depression Screening   Little Interest or Pleasure in Doing Things 0-->not at all   Feeling Down, Depressed or Hopeless 0-->not at all   PHQ-9: Brief Depression Severity Measure Score 0         Past Medical/Family/Social History:  The following portions of the patient's history were reviewed and updated as appropriate: allergies, current medications, past family history, past medical history, past social history, past surgical history, and problem list.    No Known Allergies      Current Outpatient Medications:     amLODIPine (NORVASC) 2.5 MG tablet, TAKE 1 TABLET BY MOUTH DAILY, Disp: 90 tablet, Rfl: 2    apixaban (Eliquis) 5 MG tablet tablet, TAKE 1 TABLET BY MOUTH TWICE DAILY, Disp: 180 tablet, Rfl: 3    atorvastatin (LIPITOR) 10 MG tablet, TAKE 1 TABLET BY MOUTH DAILY, Disp: 90 tablet, Rfl: 3    budesonide-formoterol (Symbicort) 160-4.5 MCG/ACT inhaler, Inhale 2 puffs 2 (Two) Times a Day. Please dispense with a spacer, Disp: 10.2 g, Rfl: 0    chlorhexidine (PERIDEX) 0.12 % solution, RINSE AND GARGLE 15 ML BY MOUTH OR THROAT TWICE DAILY, Disp: 946 mL, Rfl: 1    colestipol (COLESTID) 1 g tablet, TAKE 1 TABLET BY MOUTH FOUR TIMES DAILY, Disp: 360 tablet, Rfl: 0    montelukast  (SINGULAIR) 10 MG tablet, TAKE 1 TABLET BY MOUTH DAILY, Disp: 90 tablet, Rfl: 0    sotalol (BETAPACE AF) 80 MG tablet tablet, TAKE 1 TABLET BY MOUTH TWICE DAILY, Disp: 60 tablet, Rfl: 5    Spacer/Aero-Holding Chambers (Valved Holding Chamber) device, USE WITH SYMBICORT INHALER AS DIRECTED, Disp: , Rfl:     Aspirin use counseling: Does not need ASA (and currently is not on it)    Current medication list contains no high risk medications.  No harmful drug interactions have been identified.     Family History   Problem Relation Age of Onset    Coronary artery disease Mother 64    Hyperlipidemia Sister     Liver disease Sister         FATTY LIVER    Rheum arthritis Sister     Lung cancer Sister     Hypertension Brother     Other Brother     Rheum arthritis Brother        Social History     Tobacco Use    Smoking status: Never    Smokeless tobacco: Never   Substance Use Topics    Alcohol use: Never       Past Surgical History:   Procedure Laterality Date    CARDIAC ELECTROPHYSIOLOGY PROCEDURE N/A 4/26/2022    Procedure: PACEMAKER EXTRACTION;  Surgeon: Rj Dominique MD;  Location: [facility] INVASIVE LOCATION;  Service: Cardiology;  Laterality: N/A;    COLONOSCOPY      He has never had the test done.  His family is refusing any form of colon cancer screening for him.    CORONARY ARTERY BYPASS GRAFT  2008    DR SWEAT    MITRAL VALVE REPLACEMENT  04/2008    WITH BIOPROSTHETIC TISSUE VALVE       Patient Active Problem List   Diagnosis    Allergic rhinitis    Anemia    Atrial fibrillation    Congestive heart failure    Coronary artery disease    Hyperlipidemia    Hypertension    Medicare annual wellness visit, subsequent    Mentally challenged    Prostate cancer screening    Pacemaker infection    Mitral valve replaced    Gingivitis, chronic    Atrial flutter with controlled response       Review of Systems   Constitutional:  Negative for activity change, fatigue and fever.   Respiratory:  Negative for shortness of  "breath and wheezing.    Cardiovascular:  Negative for chest pain, palpitations and leg swelling.   Musculoskeletal:  Negative for arthralgias and back pain.   Skin:  Negative for rash.   Neurological:  Negative for tremors and headache.       Objective     Vitals:    02/05/24 1408   BP: 148/88   BP Location: Left arm   Patient Position: Sitting   Cuff Size: Adult   Pulse: 92   Resp: 16   Temp: 97.8 °F (36.6 °C)   TempSrc: Infrared   SpO2: 95%   Weight: 45.8 kg (101 lb)   Height: 148.6 cm (58.5\")   PainSc: 0-No pain       BMI is within normal parameters. No other follow-up for BMI required.      No results found.    The patient has no evidence of cognitve impairment.     Physical Exam  Vitals and nursing note reviewed.   Constitutional:       General: He is not in acute distress.     Appearance: Normal appearance. He is well-developed. He is not ill-appearing.   HENT:      Head: Normocephalic and atraumatic.   Cardiovascular:      Rate and Rhythm: Normal rate and regular rhythm.      Heart sounds: Normal heart sounds. No murmur heard.     No gallop.   Pulmonary:      Effort: Pulmonary effort is normal. No respiratory distress.      Breath sounds: Normal breath sounds. No wheezing, rhonchi or rales.   Chest:      Chest wall: No tenderness.   Musculoskeletal:      Cervical back: Normal range of motion and neck supple.   Neurological:      General: No focal deficit present.      Mental Status: He is alert and oriented to person, place, and time. Mental status is at baseline.   Psychiatric:         Mood and Affect: Mood normal.         Recent Lab Results:     Lab Results   Component Value Date    CHOL 156 06/19/2023    TRIG 116 06/19/2023    HDL 49 06/19/2023    VLDL 21 06/19/2023    LDLHDL 1.71 06/19/2023       Assessment & Plan   Age-appropriate Screening Schedule:  Refer to the list below for future screening recommendations based on patient's age, sex and/or medical conditions.      Health Maintenance   Topic Date " Due    Pneumococcal Vaccine 65+ (1 of 2 - PCV) Never done    TDAP/TD VACCINES (1 - Tdap) Never done    ZOSTER VACCINE (1 of 2) Never done    INFLUENZA VACCINE  Never done    COVID-19 Vaccine (3 - 2023-24 season) 09/01/2023    LIPID PANEL  06/19/2024    ANNUAL WELLNESS VISIT  02/05/2025    HEPATITIS C SCREENING  Completed    COLORECTAL CANCER SCREENING  Discontinued       Medicare Risks and Personalized Health Plan:  Advance Directive Discussion  Breast Cancer/Mammogram Screening  Cardiovascular risk  Colon Cancer Screening  Dementia/Memory   Depression/Dysphoria  Diabetic Lab Screening   Fall Risk  Immunizations Discussed/Encouraged (specific immunizations; Pneumococcal 23, COVID19, and RSV (Respiratory Syncytial Virus) )  Obesity/Overweight   Osteoporosis Risk      CMS-Preventive Services Quick Reference  Medicare Preventive Services Addressed:  Annual Wellness Visit (AWV)  Bone Density Measurements  Cardiovascular Disease Screening Tests (may do this order every 5 years in beneficiaries without signs or symptoms of cardiovascular disease)  Colorectal Cancer Screening, Colonoscopy  Prostate Cancer Screening     Advance Care Planning:  ACP discussion was held with the patient during this visit. Patient has an advance directive in EMR which is still valid.     Diagnoses and all orders for this visit:    1. Medicare annual wellness visit, subsequent (Primary)    2. Primary hypertension  -     Comprehensive Metabolic Panel  -     CBC Auto Differential  -     TSH      Medicare wellness exam was done today.  I will be getting labs.  Patient is not fasting however.  Immunization was reviewed and recommended, but patient's family does not wish to proceed he is not up to speed with colon cancer screening and his family does not wish to proceed with this either.  Healthy lifestyle was reinforced.      An After Visit Summary and PPPS with all of these plans were given to the patient.      Follow Up:  Return in about 1 year  (around 2/5/2025) for Medicare Wellness.        Requested Prescriptions      No prescriptions requested or ordered in this encounter

## 2024-02-06 LAB
ALBUMIN SERPL-MCNC: 4.3 G/DL (ref 3.5–5.2)
ALBUMIN/GLOB SERPL: 1.1 G/DL
ALP SERPL-CCNC: 68 U/L (ref 39–117)
ALT SERPL W P-5'-P-CCNC: 12 U/L (ref 1–41)
ANION GAP SERPL CALCULATED.3IONS-SCNC: 11.8 MMOL/L (ref 5–15)
AST SERPL-CCNC: 32 U/L (ref 1–40)
BASOPHILS # BLD AUTO: 0.03 10*3/MM3 (ref 0–0.2)
BASOPHILS NFR BLD AUTO: 0.4 % (ref 0–1.5)
BILIRUB SERPL-MCNC: 0.5 MG/DL (ref 0–1.2)
BUN SERPL-MCNC: 13 MG/DL (ref 8–23)
BUN/CREAT SERPL: 13.5 (ref 7–25)
CALCIUM SPEC-SCNC: 9.4 MG/DL (ref 8.6–10.5)
CHLORIDE SERPL-SCNC: 96 MMOL/L (ref 98–107)
CO2 SERPL-SCNC: 29.2 MMOL/L (ref 22–29)
CREAT SERPL-MCNC: 0.96 MG/DL (ref 0.76–1.27)
DEPRECATED RDW RBC AUTO: 44.7 FL (ref 37–54)
EGFRCR SERPLBLD CKD-EPI 2021: 84 ML/MIN/1.73
EOSINOPHIL # BLD AUTO: 0.28 10*3/MM3 (ref 0–0.4)
EOSINOPHIL NFR BLD AUTO: 3.9 % (ref 0.3–6.2)
ERYTHROCYTE [DISTWIDTH] IN BLOOD BY AUTOMATED COUNT: 12.5 % (ref 12.3–15.4)
GLOBULIN UR ELPH-MCNC: 3.9 GM/DL
GLUCOSE SERPL-MCNC: 95 MG/DL (ref 65–99)
HCT VFR BLD AUTO: 44.5 % (ref 37.5–51)
HGB BLD-MCNC: 15.4 G/DL (ref 13–17.7)
IMM GRANULOCYTES # BLD AUTO: 0.03 10*3/MM3 (ref 0–0.05)
IMM GRANULOCYTES NFR BLD AUTO: 0.4 % (ref 0–0.5)
LYMPHOCYTES # BLD AUTO: 1.73 10*3/MM3 (ref 0.7–3.1)
LYMPHOCYTES NFR BLD AUTO: 24.2 % (ref 19.6–45.3)
MCH RBC QN AUTO: 33.8 PG (ref 26.6–33)
MCHC RBC AUTO-ENTMCNC: 34.6 G/DL (ref 31.5–35.7)
MCV RBC AUTO: 97.6 FL (ref 79–97)
MONOCYTES # BLD AUTO: 0.68 10*3/MM3 (ref 0.1–0.9)
MONOCYTES NFR BLD AUTO: 9.5 % (ref 5–12)
NEUTROPHILS NFR BLD AUTO: 4.4 10*3/MM3 (ref 1.7–7)
NEUTROPHILS NFR BLD AUTO: 61.6 % (ref 42.7–76)
NRBC BLD AUTO-RTO: 0 /100 WBC (ref 0–0.2)
PLATELET # BLD AUTO: 162 10*3/MM3 (ref 140–450)
PMV BLD AUTO: 10.6 FL (ref 6–12)
POTASSIUM SERPL-SCNC: 4 MMOL/L (ref 3.5–5.2)
PROT SERPL-MCNC: 8.2 G/DL (ref 6–8.5)
RBC # BLD AUTO: 4.56 10*6/MM3 (ref 4.14–5.8)
SODIUM SERPL-SCNC: 137 MMOL/L (ref 136–145)
TSH SERPL DL<=0.05 MIU/L-ACNC: 1.84 UIU/ML (ref 0.27–4.2)
WBC NRBC COR # BLD AUTO: 7.15 10*3/MM3 (ref 3.4–10.8)

## 2024-04-09 ENCOUNTER — OFFICE VISIT (OUTPATIENT)
Dept: FAMILY MEDICINE CLINIC | Facility: CLINIC | Age: 74
End: 2024-04-09
Payer: MEDICARE

## 2024-04-09 VITALS
HEART RATE: 92 BPM | TEMPERATURE: 97.8 F | HEIGHT: 60 IN | BODY MASS INDEX: 20.03 KG/M2 | WEIGHT: 102 LBS | RESPIRATION RATE: 16 BRPM | OXYGEN SATURATION: 95 % | SYSTOLIC BLOOD PRESSURE: 158 MMHG | DIASTOLIC BLOOD PRESSURE: 91 MMHG

## 2024-04-09 DIAGNOSIS — J06.9 ACUTE URI: Primary | ICD-10-CM

## 2024-04-09 DIAGNOSIS — R05.2 SUBACUTE COUGH: ICD-10-CM

## 2024-04-09 PROCEDURE — 3077F SYST BP >= 140 MM HG: CPT | Performed by: FAMILY MEDICINE

## 2024-04-09 PROCEDURE — 3080F DIAST BP >= 90 MM HG: CPT | Performed by: FAMILY MEDICINE

## 2024-04-09 PROCEDURE — 99213 OFFICE O/P EST LOW 20 MIN: CPT | Performed by: FAMILY MEDICINE

## 2024-04-09 RX ORDER — PREDNISONE 20 MG/1
20 TABLET ORAL 2 TIMES DAILY
Qty: 10 TABLET | Refills: 0 | Status: SHIPPED | OUTPATIENT
Start: 2024-04-09

## 2024-04-09 RX ORDER — IPRATROPIUM BROMIDE AND ALBUTEROL SULFATE 2.5; .5 MG/3ML; MG/3ML
SOLUTION RESPIRATORY (INHALATION)
OUTPATIENT
Start: 2024-04-09

## 2024-04-09 RX ORDER — DEXTROMETHORPHAN HYDROBROMIDE AND PROMETHAZINE HYDROCHLORIDE 15; 6.25 MG/5ML; MG/5ML
5 SYRUP ORAL 4 TIMES DAILY PRN
Qty: 180 ML | Refills: 0 | Status: SHIPPED | OUTPATIENT
Start: 2024-04-09

## 2024-04-09 RX ORDER — IPRATROPIUM BROMIDE AND ALBUTEROL SULFATE 2.5; .5 MG/3ML; MG/3ML
3 SOLUTION RESPIRATORY (INHALATION)
Qty: 360 ML | Refills: 0 | Status: SHIPPED | OUTPATIENT
Start: 2024-04-09

## 2024-04-09 NOTE — PROGRESS NOTES
Subjective   Chief Complaint   Patient presents with    Cough     Drake Beth is a 73 y.o. male.     Patient Care Team:  Amada Haskins MD as PCP - General  Josr Rodriguez DPM as Consulting Physician (Podiatry)  Rj Dominique MD as Consulting Physician (Cardiology)    History of Present Illness  He is coming in today with his brother due to some cough which she has been experiencing over the last 2 weeks.  The cough is dry and present throughout the day and night.  He was somehow rundown to begin with, but this has resolved and overall he feels better over the last 4 days, but the cough is not better.  No fever or chills are being reported.  Patient is already taking Zyrtec over-the-counter and cough medicine over-the-counter.  He has had previously some balances of upper respiratory infection with prolonged cough in the past and that typically happens in the winter.  He is not really able to use inhaler due to his developmental issue and he is not really able to cooperate with that.  He is not a smoker and never has been.       The following portions of the patient's history were reviewed and updated as appropriate: allergies, current medications, past family history, past medical history, past social history, past surgical history, and problem list.  Past Medical History:   Diagnosis Date    Allergic rhinitis     Anemia     Atrial fibrillation     Chesty cough     CHF (congestive heart failure)     Coronary artery disease     Cough     Fever     Hyperlipidemia     Hypertension     Mentally challenged     MVP (mitral valve prolapse)      Past Surgical History:   Procedure Laterality Date    CARDIAC ELECTROPHYSIOLOGY PROCEDURE N/A 4/26/2022    Procedure: PACEMAKER EXTRACTION;  Surgeon: Rj Dominique MD;  Location: [facility] INVASIVE LOCATION;  Service: Cardiology;  Laterality: N/A;    COLONOSCOPY      He has never had the test done.  His family is refusing any form of colon cancer screening for  "him.    CORONARY ARTERY BYPASS GRAFT  2008    DR SWEAT    MITRAL VALVE REPLACEMENT  04/2008    WITH BIOPROSTHETIC TISSUE VALVE     The patient has a family history of  Family History   Problem Relation Age of Onset    Coronary artery disease Mother 64    Hyperlipidemia Sister     Liver disease Sister         FATTY LIVER    Rheum arthritis Sister     Lung cancer Sister     Hypertension Brother     Other Brother     Rheum arthritis Brother      Social History     Socioeconomic History    Marital status: Single   Tobacco Use    Smoking status: Never    Smokeless tobacco: Never   Vaping Use    Vaping status: Never Used   Substance and Sexual Activity    Alcohol use: Never    Drug use: Never    Sexual activity: Never       Review of Systems   Constitutional:  Negative for activity change, appetite change, chills and fever.   HENT:  Negative for congestion, ear pain, postnasal drip, sinus pressure, sore throat and swollen glands.    Respiratory:  Positive for cough. Negative for choking, chest tightness, shortness of breath, wheezing and stridor.    Cardiovascular:  Negative for chest pain.   Skin:  Negative for dry skin and rash.     Visit Vitals  /91 (BP Location: Left arm, Patient Position: Sitting, Cuff Size: Adult)   Pulse 92   Temp 97.8 °F (36.6 °C) (Infrared)   Resp 16   Ht 148.6 cm (58.5\")   Wt 46.3 kg (102 lb)   SpO2 95%   BMI 20.95 kg/m²       BMI is within normal parameters. No other follow-up for BMI required.      Current Outpatient Medications:     amLODIPine (NORVASC) 2.5 MG tablet, TAKE 1 TABLET BY MOUTH DAILY, Disp: 90 tablet, Rfl: 2    apixaban (Eliquis) 5 MG tablet tablet, TAKE 1 TABLET BY MOUTH TWICE DAILY, Disp: 180 tablet, Rfl: 3    atorvastatin (LIPITOR) 10 MG tablet, TAKE 1 TABLET BY MOUTH DAILY, Disp: 90 tablet, Rfl: 3    budesonide-formoterol (Symbicort) 160-4.5 MCG/ACT inhaler, Inhale 2 puffs 2 (Two) Times a Day. Please dispense with a spacer, Disp: 10.2 g, Rfl: 0    chlorhexidine " (PERIDEX) 0.12 % solution, RINSE AND GARGLE 15 ML BY MOUTH OR THROAT TWICE DAILY, Disp: 946 mL, Rfl: 1    colestipol (COLESTID) 1 g tablet, TAKE 1 TABLET BY MOUTH FOUR TIMES DAILY, Disp: 360 tablet, Rfl: 0    montelukast (SINGULAIR) 10 MG tablet, TAKE 1 TABLET BY MOUTH DAILY, Disp: 90 tablet, Rfl: 0    sotalol (BETAPACE AF) 80 MG tablet tablet, TAKE 1 TABLET BY MOUTH TWICE DAILY, Disp: 60 tablet, Rfl: 5    Spacer/Aero-Holding Chambers (Valved Holding Chamber) device, USE WITH SYMBICORT INHALER AS DIRECTED, Disp: , Rfl:     ipratropium-albuterol (DUO-NEB) 0.5-2.5 mg/3 ml nebulizer, Take 3 mL by nebulization 4 (Four) Times a Day., Disp: 360 mL, Rfl: 0    predniSONE (DELTASONE) 20 MG tablet, Take 1 tablet by mouth 2 (Two) Times a Day., Disp: 10 tablet, Rfl: 0    promethazine-dextromethorphan (PROMETHAZINE-DM) 6.25-15 MG/5ML syrup, Take 5 mL by mouth 4 (Four) Times a Day As Needed for Cough., Disp: 180 mL, Rfl: 0    Objective   Physical Exam  Vitals and nursing note reviewed.   Constitutional:       General: He is not in acute distress.     Appearance: He is well-developed.   HENT:      Head: Normocephalic and atraumatic.      Right Ear: External ear normal.      Left Ear: External ear normal.      Mouth/Throat:      Pharynx: No oropharyngeal exudate.   Eyes:      Conjunctiva/sclera: Conjunctivae normal.      Pupils: Pupils are equal, round, and reactive to light.   Cardiovascular:      Rate and Rhythm: Normal rate and regular rhythm.      Heart sounds: Normal heart sounds.   Pulmonary:      Effort: Pulmonary effort is normal. No respiratory distress.      Breath sounds: Normal breath sounds. No wheezing or rales.   Musculoskeletal:      Cervical back: Normal range of motion and neck supple.   Skin:     General: Skin is warm and dry.      Findings: No rash.         Assessment & Plan   Diagnoses and all orders for this visit:    1. Acute URI (Primary)  -     predniSONE (DELTASONE) 20 MG tablet; Take 1 tablet by mouth 2  (Two) Times a Day.  Dispense: 10 tablet; Refill: 0    2. Subacute cough  -     Home Nebulizer  -     ipratropium-albuterol (DUO-NEB) 0.5-2.5 mg/3 ml nebulizer; Take 3 mL by nebulization 4 (Four) Times a Day.  Dispense: 360 mL; Refill: 0  -     promethazine-dextromethorphan (PROMETHAZINE-DM) 6.25-15 MG/5ML syrup; Take 5 mL by mouth 4 (Four) Times a Day As Needed for Cough.  Dispense: 180 mL; Refill: 0  -     predniSONE (DELTASONE) 20 MG tablet; Take 1 tablet by mouth 2 (Two) Times a Day.  Dispense: 10 tablet; Refill: 0      His exam today is intact, I will be starting him on cough medicine and short course of prednisone.  Patient would benefit from using an inhaler, but he has not previously cooperated well with that even with using the spacer.  I will give him prescription for nebulizer.        Return if symptoms worsen or fail to improve, for Recheck.    Requested Prescriptions     Signed Prescriptions Disp Refills    ipratropium-albuterol (DUO-NEB) 0.5-2.5 mg/3 ml nebulizer 360 mL 0     Sig: Take 3 mL by nebulization 4 (Four) Times a Day.    promethazine-dextromethorphan (PROMETHAZINE-DM) 6.25-15 MG/5ML syrup 180 mL 0     Sig: Take 5 mL by mouth 4 (Four) Times a Day As Needed for Cough.    predniSONE (DELTASONE) 20 MG tablet 10 tablet 0     Sig: Take 1 tablet by mouth 2 (Two) Times a Day.       Amada Haskins MD

## 2024-05-04 DIAGNOSIS — R05.2 SUBACUTE COUGH: ICD-10-CM

## 2024-05-04 RX ORDER — IPRATROPIUM BROMIDE AND ALBUTEROL SULFATE 2.5; .5 MG/3ML; MG/3ML
SOLUTION RESPIRATORY (INHALATION)
Qty: 360 ML | Refills: 0 | Status: SHIPPED | OUTPATIENT
Start: 2024-05-04

## 2024-05-13 RX ORDER — SOTALOL HYDROCHLORIDE 80 MG/1
TABLET ORAL
Qty: 180 TABLET | Refills: 1 | Status: SHIPPED | OUTPATIENT
Start: 2024-05-13

## 2024-05-13 NOTE — TELEPHONE ENCOUNTER
Rx Refill Note  Requested Prescriptions     Pending Prescriptions Disp Refills    sotalol (BETAPACE AF) 80 MG tablet tablet [Pharmacy Med Name: SOTALOL AF 80MG TABLETS] 180 tablet      Sig: TAKE 1 TABLET BY MOUTH TWICE DAILY      Last office visit with prescribing clinician: 1/23/2024   Last telemedicine visit with prescribing clinician: Visit date not found   Next office visit with prescribing clinician: 7/23/2024                         Would you like a call back once the refill request has been completed: [] Yes [] No    If the office needs to give you a call back, can they leave a voicemail: [] Yes [] No    Sylvie Guerrero MA  05/13/24, 10:51 EDT

## 2024-06-02 RX ORDER — LINACLOTIDE 72 UG/1
72 CAPSULE, GELATIN COATED ORAL
Qty: 30 CAPSULE | Refills: 0 | Status: SHIPPED | OUTPATIENT
Start: 2024-06-02

## 2024-06-04 ENCOUNTER — TELEPHONE (OUTPATIENT)
Dept: FAMILY MEDICINE CLINIC | Facility: CLINIC | Age: 74
End: 2024-06-04

## 2024-06-04 NOTE — TELEPHONE ENCOUNTER
Caller: PETEY YAO    Relationship: Emergency Contact    Best call back number: [phone]     Which medication are you concerned about:     Linzess 72 MCG capsule capsule       What are your concerns: PATIENTS BROTHER CALLING STATING THAT THIS MEDICATION HAS NOT HELPED HIM, HE WOULD LIKE TO KNOW WHAT OTHER SUGGESTION  WOULD HAVE

## 2024-06-05 NOTE — TELEPHONE ENCOUNTER
He currently takes Linzess 72 mcg.  I recommend to increase the dose to 145 mg.  If he still has some capsules of Linzess 72 mcg left he can take 2 of these at the time.  Thank you.

## 2024-06-07 DIAGNOSIS — J30.1 SEASONAL ALLERGIC RHINITIS DUE TO POLLEN: ICD-10-CM

## 2024-06-07 DIAGNOSIS — E78.2 MIXED HYPERLIPIDEMIA: ICD-10-CM

## 2024-06-07 RX ORDER — MONTELUKAST SODIUM 10 MG/1
10 TABLET ORAL DAILY
Qty: 90 TABLET | Refills: 1 | Status: SHIPPED | OUTPATIENT
Start: 2024-06-07

## 2024-06-07 RX ORDER — ATORVASTATIN CALCIUM 10 MG/1
10 TABLET, FILM COATED ORAL DAILY
Qty: 90 TABLET | Refills: 3 | Status: SHIPPED | OUTPATIENT
Start: 2024-06-07

## 2024-06-20 ENCOUNTER — TELEPHONE (OUTPATIENT)
Dept: FAMILY MEDICINE CLINIC | Facility: CLINIC | Age: 74
End: 2024-06-20

## 2024-06-20 RX ORDER — LUBIPROSTONE 8 UG/1
8 CAPSULE ORAL 2 TIMES DAILY WITH MEALS
Qty: 60 CAPSULE | Refills: 0 | Status: SHIPPED | OUTPATIENT
Start: 2024-06-20

## 2024-06-20 NOTE — TELEPHONE ENCOUNTER
I recommend to increase the dose of Linzess from 72 mg to 145 mg.  I can either send a new prescription or if he still has some 72 mg strength capsules left he can start taking 2 of these at the time.  Make sure that patient takes these first thing in the morning on empty stomach with a big glass of water.  Thank you.

## 2024-06-20 NOTE — TELEPHONE ENCOUNTER
Then I recommend to start Amitiza which is a different type of medication for constipation.  Amitiza is taken 2 times a day.  If in agreement I will send a prescription to the pharmacy.  Thank you.

## 2024-06-20 NOTE — TELEPHONE ENCOUNTER
I spoke with Jv - he informed me that he has been taking the 145 mg since 06-05-24 when he spoke to Elli about this and it has not helped.   He is asking if you have any other suggestions

## 2024-06-20 NOTE — TELEPHONE ENCOUNTER
Caller: PETEY YAO    Relationship: Emergency Contact    Best call back number: [phone]     What was the call regarding: PATIENTS BROTHER STATED THAT THE     Linzess 72 MCG capsule capsule    IS NOT WORKING    PLEASE ADVISE

## 2024-06-20 NOTE — TELEPHONE ENCOUNTER
Patient brother Demetrio notified and verbally understood.  Please call in to [facility] at [address] in [place]

## 2024-06-26 ENCOUNTER — TELEPHONE (OUTPATIENT)
Dept: FAMILY MEDICINE CLINIC | Facility: CLINIC | Age: 74
End: 2024-06-26

## 2024-06-26 NOTE — TELEPHONE ENCOUNTER
Caller: Drake Beth    Relationship: Self    Best call back number: [phone]    CALLING TO CHECK STATUS ON lubiprostone (Amitiza) 8 MCG capsule [38178] (Order [id number]) . HAS NOT HEARD IF INSURANCE HAS APPROVED

## 2024-06-27 RX ORDER — LACTULOSE 10 G/15ML
10 SOLUTION ORAL 2 TIMES DAILY PRN
Qty: 900 ML | Refills: 0 | Status: SHIPPED | OUTPATIENT
Start: 2024-06-27 | End: 2024-07-27

## 2024-06-27 NOTE — TELEPHONE ENCOUNTER
We can try some lactulose, this is generic medication and I do not expect any problems on the insurance part not to cover.  If they agree to that I will send a prescription to the pharmacy.  Thank you.

## 2024-07-08 ENCOUNTER — TELEPHONE (OUTPATIENT)
Dept: FAMILY MEDICINE CLINIC | Facility: CLINIC | Age: 74
End: 2024-07-08

## 2024-07-08 DIAGNOSIS — K59.09 CHRONIC CONSTIPATION: Primary | ICD-10-CM

## 2024-07-08 NOTE — TELEPHONE ENCOUNTER
Please call the patient's brother to see what questions they have.  Most recently I sent a prescription for lactulose for him to help with constipation.  Thank you.

## 2024-07-08 NOTE — TELEPHONE ENCOUNTER
Caller: PETEY YAO    Relationship: Emergency Contact    Best call back number:     What is the best time to reach you:     Who are you requesting to speak with (clinical staff, provider,  specific staff member): DR BUCK OR STAFF    Do you know the name of the person who called:     What was the call regarding: PATIENT IS CALLING IN TO REQUEST A CALL FROM DR BUCK OR STAFF TO DISCUSS A ISSUE THAT THE PATIENT IS HAVING WITH HIS BOWELS.     Is it okay if the provider responds through MyChart:

## 2024-07-08 NOTE — TELEPHONE ENCOUNTER
I called his brother Viral.  He reports that patient actually has been going to the bathroom with lactulose, he has been having however loose and watery stools.  He has been having some on and off abdominal discomfort, but he certainly does not look sick, no vomiting.  I gave him order for KUB and he is being referred to GI.  I will advise further once the KUB report is available.

## 2024-07-08 NOTE — TELEPHONE ENCOUNTER
I spoke to the patient brother Demetrio.. new medication not working. He and his older brother  Richi who share as caregivers are asking for an x ray of the abdomen to check for a blockage and see GI please.

## 2024-07-11 ENCOUNTER — APPOINTMENT (OUTPATIENT)
Dept: GENERAL RADIOLOGY | Facility: HOSPITAL | Age: 74
End: 2024-07-11
Payer: MEDICARE

## 2024-07-11 ENCOUNTER — APPOINTMENT (OUTPATIENT)
Dept: CT IMAGING | Facility: HOSPITAL | Age: 74
End: 2024-07-11
Payer: MEDICARE

## 2024-07-11 ENCOUNTER — HOSPITAL ENCOUNTER (INPATIENT)
Facility: HOSPITAL | Age: 74
LOS: 1 days | Discharge: HOME | End: 2024-07-15
Attending: EMERGENCY MEDICINE | Admitting: EMERGENCY MEDICINE
Payer: MEDICARE

## 2024-07-11 DIAGNOSIS — R10.84 GENERALIZED ABDOMINAL PAIN: ICD-10-CM

## 2024-07-11 DIAGNOSIS — K59.00 CONSTIPATION, UNSPECIFIED CONSTIPATION TYPE: ICD-10-CM

## 2024-07-11 DIAGNOSIS — J18.9 PNEUMONIA OF BOTH LOWER LOBES DUE TO INFECTIOUS ORGANISM: Primary | ICD-10-CM

## 2024-07-11 LAB
ALBUMIN SERPL-MCNC: 4.2 G/DL (ref 3.5–5.2)
ALBUMIN/GLOB SERPL: 1.1 G/DL
ALP SERPL-CCNC: 81 U/L (ref 39–117)
ALT SERPL W P-5'-P-CCNC: 5 U/L (ref 1–41)
ANION GAP SERPL CALCULATED.3IONS-SCNC: 11.4 MMOL/L (ref 5–15)
AST SERPL-CCNC: 33 U/L (ref 1–40)
B PARAPERT DNA SPEC QL NAA+PROBE: NOT DETECTED
B PERT DNA SPEC QL NAA+PROBE: NOT DETECTED
BASOPHILS # BLD AUTO: 0.02 10*3/MM3 (ref 0–0.2)
BASOPHILS NFR BLD AUTO: 0.3 % (ref 0–1.5)
BILIRUB SERPL-MCNC: 1.4 MG/DL (ref 0–1.2)
BILIRUB UR QL STRIP: NEGATIVE
BUN SERPL-MCNC: 10 MG/DL (ref 8–23)
BUN/CREAT SERPL: 11.4 (ref 7–25)
C PNEUM DNA NPH QL NAA+NON-PROBE: NOT DETECTED
CALCIUM SPEC-SCNC: 9.7 MG/DL (ref 8.6–10.5)
CHLORIDE SERPL-SCNC: 95 MMOL/L (ref 98–107)
CLARITY UR: CLEAR
CO2 SERPL-SCNC: 28.6 MMOL/L (ref 22–29)
COLOR UR: YELLOW
CREAT SERPL-MCNC: 0.88 MG/DL (ref 0.76–1.27)
DEPRECATED RDW RBC AUTO: 48.2 FL (ref 37–54)
EGFRCR SERPLBLD CKD-EPI 2021: 91.4 ML/MIN/1.73
EOSINOPHIL # BLD AUTO: 0.18 10*3/MM3 (ref 0–0.4)
EOSINOPHIL NFR BLD AUTO: 2.4 % (ref 0.3–6.2)
ERYTHROCYTE [DISTWIDTH] IN BLOOD BY AUTOMATED COUNT: 13.5 % (ref 12.3–15.4)
FLUAV SUBTYP SPEC NAA+PROBE: NOT DETECTED
FLUBV RNA ISLT QL NAA+PROBE: NOT DETECTED
GLOBULIN UR ELPH-MCNC: 3.7 GM/DL
GLUCOSE SERPL-MCNC: 99 MG/DL (ref 65–99)
GLUCOSE UR STRIP-MCNC: NEGATIVE MG/DL
HADV DNA SPEC NAA+PROBE: NOT DETECTED
HCOV 229E RNA SPEC QL NAA+PROBE: NOT DETECTED
HCOV HKU1 RNA SPEC QL NAA+PROBE: NOT DETECTED
HCOV NL63 RNA SPEC QL NAA+PROBE: NOT DETECTED
HCOV OC43 RNA SPEC QL NAA+PROBE: NOT DETECTED
HCT VFR BLD AUTO: 37.2 % (ref 37.5–51)
HGB BLD-MCNC: 12.8 G/DL (ref 13–17.7)
HGB UR QL STRIP.AUTO: NEGATIVE
HMPV RNA NPH QL NAA+NON-PROBE: NOT DETECTED
HOLD SPECIMEN: NORMAL
HPIV1 RNA ISLT QL NAA+PROBE: NOT DETECTED
HPIV2 RNA SPEC QL NAA+PROBE: NOT DETECTED
HPIV3 RNA NPH QL NAA+PROBE: NOT DETECTED
HPIV4 P GENE NPH QL NAA+PROBE: NOT DETECTED
IMM GRANULOCYTES # BLD AUTO: 0.03 10*3/MM3 (ref 0–0.05)
IMM GRANULOCYTES NFR BLD AUTO: 0.4 % (ref 0–0.5)
KETONES UR QL STRIP: NEGATIVE
LEUKOCYTE ESTERASE UR QL STRIP.AUTO: NEGATIVE
LIPASE SERPL-CCNC: 35 U/L (ref 13–60)
LYMPHOCYTES # BLD AUTO: 1.18 10*3/MM3 (ref 0.7–3.1)
LYMPHOCYTES NFR BLD AUTO: 16 % (ref 19.6–45.3)
M PNEUMO IGG SER IA-ACNC: NOT DETECTED
MCH RBC QN AUTO: 33.6 PG (ref 26.6–33)
MCHC RBC AUTO-ENTMCNC: 34.4 G/DL (ref 31.5–35.7)
MCV RBC AUTO: 97.6 FL (ref 79–97)
MONOCYTES # BLD AUTO: 0.65 10*3/MM3 (ref 0.1–0.9)
MONOCYTES NFR BLD AUTO: 8.8 % (ref 5–12)
NEUTROPHILS NFR BLD AUTO: 5.32 10*3/MM3 (ref 1.7–7)
NEUTROPHILS NFR BLD AUTO: 72.1 % (ref 42.7–76)
NITRITE UR QL STRIP: NEGATIVE
NRBC BLD AUTO-RTO: 0 /100 WBC (ref 0–0.2)
NT-PROBNP SERPL-MCNC: 2048 PG/ML (ref 0–900)
PH UR STRIP.AUTO: 6 [PH] (ref 5–8)
PLATELET # BLD AUTO: 190 10*3/MM3 (ref 140–450)
PMV BLD AUTO: 9.3 FL (ref 6–12)
POTASSIUM SERPL-SCNC: 4.6 MMOL/L (ref 3.5–5.2)
PROT SERPL-MCNC: 7.9 G/DL (ref 6–8.5)
PROT UR QL STRIP: ABNORMAL
RBC # BLD AUTO: 3.81 10*6/MM3 (ref 4.14–5.8)
RHINOVIRUS RNA SPEC NAA+PROBE: NOT DETECTED
RSV RNA NPH QL NAA+NON-PROBE: NOT DETECTED
SARS-COV-2 RNA NPH QL NAA+NON-PROBE: NOT DETECTED
SODIUM SERPL-SCNC: 135 MMOL/L (ref 136–145)
SP GR UR STRIP: 1.05 (ref 1–1.03)
UROBILINOGEN UR QL STRIP: ABNORMAL
WBC NRBC COR # BLD AUTO: 7.38 10*3/MM3 (ref 3.4–10.8)
WHOLE BLOOD HOLD COAG: NORMAL

## 2024-07-11 PROCEDURE — 85025 COMPLETE CBC W/AUTO DIFF WBC: CPT | Performed by: NURSE PRACTITIONER

## 2024-07-11 PROCEDURE — 36415 COLL VENOUS BLD VENIPUNCTURE: CPT

## 2024-07-11 PROCEDURE — 83690 ASSAY OF LIPASE: CPT | Performed by: NURSE PRACTITIONER

## 2024-07-11 PROCEDURE — 25010000002 CEFTRIAXONE PER 250 MG: Performed by: PHYSICIAN ASSISTANT

## 2024-07-11 PROCEDURE — 87040 BLOOD CULTURE FOR BACTERIA: CPT | Performed by: PHYSICIAN ASSISTANT

## 2024-07-11 PROCEDURE — 74177 CT ABD & PELVIS W/CONTRAST: CPT

## 2024-07-11 PROCEDURE — 25010000002 ONDANSETRON PER 1 MG: Performed by: PHYSICIAN ASSISTANT

## 2024-07-11 PROCEDURE — 99285 EMERGENCY DEPT VISIT HI MDM: CPT

## 2024-07-11 PROCEDURE — 71045 X-RAY EXAM CHEST 1 VIEW: CPT

## 2024-07-11 PROCEDURE — G0378 HOSPITAL OBSERVATION PER HR: HCPCS

## 2024-07-11 PROCEDURE — 94761 N-INVAS EAR/PLS OXIMETRY MLT: CPT

## 2024-07-11 PROCEDURE — 81003 URINALYSIS AUTO W/O SCOPE: CPT | Performed by: PHYSICIAN ASSISTANT

## 2024-07-11 PROCEDURE — 25510000001 IOPAMIDOL PER 1 ML: Performed by: NURSE PRACTITIONER

## 2024-07-11 PROCEDURE — 80053 COMPREHEN METABOLIC PANEL: CPT | Performed by: NURSE PRACTITIONER

## 2024-07-11 PROCEDURE — 0202U NFCT DS 22 TRGT SARS-COV-2: CPT | Performed by: PHYSICIAN ASSISTANT

## 2024-07-11 PROCEDURE — 83880 ASSAY OF NATRIURETIC PEPTIDE: CPT | Performed by: PHYSICIAN ASSISTANT

## 2024-07-11 RX ORDER — POLYETHYLENE GLYCOL 3350 17 G/17G
17 POWDER, FOR SOLUTION ORAL DAILY PRN
Status: DISCONTINUED | OUTPATIENT
Start: 2024-07-11 | End: 2024-07-15 | Stop reason: HOSPADM

## 2024-07-11 RX ORDER — CHOLESTYRAMINE LIGHT 4 G/5.7G
1 POWDER, FOR SUSPENSION ORAL DAILY
Status: DISCONTINUED | OUTPATIENT
Start: 2024-07-12 | End: 2024-07-12

## 2024-07-11 RX ORDER — MONTELUKAST SODIUM 10 MG/1
10 TABLET ORAL DAILY
Status: DISCONTINUED | OUTPATIENT
Start: 2024-07-12 | End: 2024-07-15 | Stop reason: HOSPADM

## 2024-07-11 RX ORDER — SODIUM CHLORIDE 9 MG/ML
40 INJECTION, SOLUTION INTRAVENOUS AS NEEDED
Status: DISCONTINUED | OUTPATIENT
Start: 2024-07-11 | End: 2024-07-15 | Stop reason: HOSPADM

## 2024-07-11 RX ORDER — ATORVASTATIN CALCIUM 10 MG/1
10 TABLET, FILM COATED ORAL DAILY
Status: DISCONTINUED | OUTPATIENT
Start: 2024-07-12 | End: 2024-07-15 | Stop reason: HOSPADM

## 2024-07-11 RX ORDER — SODIUM CHLORIDE 0.9 % (FLUSH) 0.9 %
10 SYRINGE (ML) INJECTION AS NEEDED
Status: DISCONTINUED | OUTPATIENT
Start: 2024-07-11 | End: 2024-07-15 | Stop reason: HOSPADM

## 2024-07-11 RX ORDER — SOTALOL HYDROCHLORIDE 80 MG/1
80 TABLET ORAL
Status: DISCONTINUED | OUTPATIENT
Start: 2024-07-12 | End: 2024-07-15 | Stop reason: HOSPADM

## 2024-07-11 RX ORDER — BISACODYL 10 MG
10 SUPPOSITORY, RECTAL RECTAL DAILY PRN
Status: DISCONTINUED | OUTPATIENT
Start: 2024-07-11 | End: 2024-07-15 | Stop reason: HOSPADM

## 2024-07-11 RX ORDER — SODIUM CHLORIDE 0.9 % (FLUSH) 0.9 %
10 SYRINGE (ML) INJECTION EVERY 12 HOURS SCHEDULED
Status: DISCONTINUED | OUTPATIENT
Start: 2024-07-12 | End: 2024-07-15 | Stop reason: HOSPADM

## 2024-07-11 RX ORDER — AMOXICILLIN 250 MG
2 CAPSULE ORAL 2 TIMES DAILY PRN
Status: DISCONTINUED | OUTPATIENT
Start: 2024-07-11 | End: 2024-07-15 | Stop reason: HOSPADM

## 2024-07-11 RX ORDER — ONDANSETRON 2 MG/ML
4 INJECTION INTRAMUSCULAR; INTRAVENOUS ONCE
Status: COMPLETED | OUTPATIENT
Start: 2024-07-11 | End: 2024-07-11

## 2024-07-11 RX ORDER — AMLODIPINE BESYLATE 2.5 MG/1
2.5 TABLET ORAL DAILY
Status: DISCONTINUED | OUTPATIENT
Start: 2024-07-12 | End: 2024-07-13

## 2024-07-11 RX ORDER — ACETAMINOPHEN 650 MG/1
650 SUPPOSITORY RECTAL EVERY 4 HOURS PRN
Status: DISCONTINUED | OUTPATIENT
Start: 2024-07-11 | End: 2024-07-15 | Stop reason: HOSPADM

## 2024-07-11 RX ORDER — ACETAMINOPHEN 160 MG/5ML
650 SOLUTION ORAL EVERY 4 HOURS PRN
Status: DISCONTINUED | OUTPATIENT
Start: 2024-07-11 | End: 2024-07-15 | Stop reason: HOSPADM

## 2024-07-11 RX ORDER — ONDANSETRON 2 MG/ML
4 INJECTION INTRAMUSCULAR; INTRAVENOUS EVERY 6 HOURS PRN
Status: DISCONTINUED | OUTPATIENT
Start: 2024-07-11 | End: 2024-07-12

## 2024-07-11 RX ORDER — ACETAMINOPHEN 325 MG/1
650 TABLET ORAL EVERY 4 HOURS PRN
Status: DISCONTINUED | OUTPATIENT
Start: 2024-07-11 | End: 2024-07-15 | Stop reason: HOSPADM

## 2024-07-11 RX ORDER — BISACODYL 5 MG/1
5 TABLET, DELAYED RELEASE ORAL DAILY PRN
Status: DISCONTINUED | OUTPATIENT
Start: 2024-07-11 | End: 2024-07-15 | Stop reason: HOSPADM

## 2024-07-11 RX ADMIN — IOPAMIDOL 100 ML: 755 INJECTION, SOLUTION INTRAVENOUS at 15:01

## 2024-07-11 RX ADMIN — CEFTRIAXONE 1000 MG: 1 INJECTION, POWDER, FOR SOLUTION INTRAMUSCULAR; INTRAVENOUS at 16:54

## 2024-07-11 RX ADMIN — ONDANSETRON 4 MG: 2 INJECTION INTRAMUSCULAR; INTRAVENOUS at 15:04

## 2024-07-11 NOTE — ED NOTES
Nursing report ED to floor  Drake Beth  73 y.o.  male    HPI:   Chief Complaint   Patient presents with    Diarrhea     Family states abd pain diarrhea, not eating, had xrays that showed blockage and told to come to ER seen [facility].  Reports this has been going on a long time but now just complaining of pain.         Admitting doctor:   Caleb Macias MD    Admitting diagnosis:   The primary encounter diagnosis was Pneumonia of both lower lobes due to infectious organism. Diagnoses of Generalized abdominal pain and Constipation, unspecified constipation type were also pertinent to this visit.    Code status:   Current Code Status       Date Active Code Status Order ID Comments User Context       Not on file            Allergies:   Patient has no known allergies.    Isolation:  No active isolations     Fall Risk:  Fall Risk Assessment was completed, and patient is at low risk for falls.   Predictive Model Details         9 (Low) Factor Value    Calculated 7/11/2024 17:28 Age 73    Risk of Fall Model Active Peripheral IV Present     Imaging order in this encounter Present     Magnesium not on file     Number of Distinct Medication Classes administered 4     Roly Scale not on file     Total Bilirubin 1.4 mg/dL     Chloride 95 mmol/L     Diastolic BP 78     Clinically Relevant Sex Not Female     Respiratory Rate 16     Albumin 4.2 g/dL     ALT 5 U/L     Potassium 4.6 mmol/L     Days after Admission 0.134     Creatinine 0.88 mg/dL     Calcium 9.7 mg/dL         Weight:       07/11/24  1259   Weight: 46.3 kg (102 lb 1.2 oz)       Intake and Output  No intake or output data in the 24 hours ending 07/11/24 1729    Diet:        Most recent vitals:   Vitals:    07/11/24 1259 07/11/24 1300 07/11/24 1550   BP: 169/89  158/78   BP Location: Left arm     Patient Position: Sitting     Pulse: 58  62   Resp: 18  16   Temp:  97.3 °F (36.3 °C)    TempSrc: Oral     SpO2: 90%  94%   Weight: 46.3 kg (102 lb 1.2 oz)    "  Height: 152.4 cm (60\")         Active LDAs/IV Access:   Lines, Drains & Airways       Active LDAs       Name Placement date Placement time Site Days    Peripheral IV 07/11/24 1416 Left Antecubital 07/11/24  1416  Antecubital  less than 1                    Skin Condition:   Skin Assessments (last day)       None             Labs (abnormal labs have a star):   Labs Reviewed   COMPREHENSIVE METABOLIC PANEL - Abnormal; Notable for the following components:       Result Value    Sodium 135 (*)     Chloride 95 (*)     Total Bilirubin 1.4 (*)     All other components within normal limits    Narrative:     GFR Normal >60  Chronic Kidney Disease <60  Kidney Failure <15    The GFR formula is only valid for adults with stable renal function between ages 18 and 70.   CBC WITH AUTO DIFFERENTIAL - Abnormal; Notable for the following components:    RBC 3.81 (*)     Hemoglobin 12.8 (*)     Hematocrit 37.2 (*)     MCV 97.6 (*)     MCH 33.6 (*)     Lymphocyte % 16.0 (*)     All other components within normal limits   URINALYSIS W/ MICROSCOPIC IF INDICATED (NO CULTURE) - Abnormal; Notable for the following components:    Specific Gravity, UA 1.046 (*)     Protein, UA Trace (*)     All other components within normal limits    Narrative:     Urine microscopic not indicated.   BNP (IN-HOUSE) - Abnormal; Notable for the following components:    proBNP 2,048.0 (*)     All other components within normal limits    Narrative:     This assay is used as an aid in the diagnosis of individuals suspected of having heart failure. It can be used as an aid in the diagnosis of acute decompensated heart failure (ADHF) in patients presenting with signs and symptoms of ADHF to the emergency department (ED). In addition, NT-proBNP of <300 pg/mL indicates ADHF is not likely.    Age Range Result Interpretation  NT-proBNP Concentration (pg/mL:      <50             Positive            >450                   Gray                 300-450                    " Negative             <300    50-75           Positive            >900                  Gray                300-900                  Negative            <300      >75             Positive            >1800                  Gray                300-1800                  Negative            <300   LIPASE - Normal   RESPIRATORY PANEL PCR W/ COVID-19 (SARS-COV-2), NP SWAB IN UTM/VTP, 2 HR TAT   BLOOD CULTURE   BLOOD CULTURE   CBC AND DIFFERENTIAL    Narrative:     The following orders were created for panel order CBC & Differential.  Procedure                               Abnormality         Status                     ---------                               -----------         ------                     CBC Auto Differential[[id number]]        Abnormal            Final result                 Please view results for these tests on the individual orders.   EXTRA TUBES    Narrative:     The following orders were created for panel order Extra Tubes.  Procedure                               Abnormality         Status                     ---------                               -----------         ------                     Gold Top - SST[[id number]]                                   Final result               Light Blue Top[[id number]]                                   Final result                 Please view results for these tests on the individual orders.   GOLD TOP - SST   LIGHT BLUE TOP       LOC: Person    Telemetry:  Observation Unit    Cardiac Monitoring Ordered: no    EKG:   No orders to display       Medications Given in the ED:   Medications   sodium chloride 0.9 % flush 10 mL (has no administration in time range)   iopamidol (ISOVUE-370) 76 % injection 100 mL (100 mL Intravenous Given 7/11/24 1501)   ondansetron (ZOFRAN) injection 4 mg (4 mg Intravenous Given 7/11/24 1504)   cefTRIAXone (ROCEPHIN) 1,000 mg in sodium chloride 0.9 % 100 mL MBP (0 mg Intravenous Stopped 7/11/24 1727)       Imaging results:  XR Chest 1  View    Result Date: 7/11/2024  Impression: Findings are most compatible with bilateral pneumonia. Electronically Signed: Mallorie Monge MD  7/11/2024 4:17 PM EDT  Workstation ID: [id number]    CT Abdomen Pelvis With Contrast    Result Date: 7/11/2024  Impression: 1.No acute abnormality identified within the abdomen or pelvis. 2.Colonic diverticulosis. 3.Absent right kidney. 4.Cardiomegaly with probable pulmonary vascular congestion and small bilateral pleural effusions noted. Atypical infiltrates within the lung bases may have a similar appearance. Please correlate clinically. Electronically Signed: Shane Vaughn MD  7/11/2024 3:09 PM EDT  Workstation ID: [id number]     Social issues:   Social History     Socioeconomic History    Marital status: Single   Tobacco Use    Smoking status: Never    Smokeless tobacco: Never   Vaping Use    Vaping status: Never Used   Substance and Sexual Activity    Alcohol use: Never    Drug use: Never    Sexual activity: Never       NIH Stroke Scale:  Interval: (not recorded)  1a. Level of Consciousness: (not recorded)  1b. LOC Questions: (not recorded)  1c. LOC Commands: (not recorded)  2. Best Gaze: (not recorded)  3. Visual: (not recorded)  4. Facial Palsy: (not recorded)  5a. Motor Arm, Left: (not recorded)  5b. Motor Arm, Right: (not recorded)  6a. Motor Leg, Left: (not recorded)  6b. Motor Leg, Right: (not recorded)  7. Limb Ataxia: (not recorded)  8. Sensory: (not recorded)  9. Best Language: (not recorded)  10. Dysarthria: (not recorded)  11. Extinction and Inattention (formerly Neglect): (not recorded)    Total (NIH Stroke Scale): (not recorded)     Additional notable assessment information:     Nursing report ED to floor:  APOLONIA Lu RN   07/11/24 17:29 EDT

## 2024-07-11 NOTE — ED PROVIDER NOTES
Subjective    Provider in Triage Note  Abdominal pain without vomiting.  Had an outpatient x-ray on July 8 that showed ileus with questionable bowel obstruction    Due to significant overcrowding in the emergency department patient was initially seen and evaluated in triage.  Provider in triage recommended patient placement in the treatment area to initiate therapy and movement to an ER bed as soon as possible.   Orders placed; medications will be deferred to main provider per protocol.          History of Present Illness  Chief Complaint: Abdominal pain    Patient is a 73-year-old male history of A-fib on Eliquis CHF CAD presents to the ER with family member complaining of abdominal pain and difficulty with bowel movements for 1 month.  Family member states that he has not had a good bowel movement for couple weeks but has had small bouts of diarrhea.   He was seen by PCP over the last couple weeks and was started on MiraLAX, Linzess, lactulose with no improvement.  Family reports that they did have outpatient x-ray that showed possible small bowel obstruction which prompted ER visit today.  Patient has been complaining of more abdominal pain in the last couple days but cannot describe the abdominal pain.  No chest pain or shortness of breath but does report cough.  No fever or chills.  No vomiting.    PCP: Amada Haskins    History provided by:  Patient and relative  History limited by: Hx mentally challenged.      Review of Systems   Reason unable to perform ROS: Patient mentally handicapped.   Eyes: Negative.    Respiratory:  Positive for cough.    Gastrointestinal:  Positive for abdominal pain, constipation and diarrhea. Negative for nausea and vomiting.   Endocrine: Negative.    Musculoskeletal:  Negative for back pain and myalgias.   Allergic/Immunologic: Negative.    Neurological:  Negative for weakness.       Past Medical History:   Diagnosis Date    Allergic rhinitis     Anemia     Atrial fibrillation      Chesty cough     CHF (congestive heart failure)     Coronary artery disease     Cough     Fever     Hyperlipidemia     Hypertension     Mentally challenged     MVP (mitral valve prolapse)        No Known Allergies    Past Surgical History:   Procedure Laterality Date    CARDIAC ELECTROPHYSIOLOGY PROCEDURE N/A 4/26/2022    Procedure: PACEMAKER EXTRACTION;  Surgeon: Rj Dominique MD;  Location: [facility] INVASIVE LOCATION;  Service: Cardiology;  Laterality: N/A;    COLONOSCOPY      He has never had the test done.  His family is refusing any form of colon cancer screening for him.    CORONARY ARTERY BYPASS GRAFT  2008    DR SWEAT    MITRAL VALVE REPLACEMENT  04/2008    WITH BIOPROSTHETIC TISSUE VALVE       Family History   Problem Relation Age of Onset    Coronary artery disease Mother 64    Hyperlipidemia Sister     Liver disease Sister         FATTY LIVER    Rheum arthritis Sister     Lung cancer Sister     Hypertension Brother     Other Brother     Rheum arthritis Brother        Social History     Socioeconomic History    Marital status: Single   Tobacco Use    Smoking status: Never    Smokeless tobacco: Never   Vaping Use    Vaping status: Never Used   Substance and Sexual Activity    Alcohol use: Never    Drug use: Never    Sexual activity: Never           Objective   Physical Exam  Vitals and nursing note reviewed.   Constitutional:       General: He is not in acute distress.     Appearance: Normal appearance. He is normal weight. He is not diaphoretic.   Cardiovascular:      Rate and Rhythm: Normal rate and regular rhythm.      Pulses: Normal pulses.      Heart sounds: Normal heart sounds.   Pulmonary:      Effort: Pulmonary effort is normal.      Breath sounds: Normal breath sounds.   Abdominal:      General: Abdomen is flat.      Tenderness: There is abdominal tenderness.   Neurological:      General: No focal deficit present.      Mental Status: He is alert and oriented to person, place, and time.  "  Psychiatric:         Mood and Affect: Mood normal.         Behavior: Behavior normal.         Procedures           ED Course  ED Course as of 07/12/24 0143   Thu Jul 11, 2024   1442 Due to significant overcrowding in the emergency department patient was evaluated by myself in a hallway bed.  This exam may be limited by privacy, noise and the patient not wearing a hospital gown.  Explained to the patient our limitations and are overcrowding.  They were in agreement to continue the exam and treatment at this time. [MC]      ED Course User Index  [MC] Xiomara Kelley PA    /75 (BP Location: Right arm, Patient Position: Lying)   Pulse 65   Temp 97.8 °F (36.6 °C) (Oral)   Resp 16   Ht 152.4 cm (60\")   Wt 46.3 kg (102 lb 1.2 oz)   SpO2 93%   BMI 19.93 kg/m²   Labs Reviewed   COMPREHENSIVE METABOLIC PANEL - Abnormal; Notable for the following components:       Result Value    Sodium 135 (*)     Chloride 95 (*)     Total Bilirubin 1.4 (*)     All other components within normal limits    Narrative:     GFR Normal >60  Chronic Kidney Disease <60  Kidney Failure <15    The GFR formula is only valid for adults with stable renal function between ages 18 and 70.   CBC WITH AUTO DIFFERENTIAL - Abnormal; Notable for the following components:    RBC 3.81 (*)     Hemoglobin 12.8 (*)     Hematocrit 37.2 (*)     MCV 97.6 (*)     MCH 33.6 (*)     Lymphocyte % 16.0 (*)     All other components within normal limits   URINALYSIS W/ MICROSCOPIC IF INDICATED (NO CULTURE) - Abnormal; Notable for the following components:    Specific Gravity, UA 1.046 (*)     Protein, UA Trace (*)     All other components within normal limits    Narrative:     Urine microscopic not indicated.   BNP (IN-HOUSE) - Abnormal; Notable for the following components:    proBNP 2,048.0 (*)     All other components within normal limits    Narrative:     This assay is used as an aid in the diagnosis of individuals suspected of having heart failure. It can " be used as an aid in the diagnosis of acute decompensated heart failure (ADHF) in patients presenting with signs and symptoms of ADHF to the emergency department (ED). In addition, NT-proBNP of <300 pg/mL indicates ADHF is not likely.    Age Range Result Interpretation  NT-proBNP Concentration (pg/mL:      <50             Positive            >450                   Gray                 300-450                    Negative             <300    50-75           Positive            >900                  Gray                300-900                  Negative            <300      >75             Positive            >1800                  Gray                300-1800                  Negative            <300   RESPIRATORY PANEL PCR W/ COVID-19 (SARS-COV-2), NP SWAB IN UTM/VTP, 2 HR TAT - Normal    Narrative:     In the setting of a positive respiratory panel with a viral infection PLUS a negative procalcitonin without other underlying concern for bacterial infection, consider observing off antibiotics or discontinuation of antibiotics and continue supportive care. If the respiratory panel is positive for atypical bacterial infection (Bordetella pertussis, Chlamydophila pneumoniae, or Mycoplasma pneumoniae), consider antibiotic de-escalation to target atypical bacterial infection.   LIPASE - Normal   BLOOD CULTURE   BLOOD CULTURE   BASIC METABOLIC PANEL   CBC WITH AUTO DIFFERENTIAL   CBC AND DIFFERENTIAL    Narrative:     The following orders were created for panel order CBC & Differential.  Procedure                               Abnormality         Status                     ---------                               -----------         ------                     CBC Auto Differential[[id number]]        Abnormal            Final result                 Please view results for these tests on the individual orders.   EXTRA TUBES    Narrative:     The following orders were created for panel order Extra Tubes.  Procedure                                Abnormality         Status                     ---------                               -----------         ------                     Gold Top - SST[[id number]]                                   Final result               Light Blue Top[[id number]]                                   Final result                 Please view results for these tests on the individual orders.   GOLD TOP - SST   LIGHT BLUE TOP     Medications   sodium chloride 0.9 % flush 10 mL (has no administration in time range)   amLODIPine (NORVASC) tablet 2.5 mg (has no administration in time range)   apixaban (ELIQUIS) tablet 5 mg (5 mg Oral Given 7/12/24 0052)   atorvastatin (LIPITOR) tablet 10 mg (has no administration in time range)   cholestyramine light packet 4 g (has no administration in time range)   montelukast (SINGULAIR) tablet 10 mg (has no administration in time range)   sotalol (BETAPACE) tablet 80 mg (has no administration in time range)   sodium chloride 0.9 % flush 10 mL ( Intravenous Canceled Entry 7/12/24 0055)   sodium chloride 0.9 % flush 10 mL (has no administration in time range)   sodium chloride 0.9 % infusion 40 mL (has no administration in time range)   ondansetron (ZOFRAN) injection 4 mg (has no administration in time range)   melatonin tablet 5 mg (has no administration in time range)   acetaminophen (TYLENOL) tablet 650 mg (has no administration in time range)     Or   acetaminophen (TYLENOL) 160 MG/5ML oral solution 650 mg (has no administration in time range)     Or   acetaminophen (TYLENOL) suppository 650 mg (has no administration in time range)   sennosides-docusate (PERICOLACE) 8.6-50 MG per tablet 2 tablet (has no administration in time range)     And   polyethylene glycol (MIRALAX) packet 17 g (has no administration in time range)     And   bisacodyl (DULCOLAX) EC tablet 5 mg (has no administration in time range)     And   bisacodyl (DULCOLAX) suppository 10 mg (has no administration in time  range)   iopamidol (ISOVUE-370) 76 % injection 100 mL (100 mL Intravenous Given 7/11/24 1501)   ondansetron (ZOFRAN) injection 4 mg (4 mg Intravenous Given 7/11/24 1504)   cefTRIAXone (ROCEPHIN) 1,000 mg in sodium chloride 0.9 % 100 mL MBP (0 mg Intravenous Stopped 7/11/24 1727)     XR Chest 1 View    Result Date: 7/11/2024  Impression: Findings are most compatible with bilateral pneumonia. Electronically Signed: Mallorie Monge MD  7/11/2024 4:17 PM EDT  Workstation ID: [id number]    CT Abdomen Pelvis With Contrast    Result Date: 7/11/2024  Impression: 1.No acute abnormality identified within the abdomen or pelvis. 2.Colonic diverticulosis. 3.Absent right kidney. 4.Cardiomegaly with probable pulmonary vascular congestion and small bilateral pleural effusions noted. Atypical infiltrates within the lung bases may have a similar appearance. Please correlate clinically. Electronically Signed: Shane Vaughn MD  7/11/2024 3:09 PM EDT  Workstation ID: [id number]                                            Medical Decision Making  While in the ED IV was placed and labs were obtained appropriate PPE was worn during exam and throughout all encounters with the patient.  Patient afebrile nontoxic appearance in no acute respiratory distress.  History difficult to obtain as patient is mentally handicapped.  Per family patient constipated for 1 month, been taking multiple medications with no improvement has reported some small spurts of diarrhea but no relief of his discomfort.  X-ray K[facility] earlier this week showed possible obstruction.  Lab work reassuring.  No evidence of acute infectious process.  Urinalysis negative.  Respiratory panel negative.  CT abdomen pelvis shows no evidence of obstruction, there is cardiomegaly with possible neurovascular congestion and bilateral pleural effusions.  Chest x-ray just above bilateral pneumonia.  She was given IV Rocephin blood cultures obtained.  Patient placed in ED observation for IV  antibiotics and GI consultation.  Patient's brother at bedside agreeable with plan for observation admission.    Problems Addressed:  Constipation, unspecified constipation type: acute illness or injury  Generalized abdominal pain: acute illness or injury  Pneumonia of both lower lobes due to infectious organism: acute illness or injury    Amount and/or Complexity of Data Reviewed  Labs: ordered. Decision-making details documented in ED Course.  Radiology: ordered. Decision-making details documented in ED Course.  ECG/medicine tests: ordered. Decision-making details documented in ED Course.    Risk  OTC drugs.  Prescription drug management.  Decision regarding hospitalization.        Final diagnoses:   Pneumonia of both lower lobes due to infectious organism   Generalized abdominal pain   Constipation, unspecified constipation type       ED Disposition  ED Disposition       ED Disposition   Decision to Admit    Condition   --    Comment   --               No follow-up provider specified.       Medication List      No changes were made to your prescriptions during this visit.            Xiomara Kelley PA  07/12/24 0143

## 2024-07-11 NOTE — PROGRESS NOTES
I called and spoke to the patient brother Demetrio. He states Adriel just feels bloated and has a constant stomach ache. NO vomiting. They have decided to take Adriel to the ER. Demetrio and their brother Richi to get things addressed quickly

## 2024-07-12 ENCOUNTER — INPATIENT HOSPITAL (OUTPATIENT)
Dept: URBAN - METROPOLITAN AREA HOSPITAL 84 | Facility: HOSPITAL | Age: 74
End: 2024-07-12
Payer: MEDICAID

## 2024-07-12 DIAGNOSIS — D53.9 NUTRITIONAL ANEMIA, UNSPECIFIED: ICD-10-CM

## 2024-07-12 DIAGNOSIS — R17 UNSPECIFIED JAUNDICE: ICD-10-CM

## 2024-07-12 DIAGNOSIS — K59.00 CONSTIPATION, UNSPECIFIED: ICD-10-CM

## 2024-07-12 DIAGNOSIS — R10.9 UNSPECIFIED ABDOMINAL PAIN: ICD-10-CM

## 2024-07-12 LAB
ANION GAP SERPL CALCULATED.3IONS-SCNC: 9.2 MMOL/L (ref 5–15)
BASOPHILS # BLD AUTO: 0.03 10*3/MM3 (ref 0–0.2)
BASOPHILS NFR BLD AUTO: 0.5 % (ref 0–1.5)
BUN SERPL-MCNC: 13 MG/DL (ref 8–23)
BUN/CREAT SERPL: 13.5 (ref 7–25)
CALCIUM SPEC-SCNC: 9 MG/DL (ref 8.6–10.5)
CHLORIDE SERPL-SCNC: 98 MMOL/L (ref 98–107)
CO2 SERPL-SCNC: 28.8 MMOL/L (ref 22–29)
CREAT SERPL-MCNC: 0.96 MG/DL (ref 0.76–1.27)
DEPRECATED RDW RBC AUTO: 49.9 FL (ref 37–54)
EGFRCR SERPLBLD CKD-EPI 2021: 84 ML/MIN/1.73
EOSINOPHIL # BLD AUTO: 0.19 10*3/MM3 (ref 0–0.4)
EOSINOPHIL NFR BLD AUTO: 3.2 % (ref 0.3–6.2)
ERYTHROCYTE [DISTWIDTH] IN BLOOD BY AUTOMATED COUNT: 13.8 % (ref 12.3–15.4)
GLUCOSE BLDC GLUCOMTR-MCNC: 221 MG/DL (ref 70–105)
GLUCOSE BLDC GLUCOMTR-MCNC: 68 MG/DL (ref 70–105)
GLUCOSE SERPL-MCNC: 67 MG/DL (ref 65–99)
HCT VFR BLD AUTO: 33.5 % (ref 37.5–51)
HGB BLD-MCNC: 11.2 G/DL (ref 13–17.7)
IMM GRANULOCYTES # BLD AUTO: 0.03 10*3/MM3 (ref 0–0.05)
IMM GRANULOCYTES NFR BLD AUTO: 0.5 % (ref 0–0.5)
LYMPHOCYTES # BLD AUTO: 1.51 10*3/MM3 (ref 0.7–3.1)
LYMPHOCYTES NFR BLD AUTO: 25.3 % (ref 19.6–45.3)
MCH RBC QN AUTO: 33.5 PG (ref 26.6–33)
MCHC RBC AUTO-ENTMCNC: 33.4 G/DL (ref 31.5–35.7)
MCV RBC AUTO: 100.3 FL (ref 79–97)
MONOCYTES # BLD AUTO: 0.71 10*3/MM3 (ref 0.1–0.9)
MONOCYTES NFR BLD AUTO: 11.9 % (ref 5–12)
NEUTROPHILS NFR BLD AUTO: 3.51 10*3/MM3 (ref 1.7–7)
NEUTROPHILS NFR BLD AUTO: 58.6 % (ref 42.7–76)
NRBC BLD AUTO-RTO: 0 /100 WBC (ref 0–0.2)
PLATELET # BLD AUTO: 165 10*3/MM3 (ref 140–450)
PMV BLD AUTO: 9.7 FL (ref 6–12)
POTASSIUM SERPL-SCNC: 4 MMOL/L (ref 3.5–5.2)
RBC # BLD AUTO: 3.34 10*6/MM3 (ref 4.14–5.8)
SODIUM SERPL-SCNC: 136 MMOL/L (ref 136–145)
WBC NRBC COR # BLD AUTO: 5.98 10*3/MM3 (ref 3.4–10.8)

## 2024-07-12 PROCEDURE — 85025 COMPLETE CBC W/AUTO DIFF WBC: CPT | Performed by: PHYSICIAN ASSISTANT

## 2024-07-12 PROCEDURE — 80048 BASIC METABOLIC PNL TOTAL CA: CPT | Performed by: PHYSICIAN ASSISTANT

## 2024-07-12 PROCEDURE — 97162 PT EVAL MOD COMPLEX 30 MIN: CPT

## 2024-07-12 PROCEDURE — 99223 1ST HOSP IP/OBS HIGH 75: CPT | Mod: FS | Performed by: NURSE PRACTITIONER

## 2024-07-12 PROCEDURE — 25010000002 CEFTRIAXONE PER 250 MG: Performed by: PHYSICIAN ASSISTANT

## 2024-07-12 PROCEDURE — G0378 HOSPITAL OBSERVATION PER HR: HCPCS

## 2024-07-12 PROCEDURE — 82948 REAGENT STRIP/BLOOD GLUCOSE: CPT

## 2024-07-12 RX ORDER — LUBIPROSTONE 24 UG/1
24 CAPSULE ORAL
Status: DISCONTINUED | OUTPATIENT
Start: 2024-07-13 | End: 2024-07-15 | Stop reason: HOSPADM

## 2024-07-12 RX ORDER — BISACODYL 5 MG/1
10 TABLET, DELAYED RELEASE ORAL ONCE
Status: COMPLETED | OUTPATIENT
Start: 2024-07-12 | End: 2024-07-12

## 2024-07-12 RX ORDER — SORBITOL SOLUTION 70 %
50 SOLUTION, ORAL MISCELLANEOUS ONCE
Status: DISCONTINUED | OUTPATIENT
Start: 2024-07-12 | End: 2024-07-15 | Stop reason: HOSPADM

## 2024-07-12 RX ORDER — DEXTROSE MONOHYDRATE 25 G/50ML
50 INJECTION, SOLUTION INTRAVENOUS ONCE
Status: COMPLETED | OUTPATIENT
Start: 2024-07-12 | End: 2024-07-12

## 2024-07-12 RX ORDER — AMLODIPINE BESYLATE 2.5 MG/1
TABLET ORAL
Qty: 90 TABLET | Refills: 3 | Status: SHIPPED | OUTPATIENT
Start: 2024-07-12

## 2024-07-12 RX ORDER — POLYETHYLENE GLYCOL 3350 17 G/17G
17 POWDER, FOR SOLUTION ORAL DAILY
Status: DISCONTINUED | OUTPATIENT
Start: 2024-07-12 | End: 2024-07-15 | Stop reason: HOSPADM

## 2024-07-12 RX ADMIN — Medication 10 ML: at 22:01

## 2024-07-12 RX ADMIN — SOTALOL HYDROCHLORIDE 80 MG: 80 TABLET ORAL at 11:14

## 2024-07-12 RX ADMIN — DEXTROSE MONOHYDRATE 50 ML: 25 INJECTION, SOLUTION INTRAVENOUS at 06:41

## 2024-07-12 RX ADMIN — Medication 10 ML: at 11:14

## 2024-07-12 RX ADMIN — CHOLESTYRAMINE 4 G: 4 POWDER, FOR SUSPENSION ORAL at 11:13

## 2024-07-12 RX ADMIN — BISACODYL 10 MG: 5 TABLET, COATED ORAL at 11:13

## 2024-07-12 RX ADMIN — MONTELUKAST 10 MG: 10 TABLET, FILM COATED ORAL at 11:14

## 2024-07-12 RX ADMIN — AMLODIPINE BESYLATE 2.5 MG: 2.5 TABLET ORAL at 11:14

## 2024-07-12 RX ADMIN — APIXABAN 5 MG: 5 TABLET, FILM COATED ORAL at 11:18

## 2024-07-12 RX ADMIN — APIXABAN 5 MG: 5 TABLET, FILM COATED ORAL at 22:00

## 2024-07-12 RX ADMIN — CEFTRIAXONE 1000 MG: 1 INJECTION, POWDER, FOR SOLUTION INTRAMUSCULAR; INTRAVENOUS at 22:00

## 2024-07-12 RX ADMIN — ATORVASTATIN CALCIUM 10 MG: 10 TABLET, FILM COATED ORAL at 11:14

## 2024-07-12 RX ADMIN — APIXABAN 5 MG: 5 TABLET, FILM COATED ORAL at 00:52

## 2024-07-12 NOTE — H&P
[facility] Observation Unit H&P    Patient Name: Drake Beth  : 1950  MRN: [id number]  Primary Care Physician: Amada Haskins MD  Date of admission: 2024     Patient Care Team:  Amada Haskins MD as PCP - Josr Delgado DPM as Consulting Physician (Podiatry)  Rj Dominique MD as Consulting Physician (Cardiology)          Subjective   History Present Illness     Chief Complaint:   Chief Complaint   Patient presents with    Diarrhea     Family states abd pain diarrhea, not eating, had xrays that showed blockage and told to come to ER seen Gi Specialist.  Reports this has been going on a long time but now just complaining of pain.       Diarrhea/constipation    Diarrhea   Associated symptoms include bloating. Pertinent negatives include no coughing, fever or vomiting.       Ed  73-year-old male history of A-fib on Eliquis CHF CAD presents to the ER with family member complaining of abdominal pain and difficulty with bowel movements for 1 month. Family member states that he has not had a good bowel movement for couple weeks but has had small bouts of diarrhea. He was seen by PCP over the last couple weeks and was started on MiraLAX, Linzess, lactulose with no improvement. Family reports that they did have outpatient x-ray that showed possible small bowel obstruction which prompted ER visit today. Patient has been complaining of more abdominal pain in the last couple days but cannot describe the abdominal pain. No chest pain or shortness of breath but does report cough. No fever or chills. No vomiting.     Observation 24  Pt concurs with er hpi. GI consulted. Pt c/o constipation. Enema given. Pt consulted     Review of Systems   Constitutional: Negative for fever.   Cardiovascular:  Negative for chest pain.   Respiratory:  Negative for cough and shortness of breath.    Gastrointestinal:  Positive for bloating, constipation and diarrhea. Negative for nausea and vomiting.        Personal History     Past Medical History:   Past Medical History:   Diagnosis Date    Allergic rhinitis     Anemia     Atrial fibrillation     Chesty cough     CHF (congestive heart failure)     Coronary artery disease     Cough     Fever     Hyperlipidemia     Hypertension     Mentally challenged     MVP (mitral valve prolapse)        Surgical History:      Past Surgical History:   Procedure Laterality Date    CARDIAC ELECTROPHYSIOLOGY PROCEDURE N/A 4/26/2022    Procedure: PACEMAKER EXTRACTION;  Surgeon: Rj Dominique MD;  Location: [facility] CATH INVASIVE LOCATION;  Service: Cardiology;  Laterality: N/A;    COLONOSCOPY      He has never had the test done.  His family is refusing any form of colon cancer screening for him.    CORONARY ARTERY BYPASS GRAFT  2008    DR SWEAT    MITRAL VALVE REPLACEMENT  04/2008    WITH BIOPROSTHETIC TISSUE VALVE           Family History: family history includes Coronary artery disease (age of onset: 64) in his mother; Hyperlipidemia in his sister; Hypertension in his brother; Liver disease in his sister; Lung cancer in his sister; Other in his brother; Rheum arthritis in his brother and sister. Otherwise pertinent FHx was reviewed and unremarkable.     Social History:  reports that he has never smoked. He has never used smokeless tobacco. He reports that he does not drink alcohol and does not use drugs.      Medications:  Prior to Admission medications    Medication Sig Start Date End Date Taking? Authorizing Provider   apixaban (Eliquis) 5 MG tablet tablet TAKE 1 TABLET BY MOUTH TWICE DAILY 1/25/24  Yes Rj Dominique MD   atorvastatin (LIPITOR) 10 MG tablet TAKE 1 TABLET BY MOUTH DAILY 6/7/24  Yes Ansley Tomlin APRN   colestipol (COLESTID) 1 g tablet TAKE 1 TABLET BY MOUTH FOUR TIMES DAILY 1/22/24  Yes Amada Haskins MD   linaclotide (LINZESS) 72 MCG capsule capsule Take 1 capsule by mouth Every Morning Before Breakfast.   Yes Provider, MD Mary    montelukast (SINGULAIR) 10 MG tablet TAKE 1 TABLET BY MOUTH DAILY 6/7/24  Yes Amada Haskins MD   sotalol (BETAPACE AF) 80 MG tablet tablet TAKE 1 TABLET BY MOUTH TWICE DAILY 5/13/24  Yes Rj Dominique MD   amLODIPine (NORVASC) 2.5 MG tablet TAKE 1 TABLET BY MOUTH DAILY 10/20/23 7/12/24 Yes Rj Dominique MD   amLODIPine (NORVASC) 2.5 MG tablet TAKE 1 TABLET BY MOUTH DAILY 7/12/24   Rj Dominique MD       Allergies:  No Known Allergies    Objective   Objective     Vital Signs  Temp:  [97.2 °F (36.2 °C)-98.3 °F (36.8 °C)] 98.3 °F (36.8 °C)  Heart Rate:  [53-69] 57  Resp:  [10-18] 10  BP: (121-169)/(67-96) 136/67  SpO2:  [90 %-99 %] 97 %  on  Flow (L/min):  [2-3] 3;   Device (Oxygen Therapy): nasal cannula  Body mass index is 19.93 kg/m².    Physical Exam  Constitutional:       Appearance: Normal appearance.   Cardiovascular:      Rate and Rhythm: Normal rate and regular rhythm.      Pulses: Normal pulses.      Heart sounds: Normal heart sounds.   Pulmonary:      Effort: Pulmonary effort is normal.      Breath sounds: Normal breath sounds.   Abdominal:      Palpations: Abdomen is soft.   Skin:     General: Skin is warm and dry.   Neurological:      General: No focal deficit present.      Mental Status: He is alert and oriented to person, place, and time.   Psychiatric:         Mood and Affect: Mood normal.         Behavior: Behavior normal.         Results Review:  I have personally reviewed most recent lab results, microbiology results, and radiology images and interpretations and agree with findings, most notably: cbc, cmp, bnp, ua, lipase, rpp, ct abd, chest xray.    Results from last 7 days   Lab Units 07/12/24  0440   WBC 10*3/mm3 5.98   HEMOGLOBIN g/dL 11.2*   HEMATOCRIT % 33.5*   PLATELETS 10*3/mm3 165     Results from last 7 days   Lab Units 07/12/24  0440 07/11/24  1415   SODIUM mmol/L 136 135*   POTASSIUM mmol/L 4.0 4.6   CHLORIDE mmol/L 98 95*   CO2 mmol/L 28.8 28.6   BUN mg/dL 13 10    CREATININE mg/dL 0.96 0.88   GLUCOSE mg/dL 67 99   CALCIUM mg/dL 9.0 9.7   ALK PHOS U/L  --  81   ALT (SGPT) U/L  --  5   AST (SGOT) U/L  --  33   PROBNP pg/mL  --  2,048.0*     Estimated Creatinine Clearance: 45.5 mL/min (by C-G formula based on SCr of 0.96 mg/dL).  Brief Urine Lab Results  (Last result in the past 365 days)        Color   Clarity   Blood   Leuk Est   Nitrite   Protein   CREAT   Urine HCG        07/11/24 1547 Yellow   Clear   Negative   Negative   Negative   Trace                   Microbiology Results (last 10 days)       Procedure Component Value - Date/Time    Respiratory Panel PCR w/COVID-19(SARS-CoV-2) [facility] In-House, NP Swab in UTM/VTM, 2 HR TAT - Swab, Nasopharynx [[id number]]  (Normal) Collected: 07/11/24 1655    Lab Status: Final result Specimen: Swab from Nasopharynx Updated: 07/11/24 1845     ADENOVIRUS, PCR Not Detected     Coronavirus 229E Not Detected     Coronavirus HKU1 Not Detected     Coronavirus NL63 Not Detected     Coronavirus OC43 Not Detected     COVID19 Not Detected     Human Metapneumovirus Not Detected     Human Rhinovirus/Enterovirus Not Detected     Influenza A PCR Not Detected     Influenza B PCR Not Detected     Parainfluenza Virus 1 Not Detected     Parainfluenza Virus 2 Not Detected     Parainfluenza Virus 3 Not Detected     Parainfluenza Virus 4 Not Detected     RSV, PCR Not Detected     Bordetella pertussis pcr Not Detected     Bordetella parapertussis PCR Not Detected     Chlamydophila pneumoniae PCR Not Detected     Mycoplasma pneumo by PCR Not Detected    Narrative:      In the setting of a positive respiratory panel with a viral infection PLUS a negative procalcitonin without other underlying concern for bacterial infection, consider observing off antibiotics or discontinuation of antibiotics and continue supportive care. If the respiratory panel is positive for atypical bacterial infection (Bordetella pertussis, Chlamydophila pneumoniae, or  Mycoplasma pneumoniae), consider antibiotic de-escalation to target atypical bacterial infection.            ECG/EMG Results (most recent)       Procedure Component Value Units Date/Time    Telemetry Scan [[id number]] Resulted: 07/11/24     Updated: 07/11/24 2120    Telemetry Scan [[id number]] Resulted: 07/11/24     Updated: 07/11/24 2323    Telemetry Scan [[id number]] Resulted: 07/11/24     Updated: 07/12/24 0423    Telemetry Scan [[id number]] Resulted: 07/11/24     Updated: 07/12/24 0759                Results for orders placed during the hospital encounter of 09/06/23    Adult Transthoracic Echo Complete W/ Cont if Necessary Per Protocol    Interpretation Summary    Left ventricular systolic function is normal. Left ventricular ejection fraction appears to be 56 - 60%.    Left ventricular wall thickness is consistent with mild concentric hypertrophy.    The left atrial cavity is moderately dilated.    The right atrial cavity is mildly  dilated.    There is a bioprosthetic mitral valve present. Prosthetic mitral valve abnormalities include a pannus.  Mild to moderate mitral regurgitation, no mitral stenosis.    Estimated right ventricular systolic pressure from tricuspid regurgitation is normal (<35 mmHg).      XR Chest 1 View    Result Date: 7/11/2024  Impression: Findings are most compatible with bilateral pneumonia. Electronically Signed: Mallorie Monge MD  7/11/2024 4:17 PM EDT  Workstation ID: [id number]    CT Abdomen Pelvis With Contrast    Result Date: 7/11/2024  Impression: 1.No acute abnormality identified within the abdomen or pelvis. 2.Colonic diverticulosis. 3.Absent right kidney. 4.Cardiomegaly with probable pulmonary vascular congestion and small bilateral pleural effusions noted. Atypical infiltrates within the lung bases may have a similar appearance. Please correlate clinically. Electronically Signed: Shane Vaughn MD  7/11/2024 3:09 PM EDT  Workstation ID: [id number]       Estimated Creatinine  Clearance: 45.5 mL/min (by C-G formula based on SCr of 0.96 mg/dL).    Assessment & Plan   Assessment/Plan       Active Hospital Problems    Diagnosis  POA    **Bilateral pneumonia [J18.9]  Yes      Resolved Hospital Problems   No resolved problems to display.       Constipation  - primary care had ordered MiraLAX, Linzess, and lactulose   - kub as outpt showing some dilated small bowel loops which potentially might be due to a partial bowel obstruction   - cbc, cmp, lipase, ua are unremarkable  - ct abd reviewed and showing 1.No acute abnormality identified within the abdomen or pelvis. Colonic diverticulosis. Absent right kidney. Cardiomegaly with probable pulmonary vascular congestion and small bilateral pleural effusions noted. Atypical infiltrates within the lung bases may have a similar appearance. Please correlate clinically.  - enema given  - gi consulted and recommends sorbitol, family refusing colonoscopy at this time      Bilateral pneumonia  - cbc, cmp, lipase, ua are unremarkable  - chest xray reviewed and showing new infiltrates of the bilateral upper and lower lobes, overall greatest involving the lower lobes   - rpp negative  - iv rocephin     Hx of afib  - cont eliquis, sotalol      VTE Prophylaxis - Active VTE Prophylaxis  Pharmacologic:        Start     Dose Route Frequency Stop    07/12/24 0045  apixaban (ELIQUIS) tablet 5 mg         5 mg PO 2 Times Daily --                  Mechanical:        Start        07/11/24 2351  Maintain Sequential Compression Device  Continuous                              CODE STATUS:    Code Status and Medical Interventions:   Ordered at: 07/11/24 2353     Level Of Support Discussed With:    Patient    Health Care Surrogate     Code Status (Patient has no pulse and is not breathing):    CPR (Attempt to Resuscitate)     Medical Interventions (Patient has pulse or is breathing):    Full Support       This patient has been examined wearing personal protective equipment.      I discussed the patient's findings and my recommendations with patient and nursing staff.      Signature:Electronically signed by Ciera Cloud PA-C, 07/12/24, 12:41 PM EDT.

## 2024-07-12 NOTE — TELEPHONE ENCOUNTER
Rx Refill Note  Requested Prescriptions     Pending Prescriptions Disp Refills    amLODIPine (NORVASC) 2.5 MG tablet [Pharmacy Med Name: AMLODIPINE BESYLATE 2.5MGTABLETS] 90 tablet 2     Sig: TAKE 1 TABLET BY MOUTH DAILY      Last office visit with prescribing clinician: 1/23/2024   Last telemedicine visit with prescribing clinician: Visit date not found   Next office visit with prescribing clinician: 7/23/2024                         Would you like a call back once the refill request has been completed: [] Yes [] No    If the office needs to give you a call back, can they leave a voicemail: [] Yes [] No    Sylvie Guerrero MA  07/12/24, 08:26 EDT

## 2024-07-12 NOTE — THERAPY EVALUATION
Patient Name: Drake Beth  : 1950    MRN: [id number]                              Today's Date: 2024       Admit Date: 2024    Visit Dx:     ICD-10-CM ICD-9-CM   1. Pneumonia of both lower lobes due to infectious organism  J18.9 486   2. Generalized abdominal pain  R10.84 789.07   3. Constipation, unspecified constipation type  K59.00 564.00     Patient Active Problem List   Diagnosis    Allergic rhinitis    Anemia    Atrial fibrillation    Congestive heart failure    Coronary artery disease    Subacute cough    Hyperlipidemia    Hypertension    Medicare annual wellness visit, subsequent    Mentally challenged    Prostate cancer screening    Pacemaker infection    Mitral valve replaced    Gingivitis, chronic    Acute URI    Atrial flutter with controlled response    Bilateral pneumonia     Past Medical History:   Diagnosis Date    Allergic rhinitis     Anemia     Atrial fibrillation     Chesty cough     CHF (congestive heart failure)     Coronary artery disease     Cough     Fever     Hyperlipidemia     Hypertension     Mentally challenged     MVP (mitral valve prolapse)      Past Surgical History:   Procedure Laterality Date    CARDIAC ELECTROPHYSIOLOGY PROCEDURE N/A 2022    Procedure: PACEMAKER EXTRACTION;  Surgeon: Rj Dominique MD;  Location: [facility] INVASIVE LOCATION;  Service: Cardiology;  Laterality: N/A;    COLONOSCOPY      He has never had the test done.  His family is refusing any form of colon cancer screening for him.    CORONARY ARTERY BYPASS GRAFT      DR SWEAT    MITRAL VALVE REPLACEMENT  2008    WITH BIOPROSTHETIC TISSUE VALVE      General Information       Row Name 24 1452          Physical Therapy Time and Intention    Document Type evaluation  -SS     Mode of Treatment physical therapy  -SS       Row Name 24 1452          General Information    Patient Profile Reviewed yes  -SS     Prior Level of Function independent:;ADL's;all household  mobility  patient with history of intellectual disability, history is taken from patient  -       Row Name 07/12/24 1452          Living Environment    People in Home sibling(s)  lives with brother Demetrio  -       Row Name 07/12/24 1452          Cognition    Orientation Status (Cognition) oriented to;place;person;disoriented to;situation;time  -       Row Name 07/12/24 1452          Safety Issues, Functional Mobility    Impairments Affecting Function (Mobility) endurance/activity tolerance  -               User Key  (r) = Recorded By, (t) = Taken By, (c) = Cosigned By      Initials Name Provider Type     Anay Soliz PT Physical Therapist                   Mobility       Row Name 07/12/24 1453          Bed Mobility    Bed Mobility bed mobility (all) activities  -     All Activities, [place] (Bed Mobility) modified independence  -       Row Name 07/12/24 1453          Sit-Stand Transfer    Sit-Stand [place] (Transfers) supervision  -       Row Name 07/12/24 1453          Gait/Stairs (Locomotion)    [place] Level (Gait) supervision  -     Distance in Feet (Gait) 40  -SS     Deviations/Abnormal Patterns (Gait) gait speed decreased  -     Comment, (Gait/Stairs) patient with SaO2 87% on room air with ambulation, 92% on 2L  -               User Key  (r) = Recorded By, (t) = Taken By, (c) = Cosigned By      Initials Name Provider Type     Anay Soliz PT Physical Therapist                   Obj/Interventions       Row Name 07/12/24 1458          Range of Motion Comprehensive    Comment, General Range of Motion patient was not agreeable to don his socks but states he normally does, limited hip ROM assessment  -       Row Name 07/12/24 1451          Strength Comprehensive (MMT)    Comment, General Manual Muscle Testing (MMT) Assessment B LE >3/5  -       Row Name 07/12/24 1452          Balance    Balance Assessment sitting static balance;standing static balance  -      Static Sitting Balance independent  -SS     Static Standing Balance supervision  -       Row Name 07/12/24 1455          Sensory Assessment (Somatosensory)    Sensory Assessment (Somatosensory) sensation intact  -               User Key  (r) = Recorded By, (t) = Taken By, (c) = Cosigned By      Initials Name Provider Type    SS Anay Soliz PT Physical Therapist                   Goals/Plan       [facility] Name 07/12/24 1458          Gait Training Goal 1 (PT)    Activity/Assistive Device (Gait Training Goal 1, PT) gait (walking locomotion)  -     Distance (Gait Training Goal 1, PT) 150'  -     Time Frame (Gait Training Goal 1, PT) long term goal (LTG);2 weeks  -[facility] Name 07/12/24 1458          Therapy Assessment/Plan (PT)    Planned Therapy Interventions (PT) balance training;gait training;transfer training;strengthening;patient/family education  -               User Key  (r) = Recorded By, (t) = Taken By, (c) = Cosigned By      Initials Name Provider Type    Anay Hernandez PT Physical Therapist                   Clinical Impression       [facility] Name 07/12/24 1456          Pain    Additional Documentation Pain Scale: FACES Pre/Post-Treatment (Group)  -SS       Row Name 07/12/24 1456          Pain Scale: FACES Pre/Post-Treatment    Pain: FACES Scale, Pretreatment 0-->no hurt  -     Posttreatment Pain Rating 0-->no hurt  -SS       Row Name 07/12/24 1456          Plan of Care Review    Plan of Care Reviewed With patient  -SS       Row Name 07/12/24 1456          Therapy Assessment/Plan (PT)    Rehab Potential (PT) good, to achieve stated therapy goals  -     Criteria for Skilled Interventions Met (PT) yes;meets criteria  -     Therapy Frequency (PT) 3 times/wk  -     Predicted Duration of Therapy Intervention (PT) until d/c  -SS       [facility] Name 07/12/24 1456          Vital Signs    Pre SpO2 (%) 95  -SS     O2 Delivery Pre Treatment supplemental O2  -SS     Intra SpO2 (%) 87  -SS      O2 Delivery Intra Treatment room air  -     Post SpO2 (%) 94  -SS     O2 Delivery Post Treatment supplemental O2  -       Row Name 07/12/24 1456          Positioning and Restraints    Pre-Treatment Position in bed  -     Post Treatment Position chair  -               User Key  (r) = Recorded By, (t) = Taken By, (c) = Cosigned By      Initials Name Provider Type     Anay Soliz, PT Physical Therapist                   Outcome Measures       Row Name 07/12/24 0800          How much help from another person do you currently need...    Turning from your back to your side while in flat bed without using bedrails? 4  -AH     Moving from lying on back to sitting on the side of a flat bed without bedrails? 4  -AH     Moving to and from a bed to a chair (including a wheelchair)? 4  -AH     Standing up from a chair using your arms (e.g., wheelchair, bedside chair)? 4  -AH     Climbing 3-5 steps with a railing? 4  -AH     To walk in hospital room? 4  -AH     AM-PAC 6 Clicks Score (PT) 24  -     Highest Level of Mobility Goal 8 --> Walked 250 feet or more  -               User Key  (r) = Recorded By, (t) = Taken By, (c) = Cosigned By      Initials Name Provider Type     Lorna Funes, APOLONIA Registered Nurse                                 Physical Therapy Education       Title: PT OT SLP Therapies (Done)       Topic: Physical Therapy (Done)       Point: Mobility training (Done)       Learning Progress Summary             Patient Acceptance, E, VU by  at 7/12/2024 1506                                         User Key       Initials Effective Dates Name Provider Type Discipline     06/16/21 -  Anay Soliz, PT Physical Therapist PT                  PT Recommendation and Plan  Planned Therapy Interventions (PT): balance training, gait training, transfer training, strengthening, patient/family education  Plan of Care Reviewed With: patient     Time Calculation:   PT Evaluation Complexity  History, PT  Evaluation Complexity: 3 or more personal factors and/or comorbidities  Examination of Body Systems (PT Eval Complexity): total of 3 or more elements  Clinical Presentation (PT Evaluation Complexity): evolving  Clinical Decision Making (PT Evaluation Complexity): moderate complexity  Overall Complexity (PT Evaluation Complexity): moderate complexity     PT Charges       Row Name 07/12/24 1701 07/12/24 1506          Time Calculation    Start Time 1145  -SS --     Stop Time 1215  -SS --     Time Calculation (min) 30 min  -SS --     PT Received On 07/12/24  - 07/12/24  -     PT - Next Appointment 07/14/24  - 07/14/24  -     PT Goal Re-Cert Due Date -- 07/26/24  -        Time Calculation- PT    Total Timed Code Minutes- PT 0 minute(s)  -SS --               User Key  (r) = Recorded By, (t) = Taken By, (c) = Cosigned By      Initials Name Provider Type     Anay Soliz, PT Physical Therapist                  Therapy Charges for Today       Code Description Service Date Service Provider Modifiers Qty    05919174170 HC PT EVAL MOD COMPLEXITY 4 7/12/2024 Anay Soliz, PT GP 1    17173087451 HC PT EVAL MOD COMPLEXITY 4 7/12/2024 Anay Soliz, PT GP 1            PT G-Codes  AM-PAC 6 Clicks Score (PT): 24  PT Discharge Summary  Anticipated Discharge Disposition (PT): home with assist    Anay Soliz PT  7/12/2024

## 2024-07-12 NOTE — PLAN OF CARE
Problem: Adult Inpatient Plan of Care  Goal: Plan of Care Review  Outcome: Ongoing, Progressing  Flowsheets (Taken 7/12/2024 0422)  Outcome Evaluation: Pt admitted for bilateral pneumonia. Pt has been resting during this shift. Pt has a GI consult in the morning. Pt vitals have been stable during this shift.  Goal: Patient-Specific Goal (Individualized)  Outcome: Ongoing, Progressing  Goal: Absence of Hospital-Acquired Illness or Injury  Outcome: Ongoing, Progressing  Intervention: Identify and Manage Fall Risk  Recent Flowsheet Documentation  Taken 7/12/2024 0200 by Sylvie Yanez RN  Safety Promotion/Fall Prevention:   safety round/check completed   room organization consistent   clutter free environment maintained   assistive device/personal items within reach  Taken 7/12/2024 0015 by Sylvie Yanez RN  Safety Promotion/Fall Prevention:   safety round/check completed   room organization consistent   clutter free environment maintained   assistive device/personal items within reach  Taken 7/11/2024 2215 by Sylvie Yanez RN  Safety Promotion/Fall Prevention:   safety round/check completed   room organization consistent   clutter free environment maintained   assistive device/personal items within reach  Taken 7/11/2024 2000 by Sylvie Yanez RN  Safety Promotion/Fall Prevention:   safety round/check completed   room organization consistent   clutter free environment maintained   assistive device/personal items within reach  Intervention: Prevent Skin Injury  Recent Flowsheet Documentation  Taken 7/11/2024 2000 by Sylvie Yanez RN  Body Position: position changed independently  Skin Protection: adhesive use limited  Intervention: Prevent and Manage VTE (Venous Thromboembolism) Risk  Recent Flowsheet Documentation  Taken 7/11/2024 2000 by Sylive Yanez RN  Activity Management: ambulated to bathroom  VTE Prevention/Management:   compression stockings off   foot pump device off   sequential compression devices  off  Range of Motion: active ROM (range of motion) encouraged  Intervention: Prevent Infection  Recent Flowsheet Documentation  Taken 7/12/2024 0200 by Sylvie Yanez RN  Infection Prevention:   single patient room provided   rest/sleep promoted   hand hygiene promoted  Taken 7/12/2024 0015 by Sylvie Yanez RN  Infection Prevention:   single patient room provided   rest/sleep promoted   hand hygiene promoted  Taken 7/11/2024 2215 by Sylvie Yanez RN  Infection Prevention:   single patient room provided   rest/sleep promoted   hand hygiene promoted  Goal: Optimal Comfort and Wellbeing  Outcome: Ongoing, Progressing  Intervention: Provide Person-Centered Care  Recent Flowsheet Documentation  Taken 7/11/2024 2000 by Sylvie Yanez RN  Trust Relationship/Rapport:   care explained   choices provided   emotional support provided   empathic listening provided   questions answered   questions encouraged   reassurance provided   thoughts/feelings acknowledged  Goal: Readiness for Transition of Care  Outcome: Ongoing, Progressing     Problem: Behavioral Health Comorbidity  Goal: Maintenance of Behavioral Health Symptom Control  Outcome: Ongoing, Progressing  Intervention: Maintain Behavioral Health Symptom Control  Recent Flowsheet Documentation  Taken 7/12/2024 0015 by Sylvie Yanez RN  Medication Review/Management: medications reviewed  Taken 7/11/2024 2000 by Sylvie Yanez RN  Medication Review/Management: medications reviewed     Problem: Hypertension Comorbidity  Goal: Blood Pressure in Desired Range  Outcome: Ongoing, Progressing  Intervention: Maintain Blood Pressure Management  Recent Flowsheet Documentation  Taken 7/12/2024 0015 by Sylvie Yanez RN  Medication Review/Management: medications reviewed  Taken 7/11/2024 2000 by Sylvie Yanez RN  Medication Review/Management: medications reviewed     Problem: Skin Injury Risk Increased  Goal: Skin Health and Integrity  Outcome: Ongoing, Progressing  Intervention: Optimize  Skin Protection  Recent Flowsheet Documentation  Taken 7/11/2024 2000 by Sylvie Yanez, RN  Head of Bed (HOB) Positioning: HOB elevated  Skin Protection: adhesive use limited   Goal Outcome Evaluation:              Outcome Evaluation: Pt admitted for bilateral pneumonia. Pt has been resting during this shift. Pt has a GI consult in the morning. Pt vitals have been stable during this shift.

## 2024-07-12 NOTE — PLAN OF CARE
Problem: Adult Inpatient Plan of Care  Goal: Plan of Care Review  Outcome: Ongoing, Progressing  Flowsheets (Taken 7/12/2024 1628)  Progress: improving  Plan of Care Reviewed With: patient  Goal: Patient-Specific Goal (Individualized)  Outcome: Ongoing, Progressing  Goal: Absence of Hospital-Acquired Illness or Injury  Outcome: Ongoing, Progressing  Intervention: Identify and Manage Fall Risk  Recent Flowsheet Documentation  Taken 7/12/2024 1600 by Lorna Funes RN  Safety Promotion/Fall Prevention: safety round/check completed  Taken 7/12/2024 1400 by Lorna Funes RN  Safety Promotion/Fall Prevention: safety round/check completed  Taken 7/12/2024 1200 by Lorna Funes RN  Safety Promotion/Fall Prevention: safety round/check completed  Taken 7/12/2024 1000 by Lorna Funes RN  Safety Promotion/Fall Prevention: safety round/check completed  Taken 7/12/2024 0800 by Lorna Funes RN  Safety Promotion/Fall Prevention:   safety round/check completed   room organization consistent   nonskid shoes/slippers when out of bed   fall prevention program maintained   clutter free environment maintained   assistive device/personal items within reach  Intervention: Prevent and Manage VTE (Venous Thromboembolism) Risk  Recent Flowsheet Documentation  Taken 7/12/2024 0800 by Lorna Funes RN  Activity Management: ambulated to bathroom  VTE Prevention/Management: (eliquis) other (see comments)  Intervention: Prevent Infection  Recent Flowsheet Documentation  Taken 7/12/2024 0800 by Lorna Funes RN  Infection Prevention:   environmental surveillance performed   single patient room provided  Goal: Optimal Comfort and Wellbeing  Outcome: Ongoing, Progressing  Intervention: Provide Person-Centered Care  Recent Flowsheet Documentation  Taken 7/12/2024 0800 by Lorna Funes RN  Trust Relationship/Rapport:   care explained   reassurance provided  Goal: Readiness for Transition of Care  Outcome: Ongoing, Progressing   Goal Outcome Evaluation:  Plan of  Care Reviewed With: patient        Progress: improving     Pt seen by GI today for complaints of constipation. Patient treated with medication, and had bowel movement today late in the afternoon. GI recommends daily miralax and amitiza for constipation. Follow up in 6-8 weeks. Patient is on 1-2 L of oxygen, but is baseline room air. Will have walking oximetry done tomorrow to evaluate oxygen needs for d/c home. Pt does have bilateral pneumonia. Pt had some trouble swallowing food today, speech consulted for swallow eval.

## 2024-07-12 NOTE — PLAN OF CARE
Goal Outcome Evaluation:  Plan of Care Reviewed With: patient            74 y/o M who presented with abdominal pain and difficulty with BM for 1 month. Diagnosed with potential partial bowel obstruction. Planning bowel purge. Also diagnosed with PNA. On 2L at rest. Patient provided history. Patient with history of intellectual disability so information gathered was limited. Patient lives with brother Demetrio. Patient reports he is independent with ADLs and mobility without AD. Denies falls.       Anticipated Discharge Disposition (PT): home with assist

## 2024-07-12 NOTE — CASE MANAGEMENT/SOCIAL WORK
Discharge Planning Assessment  [facility]     Patient Name: Drake Beth  MRN: [id number]  Today's Date: 7/12/2024    Admit Date: 7/11/2024    Plan: Return home with brother. NO home oxygen.   Discharge Needs Assessment       Row Name 07/12/24 1649       Living Environment    People in Home sibling(s)    Name(s) of People in Home Brother, Demetrio and his wife    Current Living Arrangements home    Potentially Unsafe Housing Conditions none    In the past 12 months has the electric, gas, oil, or water company threatened to shut off services in your home? No    Primary Care Provided by self    Provides Primary Care For no one, unable/limited ability to care for self    Family Caregiver if Needed sibling(s)    Family Caregiver Names brother, Demetrio    Quality of Family Relationships involved;helpful;supportive    Able to Return to Prior Arrangements yes       Resource/Environmental Concerns    Resource/Environmental Concerns none    Transportation Concerns none       Transportation Needs    In the past 12 months, has lack of transportation kept you from medical appointments or from getting medications? no    In the past 12 months, has lack of transportation kept you from meetings, work, or from getting things needed for daily living? No       Transition Planning    Patient/Family Anticipates Transition to home with family    Patient/Family Anticipated Services at Transition none    Transportation Anticipated family or friend will provide       Discharge Needs Assessment    Readmission Within the Last 30 Days no previous admission in last 30 days    Equipment Currently Used at Home nebulizer    Concerns to be Addressed no discharge needs identified    Anticipated Changes Related to Illness none    Equipment Needed After Discharge none                   Discharge Plan       Row Name 07/12/24 2645       Plan    Plan Return home with brother. NO home oxygen.    Patient/Family in Agreement with Plan yes    Plan Comments  Barriers: Oxygen 1 liter with no home oxygen. SLP eval pending. GI following. CM met with Mr. Beth and his brother, Gen. He is alert and oriented x 3 but could not tell CM his . He does have intellectual disability documented. He lives with his brother , Demetrio and family provides transportation. The only equipment he has is a nebulizer. Demetrio said lately and especially today he has had problems swallowing his food. CM informed nursing and P A and a SLP eval was ordered.  Per family  he is very active and walks around the neighborhood daily.  PCP and Pharmacy verified. They denied any financial concerns. CM will follow                Demographic Summary       Row Name 24 7616       General Information    Admission Type observation    Arrived From emergency department    Required Notices Provided Observation Status Notice    Referral Source admission list    Reason for Consult discharge planning    Preferred Language English       Contact Information    Permission Granted to Share Info With                    Functional Status       Row Name 24 1645       Functional Status    Usual Activity Tolerance good    Current Activity Tolerance moderate       Functional Status, IADL    Medications completely dependent    Meal Preparation completely dependent    Housekeeping completely dependent    Laundry completely dependent    Shopping completely dependent    IADL Comments mostly independent but family supervises       Mental Status    General Appearance WDL WDL       Mental Status Summary    Recent Changes in Mental Status/Cognitive Functioning no changes                    Xiomara BOURNE,RN Case Manager  [facility]  Phone: Desk- [phone]  Cell- [phone]

## 2024-07-12 NOTE — CONSULTS
GI CONSULT  NOTE:    Referring Provider:  PITA Topete    Chief complaint: Abdominal pain    Subjective .     History of present illness: Drake Beth is a 73 y.o. male with history of mental disability, A-fib on Eliquis, CHF, CAD s/p CABG, mitral valve replacement with tissue valve, hypertension, and hyperlipidemia who presents with complaints of abdominal pain.  History is limited secondary to the patient's mental disability, therefore history was supplemented via chart review and from the bedside RN.  The patient currently denies any abdominal pain.  He denies any bowel movement since admission.  No nausea/vomiting.  The patient reportedly has been treated with MiraLAX, Linzess, and lactulose by his primary care provider with no improvement in constipation.      Endo History:  No record of previous endoscopy.    Past Medical History:  Past Medical History:   Diagnosis Date    Allergic rhinitis     Anemia     Atrial fibrillation     Chesty cough     CHF (congestive heart failure)     Coronary artery disease     Cough     Fever     Hyperlipidemia     Hypertension     Mentally challenged     MVP (mitral valve prolapse)        Past Surgical History:  Past Surgical History:   Procedure Laterality Date    CARDIAC ELECTROPHYSIOLOGY PROCEDURE N/A 4/26/2022    Procedure: PACEMAKER EXTRACTION;  Surgeon: Rj Dominique MD;  Location:  INVASIVE LOCATION;  Service: Cardiology;  Laterality: N/A;    COLONOSCOPY      He has never had the test done.  His family is refusing any form of colon cancer screening for him.    CORONARY ARTERY BYPASS GRAFT  2008    DR SWEAT    MITRAL VALVE REPLACEMENT  04/2008    WITH BIOPROSTHETIC TISSUE VALVE       Social History:  Social History     Tobacco Use    Smoking status: Never    Smokeless tobacco: Never   Vaping Use    Vaping status: Never Used   Substance Use Topics    Alcohol use: Never    Drug use: Never       Family History:  Family History   Problem Relation Age of  Onset    Coronary artery disease Mother 64    Hyperlipidemia Sister     Liver disease Sister         FATTY LIVER    Rheum arthritis Sister     Lung cancer Sister     Hypertension Brother     Other Brother     Rheum arthritis Brother        Medications:  Medications Prior to Admission   Medication Sig Dispense Refill Last Dose    apixaban (Eliquis) 5 MG tablet tablet TAKE 1 TABLET BY MOUTH TWICE DAILY 180 tablet 3 7/10/2024    atorvastatin (LIPITOR) 10 MG tablet TAKE 1 TABLET BY MOUTH DAILY 90 tablet 3 7/10/2024    colestipol (COLESTID) 1 g tablet TAKE 1 TABLET BY MOUTH FOUR TIMES DAILY 360 tablet 0 7/10/2024    linaclotide (LINZESS) 72 MCG capsule capsule Take 1 capsule by mouth Every Morning Before Breakfast.   7/10/2024    montelukast (SINGULAIR) 10 MG tablet TAKE 1 TABLET BY MOUTH DAILY 90 tablet 1 7/10/2024    sotalol (BETAPACE AF) 80 MG tablet tablet TAKE 1 TABLET BY MOUTH TWICE DAILY 180 tablet 1 7/10/2024       Scheduled Meds:amLODIPine, 2.5 mg, Oral, Daily  apixaban, 5 mg, Oral, BID  atorvastatin, 10 mg, Oral, Daily  cholestyramine light, 1 packet, Oral, Daily  montelukast, 10 mg, Oral, Daily  sodium chloride, 10 mL, Intravenous, Q12H  sotalol, 80 mg, Oral, Q24H      Continuous Infusions:   PRN Meds:.  acetaminophen **OR** acetaminophen **OR** acetaminophen    senna-docusate sodium **AND** polyethylene glycol **AND** bisacodyl **AND** bisacodyl    melatonin    ondansetron    [COMPLETED] Insert Peripheral IV **AND** sodium chloride    sodium chloride    sodium chloride    ALLERGIES:  Patient has no known allergies.    ROS:  Unobtainable secondary to mental disability.    Objective     Vital Signs:   Vitals:    07/12/24 0231 07/12/24 0456 07/12/24 0839 07/12/24 1018   BP: 121/70 130/74  136/67   BP Location: Left arm Left arm  Left arm   Patient Position: Lying Lying  Lying   Pulse: 53 69 67 57   Resp: 17 16  10   Temp: 97.7 °F (36.5 °C) 97.8 °F (36.6 °C)  98.3 °F (36.8 °C)   TempSrc: Oral Oral  Oral   SpO2:  "99% 92% 90% 97%   Weight:       Height:           Physical Exam:       General Appearance:    Awake and alert, in no acute distress   Head:    Normocephalic, without obvious abnormality, atraumatic   Throat:   No oral lesions, no thrush, oral mucosa moist   Lungs:     Respirations regular, even and unlabored   Chest Wall:    No abnormalities observed   Abdomen:     Soft, non-tender, no rebound or guarding, non-distended   Rectal:     Deferred   Extremities:   Moves all extremities, no edema, no cyanosis   Pulses:   Pulses palpable and equal bilaterally   Skin:   No rash, no jaundice, normal palpation   Lymph nodes:   No cervical, supraclavicular or submandibular palpable adenopathy   Neurologic:   Cranial nerves 2 - 12 grossly intact, no asterixis       Results Review:   I reviewed the patient's labs and imaging.  CBC    Results from last 7 days   Lab Units 07/12/24  0440 07/11/24  1415   WBC 10*3/mm3 5.98 7.38   HEMOGLOBIN g/dL 11.2* 12.8*   PLATELETS 10*3/mm3 165 190     CMP   Results from last 7 days   Lab Units 07/12/24  0440 07/11/24  1415   SODIUM mmol/L 136 135*   POTASSIUM mmol/L 4.0 4.6   CHLORIDE mmol/L 98 95*   CO2 mmol/L 28.8 28.6   BUN mg/dL 13 10   CREATININE mg/dL 0.96 0.88   GLUCOSE mg/dL 67 99   ALBUMIN g/dL  --  4.2   BILIRUBIN mg/dL  --  1.4*   ALK PHOS U/L  --  81   AST (SGOT) U/L  --  33   ALT (SGPT) U/L  --  5   LIPASE U/L  --  35     Cr Clearance Estimated Creatinine Clearance: 45.5 mL/min (by C-G formula based on SCr of 0.96 mg/dL).  Coag     HbA1C No results found for: \"HGBA1C\"  Blood Glucose   Glucose   Date/Time Value Ref Range Status   07/12/2024 0659 221 (H) 70 - 105 mg/dL Final     Comment:     Serial Number: [id number]:  [id number]   07/12/2024 0625 68 (L) 70 - 105 mg/dL Final     Comment:     Serial Number: [id number]:  [id number]     Infection     UA    Results from last 7 days   Lab Units 07/11/24  1547   NITRITE UA  Negative     Imaging Results (Last 72 Hours)       " Procedure Component Value Units Date/Time    XR Chest 1 View [[id number]] Collected: 07/11/24 1616     Updated: 07/11/24 1619    Narrative:      XR CHEST 1 VW    Date of Exam: 7/11/2024 3:50 PM EDT    Indication: abnormal CT, cough    Comparison: 12/15/2022.    Findings:  There are new infiltrates of the bilateral upper and lower lobes, overall greatest involving the lower lobes. There are no definite effusions. There is stable mild cardiomegaly. There are sternal suture wires.      Impression:      Impression:  Findings are most compatible with bilateral pneumonia.      Electronically Signed: Mallorie Monge MD    7/11/2024 4:17 PM EDT    Workstation ID: [id number]    CT Abdomen Pelvis With Contrast [[id number]] Collected: 07/11/24 1502     Updated: 07/11/24 1511    Narrative:      CT ABDOMEN PELVIS W CONTRAST    Date of Exam: 7/11/2024 3:00 PM EDT    Indication: abd pain.    Comparison: None available.    Technique: Axial CT images were obtained of the abdomen and pelvis following the uneventful intravenous administration of iodinated contrast. Sagittal and coronal reconstructions were performed.  Automated exposure control and iterative reconstruction   methods were used.      Findings:    Liver: The liver is unremarkable in morphology. No focal liver lesion is seen. No biliary dilation is seen.    Gallbladder: Absent.    Pancreas: Unremarkable.    Spleen: Unremarkable.    Adrenal glands: Unremarkable.    Genitourinary tract: Patient is status post right nephrectomy. Small left renal cyst and additional subcentimeter left renal hypodensity too small to characterize noted. No hydronephrosis is seen. The visualized left ureter is unremarkable. Urinary   bladder and prostate gland are unremarkable.    Gastrointestinal tract: Colonic diverticulosis is present. The hollow viscera appear otherwise unremarkable. There is no evidence of bowel obstruction.    Appendix: No findings to suggest acute appendicitis.    Other  findings: No free air or free fluid. No pathologically enlarged lymph nodes are seen. Mild vascular calcifications are present. The IVC is unremarkable.    Bones and soft tissues: No acute osseous lesion is identified. There are degenerative changes within the spine and of the right hip. Superficial patient appears to be status post left orchiectomy.     Lung bases: Cardiomegaly, probable pulmonary vascular congestion, and small bilateral pleural effusions noted. Tiny calcified granuloma within the right lung base.      Impression:      Impression:  1.No acute abnormality identified within the abdomen or pelvis.  2.Colonic diverticulosis.  3.Absent right kidney.  4.Cardiomegaly with probable pulmonary vascular congestion and small bilateral pleural effusions noted. Atypical infiltrates within the lung bases may have a similar appearance. Please correlate clinically.      Electronically Signed: Shane Vaughn MD    7/11/2024 3:09 PM EDT    Workstation ID: [id number]            ASSESSMENT:  -Abdominal pain  -Constipation  -Elevated total bilirubin  -Mild macrocytic anemia  -Bilateral pneumonia  -A-fib on Eliquis  -CAD s/p CABG  -CHF  -Mitral valve replacement -tissue valve  -Hypertension  -Hyperlipidemia    PLAN:  Patient is a 73-year-old male with history of mental disability, A-fib on Eliquis, CAD s/p CABG, and tissue mitral valve replacement who presented on 7/11 with complaints of abdominal pain.    CT abdomen/pelvis W does not show any acute abnormality.  He denies any bowel movement since admission.  Reportedly treated with MiraLAX, Linzess, and lactulose by primary care provider as an outpatient with no significant improvement.  We will plan bowel purge and monitor for improvement in symptoms.  Total bilirubin noted to be mildly elevated at 1.4.  Will fractionate.  Treatment of bilateral pneumonia per primary care team.  Diet as tolerated.  Antiemetics/analgesics as needed.  Bedside RN reports that family is  refusing colonoscopy at this time.  Supportive care.      I discussed the patients findings and my recommendations with the patient.  I will discuss case with Dr. Valdes and change plan accordingly.    We appreciate the referral.    Electronically signed by IZAIAH Peterson, 07/12/24, 10:23 AM EDT.

## 2024-07-12 NOTE — PLAN OF CARE
Goal Outcome Evaluation:         Pt. Up ad-uma, does not require skilled therapy at this time.

## 2024-07-13 ENCOUNTER — APPOINTMENT (OUTPATIENT)
Dept: CARDIOLOGY | Facility: HOSPITAL | Age: 74
End: 2024-07-13
Payer: MEDICARE

## 2024-07-13 LAB
ALBUMIN SERPL-MCNC: 3.6 G/DL (ref 3.5–5.2)
ALBUMIN/GLOB SERPL: 1.2 G/DL
ALP SERPL-CCNC: 69 U/L (ref 39–117)
ALT SERPL W P-5'-P-CCNC: 7 U/L (ref 1–41)
ANION GAP SERPL CALCULATED.3IONS-SCNC: 8.2 MMOL/L (ref 5–15)
AORTIC DIMENSIONLESS INDEX: 0.59 (DI)
ARTERIAL PATENCY WRIST A: POSITIVE
AST SERPL-CCNC: 27 U/L (ref 1–40)
ATMOSPHERIC PRESS: ABNORMAL MM[HG]
BASE EXCESS BLDA CALC-SCNC: 4.8 MMOL/L (ref 0–3)
BASOPHILS # BLD AUTO: 0.04 10*3/MM3 (ref 0–0.2)
BASOPHILS NFR BLD AUTO: 0.5 % (ref 0–1.5)
BDY SITE: ABNORMAL
BH CV ECHO LEFT VENTRICLE GLOBAL LONGITUDINAL STRAIN: -13.2 %
BH CV ECHO MEAS - AI P1/2T: 840.7 MSEC
BH CV ECHO MEAS - AO MAX PG: 6.1 MMHG
BH CV ECHO MEAS - AO MEAN PG: 3 MMHG
BH CV ECHO MEAS - AO V2 MAX: 123 CM/SEC
BH CV ECHO MEAS - AO V2 VTI: 21.8 CM
BH CV ECHO MEAS - AVA(I,D): 1.67 CM2
BH CV ECHO MEAS - EDV(CUBED): 59.3 ML
BH CV ECHO MEAS - EDV(MOD-SP2): 85 ML
BH CV ECHO MEAS - EDV(MOD-SP4): 85.5 ML
BH CV ECHO MEAS - EF(MOD-BP): 55.5 %
BH CV ECHO MEAS - EF(MOD-SP2): 58.2 %
BH CV ECHO MEAS - EF(MOD-SP4): 53.1 %
BH CV ECHO MEAS - EF_3D-VOL: 55 %
BH CV ECHO MEAS - ESV(CUBED): 22 ML
BH CV ECHO MEAS - ESV(MOD-SP2): 35.5 ML
BH CV ECHO MEAS - ESV(MOD-SP4): 40.1 ML
BH CV ECHO MEAS - FS: 28.2 %
BH CV ECHO MEAS - IVS/LVPW: 1.25 CM
BH CV ECHO MEAS - IVSD: 1 CM
BH CV ECHO MEAS - LA DIMENSION: 5.1 CM
BH CV ECHO MEAS - LV DIASTOLIC VOL/BSA (35-75): 61 CM2
BH CV ECHO MEAS - LV MASS(C)D: 105.3 GRAMS
BH CV ECHO MEAS - LV MAX PG: 2.07 MMHG
BH CV ECHO MEAS - LV MEAN PG: 1 MMHG
BH CV ECHO MEAS - LV SYSTOLIC VOL/BSA (12-30): 28.6 CM2
BH CV ECHO MEAS - LV V1 MAX: 72 CM/SEC
BH CV ECHO MEAS - LV V1 VTI: 14.3 CM
BH CV ECHO MEAS - LVIDD: 3.9 CM
BH CV ECHO MEAS - LVIDS: 2.8 CM
BH CV ECHO MEAS - LVOT AREA: 2.5 CM2
BH CV ECHO MEAS - LVOT DIAM: 1.8 CM
BH CV ECHO MEAS - LVPWD: 0.8 CM
BH CV ECHO MEAS - MR MAX PG: 99.6 MMHG
BH CV ECHO MEAS - MR MAX VEL: 499 CM/SEC
BH CV ECHO MEAS - MR MEAN PG: 70 MMHG
BH CV ECHO MEAS - MR MEAN VEL: 399 CM/SEC
BH CV ECHO MEAS - MR VTI: 146 CM
BH CV ECHO MEAS - MV A MAX VEL: 33.1 CM/SEC
BH CV ECHO MEAS - MV DEC SLOPE: 1003 CM/SEC2
BH CV ECHO MEAS - MV DEC TIME: 0.19 SEC
BH CV ECHO MEAS - MV E MAX VEL: 173 CM/SEC
BH CV ECHO MEAS - MV E/A: 5.2
BH CV ECHO MEAS - MV MAX PG: 16.2 MMHG
BH CV ECHO MEAS - MV MEAN PG: 3 MMHG
BH CV ECHO MEAS - MV P1/2T: 57.2 MSEC
BH CV ECHO MEAS - MV V2 VTI: 45.5 CM
BH CV ECHO MEAS - MVA(P1/2T): 3.8 CM2
BH CV ECHO MEAS - MVA(VTI): 0.8 CM2
BH CV ECHO MEAS - RAP SYSTOLE: 3 MMHG
BH CV ECHO MEAS - RVSP: 56.3 MMHG
BH CV ECHO MEAS - SV(LVOT): 36.4 ML
BH CV ECHO MEAS - SV(MOD-SP2): 49.5 ML
BH CV ECHO MEAS - SV(MOD-SP4): 45.4 ML
BH CV ECHO MEAS - SVI(LVOT): 26 ML/M2
BH CV ECHO MEAS - SVI(MOD-SP2): 35.3 ML/M2
BH CV ECHO MEAS - SVI(MOD-SP4): 32.4 ML/M2
BH CV ECHO MEAS - TAPSE (>1.6): 1.43 CM
BH CV ECHO MEAS - TR MAX PG: 53.3 MMHG
BH CV ECHO MEAS - TR MAX VEL: 365 CM/SEC
BH CV XLRA - RV BASE: 3.4 CM
BH CV XLRA - RV MID: 2.2 CM
BILIRUB SERPL-MCNC: 0.6 MG/DL (ref 0–1.2)
BUN SERPL-MCNC: 11 MG/DL (ref 8–23)
BUN/CREAT SERPL: 10.8 (ref 7–25)
CALCIUM SPEC-SCNC: 8.7 MG/DL (ref 8.6–10.5)
CHLORIDE SERPL-SCNC: 98 MMOL/L (ref 98–107)
CO2 BLDA-SCNC: 32.1 MMOL/L (ref 22–29)
CO2 SERPL-SCNC: 27.8 MMOL/L (ref 22–29)
CREAT SERPL-MCNC: 1.02 MG/DL (ref 0.76–1.27)
DEPRECATED RDW RBC AUTO: 51.3 FL (ref 37–54)
EGFRCR SERPLBLD CKD-EPI 2021: 78.1 ML/MIN/1.73
EOSINOPHIL # BLD AUTO: 0.42 10*3/MM3 (ref 0–0.4)
EOSINOPHIL NFR BLD AUTO: 4.7 % (ref 0.3–6.2)
ERYTHROCYTE [DISTWIDTH] IN BLOOD BY AUTOMATED COUNT: 14 % (ref 12.3–15.4)
GLOBULIN UR ELPH-MCNC: 3.1 GM/DL
GLUCOSE SERPL-MCNC: 101 MG/DL (ref 65–99)
HCO3 BLDA-SCNC: 30.6 MMOL/L (ref 21–28)
HCT VFR BLD AUTO: 33.7 % (ref 37.5–51)
HEMODILUTION: NO
HGB BLD-MCNC: 11.4 G/DL (ref 13–17.7)
IMM GRANULOCYTES # BLD AUTO: 0.04 10*3/MM3 (ref 0–0.05)
IMM GRANULOCYTES NFR BLD AUTO: 0.5 % (ref 0–0.5)
INHALED O2 CONCENTRATION: 21 %
LYMPHOCYTES # BLD AUTO: 1.89 10*3/MM3 (ref 0.7–3.1)
LYMPHOCYTES NFR BLD AUTO: 21.3 % (ref 19.6–45.3)
MCH RBC QN AUTO: 34.2 PG (ref 26.6–33)
MCHC RBC AUTO-ENTMCNC: 33.8 G/DL (ref 31.5–35.7)
MCV RBC AUTO: 101.2 FL (ref 79–97)
MODALITY: ABNORMAL
MONOCYTES # BLD AUTO: 0.92 10*3/MM3 (ref 0.1–0.9)
MONOCYTES NFR BLD AUTO: 10.4 % (ref 5–12)
NEUTROPHILS NFR BLD AUTO: 5.57 10*3/MM3 (ref 1.7–7)
NEUTROPHILS NFR BLD AUTO: 62.6 % (ref 42.7–76)
NRBC BLD AUTO-RTO: 0 /100 WBC (ref 0–0.2)
NT-PROBNP SERPL-MCNC: 1485 PG/ML (ref 0–900)
PCO2 BLDA: 48.9 MM HG (ref 35–48)
PH BLDA: 7.4 PH UNITS (ref 7.35–7.45)
PLATELET # BLD AUTO: 161 10*3/MM3 (ref 140–450)
PMV BLD AUTO: 9.4 FL (ref 6–12)
PO2 BLD: 240 MM[HG] (ref 0–500)
PO2 BLDA: 50.5 MM HG (ref 83–108)
POTASSIUM SERPL-SCNC: 4.3 MMOL/L (ref 3.5–5.2)
PROCALCITONIN SERPL-MCNC: 0.04 NG/ML (ref 0–0.25)
PROT SERPL-MCNC: 6.7 G/DL (ref 6–8.5)
RBC # BLD AUTO: 3.33 10*6/MM3 (ref 4.14–5.8)
SAO2 % BLDCOA: 84.9 % (ref 94–98)
SINUS: 2.8 CM
SODIUM SERPL-SCNC: 134 MMOL/L (ref 136–145)
WBC NRBC COR # BLD AUTO: 8.88 10*3/MM3 (ref 3.4–10.8)

## 2024-07-13 PROCEDURE — 93356 MYOCRD STRAIN IMG SPCKL TRCK: CPT

## 2024-07-13 PROCEDURE — 93356 MYOCRD STRAIN IMG SPCKL TRCK: CPT | Performed by: INTERNAL MEDICINE

## 2024-07-13 PROCEDURE — G0378 HOSPITAL OBSERVATION PER HR: HCPCS

## 2024-07-13 PROCEDURE — 25010000002 METHYLPREDNISOLONE PER 125 MG: Performed by: NURSE PRACTITIONER

## 2024-07-13 PROCEDURE — 93010 ELECTROCARDIOGRAM REPORT: CPT | Performed by: INTERNAL MEDICINE

## 2024-07-13 PROCEDURE — 83880 ASSAY OF NATRIURETIC PEPTIDE: CPT | Performed by: NURSE PRACTITIONER

## 2024-07-13 PROCEDURE — 25010000002 CEFTRIAXONE PER 250 MG: Performed by: PHYSICIAN ASSISTANT

## 2024-07-13 PROCEDURE — 92610 EVALUATE SWALLOWING FUNCTION: CPT

## 2024-07-13 PROCEDURE — 82803 BLOOD GASES ANY COMBINATION: CPT | Performed by: NURSE PRACTITIONER

## 2024-07-13 PROCEDURE — 84145 PROCALCITONIN (PCT): CPT | Performed by: NURSE PRACTITIONER

## 2024-07-13 PROCEDURE — 25010000002 FUROSEMIDE PER 20 MG: Performed by: NURSE PRACTITIONER

## 2024-07-13 PROCEDURE — 36600 WITHDRAWAL OF ARTERIAL BLOOD: CPT | Performed by: NURSE PRACTITIONER

## 2024-07-13 PROCEDURE — 80053 COMPREHEN METABOLIC PANEL: CPT | Performed by: NURSE PRACTITIONER

## 2024-07-13 PROCEDURE — 99214 OFFICE O/P EST MOD 30 MIN: CPT | Performed by: INTERNAL MEDICINE

## 2024-07-13 PROCEDURE — 93306 TTE W/DOPPLER COMPLETE: CPT | Performed by: INTERNAL MEDICINE

## 2024-07-13 PROCEDURE — 93306 TTE W/DOPPLER COMPLETE: CPT

## 2024-07-13 PROCEDURE — 85025 COMPLETE CBC W/AUTO DIFF WBC: CPT | Performed by: NURSE PRACTITIONER

## 2024-07-13 PROCEDURE — 93005 ELECTROCARDIOGRAM TRACING: CPT | Performed by: NURSE PRACTITIONER

## 2024-07-13 RX ORDER — IPRATROPIUM BROMIDE AND ALBUTEROL SULFATE 2.5; .5 MG/3ML; MG/3ML
3 SOLUTION RESPIRATORY (INHALATION) ONCE
Status: DISCONTINUED | OUTPATIENT
Start: 2024-07-13 | End: 2024-07-15 | Stop reason: HOSPADM

## 2024-07-13 RX ORDER — METHYLPREDNISOLONE SODIUM SUCCINATE 125 MG/2ML
125 INJECTION, POWDER, LYOPHILIZED, FOR SOLUTION INTRAMUSCULAR; INTRAVENOUS ONCE
Status: COMPLETED | OUTPATIENT
Start: 2024-07-13 | End: 2024-07-13

## 2024-07-13 RX ORDER — AMLODIPINE BESYLATE 2.5 MG/1
2.5 TABLET ORAL DAILY
Status: DISCONTINUED | OUTPATIENT
Start: 2024-07-13 | End: 2024-07-15 | Stop reason: HOSPADM

## 2024-07-13 RX ORDER — FUROSEMIDE 10 MG/ML
40 INJECTION INTRAMUSCULAR; INTRAVENOUS ONCE
Status: COMPLETED | OUTPATIENT
Start: 2024-07-13 | End: 2024-07-13

## 2024-07-13 RX ADMIN — MONTELUKAST 10 MG: 10 TABLET, FILM COATED ORAL at 08:33

## 2024-07-13 RX ADMIN — AMLODIPINE BESYLATE 2.5 MG: 2.5 TABLET ORAL at 08:33

## 2024-07-13 RX ADMIN — SOTALOL HYDROCHLORIDE 80 MG: 80 TABLET ORAL at 08:33

## 2024-07-13 RX ADMIN — LUBIPROSTONE 24 MCG: 24 CAPSULE, GELATIN COATED ORAL at 08:33

## 2024-07-13 RX ADMIN — APIXABAN 5 MG: 5 TABLET, FILM COATED ORAL at 08:33

## 2024-07-13 RX ADMIN — POLYETHYLENE GLYCOL 3350 17 G: 17 POWDER, FOR SOLUTION ORAL at 08:32

## 2024-07-13 RX ADMIN — FUROSEMIDE 40 MG: 10 INJECTION, SOLUTION INTRAMUSCULAR; INTRAVENOUS at 11:55

## 2024-07-13 RX ADMIN — ATORVASTATIN CALCIUM 10 MG: 10 TABLET, FILM COATED ORAL at 08:33

## 2024-07-13 RX ADMIN — METHYLPREDNISOLONE SODIUM SUCCINATE 125 MG: 125 INJECTION, POWDER, FOR SOLUTION INTRAMUSCULAR; INTRAVENOUS at 11:55

## 2024-07-13 RX ADMIN — Medication 10 ML: at 08:33

## 2024-07-13 RX ADMIN — CEFTRIAXONE 1000 MG: 1 INJECTION, POWDER, FOR SOLUTION INTRAMUSCULAR; INTRAVENOUS at 17:42

## 2024-07-13 RX ADMIN — Medication 10 ML: at 20:35

## 2024-07-13 RX ADMIN — APIXABAN 5 MG: 5 TABLET, FILM COATED ORAL at 20:35

## 2024-07-13 NOTE — PLAN OF CARE
Goal Outcome Evaluation:      Patient was seen for dysphagia evaluation per MD order. Most recent chest x-ray revealed findings most compatible with bilateral pneumonia. Patient has intellectual disability, therefore, unsure of accuracy of information. However, patient does deny history of pneumonia, esophageal stricture, CVA and/or GERD. Patient has his own natural teeth. Oral mechanism examination revealed patient demonstrated full ROM of lingual protrusion, lateralization, elevation and retraction. Unable to visualize lingual A-P movement as he was unable to follow directives despite visual and verbal cuing. Labial protrusion/retraction was reduced. Lingual strength was adequate. Palata movement was present. Noted diminished and/or absent gag reflex. Patient was properly positioned upright in bed near 90 degree hip flexion in bed  prior to trials. Provided trials of thins via straw x 4. No clearing of throat, cough and/or vocal changes were identified. Provided trials of regular x 3. Patient displayed adequate rotary chewing. Patient cleared oral cavity between bites without evidence of oral residue. No overt s/s of aspiration was observed. No wet vocal quality was detected. It is recommended that patient remain on current diet. No further treatment is necessary at this time. Please re consult if any further concerns arise.                          SLP Swallowing Diagnosis: functional oral phase, functional pharyngeal phase (07/13/24 1200)

## 2024-07-13 NOTE — PROCEDURES
Exercise Oximetry    Patient Name:rDake Beth   MRN: [id number]   Date: 07/13/24             ROOM AIR BASELINE   SpO2% 93%    Heart Rate 71 BPM   Blood Pressure      EXERCISE ON ROOM AIR SpO2% EXERCISE ON O2 @  LPM SpO2%   1 MINUTE 92% on RA 92% 1 MINUTE    2 MINUTES 92% on RA 92% 2 MINUTES    3 MINUTES 91 % on RA 91% 3 MINUTES    4 MINUTES 90% on RA 90% 4 MINUTES    5 MINUTES 89% on RA 89% 5 MINUTES    6 MINUTES 89% on RA 89% 6 MINUTES               Distance Walked  6 mins Distance Walked   Dyspnea (Nishant Scale)   Dyspnea (Nishant Scale)   Fatigue (Nishant Scale)   Fatigue (Nishant Scale)   SpO2% Post Exercise  90% on RA SpO2% Post Exercise   HR Post Exercise  85 BPM HR Post Exercise   Time to Recovery   Time to Recovery     Comments: Pt walked for 6 min's without stopping while on room air.

## 2024-07-13 NOTE — CONSULTS
HP      Name: Drake Beth ADMIT: 2024   : 1950  PCP: Amada Haskins MD    MRN: [id number] LOS: 0 days   AGE/SEX: 73 y.o. male  ROOM: 226/1     Chief Complaint   Patient presents with    Diarrhea     Family states abd pain diarrhea, not eating, had xrays that showed blockage and told to come to ER seen [facility]ist.  Reports this has been going on a long time but now just complaining of pain.         Subjective        History of present illness  Drake Beth is a 74-year-old male patient who has developmental delay, presented to the ER due to complaints of abdominal pain.  He has CAD status post bypass as well as bioprosthetic mitral valve in , hypertension, paroxysmal atrial fibrillation, had dual-chamber pacemaker implanted on 3/11/2022 at an outside facility, 2 weeks later he developed pocket infection and pacemaker extraction was done on 2022.  On 2023 he was in atypical atrial flutter and sotalol was increased to 80 mg twice a day.  Since then he has been in sinus rhythm.    Past Medical History:   Diagnosis Date    Allergic rhinitis     Anemia     Atrial fibrillation     Chesty cough     CHF (congestive heart failure)     Coronary artery disease     Cough     Fever     Hyperlipidemia     Hypertension     Mentally challenged     MVP (mitral valve prolapse)      Past Surgical History:   Procedure Laterality Date    CARDIAC ELECTROPHYSIOLOGY PROCEDURE N/A 2022    Procedure: PACEMAKER EXTRACTION;  Surgeon: Rj Dominique MD;  Location: [facility] CATH INVASIVE LOCATION;  Service: Cardiology;  Laterality: N/A;    COLONOSCOPY      He has never had the test done.  His family is refusing any form of colon cancer screening for him.    CORONARY ARTERY BYPASS GRAFT      DR SWEAT    MITRAL VALVE REPLACEMENT  2008    WITH BIOPROSTHETIC TISSUE VALVE     Family History   Problem Relation Age of Onset    Coronary artery disease Mother 64    Hyperlipidemia Sister     Liver  disease Sister         FATTY LIVER    Rheum arthritis Sister     Lung cancer Sister     Hypertension Brother     Other Brother     Rheum arthritis Brother      Social History     Tobacco Use    Smoking status: Never    Smokeless tobacco: Never   Vaping Use    Vaping status: Never Used   Substance Use Topics    Alcohol use: Never    Drug use: Never     Medications Prior to Admission   Medication Sig Dispense Refill Last Dose    apixaban (Eliquis) 5 MG tablet tablet TAKE 1 TABLET BY MOUTH TWICE DAILY 180 tablet 3 7/10/2024    atorvastatin (LIPITOR) 10 MG tablet TAKE 1 TABLET BY MOUTH DAILY 90 tablet 3 7/10/2024    colestipol (COLESTID) 1 g tablet TAKE 1 TABLET BY MOUTH FOUR TIMES DAILY 360 tablet 0 7/10/2024    linaclotide (LINZESS) 72 MCG capsule capsule Take 1 capsule by mouth Every Morning Before Breakfast.   7/10/2024    montelukast (SINGULAIR) 10 MG tablet TAKE 1 TABLET BY MOUTH DAILY 90 tablet 1 7/10/2024    sotalol (BETAPACE AF) 80 MG tablet tablet TAKE 1 TABLET BY MOUTH TWICE DAILY 180 tablet 1 7/10/2024     Allergies:  Patient has no known allergies.    Review of systems    Constitutional: Negative.    Respiratory and cardiovascular: As detailed in HPI section.  Gastrointestinal: Negative for constipation, nausea and vomiting negative for abdominal distention, abdominal pain and diarrhea.   Genitourinary: Negative for difficulty urinating and flank pain.   Musculoskeletal: Negative for arthralgias, joint swelling and myalgias.   Skin: Negative for color change, rash and wound.   Neurological: Negative for dizziness, syncope, weakness and headaches.   Hematological: Negative for adenopathy.   Psychiatric/Behavioral: Negative for confusion.   All other systems reviewed and are negative.       Physical Exam  VITALS REVIEWED    General:      well developed, in no acute distress.    Head:      normocephalic and atraumatic.    Eyes:      PERRL/EOM intact, conjunctiva and sclera clear with out nystagmus.    Neck:       no masses, thyromegaly,  trachea central with normal respiratory effort and PMI displaced laterally  Lungs:      Clear to auscultation bilaterally  Heart:       Regular rate and rhythm  Msk:      no deformity or scoliosis noted of thoracic or lumbar spine.    Pulses:      pulses normal in all 4 extremities.    Extremities:       No lower extremity edema  Neurologic:      no focal deficits.   alert oriented x3  Skin:      intact without lesions or rashes.    Psych:      alert and cooperative; normal mood and affect; normal attention span and concentration.      Result Review :               Pertinent cardiac workup    EKG 7/13/2024 sinus rhythm 54 bpm.  EKG 7/25/2023 and 9/6/2023 atrial flutter.      Assessment and Plan         Bilateral pneumonia    Atrial fibrillation    Mitral valve replaced      Drake Bteh is a 73-year-old male patient who has history of CAD and mitral valve replaced as detailed above, also paroxysmal atrial flutter on sotalol, history of pacemaker infection which was removed.  He is admitted to the hospital due to abdominal pain, working diagnosis is bilateral pneumonia.  His EKG shows sinus rhythm, his BNP is elevated but clinically does not seem to be in CHF.  Echo is ordered.  Continue all his cardiac medications including sotalol.  He was given 1 dose of IV Lasix so far.  Further cardiac management will depend on echo results.        No follow-ups on file.  Patient was given instructions and counseling regarding his condition or for health maintenance advice. Please see specific information pulled into the AVS if appropriate.          Electronically signed by Rj Dominique MD, 07/13/24, 2:49 PM EDT.

## 2024-07-13 NOTE — PLAN OF CARE
Problem: Adult Inpatient Plan of Care  Goal: Patient-Specific Goal (Individualized)  Outcome: Ongoing, Progressing     Problem: Adult Inpatient Plan of Care  Goal: Absence of Hospital-Acquired Illness or Injury  Intervention: Prevent Skin Injury  Recent Flowsheet Documentation  Taken 7/13/2024 0400 by Jessica Rosa RN  Body Position: position changed independently  Taken 7/13/2024 0000 by Jessica Rosa RN  Body Position: position changed independently  Taken 7/12/2024 2000 by Jessica Rosa RN  Body Position: position changed independently   Goal Outcome Evaluation:

## 2024-07-13 NOTE — CONSULTS
"    [facility] Medicine Services  Consult Note    Patient Name: Drake Beth  : 1950  MRN: [id number]  Primary Care Physician:  Amada Haskins MD  Referring Physician: No ref. provider found  Date of admission: 2024  Date and Time of Care: 24   at 11:11 PM EDT      Inpatient Hospitalist Consult  Consult performed by: Eleonora Medellin APRN  Consult ordered by: Rosanna Phelps APRN            Reason for Consult/ Chief Complaint: Medical management    Consult Requested By: IZAIAH Mckeon    Subjective:     History of Present Illness:   Per the documentation by IZAIAH Mckeon, dated 2024,  \"Ed  73-year-old male history of A-fib on Eliquis CHF CAD presents to the ER with family member complaining of abdominal pain and difficulty with bowel movements for 1 month. Family member states that he has not had a good bowel movement for couple weeks but has had small bouts of diarrhea. He was seen by PCP over the last couple weeks and was started on MiraLAX, Linzess, lactulose with no improvement. Family reports that they did have outpatient x-ray that showed possible small bowel obstruction which prompted ER visit today. Patient has been complaining of more abdominal pain in the last couple days but cannot describe the abdominal pain. No chest pain or shortness of breath but does report cough. No fever or chills. No vomiting.      Observation 24  Pt concurs with er hpi. GI consulted. Pt c/o constipation. Enema given. Pt consulted      OBSERVATION 24:  Patient states his abd is mildly cramping from having bowel movements; feels short of breath. No chest pain.     Patient's RA ox on my initial evaluation was 68%. He denies any fever, reports a scant nonproductive cough, no leg swelling. He was placed on supplemental oxygen with abg, duoneb, lasix, solumedrol ordered. Notably hypoxic and ordered high flow oxygen. Review of his cxr compared to priority xr from 2022 " "felt to be concerning for chf exacerbation rather than infection (no BC growth, wbc 8 without shift, RVP negative; procal negative). 2d echo ordered. Patient has a grade 4-5 murmur that doesn't appear in recent documentation review. EKG ordered. Cardiology consulted and patient will be admitted to the hospitalist for further management as he will not be discharged from [facility] today.   Scheduled for swallow study today- no noted choking or aspiration events.   Patient is having continued large bowel movements, and GI saw patient 7/12 and has signed off but available as needed.    \"    Review of Systems:   Constitutional: Negative for fever.   HENT:  Negative for congestion.    Cardiovascular:  Negative for chest pain.   Respiratory:  Positive for shortness of breath. Negative for cough and hemoptysis.    Gastrointestinal:         Cramping     Genitourinary:  Negative for dysuria.   Neurological:  Negative for dizziness.     Personal History:     Past Medical History:   Diagnosis Date    Allergic rhinitis     Anemia     Atrial fibrillation     Chesty cough     CHF (congestive heart failure)     Coronary artery disease     Cough     Fever     Hyperlipidemia     Hypertension     Mentally challenged     MVP (mitral valve prolapse)        Past Surgical History:   Procedure Laterality Date    CARDIAC ELECTROPHYSIOLOGY PROCEDURE N/A 4/26/2022    Procedure: PACEMAKER EXTRACTION;  Surgeon: Rj Dominique MD;  Location: [facility] CATH INVASIVE LOCATION;  Service: Cardiology;  Laterality: N/A;    COLONOSCOPY      He has never had the test done.  His family is refusing any form of colon cancer screening for him.    CORONARY ARTERY BYPASS GRAFT  2008    DR SWEAT    MITRAL VALVE REPLACEMENT  04/2008    WITH BIOPROSTHETIC TISSUE VALVE       Family History: family history includes Coronary artery disease (age of onset: 64) in his mother; Hyperlipidemia in his sister; Hypertension in his brother; Liver disease in his sister; Lung " cancer in his sister; Other in his brother; Rheum arthritis in his brother and sister. Otherwise pertinent FHx was reviewed and not pertinent to current issue.    Social History:  reports that he has never smoked. He has never used smokeless tobacco. He reports that he does not drink alcohol and does not use drugs.    Home Medications:   amLODIPine, apixaban, atorvastatin, colestipol, linaclotide, montelukast, and sotalol    Allergies:  No Known Allergies      Objective:     Vital Signs  Temp:  [97.1 °F (36.2 °C)-97.8 °F (36.6 °C)] 97.8 °F (36.6 °C)  Heart Rate:  [57-65] 62  Resp:  [15-22] 16  BP: (127-147)/(69-87) 127/69  Flow (L/min):  [1-4] 4   Body mass index is 19.93 kg/m².    Physical Exam  PHYSICAL EXAM  Constitutional:  Well developed, well nourished, no acute distress, non-toxic appearance   Eyes:  PERRL, conjunctiva normal, EOMI   HENT:  Atraumatic, external ears normal, nose normal, oropharynx moist, no pharyngeal exudates. Neck-normal range of motion, no tenderness, supple, trachea midline  Respiratory: Tachypnea, bibasilar rales noted, non-labored respirations without accessory muscle use  Cardiovascular:  Normal rate, normal rhythm, no murmurs, no gallops, no rubs   GI:  Soft, nondistended, normal bowel sounds, nontender, no organomegaly, no mass, no rebound, no guarding   :  No costovertebral angle tenderness   Musculoskeletal:  No edema, no tenderness, no deformities  Integument:  Well hydrated, no rash   Neurologic:  Alert & oriented x 3, CN 2-12 normal, normal motor function, normal sensory function, no focal deficits noted   Psychiatric:  Speech and behavior appropriate      Scheduled Meds   amLODIPine, 2.5 mg, Oral, Daily  apixaban, 5 mg, Oral, BID  atorvastatin, 10 mg, Oral, Daily  cefTRIAXone, 1,000 mg, Intravenous, Q24H  ipratropium-albuterol, 3 mL, Nebulization, Once  lubiprostone, 24 mcg, Oral, Daily With Breakfast  montelukast, 10 mg, Oral, Daily  polyethylene glycol, 17 g, Oral,  Daily  sodium chloride, 10 mL, Intravenous, Q12H  sorbitol, 50 mL, Oral, Once  sotalol, 80 mg, Oral, Q24H       PRN Meds     acetaminophen **OR** acetaminophen **OR** acetaminophen    senna-docusate sodium **AND** polyethylene glycol **AND** bisacodyl **AND** bisacodyl    melatonin    [COMPLETED] Insert Peripheral IV **AND** sodium chloride    sodium chloride    sodium chloride   Infusions         Diagnostic Data    Results from last 7 days   Lab Units 07/13/24  0327   WBC 10*3/mm3 8.88   HEMOGLOBIN g/dL 11.4*   HEMATOCRIT % 33.7*   PLATELETS 10*3/mm3 161   GLUCOSE mg/dL 101*   CREATININE mg/dL 1.02   BUN mg/dL 11   SODIUM mmol/L 134*   POTASSIUM mmol/L 4.3   AST (SGOT) U/L 27   ALT (SGPT) U/L 7   ALK PHOS U/L 69   BILIRUBIN mg/dL 0.6   ANION GAP mmol/L 8.2       XR Chest 1 View    Result Date: 7/11/2024  Impression: Findings are most compatible with bilateral pneumonia. Electronically Signed: Mallorie Monge MD  7/11/2024 4:17 PM EDT  Workstation ID: [id number]    CT Abdomen Pelvis With Contrast    Result Date: 7/11/2024  Impression: 1.No acute abnormality identified within the abdomen or pelvis. 2.Colonic diverticulosis. 3.Absent right kidney. 4.Cardiomegaly with probable pulmonary vascular congestion and small bilateral pleural effusions noted. Atypical infiltrates within the lung bases may have a similar appearance. Please correlate clinically. Electronically Signed: Shane Vaughn MD  7/11/2024 3:09 PM EDT  Workstation ID: [id number]       I reviewed the patient's new clinical results.  I reviewed the patient's new imaging results and agree with the interpretation.    Assessment/Plan:     Active and Resolved Problems  Active Hospital Problems    Diagnosis  POA    **Bilateral pneumonia [J18.9]  Yes      Resolved Hospital Problems   No resolved problems to display.       Hypoxia  - Patient 68% on RA,   - ABG as follows: pH: 7.4, pCO2: 48.9, pO2: 50.5, HCO3: 30.6, base excess: 4.8, O2 saturation: 84.9, CO2 content:  32.1-patient is showing hypoxia with compensation  - BNP 2000  - Echo pending  - Chest x-ray reviewed: Findings most compatible with bilateral pneumonia  - CT chest: Cardiomegaly with probable pulmonary vascular congestion, and small bilateral pleural effusions atypical infiltrates within the lung bases may have a similar appearance.  - Patient placed on 4 L high flow NC  - Continue oxygen supplementation to keep sats greater than 90  - Continue Rocephin for pneumonia  - Consider, cardiology consult depending upon echo  - Walk test prior to discharge    Abdominal pain  - CT abdomen: No acute abnormality identified within the abdomen or pelvis, colonic diverticulosis, absent right kidney  - Patient moderate results with enema, Linzess, lactulose, and MiraLAX  - Continue MiraLAX, discontinue Linzess, lactulose, and enemas  - Swallow study completed and passed  - GI consulted, evaluated patient, has signed off at this time.    History of atrial fibrillation  - Continue Eliquis, sotalol  - Rate controlled    VTE Prophylaxis:  Pharmacologic & mechanical VTE prophylaxis orders are present.         Code status is   Code Status and Medical Interventions:   Ordered at: 07/11/24 7072     Level Of Support Discussed With:    Patient    Health Care Surrogate     Code Status (Patient has no pulse and is not breathing):    CPR (Attempt to Resuscitate)     Medical Interventions (Patient has pulse or is breathing):    Full Support       Plan for disposition: Home in 30 days    Time: 30 minutes        Signature: Electronically signed by IZAIAH Velazquez, 07/13/24, 12:51 EDT.  [facility] Hospitalist Team

## 2024-07-13 NOTE — PROGRESS NOTES
[facility] H&P    Patient Name: Drake Beth  : 1950  MRN: [id number]  Primary Care Physician: Amada Haskins MD  Date of admission: 2024     Patient Care Team:  Amada Haskins MD as PCP - Josr Delgado DPM as Consulting Physician (Podiatry)  Rj Dominique MD as Consulting Physician (Cardiology)          Subjective   History Present Illness     Chief Complaint:   Chief Complaint   Patient presents with    Diarrhea     Family states abd pain diarrhea, not eating, had xrays that showed blockage and told to come to ER seen Gi Specialist.  Reports this has been going on a long time but now just complaining of pain.            Mr. Beth is a 73 y.o.  male with history of mental disability, A-fib on Eliquis, CHF, CAD s/p CABG, mitral valve replacement with tissue valve, hypertension, and hyperlipidemia who presents with complaints of abdominal pain.      History of Present Illness    Ed  73-year-old male history of A-fib on Eliquis CHF CAD presents to the ER with family member complaining of abdominal pain and difficulty with bowel movements for 1 month. Family member states that he has not had a good bowel movement for couple weeks but has had small bouts of diarrhea. He was seen by PCP over the last couple weeks and was started on MiraLAX, Linzess, lactulose with no improvement. Family reports that they did have outpatient x-ray that showed possible small bowel obstruction which prompted ER visit today. Patient has been complaining of more abdominal pain in the last couple days but cannot describe the abdominal pain. No chest pain or shortness of breath but does report cough. No fever or chills. No vomiting.      Observation 24  Pt concurs with er hpi. GI consulted. Pt c/o constipation. Enema given. Pt consulted     OBSERVATION 24:  Patient states his abd is mildly cramping from having bowel movements; feels short of breath. No chest pain.    Patient's RA ox  on my initial evaluation was 68%. He denies any fever, reports a scant nonproductive cough, no leg swelling. He was placed on supplemental oxygen with abg, duoneb, lasix, solumedrol ordered. Notably hypoxic and ordered high flow oxygen. Review of his cxr compared to priority xr from 12/2022 felt to be concerning for chf exacerbation rather than infection (no BC growth, wbc 8 without shift, RVP negative; procal negative). 2d echo ordered. Patient has a grade 4-5 murmur that doesn't appear in recent documentation review. EKG ordered. Cardiology consulted and patient will be admitted to the hospitalist for further management as he will not be discharged from [facility] today.   Scheduled for swallow study today- no noted choking or aspiration events.   Patient is having continued large bowel movements, and GI saw patient 7/12 and has signed off but available as needed.     Review of Systems   Constitutional: Negative for fever.   HENT:  Negative for congestion.    Cardiovascular:  Negative for chest pain.   Respiratory:  Positive for shortness of breath. Negative for cough and hemoptysis.    Gastrointestinal:         Cramping     Genitourinary:  Negative for dysuria.   Neurological:  Negative for dizziness.           Personal History     Past Medical History:   Past Medical History:   Diagnosis Date    Allergic rhinitis     Anemia     Atrial fibrillation     Chesty cough     CHF (congestive heart failure)     Coronary artery disease     Cough     Fever     Hyperlipidemia     Hypertension     Mentally challenged     MVP (mitral valve prolapse)        Surgical History:      Past Surgical History:   Procedure Laterality Date    CARDIAC ELECTROPHYSIOLOGY PROCEDURE N/A 4/26/2022    Procedure: PACEMAKER EXTRACTION;  Surgeon: Rj Dominique MD;  Location: [facility] INVASIVE LOCATION;  Service: Cardiology;  Laterality: N/A;    COLONOSCOPY      He has never had the test done.  His family is refusing any form of colon cancer  screening for him.    CORONARY ARTERY BYPASS GRAFT  2008    DR SWEAT    MITRAL VALVE REPLACEMENT  04/2008    WITH BIOPROSTHETIC TISSUE VALVE           Family History: family history includes Coronary artery disease (age of onset: 64) in his mother; Hyperlipidemia in his sister; Hypertension in his brother; Liver disease in his sister; Lung cancer in his sister; Other in his brother; Rheum arthritis in his brother and sister. Otherwise pertinent FHx was reviewed and unremarkable.     Social History:  reports that he has never smoked. He has never used smokeless tobacco. He reports that he does not drink alcohol and does not use drugs.      Medications:  Prior to Admission medications    Medication Sig Start Date End Date Taking? Authorizing Provider   apixaban (Eliquis) 5 MG tablet tablet TAKE 1 TABLET BY MOUTH TWICE DAILY 1/25/24  Yes Rj Dominique MD   atorvastatin (LIPITOR) 10 MG tablet TAKE 1 TABLET BY MOUTH DAILY 6/7/24  Yes Ansley Tomlin APRN   colestipol (COLESTID) 1 g tablet TAKE 1 TABLET BY MOUTH FOUR TIMES DAILY 1/22/24  Yes Amada Haskins MD   linaclotide (LINZESS) 72 MCG capsule capsule Take 1 capsule by mouth Every Morning Before Breakfast.   Yes ProviderMary MD   montelukast (SINGULAIR) 10 MG tablet TAKE 1 TABLET BY MOUTH DAILY 6/7/24  Yes Amada Haskins MD   sotalol (BETAPACE AF) 80 MG tablet tablet TAKE 1 TABLET BY MOUTH TWICE DAILY 5/13/24  Yes Rj Dominique MD   amLODIPine (NORVASC) 2.5 MG tablet TAKE 1 TABLET BY MOUTH DAILY 7/12/24   Rj Dominique MD       Allergies:  No Known Allergies    Objective   Objective     Vital Signs  Temp:  [97.1 °F (36.2 °C)-97.8 °F (36.6 °C)] 97.8 °F (36.6 °C)  Heart Rate:  [57-65] 62  Resp:  [15-22] 20  BP: (135-147)/(73-87) 142/73  SpO2:  [92 %-100 %] 100 %  on  Flow (L/min):  [1-3] 3;   Device (Oxygen Therapy): nasal cannula  Body mass index is 19.93 kg/m².    Physical Exam      Constitutional: Awake, alert; nontoxic in appearance  but mildly tachypneic  HEENT: Normocephalic, atraumatic; oropharynx is pink and moist without exudate or erythema.  Neck: Supple, full range of motion without pain; no JVD  Cardiovascular: irregular rhythm; normal rate. Grade 4 murmur  Pulmonary: Respiratory effort regular mildly tachypneic, breath sounds bibasilar Rales  Abdomen: Soft, nontender nondistended with normoactive bowel sounds; no rebound or guarding.  Musculoskeletal: Independent range of motion of all extremities with no palpable tenderness or edema.  Neuro: Alert oriented at baseline speech is clear and appropriate  Skin:  Fleshtone warm, dry, intact; no erythematous or petechial rash or lesion      Results Review:  I have personally reviewed most recent lab results and radiology images and interpretations       Results from last 7 days   Lab Units 07/13/24  0327   WBC 10*3/mm3 8.88   HEMOGLOBIN g/dL 11.4*   HEMATOCRIT % 33.7*   PLATELETS 10*3/mm3 161     Results from last 7 days   Lab Units 07/13/24  0327   SODIUM mmol/L 134*   POTASSIUM mmol/L 4.3   CHLORIDE mmol/L 98   CO2 mmol/L 27.8   BUN mg/dL 11   CREATININE mg/dL 1.02   GLUCOSE mg/dL 101*   CALCIUM mg/dL 8.7   ALK PHOS U/L 69   ALT (SGPT) U/L 7   AST (SGOT) U/L 27   PROBNP pg/mL 1,485.0*   PROCALCITONIN ng/mL 0.04     Estimated Creatinine Clearance: 42.9 mL/min (by C-G formula based on SCr of 1.02 mg/dL).  Brief Urine Lab Results  (Last result in the past 365 days)        Color   Clarity   Blood   Leuk Est   Nitrite   Protein   CREAT   Urine HCG        07/11/24 1547 Yellow   Clear   Negative   Negative   Negative   Trace                   Microbiology Results (last 10 days)       Procedure Component Value - Date/Time    Respiratory Panel PCR w/COVID-19(SARS-CoV-2) DIDI/ANJUM/[facility]/PAD/COR/YING In-House, NP Swab in UTM/VTM, 2 HR TAT - Swab, Nasopharynx [[id number]]  (Normal) Collected: 07/11/24 1655    Lab Status: Final result Specimen: Swab from Nasopharynx Updated: 07/11/24 1845     ADENOVIRUS, PCR  Not Detected     Coronavirus 229E Not Detected     Coronavirus HKU1 Not Detected     Coronavirus NL63 Not Detected     Coronavirus OC43 Not Detected     COVID19 Not Detected     Human Metapneumovirus Not Detected     Human Rhinovirus/Enterovirus Not Detected     Influenza A PCR Not Detected     Influenza B PCR Not Detected     Parainfluenza Virus 1 Not Detected     Parainfluenza Virus 2 Not Detected     Parainfluenza Virus 3 Not Detected     Parainfluenza Virus 4 Not Detected     RSV, PCR Not Detected     Bordetella pertussis pcr Not Detected     Bordetella parapertussis PCR Not Detected     Chlamydophila pneumoniae PCR Not Detected     Mycoplasma pneumo by PCR Not Detected    Narrative:      In the setting of a positive respiratory panel with a viral infection PLUS a negative procalcitonin without other underlying concern for bacterial infection, consider observing off antibiotics or discontinuation of antibiotics and continue supportive care. If the respiratory panel is positive for atypical bacterial infection (Bordetella pertussis, Chlamydophila pneumoniae, or Mycoplasma pneumoniae), consider antibiotic de-escalation to target atypical bacterial infection.    Blood Culture - Blood, Arm, Left [[id number]]  (Normal) Collected: 07/11/24 1650    Lab Status: Preliminary result Specimen: Blood from Arm, Left Updated: 07/12/24 1701     Blood Culture No growth at 24 hours    Narrative:      Less than seven (7) mL's of blood was collected.  Insufficient quantity may yield false negative results.    Blood Culture - Blood, Arm, Left [[id number]]  (Normal) Collected: 07/11/24 1650    Lab Status: Preliminary result Specimen: Blood from Arm, Left Updated: 07/12/24 1701     Blood Culture No growth at 24 hours    Narrative:      Less than seven (7) mL's of blood was collected.  Insufficient quantity may yield false negative results.            ECG/EMG Results (most recent)       Procedure Component Value Units Date/Time     Telemetry Scan [[id number]] Resulted: 07/11/24     Updated: 07/11/24 2120    Telemetry Scan [[id number]] Resulted: 07/11/24     Updated: 07/11/24 2323    Telemetry Scan [[id number]] Resulted: 07/11/24     Updated: 07/12/24 0423    Telemetry Scan [[id number]] Resulted: 07/11/24     Updated: 07/12/24 0759    Telemetry Scan [[id number]] Resulted: 07/11/24     Updated: 07/12/24 1843    Telemetry Scan [[id number]] Resulted: 07/11/24     Updated: 07/12/24 2030    Telemetry Scan [[id number]] Resulted: 07/11/24     Updated: 07/13/24 0135                Results for orders placed during the hospital encounter of 09/06/23    Adult Transthoracic Echo Complete W/ Cont if Necessary Per Protocol    Interpretation Summary    Left ventricular systolic function is normal. Left ventricular ejection fraction appears to be 56 - 60%.    Left ventricular wall thickness is consistent with mild concentric hypertrophy.    The left atrial cavity is moderately dilated.    The right atrial cavity is mildly  dilated.    There is a bioprosthetic mitral valve present. Prosthetic mitral valve abnormalities include a pannus.  Mild to moderate mitral regurgitation, no mitral stenosis.    Estimated right ventricular systolic pressure from tricuspid regurgitation is normal (<35 mmHg).      XR Chest 1 View    Result Date: 7/11/2024  Impression: Findings are most compatible with bilateral pneumonia. Electronically Signed: Mallorie Monge MD  7/11/2024 4:17 PM EDT  Workstation ID: [id number]    CT Abdomen Pelvis With Contrast    Result Date: 7/11/2024  Impression: 1.No acute abnormality identified within the abdomen or pelvis. 2.Colonic diverticulosis. 3.Absent right kidney. 4.Cardiomegaly with probable pulmonary vascular congestion and small bilateral pleural effusions noted. Atypical infiltrates within the lung bases may have a similar appearance. Please correlate clinically. Electronically Signed: Shane Vaughn MD  7/11/2024 3:09 PM EDT  Workstation ID:  [id number]       Estimated Creatinine Clearance: 42.9 mL/min (by C-G formula based on SCr of 1.02 mg/dL).    Assessment & Plan   Assessment/Plan       Active Hospital Problems    Diagnosis  POA    **Bilateral pneumonia [J18.9]  Yes      Resolved Hospital Problems   No resolved problems to display.      Constipation  - primary care had ordered MiraLAX, Linzess, and lactulose   - kub as outpt showing some dilated small bowel loops which potentially might be due to a partial bowel obstruction   - cbc, cmp, lipase, ua are unremarkable  - ct abd reviewed and showing 1.No acute abnormality identified within the abdomen or pelvis. Colonic diverticulosis. Absent right kidney. Cardiomegaly with probable pulmonary vascular congestion and small bilateral pleural effusions noted. Atypical infiltrates within the lung bases may have a similar appearance. Please correlate clinically.  - enema given  - gi consulted and recommends sorbitol, family refusing colonoscopy at this time  - swallow study  - gi signed off     Questionable Bilateral pneumonia VS. failure  - cbc, cmp, lipase, ua are unremarkable  - chest xray reviewed    - rpp negative, procal negative  - iv rocephin   - blood culture prelim no growth at 24 hours   - legionella, strep pneumo pending     Hx of afib  - cont eliquis, sotalol  - controlled rate    Hypoxia  - echo pending  - abg showing hypoxia with compensation- high flow cannula  - bnp 2000  - lasix given 7/13  - s/p prosthetic mitral valve  - cardiology consult- felt to be related to possible failure            VTE Prophylaxis - [unfilled]    CODE STATUS:    Code Status and Medical Interventions:   Ordered at: 07/11/24 2564     Level Of Support Discussed With:    Patient    Health Care Surrogate     Code Status (Patient has no pulse and is not breathing):    CPR (Attempt to Resuscitate)     Medical Interventions (Patient has pulse or is breathing):    Full Support       This patient has been examined wearing  personal protective equipment.     I discussed the patient's findings and my recommendations with patient.      Signature:Electronically signed by IZAIAH Mckeno, 07/13/24, 10:30 AM EDT.

## 2024-07-13 NOTE — THERAPY EVALUATION
Acute Care - Speech Language Pathology   Swallow Initial Evaluation [facility]     Patient Name: Drake Beth  : 1950  MRN: [id number]  Today's Date: 2024               Admit Date: 2024    Visit Dx:     ICD-10-CM ICD-9-CM   1. Pneumonia of both lower lobes due to infectious organism  J18.9 486   2. Generalized abdominal pain  R10.84 789.07   3. Constipation, unspecified constipation type  K59.00 564.00     Patient Active Problem List   Diagnosis    Allergic rhinitis    Anemia    Atrial fibrillation    Congestive heart failure    Coronary artery disease    Subacute cough    Hyperlipidemia    Hypertension    Medicare annual wellness visit, subsequent    Mentally challenged    Prostate cancer screening    Pacemaker infection    Mitral valve replaced    Gingivitis, chronic    Acute URI    Atrial flutter with controlled response    Bilateral pneumonia     Past Medical History:   Diagnosis Date    Allergic rhinitis     Anemia     Atrial fibrillation     Chesty cough     CHF (congestive heart failure)     Coronary artery disease     Cough     Fever     Hyperlipidemia     Hypertension     Mentally challenged     MVP (mitral valve prolapse)      Past Surgical History:   Procedure Laterality Date    CARDIAC ELECTROPHYSIOLOGY PROCEDURE N/A 2022    Procedure: PACEMAKER EXTRACTION;  Surgeon: Rj Dominique MD;  Location: [facility] INVASIVE LOCATION;  Service: Cardiology;  Laterality: N/A;    COLONOSCOPY      He has never had the test done.  His family is refusing any form of colon cancer screening for him.    CORONARY ARTERY BYPASS GRAFT      DR SWEAT    MITRAL VALVE REPLACEMENT  2008    WITH BIOPROSTHETIC TISSUE VALVE       SLP Recommendation and Plan  SLP Swallowing Diagnosis: functional oral phase, functional pharyngeal phase (24 1200)  SLP Diet Recommendation: regular textures, thin liquids (24 1200)     SLP Rec. for Method of Medication Administration: meds whole, meds  crushed, as tolerated (07/13/24 1200)     Monitor for Signs of Aspiration: yes, notify SLP if any concerns (07/13/24 1200)              Therapy Frequency (Swallow): evaluation only (07/13/24 1200)     Oral Care Recommendations: Oral Care BID/PRN, Swab (07/13/24 1200)          SWALLOW EVALUATION (Last 72 Hours)       SLP Adult Swallow Evaluation       Row Name 07/13/24 1200       Rehab Evaluation    Document Type evaluation  -CB    Subjective Information no complaints  -CB    Patient Observations alert;cooperative  -CB    Patient Effort good  -CB       General Information    Patient Profile Reviewed yes  -CB    Pertinent History Of Current Problem 73-year-old male history of A-fib on Eliquis CHF CAD presents to the ER with family member complaining of abdominal pain and difficulty with bowel movements for 1 month. Family member states that he has not had a good bowel movement for couple weeks but has had small bouts of diarrhea. He was seen by PCP over the last couple weeks and was started on MiraLAX, Linzess, lactulose with no improvement. Family reports that they did have outpatient x-ray that showed possible small bowel obstruction which prompted ER visit today. Patient has been complaining of more abdominal pain in the last couple days but cannot describe the abdominal pain. No chest pain or shortness of breath but does report cough. No fever or chills. No vomiting.  He denies any fever, reports a scant nonproductive cough, no leg swelling. He was placed on supplemental oxygen with abg, duoneb, lasix, solumedrol ordered. Notably hypoxic and ordered high flow oxygen. Review of his cxr compared to priority xr from 12/2022 felt to be concerning for chf exacerbation rather than infection (no BC growth, wbc 8 without shift, RVP negative; procal negative). 2d echo ordered. Patient has a grade 4-5 murmur that doesn't appear in recent documentation review. EKG ordered. Cardiology consulted and patient will be admitted to  the hospitalist for further management as he will not be discharged from obs today.   Scheduled for swallow study today- no noted choking or aspiration events.   Patient is having continued large bowel movements, and GI saw patient 7/12 and has signed off but available as needed.  -CB       Pain    Additional Documentation Pain Scale: FACES Pre/Post-Treatment (Group)  -CB       Pain Scale: FACES Pre/Post-Treatment    Pain: FACES Scale, Pretreatment 0-->no hurt  -CB    Posttreatment Pain Rating 0-->no hurt  -CB       Oral Motor Structure and Function    Dentition Assessment natural, present and adequate  -CB    Secretion Management WNL/WFL  -CB    Mucosal Quality moist, healthy  -CB    Gag Response absent or diminished  -CB       Oral Musculature and Cranial Nerve Assessment    Oral Motor General Assessment WFL  -CB    Oral Motor, Comment Oral mechanism examination revealed patient demonstrated full ROM and mobility of lingual structure and noted reduce labial protrusion/retraction. Unable to follow directive for lingual A-P movement, therefore, was unable to visualize movement. Lingual strength was adequate. Palatal movement was present. Noted diminished and/or absent gag reflex.  -CB       General Eating/Swallowing Observations    Respiratory Support Currently in Use nasal cannula  -CB    O2 Liters 4L  -CB    Eating/Swallowing Skills self-fed  -CB    Positioning During Eating upright in bed  -CB    Utensils Used straw  -CB       Clinical Swallow Eval    Clinical Swallow Evaluation Summary Patient was seen for dysphagia evaluation per MD order. Most recent chest x-ray revealed findings most compatible with bilateral pneumonia. Patient has intellectual disability, therefore, unsure of accuracy of information. However, patient does deny history of pneumonia, esophageal stricture, CVA and/or GERD. Patient has his own natural teeth. Oral mechanism examination revealed patient demonstrated full ROM of lingual protrusion,  lateralization, elevation and retraction. Unable to visualize lingual A-P movement as he was unable to follow directives despite visual and verbal cuing. Labial protrusion/retraction was reduced. Lingual strength was adequate. Palata movement was present. Noted diminished and/or absent gag reflex. Patient was properly positioned upright in bed near 90 degree hip flexion in bed  prior to trials. Provided trials of thins via straw x 4. No clearing of throat, cough and/or vocal changes were identified. Provided trials of regular x 3. Patient displayed adequate rotary chewing. Patient cleared oral cavity between bites without evidence of oral residue. No overt s/s of aspiration was observed. No wet vocal quality was detected. It is recommended that patient remain on current diet. No further treatment is necessary at this time. Please re consult if any further concerns arise.  -CB       SLP Evaluation Clinical Impression    SLP Swallowing Diagnosis functional oral phase;functional pharyngeal phase  -CB       Recommendations    Therapy Frequency (Swallow) evaluation only  -CB    SLP Diet Recommendation regular textures;thin liquids  -CB    Oral Care Recommendations Oral Care BID/PRN;Swab  -CB    SLP Rec. for Method of Medication Administration meds whole;meds crushed;as tolerated  -CB    Monitor for Signs of Aspiration yes;notify SLP if any concerns  -CB              User Key  (r) = Recorded By, (t) = Taken By, (c) = Cosigned By      Initials Name Effective Dates    Ariadna Mckee SLP 09/21/21 -                     EDUCATION  The patient has been educated in the following areas:   Dysphagia (Swallowing Impairment) Oral Care/Hydration.                Time Calculation:                RENAN Azul  7/13/2024

## 2024-07-14 LAB
L PNEUMO1 AG UR QL IA: NEGATIVE
S PNEUM AG SPEC QL LA: NEGATIVE

## 2024-07-14 PROCEDURE — 87449 NOS EACH ORGANISM AG IA: CPT | Performed by: NURSE PRACTITIONER

## 2024-07-14 PROCEDURE — 87899 AGENT NOS ASSAY W/OPTIC: CPT | Performed by: NURSE PRACTITIONER

## 2024-07-14 PROCEDURE — 93010 ELECTROCARDIOGRAM REPORT: CPT | Performed by: INTERNAL MEDICINE

## 2024-07-14 PROCEDURE — 99232 SBSQ HOSP IP/OBS MODERATE 35: CPT | Performed by: INTERNAL MEDICINE

## 2024-07-14 PROCEDURE — 93005 ELECTROCARDIOGRAM TRACING: CPT | Performed by: STUDENT IN AN ORGANIZED HEALTH CARE EDUCATION/TRAINING PROGRAM

## 2024-07-14 RX ADMIN — ATORVASTATIN CALCIUM 10 MG: 10 TABLET, FILM COATED ORAL at 08:53

## 2024-07-14 RX ADMIN — SOTALOL HYDROCHLORIDE 80 MG: 80 TABLET ORAL at 08:54

## 2024-07-14 RX ADMIN — APIXABAN 5 MG: 5 TABLET, FILM COATED ORAL at 20:20

## 2024-07-14 RX ADMIN — POLYETHYLENE GLYCOL 3350 17 G: 17 POWDER, FOR SOLUTION ORAL at 08:54

## 2024-07-14 RX ADMIN — LUBIPROSTONE 24 MCG: 24 CAPSULE, GELATIN COATED ORAL at 08:54

## 2024-07-14 RX ADMIN — Medication 10 ML: at 20:20

## 2024-07-14 RX ADMIN — Medication 10 ML: at 08:54

## 2024-07-14 RX ADMIN — APIXABAN 5 MG: 5 TABLET, FILM COATED ORAL at 08:53

## 2024-07-14 RX ADMIN — AMLODIPINE BESYLATE 2.5 MG: 2.5 TABLET ORAL at 08:53

## 2024-07-14 RX ADMIN — MONTELUKAST 10 MG: 10 TABLET, FILM COATED ORAL at 08:53

## 2024-07-14 NOTE — PROGRESS NOTES
"    Reason for follow-up: A-fib     Patient Care Team:  Amada Haskins MD as PCP - General  Josr Rodriguez DPM as Consulting Physician (Podiatry)  Rj Dominique MD as Consulting Physician (Cardiology)    Subjective .   Drake Beth is doing well today     ROS    Patient has no known allergies.    Scheduled Meds:amLODIPine, 2.5 mg, Oral, Daily  apixaban, 5 mg, Oral, BID  atorvastatin, 10 mg, Oral, Daily  ipratropium-albuterol, 3 mL, Nebulization, Once  lubiprostone, 24 mcg, Oral, Daily With Breakfast  montelukast, 10 mg, Oral, Daily  polyethylene glycol, 17 g, Oral, Daily  sodium chloride, 10 mL, Intravenous, Q12H  sorbitol, 50 mL, Oral, Once  sotalol, 80 mg, Oral, Q24H      Continuous Infusions:   PRN Meds:.  acetaminophen **OR** acetaminophen **OR** acetaminophen    senna-docusate sodium **AND** polyethylene glycol **AND** bisacodyl **AND** bisacodyl    melatonin    [COMPLETED] Insert Peripheral IV **AND** sodium chloride    sodium chloride    sodium chloride      VITAL SIGNS  Vitals:    07/13/24 2100 07/14/24 0314 07/14/24 0616 07/14/24 1002   BP: 127/65 115/62 123/68 114/67   BP Location: Right arm Right arm Left arm Left arm   Patient Position: Lying Lying Lying Lying   Pulse: 70 62  101   Resp: 14 14 13 17   Temp: 97.8 °F (36.6 °C) 97.4 °F (36.3 °C)  97.2 °F (36.2 °C)   TempSrc: Axillary Oral Oral Oral   SpO2: 97%   96%   Weight:       Height:           Flowsheet Rows      Flowsheet Row First Filed Value   Admission Height 152.4 cm (60\") Documented at 07/11/2024 1259   Admission Weight 46.3 kg (102 lb 1.2 oz) Documented at 07/11/2024 1259               Physical Exam  VITALS REVIEWED    General:      well developed, in no acute distress.    Head:      normocephalic and atraumatic.    Eyes:      PERRL/EOM intact, conjunctiva and sclera clear with out nystagmus.    Neck:      no masses, thyromegaly,  trachea central with normal respiratory effort and PMI displaced laterally  Lungs:      " Clear  Heart:       Irregular rhythm  Msk:      no deformity or scoliosis noted of thoracic or lumbar spine.    Pulses:      pulses normal in all 4 extremities.    Extremities:       No lower extremity edema  Neurologic:      no focal deficits.   alert oriented x3  Skin:      intact without lesions or rashes.    Psych:      alert and cooperative; normal mood and affect; normal attention span and concentration.          LAB RESULTS (LAST 7 DAYS)    CBC  Results from last 7 days   Lab Units 07/13/24  0327 07/12/24  0440 07/11/24  1415   WBC 10*3/mm3 8.88 5.98 7.38   RBC 10*6/mm3 3.33* 3.34* 3.81*   HEMOGLOBIN g/dL 11.4* 11.2* 12.8*   HEMATOCRIT % 33.7* 33.5* 37.2*   MCV fL 101.2* 100.3* 97.6*   PLATELETS 10*3/mm3 161 165 190       BMP  Results from last 7 days   Lab Units 07/13/24 0327 07/12/24  0440 07/11/24  1415   SODIUM mmol/L 134* 136 135*   POTASSIUM mmol/L 4.3 4.0 4.6   CHLORIDE mmol/L 98 98 95*   CO2 mmol/L 27.8 28.8 28.6   BUN mg/dL 11 13 10   CREATININE mg/dL 1.02 0.96 0.88   GLUCOSE mg/dL 101* 67 99       CMP   Results from last 7 days   Lab Units 07/13/24  0327 07/12/24  0440 07/11/24  1415   SODIUM mmol/L 134* 136 135*   POTASSIUM mmol/L 4.3 4.0 4.6   CHLORIDE mmol/L 98 98 95*   CO2 mmol/L 27.8 28.8 28.6   BUN mg/dL 11 13 10   CREATININE mg/dL 1.02 0.96 0.88   GLUCOSE mg/dL 101* 67 99   ALBUMIN g/dL 3.6  --  4.2   BILIRUBIN mg/dL 0.6  --  1.4*   ALK PHOS U/L 69  --  81   AST (SGOT) U/L 27  --  33   ALT (SGPT) U/L 7  --  5   LIPASE U/L  --   --  35         BNP        TROPONIN        CoAg        Creatinine Clearance  Estimated Creatinine Clearance: 42.9 mL/min (by C-G formula based on SCr of 1.02 mg/dL).    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 He is admitted to the hospital due to abdominal pain, working diagnosis is bilateral pneumonia. His EKG shows sinus rhythm, his BNP is elevated but clinically does not seem to be in CHF.     7/14/2024  Patient is now in arrhythmia, please continue the sotalol and Eliquis.  In the past him and the family had refused any invasive measures for A-fib.  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    I discussed the patients findings and my recommendations with patient and agrees with outlined plan.    Rj Dominique MD  07/14/24  12:01 EDT

## 2024-07-14 NOTE — PLAN OF CARE
Problem: Adult Inpatient Plan of Care  Goal: Patient-Specific Goal (Individualized)  Outcome: Ongoing, Progressing     Problem: Infection (Pneumonia)  Goal: Resolution of Infection Signs and Symptoms  Outcome: Ongoing, Progressing     Problem: Adult Inpatient Plan of Care  Goal: Absence of Hospital-Acquired Illness or Injury  Intervention: Identify and Manage Fall Risk  Recent Flowsheet Documentation  Taken 7/14/2024 1002 by Bere Saldivar RN  Safety Promotion/Fall Prevention:   activity supervised   assistive device/personal items within reach   clutter free environment maintained   fall prevention program maintained   nonskid shoes/slippers when out of bed   room organization consistent   safety round/check completed  Taken 7/14/2024 0802 by Bere Saldivar RN  Safety Promotion/Fall Prevention:   activity supervised   assistive device/personal items within reach   clutter free environment maintained   fall prevention program maintained   nonskid shoes/slippers when out of bed   room organization consistent   safety round/check completed  Intervention: Prevent Skin Injury  Recent Flowsheet Documentation  Taken 7/14/2024 0802 by Bere Saldivar RN  Body Position: position changed independently  Intervention: Prevent and Manage VTE (Venous Thromboembolism) Risk  Recent Flowsheet Documentation  Taken 7/14/2024 0802 by Bere Saldivar RN  Activity Management: ambulated to bathroom  Range of Motion: active ROM (range of motion) encouraged  Intervention: Prevent Infection  Recent Flowsheet Documentation  Taken 7/14/2024 0802 by Bere Saldivar RN  Infection Prevention:   hand hygiene promoted   rest/sleep promoted   single patient room provided  Goal: Optimal Comfort and Wellbeing  Intervention: Provide Person-Centered Care  Recent Flowsheet Documentation  Taken 7/14/2024 0802 by Bere Saldivar RN  Trust Relationship/Rapport:   care explained   choices provided   emotional support provided   empathic listening provided    questions answered   questions encouraged   reassurance provided   thoughts/feelings acknowledged     Problem: Behavioral Health Comorbidity  Goal: Maintenance of Behavioral Health Symptom Control  Intervention: Maintain Behavioral Health Symptom Control  Recent Flowsheet Documentation  Taken 7/14/2024 0802 by Bere Saldivar RN  Medication Review/Management: medications reviewed     Problem: Hypertension Comorbidity  Goal: Blood Pressure in Desired Range  Intervention: Maintain Blood Pressure Management  Recent Flowsheet Documentation  Taken 7/14/2024 0802 by Bere Saldivar RN  Medication Review/Management: medications reviewed     Problem: Skin Injury Risk Increased  Goal: Skin Health and Integrity  Intervention: Optimize Skin Protection  Recent Flowsheet Documentation  Taken 7/14/2024 0802 by Bere Saldivar RN  Head of Bed (HOB) Positioning: HOB elevated     Problem: Fall Injury Risk  Goal: Absence of Fall and Fall-Related Injury  Intervention: Identify and Manage Contributors  Recent Flowsheet Documentation  Taken 7/14/2024 0802 by Bere Saldivar RN  Medication Review/Management: medications reviewed  Intervention: Promote Injury-Free Environment  Recent Flowsheet Documentation  Taken 7/14/2024 1002 by Bere Saldivar RN  Safety Promotion/Fall Prevention:   activity supervised   assistive device/personal items within reach   clutter free environment maintained   fall prevention program maintained   nonskid shoes/slippers when out of bed   room organization consistent   safety round/check completed  Taken 7/14/2024 0802 by Bere Saldivar RN  Safety Promotion/Fall Prevention:   activity supervised   assistive device/personal items within reach   clutter free environment maintained   fall prevention program maintained   nonskid shoes/slippers when out of bed   room organization consistent   safety round/check completed     Problem: Respiratory Compromise (Pneumonia)  Goal: Effective Oxygenation and  Ventilation  Intervention: Promote Airway Secretion Clearance  Recent Flowsheet Documentation  Taken 7/14/2024 0802 by Bere Saldivar, RN  Cough And Deep Breathing: done independently per patient  Intervention: Optimize Oxygenation and Ventilation  Recent Flowsheet Documentation  Taken 7/14/2024 0802 by Bere Saldivar, RN  Head of Bed (HOB) Positioning: HOB elevated   Goal Outcome Evaluation:   Pt AO x 1 to self only, ambulatory with standby assist, VSS, denies pain or SOB. Safety precautions in place. Will continue to monitor.

## 2024-07-14 NOTE — PLAN OF CARE
Problem: Adult Inpatient Plan of Care  Goal: Plan of Care Review  Outcome: Ongoing, Progressing     Problem: Adult Inpatient Plan of Care  Goal: Absence of Hospital-Acquired Illness or Injury  Intervention: Prevent and Manage VTE (Venous Thromboembolism) Risk  Recent Flowsheet Documentation  Taken 7/13/2024 2000 by Jessica Rosa RN  VTE Prevention/Management: patient refused intervention     Problem: Adult Inpatient Plan of Care  Goal: Absence of Hospital-Acquired Illness or Injury  Intervention: Prevent Infection  Recent Flowsheet Documentation  Taken 7/13/2024 2000 by Jessica Rosa RN  Infection Prevention: single patient room provided     Problem: Adult Inpatient Plan of Care  Goal: Optimal Comfort and Wellbeing  Outcome: Ongoing, Progressing   Goal Outcome Evaluation:

## 2024-07-14 NOTE — PROGRESS NOTES
[facility] MEDICINE SERVICE  DAILY PROGRESS NOTE    NAME: Drake Beth  : 1950  MRN: [id number]      LOS: 0 days     PROVIDER OF SERVICE: Tex Chandler MD    Chief Complaint: Bilateral pneumonia    Subjective:     Interval History:  History taken from: patient    No acute overnight events. Patient is pleasant, denies chest pain, SOB at this time.    Review of Systems:   Review of Systems Negative    Objective:     Vital Signs  Temp:  [97.2 °F (36.2 °C)-97.8 °F (36.6 °C)] 97.2 °F (36.2 °C)  Heart Rate:  [] 101  Resp:  [13-17] 17  BP: (114-166)/(62-86) 114/67  Flow (L/min):  [3-4] 3   Body mass index is 19.92 kg/m².    Physical Exam  Physical Exam    Scheduled Meds   amLODIPine, 2.5 mg, Oral, Daily  apixaban, 5 mg, Oral, BID  atorvastatin, 10 mg, Oral, Daily  ipratropium-albuterol, 3 mL, Nebulization, Once  lubiprostone, 24 mcg, Oral, Daily With Breakfast  montelukast, 10 mg, Oral, Daily  polyethylene glycol, 17 g, Oral, Daily  sodium chloride, 10 mL, Intravenous, Q12H  sorbitol, 50 mL, Oral, Once  sotalol, 80 mg, Oral, Q24H       PRN Meds     acetaminophen **OR** acetaminophen **OR** acetaminophen    senna-docusate sodium **AND** polyethylene glycol **AND** bisacodyl **AND** bisacodyl    melatonin    [COMPLETED] Insert Peripheral IV **AND** sodium chloride    sodium chloride    sodium chloride   Infusions         Diagnostic Data    Results from last 7 days   Lab Units 24  0327   WBC 10*3/mm3 8.88   HEMOGLOBIN g/dL 11.4*   HEMATOCRIT % 33.7*   PLATELETS 10*3/mm3 161   GLUCOSE mg/dL 101*   CREATININE mg/dL 1.02   BUN mg/dL 11   SODIUM mmol/L 134*   POTASSIUM mmol/L 4.3   AST (SGOT) U/L 27   ALT (SGPT) U/L 7   ALK PHOS U/L 69   BILIRUBIN mg/dL 0.6   ANION GAP mmol/L 8.2       No radiology results for the last day      I reviewed the patient's new clinical results.    Assessment/Plan:   Hypoxia  - Patient 68% on RA,   - ABG as follows: pH: 7.4, pCO2: 48.9, pO2: 50.5, HCO3: 30.6,  base excess: 4.8, O2 saturation: 84.9, CO2 content: 32.1-patient is showing hypoxia with compensation  - BNP 2000  - Echo pending  - Chest x-ray reviewed: Findings most compatible with bilateral pneumonia  - CT chest: Cardiomegaly with probable pulmonary vascular congestion, and small bilateral pleural effusions atypical infiltrates within the lung bases may have a similar appearance.  - Patient placed on 4 L high flow NC  - Continue oxygen supplementation to keep sats greater than 90  - Continue Rocephin for pneumonia  - Consider, cardiology consult depending upon echo  - Walk test prior to discharge     Abdominal pain  - CT abdomen: No acute abnormality identified within the abdomen or pelvis, colonic diverticulosis, absent right kidney  - Patient moderate results with enema, Linzess, lactulose, and MiraLAX  - Continue MiraLAX, discontinue Linzess, lactulose, and enemas  - Swallow study completed and passed  - GI consulted, evaluated patient, has signed off at this time.     History of atrial fibrillation  - Continue Eliquis, sotalol  - Rate controlled      7/14:   -Echo showed preserved LVEF of 50-55 %  -Per EP note, likely tissue overgrowth on one of the leaflets, but no features of endocarditis   -Continue Sotalol, eliquis for now  -Appreciate Cardiology reccs        VTE Prophylaxis:  Pharmacologic & mechanical VTE prophylaxis orders are present.         Code status is   Code Status and Medical Interventions:   Ordered at: 07/11/24 2394     Level Of Support Discussed With:    Patient    Health Care Surrogate     Code Status (Patient has no pulse and is not breathing):    CPR (Attempt to Resuscitate)     Medical Interventions (Patient has pulse or is breathing):    Full Support       Plan for disposition: Discharge possibly in 1-2 days    Time: 30 minutes    Signature: Electronically signed by Tex Chandler MD, 07/14/24, 14:03 EDT.  [facility] Hospitalist Team

## 2024-07-15 ENCOUNTER — READMISSION MANAGEMENT (OUTPATIENT)
Dept: CALL CENTER | Facility: HOSPITAL | Age: 74
End: 2024-07-15
Payer: MEDICARE

## 2024-07-15 VITALS
BODY MASS INDEX: 20.03 KG/M2 | HEART RATE: 89 BPM | HEIGHT: 60 IN | WEIGHT: 102 LBS | RESPIRATION RATE: 18 BRPM | SYSTOLIC BLOOD PRESSURE: 118 MMHG | OXYGEN SATURATION: 95 % | TEMPERATURE: 97.3 F | DIASTOLIC BLOOD PRESSURE: 72 MMHG

## 2024-07-15 LAB
ALBUMIN SERPL-MCNC: 3.5 G/DL (ref 3.5–5.2)
ALBUMIN/GLOB SERPL: 1.1 G/DL
ALP SERPL-CCNC: 64 U/L (ref 39–117)
ALT SERPL W P-5'-P-CCNC: 6 U/L (ref 1–41)
ANION GAP SERPL CALCULATED.3IONS-SCNC: 7.5 MMOL/L (ref 5–15)
AST SERPL-CCNC: 28 U/L (ref 1–40)
BASOPHILS # BLD AUTO: 0.02 10*3/MM3 (ref 0–0.2)
BASOPHILS NFR BLD AUTO: 0.2 % (ref 0–1.5)
BILIRUB SERPL-MCNC: 0.6 MG/DL (ref 0–1.2)
BUN SERPL-MCNC: 12 MG/DL (ref 8–23)
BUN/CREAT SERPL: 15.8 (ref 7–25)
CALCIUM SPEC-SCNC: 8.9 MG/DL (ref 8.6–10.5)
CHLORIDE SERPL-SCNC: 97 MMOL/L (ref 98–107)
CO2 SERPL-SCNC: 30.5 MMOL/L (ref 22–29)
CREAT SERPL-MCNC: 0.76 MG/DL (ref 0.76–1.27)
DEPRECATED RDW RBC AUTO: 50.6 FL (ref 37–54)
EGFRCR SERPLBLD CKD-EPI 2021: 95.5 ML/MIN/1.73
EOSINOPHIL # BLD AUTO: 0.22 10*3/MM3 (ref 0–0.4)
EOSINOPHIL NFR BLD AUTO: 1.9 % (ref 0.3–6.2)
ERYTHROCYTE [DISTWIDTH] IN BLOOD BY AUTOMATED COUNT: 14 % (ref 12.3–15.4)
GLOBULIN UR ELPH-MCNC: 3.2 GM/DL
GLUCOSE SERPL-MCNC: 89 MG/DL (ref 65–99)
HCT VFR BLD AUTO: 35.4 % (ref 37.5–51)
HGB BLD-MCNC: 11.9 G/DL (ref 13–17.7)
IMM GRANULOCYTES # BLD AUTO: 0.07 10*3/MM3 (ref 0–0.05)
IMM GRANULOCYTES NFR BLD AUTO: 0.6 % (ref 0–0.5)
LYMPHOCYTES # BLD AUTO: 1.93 10*3/MM3 (ref 0.7–3.1)
LYMPHOCYTES NFR BLD AUTO: 16.7 % (ref 19.6–45.3)
MAGNESIUM SERPL-MCNC: 2 MG/DL (ref 1.6–2.4)
MCH RBC QN AUTO: 33.5 PG (ref 26.6–33)
MCHC RBC AUTO-ENTMCNC: 33.6 G/DL (ref 31.5–35.7)
MCV RBC AUTO: 99.7 FL (ref 79–97)
MONOCYTES # BLD AUTO: 0.97 10*3/MM3 (ref 0.1–0.9)
MONOCYTES NFR BLD AUTO: 8.4 % (ref 5–12)
NEUTROPHILS NFR BLD AUTO: 72.2 % (ref 42.7–76)
NEUTROPHILS NFR BLD AUTO: 8.36 10*3/MM3 (ref 1.7–7)
NRBC BLD AUTO-RTO: 0 /100 WBC (ref 0–0.2)
PHOSPHATE SERPL-MCNC: 2.9 MG/DL (ref 2.5–4.5)
PLATELET # BLD AUTO: 192 10*3/MM3 (ref 140–450)
PMV BLD AUTO: 9.4 FL (ref 6–12)
POTASSIUM SERPL-SCNC: 4 MMOL/L (ref 3.5–5.2)
PROT SERPL-MCNC: 6.7 G/DL (ref 6–8.5)
RBC # BLD AUTO: 3.55 10*6/MM3 (ref 4.14–5.8)
SODIUM SERPL-SCNC: 135 MMOL/L (ref 136–145)
WBC NRBC COR # BLD AUTO: 11.57 10*3/MM3 (ref 3.4–10.8)

## 2024-07-15 PROCEDURE — 84100 ASSAY OF PHOSPHORUS: CPT | Performed by: STUDENT IN AN ORGANIZED HEALTH CARE EDUCATION/TRAINING PROGRAM

## 2024-07-15 PROCEDURE — 83735 ASSAY OF MAGNESIUM: CPT | Performed by: STUDENT IN AN ORGANIZED HEALTH CARE EDUCATION/TRAINING PROGRAM

## 2024-07-15 PROCEDURE — 80053 COMPREHEN METABOLIC PANEL: CPT | Performed by: STUDENT IN AN ORGANIZED HEALTH CARE EDUCATION/TRAINING PROGRAM

## 2024-07-15 PROCEDURE — 94618 PULMONARY STRESS TESTING: CPT

## 2024-07-15 PROCEDURE — 97116 GAIT TRAINING THERAPY: CPT

## 2024-07-15 PROCEDURE — 99232 SBSQ HOSP IP/OBS MODERATE 35: CPT | Performed by: NURSE PRACTITIONER

## 2024-07-15 PROCEDURE — 85025 COMPLETE CBC W/AUTO DIFF WBC: CPT | Performed by: STUDENT IN AN ORGANIZED HEALTH CARE EDUCATION/TRAINING PROGRAM

## 2024-07-15 RX ADMIN — ATORVASTATIN CALCIUM 10 MG: 10 TABLET, FILM COATED ORAL at 08:39

## 2024-07-15 RX ADMIN — SOTALOL HYDROCHLORIDE 80 MG: 80 TABLET ORAL at 08:39

## 2024-07-15 RX ADMIN — Medication 10 ML: at 08:39

## 2024-07-15 RX ADMIN — MONTELUKAST 10 MG: 10 TABLET, FILM COATED ORAL at 08:39

## 2024-07-15 RX ADMIN — LUBIPROSTONE 24 MCG: 24 CAPSULE, GELATIN COATED ORAL at 08:38

## 2024-07-15 RX ADMIN — AMLODIPINE BESYLATE 2.5 MG: 2.5 TABLET ORAL at 08:39

## 2024-07-15 RX ADMIN — APIXABAN 5 MG: 5 TABLET, FILM COATED ORAL at 08:38

## 2024-07-15 RX ADMIN — POLYETHYLENE GLYCOL 3350 17 G: 17 POWDER, FOR SOLUTION ORAL at 08:43

## 2024-07-15 NOTE — PLAN OF CARE
Goal Outcome Evaluation:     Problem: Adult Inpatient Plan of Care  Goal: Plan of Care Review  Outcome: Ongoing, Progressing  Goal: Patient-Specific Goal (Individualized)  Outcome: Ongoing, Progressing  Goal: Absence of Hospital-Acquired Illness or Injury  Outcome: Ongoing, Progressing  Intervention: Identify and Manage Fall Risk  Recent Flowsheet Documentation  Taken 7/14/2024 2332 by Ashley Jennings RN  Safety Promotion/Fall Prevention:   safety round/check completed   room organization consistent   fall prevention program maintained   clutter free environment maintained   assistive device/personal items within reach  Taken 7/14/2024 2200 by Ashley Jennings RN  Safety Promotion/Fall Prevention:   safety round/check completed   room organization consistent   clutter free environment maintained   fall prevention program maintained  Taken 7/14/2024 2021 by Ashley Jennings RN  Safety Promotion/Fall Prevention:   safety round/check completed   room organization consistent   fall prevention program maintained   clutter free environment maintained   assistive device/personal items within reach  Intervention: Prevent Skin Injury  Recent Flowsheet Documentation  Taken 7/14/2024 2332 by Ashley Jennings RN  Body Position: position changed independently  Taken 7/14/2024 2021 by Ashley Jennings RN  Body Position: position changed independently  Intervention: Prevent and Manage VTE (Venous Thromboembolism) Risk  Recent Flowsheet Documentation  Taken 7/14/2024 2332 by Ashley Jennings RN  Activity Management: ambulated to bathroom  Taken 7/14/2024 2021 by Ashley Jennings RN  Activity Management: ambulated to bathroom  Intervention: Prevent Infection  Recent Flowsheet Documentation  Taken 7/14/2024 2332 by Ashley Jennings RN  Infection Prevention:   hand hygiene promoted   personal protective equipment utilized   rest/sleep promoted  Taken 7/14/2024 2200 by Ashley Jennings RN  Infection Prevention:   hand hygiene promoted   personal protective  equipment utilized   rest/sleep promoted  Taken 7/14/2024 2021 by Ashley Jennings RN  Infection Prevention:   personal protective equipment utilized   hand hygiene promoted   rest/sleep promoted  Goal: Optimal Comfort and Wellbeing  Outcome: Ongoing, Progressing  Intervention: Provide Person-Centered Care  Recent Flowsheet Documentation  Taken 7/14/2024 2021 by Ashley Jennings RN  Trust Relationship/Rapport:   care explained   questions answered   thoughts/feelings acknowledged  Goal: Readiness for Transition of Care  Outcome: Ongoing, Progressing     Problem: Behavioral Health Comorbidity  Goal: Maintenance of Behavioral Health Symptom Control  Outcome: Ongoing, Progressing  Intervention: Maintain Behavioral Health Symptom Control  Recent Flowsheet Documentation  Taken 7/14/2024 2021 by Ashely Jennings RN  Medication Review/Management: medications reviewed     Problem: Hypertension Comorbidity  Goal: Blood Pressure in Desired Range  Outcome: Ongoing, Progressing  Intervention: Maintain Blood Pressure Management  Recent Flowsheet Documentation  Taken 7/14/2024 2021 by Ashley Jennings RN  Medication Review/Management: medications reviewed     Problem: Skin Injury Risk Increased  Goal: Skin Health and Integrity  Outcome: Ongoing, Progressing  Intervention: Optimize Skin Protection  Recent Flowsheet Documentation  Taken 7/14/2024 2021 by Ashley Jennings RN  Pressure Reduction Techniques:   frequent weight shift encouraged   weight shift assistance provided  Pressure Reduction Devices: pressure-redistributing mattress utilized     Problem: Fall Injury Risk  Goal: Absence of Fall and Fall-Related Injury  Outcome: Ongoing, Progressing  Intervention: Identify and Manage Contributors  Recent Flowsheet Documentation  Taken 7/14/2024 2021 by Ashley Jennings RN  Medication Review/Management: medications reviewed  Intervention: Promote Injury-Free Environment  Recent Flowsheet Documentation  Taken 7/14/2024 2332 by Ashley Jennings RN  Safety  Promotion/Fall Prevention:   safety round/check completed   room organization consistent   fall prevention program maintained   clutter free environment maintained   assistive device/personal items within reach  Taken 7/14/2024 2200 by Ashley Jennings RN  Safety Promotion/Fall Prevention:   safety round/check completed   room organization consistent   clutter free environment maintained   fall prevention program maintained  Taken 7/14/2024 2021 by Ashley Jennings RN  Safety Promotion/Fall Prevention:   safety round/check completed   room organization consistent   fall prevention program maintained   clutter free environment maintained   assistive device/personal items within reach     Problem: Fluid Imbalance (Pneumonia)  Goal: Fluid Balance  Outcome: Ongoing, Progressing     Problem: Infection (Pneumonia)  Goal: Resolution of Infection Signs and Symptoms  Outcome: Ongoing, Progressing     Problem: Respiratory Compromise (Pneumonia)  Goal: Effective Oxygenation and Ventilation  Outcome: Ongoing, Progressing  Intervention: Promote Airway Secretion Clearance  Recent Flowsheet Documentation  Taken 7/14/2024 2021 by Ashley Jennings RN  Cough And Deep Breathing: done independently per patient

## 2024-07-15 NOTE — DISCHARGE SUMMARY
[facility] Medicine Services  Discharge Summary    Date of Service: January 15, 2024  Patient Name: Drake Beth  : 1950  MRN: [id number]    Date of Admission: 2024  Discharge Diagnosis:     Probable pneumonia  Possible Acute on chronic diastolic congestive heart failure  Atrial fibs, paroxysmal  History of coronary artery disease  History of mitral valve replacement  Chronic anticoagulation  Hypoxemic respiratory failure  Constipation      Date of Discharge: 4/15/2024  Primary Care Physician: Amada Haskins MD      Presenting Problem:   Generalized abdominal pain [R10.84]  Bilateral pneumonia [J18.9]  Pneumonia of both lower lobes due to infectious organism [J18.9]  Constipation, unspecified constipation type [K59.00]    Active and Resolved Hospital Problems:  Active Hospital Problems    Diagnosis POA    **Bilateral pneumonia [J18.9] Yes    Mitral valve replaced [Z95.2] Not Applicable    Atrial fibrillation [I48.91] Yes      Resolved Hospital Problems   No resolved problems to display.         Hospital Course     HPI:  Per the H&P  dated 2024    Hospital Course:  This is a 73-year-old white male who I was asked to discharge on my initial day of contact.  He presented with initial issues that were discussed in history and physical.  Febrile during his hospitalization.  Blood pressure is well-controlled controlled.  He had no oxygen requirements at time of discharge.  Initial upper respiratory viral panel was negative.  Blood cultures remained without growth.  Strep and Legionella urinary antigens were negative.  Initial CT scan of abdomen demonstrated the absence of his right kidney.  He had noted cardiomegaly with evidence of small pleural effusions with some atypical infiltrative appearance of the lungs.  No evidence of aspiration when evaluated.  X-ray had findings consistent with a probable pneumonia.  Initial CBC showed some mild anemia without leukocytosis.  proBNP was  elevated in excess of 2000.  Lites were nonremarkable.  Liver functions nonremarkable.  Process was nonremarkable.  CBC showed similar findings.  Troponin levels were not elevated.  Cardiogram showed no evidence of endocarditis.  Not thought by cardiology that he had overt clinical evidence of congestive heart failure.  He did a 5-day course of antibiotics.  Evidence of hypoxemia at time of discharge requiring oxygen.  It was thought that he could be discharged and followed up by primary care as an outpatient.            Reasons For Change In Medications and Indications for New Medications:      Day of Discharge     Vital Signs:  Temp:  [97.3 °F (36.3 °C)-97.9 °F (36.6 °C)] 97.3 °F (36.3 °C)  Heart Rate:  [85-98] 89  Resp:  [13-23] 18  BP: (102-136)/(62-96) 118/72  Flow (L/min):  [1-2] 1    Physical Exam:  Physical Exam  Vitals reviewed.   Constitutional:       Appearance: Normal appearance.   HENT:      Head: Normocephalic.   Cardiovascular:      Rate and Rhythm: Normal rate and regular rhythm.   Pulmonary:      Effort: Pulmonary effort is normal.      Breath sounds: Normal breath sounds.   Abdominal:      General: Bowel sounds are normal.      Palpations: Abdomen is soft.      Tenderness: There is no abdominal tenderness.   Musculoskeletal:         General: No swelling.   Neurological:      Mental Status: He is alert. Mental status is at baseline.   Psychiatric:         Behavior: Behavior normal.            Pertinent  and/or Most Recent Results     LAB RESULTS:      Lab 07/15/24  0602 07/13/24  0327 07/12/24  0440 07/11/24  1415   WBC 11.57* 8.88 5.98 7.38   HEMOGLOBIN 11.9* 11.4* 11.2* 12.8*   HEMATOCRIT 35.4* 33.7* 33.5* 37.2*   PLATELETS 192 161 165 190   NEUTROS ABS 8.36* 5.57 3.51 5.32   IMMATURE GRANS (ABS) 0.07* 0.04 0.03 0.03   LYMPHS ABS 1.93 1.89 1.51 1.18   MONOS ABS 0.97* 0.92* 0.71 0.65   EOS ABS 0.22 0.42* 0.19 0.18   MCV 99.7* 101.2* 100.3* 97.6*   PROCALCITONIN  --  0.04  --   --          Lab  07/15/24  0602 07/13/24  0327 07/12/24  0440 07/11/24  1415   SODIUM 135* 134* 136 135*   POTASSIUM 4.0 4.3 4.0 4.6   CHLORIDE 97* 98 98 95*   CO2 30.5* 27.8 28.8 28.6   ANION GAP 7.5 8.2 9.2 11.4   BUN 12 11 13 10   CREATININE 0.76 1.02 0.96 0.88   EGFR 95.5 78.1 84.0 91.4   GLUCOSE 89 101* 67 99   CALCIUM 8.9 8.7 9.0 9.7   MAGNESIUM 2.0  --   --   --    PHOSPHORUS 2.9  --   --   --          Lab 07/15/24  0602 07/13/24  0327 07/11/24  1415   TOTAL PROTEIN 6.7 6.7 7.9   ALBUMIN 3.5 3.6 4.2   GLOBULIN 3.2 3.1 3.7   ALT (SGPT) 6 7 5   AST (SGOT) 28 27 33   BILIRUBIN 0.6 0.6 1.4*   ALK PHOS 64 69 81   LIPASE  --   --  35         Lab 07/13/24  0327 07/11/24  1415   PROBNP 1,485.0* 2,048.0*                 Lab 07/13/24  1006   PH, ARTERIAL 7.404   PCO2, ARTERIAL 48.9*   PO2 ART 50.5*   O2 SATURATION ART 84.9*   FIO2 21   HCO3 ART 30.6*   BASE EXCESS ART 4.8*     Brief Urine Lab Results  (Last result in the past 365 days)        Color   Clarity   Blood   Leuk Est   Nitrite   Protein   CREAT   Urine HCG        07/11/24 1547 Yellow   Clear   Negative   Negative   Negative   Trace                 Microbiology Results (last 10 days)       Procedure Component Value - Date/Time    Legionella Antigen, Urine - Urine, Urine, Clean Catch [[id number]]  (Normal) Collected: 07/14/24 1708    Lab Status: Final result Specimen: Urine, Clean Catch Updated: 07/14/24 1733     LEGIONELLA ANTIGEN, URINE Negative    S. Pneumo Ag Urine or CSF - Urine, Urine, Clean Catch [[id number]]  (Normal) Collected: 07/14/24 1708    Lab Status: Final result Specimen: Urine, Clean Catch Updated: 07/14/24 1733     Strep Pneumo Ag Negative    Respiratory Panel PCR w/COVID-19(SARS-CoV-2) [facility] In-House, NP Swab in UTM/VTM, 2 HR TAT - Swab, Nasopharynx [[id number]]  (Normal) Collected: 07/11/24 1655    Lab Status: Final result Specimen: Swab from Nasopharynx Updated: 07/11/24 1848     ADENOVIRUS, PCR Not Detected     Coronavirus 229E Not Detected      Coronavirus HKU1 Not Detected     Coronavirus NL63 Not Detected     Coronavirus OC43 Not Detected     COVID19 Not Detected     Human Metapneumovirus Not Detected     Human Rhinovirus/Enterovirus Not Detected     Influenza A PCR Not Detected     Influenza B PCR Not Detected     Parainfluenza Virus 1 Not Detected     Parainfluenza Virus 2 Not Detected     Parainfluenza Virus 3 Not Detected     Parainfluenza Virus 4 Not Detected     RSV, PCR Not Detected     Bordetella pertussis pcr Not Detected     Bordetella parapertussis PCR Not Detected     Chlamydophila pneumoniae PCR Not Detected     Mycoplasma pneumo by PCR Not Detected    Narrative:      In the setting of a positive respiratory panel with a viral infection PLUS a negative procalcitonin without other underlying concern for bacterial infection, consider observing off antibiotics or discontinuation of antibiotics and continue supportive care. If the respiratory panel is positive for atypical bacterial infection (Bordetella pertussis, Chlamydophila pneumoniae, or Mycoplasma pneumoniae), consider antibiotic de-escalation to target atypical bacterial infection.    Blood Culture - Blood, Arm, Left [[id number]]  (Normal) Collected: 07/11/24 1650    Lab Status: Preliminary result Specimen: Blood from Arm, Left Updated: 07/15/24 1700     Blood Culture No growth at 4 days    Narrative:      Less than seven (7) mL's of blood was collected.  Insufficient quantity may yield false negative results.    Blood Culture - Blood, Arm, Left [[id number]]  (Normal) Collected: 07/11/24 1650    Lab Status: Preliminary result Specimen: Blood from Arm, Left Updated: 07/15/24 1700     Blood Culture No growth at 4 days    Narrative:      Less than seven (7) mL's of blood was collected.  Insufficient quantity may yield false negative results.            XR Chest 1 View    Result Date: 7/11/2024  Impression: Impression: Findings are most compatible with bilateral pneumonia. Electronically  Signed: Mallorie Monge MD  7/11/2024 4:17 PM EDT  Workstation ID: [id number]    CT Abdomen Pelvis With Contrast    Result Date: 7/11/2024  Impression: Impression: 1.No acute abnormality identified within the abdomen or pelvis. 2.Colonic diverticulosis. 3.Absent right kidney. 4.Cardiomegaly with probable pulmonary vascular congestion and small bilateral pleural effusions noted. Atypical infiltrates within the lung bases may have a similar appearance. Please correlate clinically. Electronically Signed: Shane Vaughn MD  7/11/2024 3:09 PM EDT  Workstation ID: [id number]             Results for orders placed during the hospital encounter of 07/11/24    Adult Transthoracic Echo Complete w/ Color, Spectral and Contrast if Necessary Per Protocol    Interpretation Summary    Left ventricular systolic function is normal. Left ventricular ejection fraction appears to be 51 - 55%.    Left ventricular diastolic function was indeterminate.    The left atrial cavity is moderately dilated.    Left atrial volume is moderately increased.    There is mild calcification of the aortic valve.    A mitral valve mass is present.    Moderate to severe mitral valve regurgitation is present.    There is a bioprosthetic mitral valve present. Transvalvular regurgitation is present in the prosthetic mitral valve.    Moderate tricuspid valve regurgitation is present.    Estimated right ventricular systolic pressure from tricuspid regurgitation is markedly elevated (>55 mmHg).    Moderate to severe pulmonary hypertension is present.    Abnormal study  Recommend transesophageal echo imaging for evaluation of bioprosthetic mitral valve which appears severely regurgitant .,  See documented other abnormalities and findings      Labs Pending at Discharge:  Pending Labs       Order Current Status    Blood Culture - Blood, Arm, Left Preliminary result    Blood Culture - Blood, Arm, Left Preliminary result            Procedures Performed    07/15 8351  Walking Oximetry  07/13 1604 Walking Oximetry      Consults:   Consults       Date and Time Order Name Status Description    7/13/2024 10:18 AM Inpatient Hospitalist Consult Completed     7/13/2024 10:18 AM Inpatient Cardiology Consult      7/11/2024 11:53 PM Inpatient Gastroenterology Consult Completed               Discharge Details        Discharge Medications        Continue These Medications        Instructions Start Date   amLODIPine 2.5 MG tablet  Commonly known as: NORVASC   TAKE 1 TABLET BY MOUTH DAILY      atorvastatin 10 MG tablet  Commonly known as: LIPITOR   10 mg, Oral, Daily      colestipol 1 g tablet  Commonly known as: COLESTID   TAKE 1 TABLET BY MOUTH FOUR TIMES DAILY      Eliquis 5 MG tablet tablet  Generic drug: apixaban   5 mg, Oral, 2 Times Daily      linaclotide 72 MCG capsule capsule  Commonly known as: LINZESS   72 mcg, Oral, Every Morning Before Breakfast      montelukast 10 MG tablet  Commonly known as: SINGULAIR   10 mg, Oral, Daily      sotalol 80 MG tablet tablet  Commonly known as: BETAPACE AF   TAKE 1 TABLET BY MOUTH TWICE DAILY               No Known Allergies      Discharge Disposition: home  Home or Self Care    Diet:  Hospital:  Diet Order   Procedures    Diet: Gastrointestinal; Fiber-Restricted; Fluid Consistency: Thin (IDDSI 0)         Discharge Activity:         CODE STATUS:  Code Status and Medical Interventions:   Ordered at: 07/11/24 0133     Level Of Support Discussed With:    Patient    Health Care Surrogate     Code Status (Patient has no pulse and is not breathing):    CPR (Attempt to Resuscitate)     Medical Interventions (Patient has pulse or is breathing):    Full Support         Future Appointments   Date Time Provider Department Center   7/23/2024  3:30 PM Rj Dominique MD [facility]       Additional Instructions for the Follow-ups that You Need to Schedule       Discharge Follow-up with PCP   As directed       Currently Documented PCP:    Divine  MD Amada    PCP Phone Number:    [phone]     Follow Up Details: one week                Time spent on Discharge including face to face service:  >30 minutes    Signature: Electronically signed by Timothy Duane Brammell, MD, 07/15/24, 17:11 EDT.  [facility] Hospitalist Team

## 2024-07-15 NOTE — PROGRESS NOTES
"    Reason for follow-up: A-fib     Patient Care Team:  mAada Haskins MD as PCP - General  Josr Rodriguez DPM as Consulting Physician (Podiatry)  Rj Dominique MD as Consulting Physician (Cardiology)    Subjective .   Drake Beth is doing well today     Review of Systems   All other systems reviewed and are negative.      Patient has no known allergies.    Scheduled Meds:amLODIPine, 2.5 mg, Oral, Daily  apixaban, 5 mg, Oral, BID  atorvastatin, 10 mg, Oral, Daily  ipratropium-albuterol, 3 mL, Nebulization, Once  lubiprostone, 24 mcg, Oral, Daily With Breakfast  montelukast, 10 mg, Oral, Daily  polyethylene glycol, 17 g, Oral, Daily  sodium chloride, 10 mL, Intravenous, Q12H  sorbitol, 50 mL, Oral, Once  sotalol, 80 mg, Oral, Q24H      Continuous Infusions:   PRN Meds:.  acetaminophen **OR** acetaminophen **OR** acetaminophen    senna-docusate sodium **AND** polyethylene glycol **AND** bisacodyl **AND** bisacodyl    melatonin    [COMPLETED] Insert Peripheral IV **AND** sodium chloride    sodium chloride    sodium chloride      VITAL SIGNS  Vitals:    07/15/24 0602 07/15/24 0839 07/15/24 0949 07/15/24 1445   BP: 115/96 129/76 129/76 118/72   BP Location: Left arm  Left arm Left arm   Patient Position: Lying  Lying Lying   Pulse:  96 89 92   Resp: 17  18 18   Temp:   97.6 °F (36.4 °C) 97.3 °F (36.3 °C)   TempSrc: Oral  Oral Axillary   SpO2: 100%  98% 98%   Weight:       Height:           Flowsheet Rows      Flowsheet Row First Filed Value   Admission Height 152.4 cm (60\") Documented at 07/11/2024 1259   Admission Weight 46.3 kg (102 lb 1.2 oz) Documented at 07/11/2024 1259               Physical Exam  VITALS REVIEWED    General:      well developed, in no acute distress.    Head:      normocephalic and atraumatic.    Eyes:      PERRL/EOM intact, conjunctiva and sclera clear with out nystagmus.    Neck:      no masses, thyromegaly,  trachea central with normal respiratory effort and PMI displaced " laterally  Lungs:      Clear  Heart:       Irregular rhythm  Msk:      no deformity or scoliosis noted of thoracic or lumbar spine.    Pulses:      pulses normal in all 4 extremities.    Extremities:       No lower extremity edema  Neurologic:      no focal deficits.   alert oriented x3  Skin:      intact without lesions or rashes.    Psych:      alert and cooperative; normal mood and affect; normal attention span and concentration.          LAB RESULTS (LAST 7 DAYS)    CBC  Results from last 7 days   Lab Units 07/15/24  0602 07/13/24 0327 07/12/24 0440 07/11/24  1415   WBC 10*3/mm3 11.57* 8.88 5.98 7.38   RBC 10*6/mm3 3.55* 3.33* 3.34* 3.81*   HEMOGLOBIN g/dL 11.9* 11.4* 11.2* 12.8*   HEMATOCRIT % 35.4* 33.7* 33.5* 37.2*   MCV fL 99.7* 101.2* 100.3* 97.6*   PLATELETS 10*3/mm3 192 161 165 190       BMP  Results from last 7 days   Lab Units 07/15/24  0602 07/13/24  0327 07/12/24  0440 07/11/24  1415   SODIUM mmol/L 135* 134* 136 135*   POTASSIUM mmol/L 4.0 4.3 4.0 4.6   CHLORIDE mmol/L 97* 98 98 95*   CO2 mmol/L 30.5* 27.8 28.8 28.6   BUN mg/dL 12 11 13 10   CREATININE mg/dL 0.76 1.02 0.96 0.88   GLUCOSE mg/dL 89 101* 67 99   MAGNESIUM mg/dL 2.0  --   --   --    PHOSPHORUS mg/dL 2.9  --   --   --        CMP   Results from last 7 days   Lab Units 07/15/24  0602 07/13/24 0327 07/12/24 0440 07/11/24  1415   SODIUM mmol/L 135* 134* 136 135*   POTASSIUM mmol/L 4.0 4.3 4.0 4.6   CHLORIDE mmol/L 97* 98 98 95*   CO2 mmol/L 30.5* 27.8 28.8 28.6   BUN mg/dL 12 11 13 10   CREATININE mg/dL 0.76 1.02 0.96 0.88   GLUCOSE mg/dL 89 101* 67 99   ALBUMIN g/dL 3.5 3.6  --  4.2   BILIRUBIN mg/dL 0.6 0.6  --  1.4*   ALK PHOS U/L 64 69  --  81   AST (SGOT) U/L 28 27  --  33   ALT (SGPT) U/L 6 7  --  5   LIPASE U/L  --   --   --  35         BNP        TROPONIN        CoAg        Creatinine Clearance  Estimated Creatinine Clearance: 57.5 mL/min (by C-G formula based on SCr of 0.76 mg/dL).    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 He is admitted to the hospital due to abdominal pain, working diagnosis is bilateral pneumonia. His EKG shows sinus rhythm, his BNP is elevated but clinically does not seem to be in CHF.     7/14/2024  Patient is now in arrhythmia, please continue the sotalol and Eliquis.  In the past him and the family had refused any invasive measures for A-fib.  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    7/15/2024  Patient continues to be in afib. Sotalol and Eliquis continued. No current complaints.     I discussed the patients findings and my recommendations with patient and agrees with outlined plan.    IZAIAH Thapa  07/15/24  14:58 EDT    Electronically signed by IZAIAH Thapa, 07/15/24, 3:00 PM EDT.

## 2024-07-15 NOTE — PLAN OF CARE
Goal Outcome Evaluation:              Outcome Evaluation: Pt now able to amb 300 ft w/ one hand held. No assist needed for balance. Hand held assist given for direction only. No loss of balance. Normal nancy. Has met all goals. No need for skilled PT. Safe for ambulation w/ family or  nsg. Plan is for home w/ family at d/c. Will sign off.

## 2024-07-15 NOTE — THERAPY DISCHARGE NOTE
Patient Name: Drake Beth  : 1950    MRN: [id number]                              Today's Date: 7/15/2024       Admit Date: 2024    Visit Dx:     ICD-10-CM ICD-9-CM   1. Pneumonia of both lower lobes due to infectious organism  J18.9 486   2. Generalized abdominal pain  R10.84 789.07   3. Constipation, unspecified constipation type  K59.00 564.00     Patient Active Problem List   Diagnosis    Allergic rhinitis    Anemia    Atrial fibrillation    Congestive heart failure    Coronary artery disease    Subacute cough    Hyperlipidemia    Hypertension    Medicare annual wellness visit, subsequent    Mentally challenged    Prostate cancer screening    Pacemaker infection    Mitral valve replaced    Gingivitis, chronic    Acute URI    Atrial flutter with controlled response    Bilateral pneumonia     Past Medical History:   Diagnosis Date    Allergic rhinitis     Anemia     Atrial fibrillation     Chesty cough     CHF (congestive heart failure)     Coronary artery disease     Cough     Fever     Hyperlipidemia     Hypertension     Mentally challenged     MVP (mitral valve prolapse)      Past Surgical History:   Procedure Laterality Date    CARDIAC ELECTROPHYSIOLOGY PROCEDURE N/A 2022    Procedure: PACEMAKER EXTRACTION;  Surgeon: Rj Dominique MD;  Location: [facility] INVASIVE LOCATION;  Service: Cardiology;  Laterality: N/A;    COLONOSCOPY      He has never had the test done.  His family is refusing any form of colon cancer screening for him.    CORONARY ARTERY BYPASS GRAFT      DR SWEAT    MITRAL VALVE REPLACEMENT  2008    WITH BIOPROSTHETIC TISSUE VALVE      General Information       Row Name 07/15/24 1441          Physical Therapy Time and Intention    Document Type discharge treatment  -CM     Mode of Treatment physical therapy  -CM       Row Name 07/15/24 1441          General Information    Patient Profile Reviewed yes  -CM     Existing Precautions/Restrictions no known  precautions/restrictions  -CM               User Key  (r) = Recorded By, (t) = Taken By, (c) = Cosigned By      Initials Name Provider Type    Daylin Thrasher, PT Physical Therapist                   Mobility       Row Name 07/15/24 1441          Bed Mobility    Bed Mobility bed mobility (all) activities  -CM     All Activities, [place] (Bed Mobility) modified independence  -CM       Row Name 07/15/24 1441          Sit-Stand Transfer    Sit-Stand [place] (Transfers) supervision  -CM       Row Name 07/15/24 1441          Gait/Stairs (Locomotion)    [place] Level (Gait) supervision  -CM     Patient was able to Ambulate yes  -CM     Distance in Feet (Gait) 300  normal nancy; one hand held for direction only; did not need for balance.  -CM               User Key  (r) = Recorded By, (t) = Taken By, (c) = Cosigned By      Initials Name Provider Type    Daylin Thrasher, PT Physical Therapist                   Obj/Interventions    No documentation.                  Goals/Plan       Row Name 07/15/24 1437          Gait Training Goal 1 (PT)    [place] Level (Gait Training Goal 1, PT) supervision required  -CM     Progress/Outcome (Gait Training Goal 1, PT) goal met  -CM               User Key  (r) = Recorded By, (t) = Taken By, (c) = Cosigned By      Initials Name Provider Type    Daylin Thrasher, PT Physical Therapist                   Clinical Impression       Row Name 07/15/24 1442          Plan of Care Review    Outcome Evaluation Pt now able to amb 300 ft w/ one hand held. No assist needed for balance. Hand held assist given for direction only. No loss of balance. Normal nancy. Has met all goals. No need for skilled PT. Safe for ambulation w/ family or  nsg. Plan is for home w/ family at d/c. Will sign off.  -CM               User Key  (r) = Recorded By, (t) = Taken By, (c) = Cosigned By      Initials Name Provider Type    Daylin Thrasher, PT Physical Therapist                    Outcome Measures       [facility] Name 07/15/24 0837          How much help from another person do you currently need...    Turning from your back to your side while in flat bed without using bedrails? 4  -LH     Moving from lying on back to sitting on the side of a flat bed without bedrails? 4  -LH     Moving to and from a bed to a chair (including a wheelchair)? 3  -LH     Standing up from a chair using your arms (e.g., wheelchair, bedside chair)? 3  -LH     Climbing 3-5 steps with a railing? 3  -LH     To walk in hospital room? 3  -LH     AM-PAC 6 Clicks Score (PT) 20  -     Highest Level of Mobility Goal 6 --> Walk 10 steps or more  -               User Key  (r) = Recorded By, (t) = Taken By, (c) = Cosigned By      Initials Name Provider Type     Anant Stapleton, RN Registered Nurse                  Physical Therapy Education       Title: PT OT SLP Therapies (Done)       Topic: Physical Therapy (Done)       Point: Mobility training (Done)       Learning Progress Summary             Patient Acceptance, E, VU by  at 7/12/2024 1506                                         User Key       Initials Effective Dates Name Provider Type Discipline     06/16/21 -  Anay Soliz, PT Physical Therapist PT                  PT Recommendation and Plan     Outcome Evaluation: Pt now able to amb 300 ft w/ one hand held. No assist needed for balance. Hand held assist given for direction only. No loss of balance. Normal nancy. Has met all goals. No need for skilled PT. Safe for ambulation w/ family or  nsg. Plan is for home w/ family at d/c. Will sign off.     Time Calculation:         PT Charges       [facility] Name 07/15/24 1440             Time Calculation    Start Time 1422  -CM      Stop Time 1434  -CM      Time Calculation (min) 12 min  -CM      PT Received On 07/15/24  -CM         Time Calculation- PT    Total Timed Code Minutes- PT 12 minute(s)  -CM                User Key  (r) = Recorded By, (t) = Taken By, (c) =  Cosigned By      Initials Name Provider Type    CM Daylin Valle, PT Physical Therapist                  Therapy Charges for Today       Code Description Service Date Service Provider Modifiers Qty    07816956803 HC GAIT TRAINING EA 15 MIN 7/15/2024 Daylin Valle, PT GP 1            PT G-Codes  AM-PAC 6 Clicks Score (PT): 20    PT Discharge Summary  Anticipated Discharge Disposition (PT): home with assist  Reason for Discharge: All goals achieved, At baseline function  Outcomes Achieved: Able to achieve all goals within established timeline  Discharge Destination: Home with assist    Daylin Valle, PT  7/15/2024

## 2024-07-15 NOTE — PROCEDURES
Exercise Oximetry    Patient Name:Drake Beth   MRN: [id number]   Date: 07/15/24             ROOM AIR BASELINE   SpO2% 97   Heart Rate  83   Blood Pressure      EXERCISE ON ROOM AIR SpO2% EXERCISE ON O2 @LPM SpO2%   1 MINUTE 95 1 MINUTE    2 MINUTES 96 2 MINUTES    3 MINUTES 98 3 MINUTES    4 MINUTES 94 4 MINUTES    5 MINUTES 96 5 MINUTES    6 MINUTES 96 6 MINUTES               Distance Walked  6mins Distance Walked   Dyspnea (Nishant Scale)   Dyspnea (Nishant Scale)   Fatigue (Nishant Scale)   Fatigue (Nishant Scale)   SpO2% Post Exercise  98 SpO2% Post Exercise   HR Post Exercise  100 HR Post Exercise   Time to Recovery  NA Time to Recovery     Comments: Patient completed walking exercise on room air without O2, at this time patient does not require o2 for home.

## 2024-07-15 NOTE — PLAN OF CARE
Goal Outcome Evaluation:  Plan of Care Reviewed With: patient        Progress: improving  Outcome Evaluation: Patient has been sitting in bed watching Television with no complaints of pain or trouble breathing. Patient has been up stand by assist to the toilet multiple times throughout the shift. Patient Oxygenation has been high 90s to 100 on room air. Patient is walking with PT in the hallway.

## 2024-07-15 NOTE — CASE MANAGEMENT/SOCIAL WORK
Continued Stay Note   [facility]     Patient Name: Drake Beth  MRN: [id number]  Today's Date: 7/15/2024    Admit Date: 7/11/2024    Plan: Return home with brother. NO home oxygen.   Discharge Plan       Row Name 07/15/24 0933       Plan    Plan Comments Barriers: Oxygen 1 liter with no home oxygen and CM informed Dr. Zayas and nursing.  6MWT ordered on 7/13 but not completed yet and may need to be reordered.   SLP evaluation did not suggest any changes to his diet.   Cardiology and GI following. At discharge   he plans to return home with his brother                          Xiomara BOURNE,RN Case Manager  [facility]  Phone: Desk- [phone] cell- [phone]

## 2024-07-15 NOTE — OUTREACH NOTE
Prep Survey      Flowsheet Row Responses   [facility] patient discharged from? [place]   Is LACE score < 7 ? No   Eligibility [facility]   Date of Admission 07/11/24   Date of Discharge 07/15/24   Discharge diagnosis Bilateral pneumonia   Does the patient have one of the following disease processes/diagnoses(primary or secondary)? Pneumonia   Prep survey completed? Yes            lAana BAPTISTE - Registered Nurse

## 2024-07-16 ENCOUNTER — TRANSITIONAL CARE MANAGEMENT TELEPHONE ENCOUNTER (OUTPATIENT)
Dept: CALL CENTER | Facility: HOSPITAL | Age: 74
End: 2024-07-16
Payer: MEDICARE

## 2024-07-16 LAB
BACTERIA SPEC AEROBE CULT: NORMAL
BACTERIA SPEC AEROBE CULT: NORMAL

## 2024-07-16 NOTE — OUTREACH NOTE
Call Center TCM Note      Flowsheet Row Responses   [facility] patient discharged from? [facility]   Does the patient have one of the following disease processes/diagnoses(primary or secondary)? Pneumonia   TCM attempt successful? Yes   Call start time 0959   Call end time 1000   Discharge diagnosis Bilateral pneumonia   Person spoke with today (if not patient) and relationship [place]   Meds reviewed with patient/caregiver? Yes   Is the patient having any side effects they believe may be caused by any medication additions or changes? No   Does the patient have all medications ordered at discharge? Yes   Is the patient taking all medications as directed (includes completed medication regime)? Yes   Does the patient have an appointment with their PCP within 7-14 days of discharge? No   Nursing Interventions Patient declined scheduling/rescheduling appointment at this time   Has home health visited the patient within 72 hours of discharge? N/A   Psychosocial issues? No   What is the patient's perception of their health status since discharge? Improving   TCM call completed? Yes   Wrap up additional comments Brief call brother states pt is ding al right and will call to schedule f/u appt   Call end time 1000            Divine Zuniga RN    7/16/2024, 10:01 EDT

## 2024-07-18 ENCOUNTER — HOSPITAL ENCOUNTER (INPATIENT)
Facility: HOSPITAL | Age: 74
LOS: 8 days | Discharge: NURSING, REHAB OR LONG-TERM CARE | DRG: 871 | End: 2024-07-27
Attending: EMERGENCY MEDICINE | Admitting: INTERNAL MEDICINE
Payer: MEDICARE

## 2024-07-18 ENCOUNTER — APPOINTMENT (OUTPATIENT)
Dept: GENERAL RADIOLOGY | Facility: HOSPITAL | Age: 74
DRG: 871 | End: 2024-07-18
Payer: MEDICARE

## 2024-07-18 DIAGNOSIS — R06.00 DYSPNEA, UNSPECIFIED TYPE: ICD-10-CM

## 2024-07-18 DIAGNOSIS — R10.84 GENERALIZED ABDOMINAL PAIN: ICD-10-CM

## 2024-07-18 DIAGNOSIS — J18.9 PNEUMONIA OF BOTH LUNGS DUE TO INFECTIOUS ORGANISM, UNSPECIFIED PART OF LUNG: Primary | ICD-10-CM

## 2024-07-18 LAB
ARTERIAL PATENCY WRIST A: POSITIVE
ATMOSPHERIC PRESS: ABNORMAL MM[HG]
BASE EXCESS BLDA CALC-SCNC: -0.9 MMOL/L (ref 0–3)
BASOPHILS # BLD AUTO: 0.06 10*3/MM3 (ref 0–0.2)
BASOPHILS NFR BLD AUTO: 0.4 % (ref 0–1.5)
BDY SITE: ABNORMAL
CO2 BLDA-SCNC: 27.8 MMOL/L (ref 22–29)
D-LACTATE SERPL-SCNC: 3.5 MMOL/L (ref 0.3–2)
DEPRECATED RDW RBC AUTO: 50.8 FL (ref 37–54)
EOSINOPHIL # BLD AUTO: 0.09 10*3/MM3 (ref 0–0.4)
EOSINOPHIL NFR BLD AUTO: 0.6 % (ref 0.3–6.2)
ERYTHROCYTE [DISTWIDTH] IN BLOOD BY AUTOMATED COUNT: 13.6 % (ref 12.3–15.4)
HCO3 BLDA-SCNC: 26.2 MMOL/L (ref 21–28)
HCT VFR BLD AUTO: 39.7 % (ref 37.5–51)
HEMODILUTION: NO
HGB BLD-MCNC: 13.4 G/DL (ref 13–17.7)
HOLD SPECIMEN: NORMAL
HOLD SPECIMEN: NORMAL
IMM GRANULOCYTES # BLD AUTO: 0.25 10*3/MM3 (ref 0–0.05)
IMM GRANULOCYTES NFR BLD AUTO: 1.8 % (ref 0–0.5)
INHALED O2 CONCENTRATION: 40 %
LYMPHOCYTES # BLD AUTO: 1.05 10*3/MM3 (ref 0.7–3.1)
LYMPHOCYTES NFR BLD AUTO: 7.4 % (ref 19.6–45.3)
MCH RBC QN AUTO: 34.2 PG (ref 26.6–33)
MCHC RBC AUTO-ENTMCNC: 33.8 G/DL (ref 31.5–35.7)
MCV RBC AUTO: 101.3 FL (ref 79–97)
MODALITY: ABNORMAL
MONOCYTES # BLD AUTO: 0.66 10*3/MM3 (ref 0.1–0.9)
MONOCYTES NFR BLD AUTO: 4.7 % (ref 5–12)
NEUTROPHILS NFR BLD AUTO: 12.03 10*3/MM3 (ref 1.7–7)
NEUTROPHILS NFR BLD AUTO: 85.1 % (ref 42.7–76)
NRBC BLD AUTO-RTO: 0.1 /100 WBC (ref 0–0.2)
PCO2 BLDA: 52.2 MM HG (ref 35–48)
PH BLDA: 7.31 PH UNITS (ref 7.35–7.45)
PLATELET # BLD AUTO: 226 10*3/MM3 (ref 140–450)
PMV BLD AUTO: 9.3 FL (ref 6–12)
PO2 BLD: 175 MM[HG] (ref 0–500)
PO2 BLDA: 69.8 MM HG (ref 83–108)
QT INTERVAL: 381 MS
QT INTERVAL: 418 MS
QT INTERVAL: 491 MS
QTC INTERVAL: 456 MS
QTC INTERVAL: 464 MS
QTC INTERVAL: 480 MS
RBC # BLD AUTO: 3.92 10*6/MM3 (ref 4.14–5.8)
SAO2 % BLDCOA: 91.7 % (ref 94–98)
WBC NRBC COR # BLD AUTO: 14.14 10*3/MM3 (ref 3.4–10.8)
WHOLE BLOOD HOLD COAG: NORMAL
WHOLE BLOOD HOLD SPECIMEN: NORMAL

## 2024-07-18 PROCEDURE — 93005 ELECTROCARDIOGRAM TRACING: CPT

## 2024-07-18 PROCEDURE — 99285 EMERGENCY DEPT VISIT HI MDM: CPT

## 2024-07-18 PROCEDURE — 36600 WITHDRAWAL OF ARTERIAL BLOOD: CPT | Performed by: NURSE PRACTITIONER

## 2024-07-18 PROCEDURE — 36415 COLL VENOUS BLD VENIPUNCTURE: CPT

## 2024-07-18 PROCEDURE — 83880 ASSAY OF NATRIURETIC PEPTIDE: CPT | Performed by: NURSE PRACTITIONER

## 2024-07-18 PROCEDURE — 83605 ASSAY OF LACTIC ACID: CPT | Performed by: NURSE PRACTITIONER

## 2024-07-18 PROCEDURE — 71045 X-RAY EXAM CHEST 1 VIEW: CPT

## 2024-07-18 PROCEDURE — 80053 COMPREHEN METABOLIC PANEL: CPT | Performed by: NURSE PRACTITIONER

## 2024-07-18 PROCEDURE — 84484 ASSAY OF TROPONIN QUANT: CPT | Performed by: NURSE PRACTITIONER

## 2024-07-18 PROCEDURE — 0202U NFCT DS 22 TRGT SARS-COV-2: CPT | Performed by: NURSE PRACTITIONER

## 2024-07-18 PROCEDURE — 85025 COMPLETE CBC W/AUTO DIFF WBC: CPT | Performed by: NURSE PRACTITIONER

## 2024-07-18 PROCEDURE — P9612 CATHETERIZE FOR URINE SPEC: HCPCS

## 2024-07-18 PROCEDURE — 82803 BLOOD GASES ANY COMBINATION: CPT | Performed by: NURSE PRACTITIONER

## 2024-07-18 PROCEDURE — 94761 N-INVAS EAR/PLS OXIMETRY MLT: CPT

## 2024-07-18 PROCEDURE — 83690 ASSAY OF LIPASE: CPT | Performed by: NURSE PRACTITIONER

## 2024-07-18 NOTE — LETTER
EMS Transport Request  For use at [facility], [place], [place], [place], and [place] only   Patient Name: Drake Beth : 1950   Weight:47.6 kg (104 lb 15 oz) Pick-up Location: [facility] BLS/ALS: BLS/ALS: BLS   Insurance: MEDICARE Auth End Date:    Pre-Cert #: D/C Summary complete:    Destination: Other [place]   Contact Precautions: None   Equipment (O2, Fluids, etc.): None   Arrive By Date/Time: will call Stretcher/WC: Wheelchair   CM Requesting: Kaitlin Freedman RN Ext: [phone]   Notes/Medical Necessity: Can stand and pivot and walk with walker.  Intellectual disability, responds well to verbal communication     ______________________________________________________________________    *Only 2 patient bags OR 1 carry-on size bag are permitted.  Wheelchairs and walkers CANNOT transported with the patient. Acknowledge: Yes

## 2024-07-18 NOTE — Clinical Note
Level of Care: Telemetry [5]   Admitting Physician: MAURICIO HUERTAS [[id number]]   Attending Physician: MAURICIO HUERTAS [[id number]]

## 2024-07-19 ENCOUNTER — READMISSION MANAGEMENT (OUTPATIENT)
Dept: CALL CENTER | Facility: HOSPITAL | Age: 74
End: 2024-07-19
Payer: MEDICARE

## 2024-07-19 ENCOUNTER — APPOINTMENT (OUTPATIENT)
Dept: CT IMAGING | Facility: HOSPITAL | Age: 74
DRG: 871 | End: 2024-07-19
Payer: MEDICARE

## 2024-07-19 PROBLEM — I50.33 ACUTE ON CHRONIC DIASTOLIC HEART FAILURE: Status: ACTIVE | Noted: 2024-07-19

## 2024-07-19 PROBLEM — J96.02 ACUTE RESPIRATORY FAILURE WITH HYPOXIA AND HYPERCAPNIA: Status: ACTIVE | Noted: 2024-07-19

## 2024-07-19 PROBLEM — I48.0 PAROXYSMAL ATRIAL FIBRILLATION: Status: ACTIVE | Noted: 2024-07-19

## 2024-07-19 PROBLEM — A41.9 SEPSIS: Status: ACTIVE | Noted: 2024-07-19

## 2024-07-19 PROBLEM — J96.01 ACUTE RESPIRATORY FAILURE WITH HYPOXIA AND HYPERCAPNIA: Status: ACTIVE | Noted: 2024-07-19

## 2024-07-19 PROBLEM — E87.5 HYPERKALEMIA: Status: ACTIVE | Noted: 2024-07-19

## 2024-07-19 PROBLEM — N39.0 ACUTE UTI (URINARY TRACT INFECTION): Status: ACTIVE | Noted: 2024-07-19

## 2024-07-19 PROBLEM — E80.6 HYPERBILIRUBINEMIA: Status: ACTIVE | Noted: 2024-07-19

## 2024-07-19 PROBLEM — E87.1 HYPONATREMIA: Status: ACTIVE | Noted: 2024-07-19

## 2024-07-19 PROBLEM — Z95.0 PRESENCE OF CARDIAC PACEMAKER: Status: ACTIVE | Noted: 2024-07-19

## 2024-07-19 LAB
ALBUMIN SERPL-MCNC: 4.2 G/DL (ref 3.5–5.2)
ALBUMIN/GLOB SERPL: 1.1 G/DL
ALP SERPL-CCNC: 83 U/L (ref 39–117)
ALT SERPL W P-5'-P-CCNC: 11 U/L (ref 1–41)
ANION GAP SERPL CALCULATED.3IONS-SCNC: 15.2 MMOL/L (ref 5–15)
AST SERPL-CCNC: 44 U/L (ref 1–40)
ATMOSPHERIC PRESS: ABNORMAL MM[HG]
B PARAPERT DNA SPEC QL NAA+PROBE: NOT DETECTED
B PERT DNA SPEC QL NAA+PROBE: NOT DETECTED
BACTERIA UR QL AUTO: ABNORMAL /HPF
BASE EXCESS BLDV CALC-SCNC: -0.8 MMOL/L (ref -2–2)
BDY SITE: ABNORMAL
BILIRUB SERPL-MCNC: 2.1 MG/DL (ref 0–1.2)
BILIRUB UR QL STRIP: NEGATIVE
BUN SERPL-MCNC: 15 MG/DL (ref 8–23)
BUN/CREAT SERPL: 15 (ref 7–25)
C PNEUM DNA NPH QL NAA+NON-PROBE: NOT DETECTED
CALCIUM SPEC-SCNC: 9.3 MG/DL (ref 8.6–10.5)
CHLORIDE SERPL-SCNC: 89 MMOL/L (ref 98–107)
CLARITY UR: ABNORMAL
CO2 BLDA-SCNC: 27.6 MMOL/L (ref 22–29)
CO2 SERPL-SCNC: 23.8 MMOL/L (ref 22–29)
COLOR UR: ABNORMAL
CREAT SERPL-MCNC: 1 MG/DL (ref 0.76–1.27)
D-LACTATE SERPL-SCNC: 2 MMOL/L (ref 0.5–2)
D-LACTATE SERPL-SCNC: 2.2 MMOL/L (ref 0.5–2)
EGFRCR SERPLBLD CKD-EPI 2021: 80 ML/MIN/1.73
FLUAV SUBTYP SPEC NAA+PROBE: NOT DETECTED
FLUBV RNA ISLT QL NAA+PROBE: NOT DETECTED
GEN 5 2HR TROPONIN T REFLEX: 23 NG/L
GLOBULIN UR ELPH-MCNC: 3.7 GM/DL
GLUCOSE SERPL-MCNC: 149 MG/DL (ref 65–99)
GLUCOSE UR STRIP-MCNC: NEGATIVE MG/DL
HADV DNA SPEC NAA+PROBE: NOT DETECTED
HCO3 BLDV-SCNC: 26 MMOL/L (ref 22–26)
HCOV 229E RNA SPEC QL NAA+PROBE: NOT DETECTED
HCOV HKU1 RNA SPEC QL NAA+PROBE: NOT DETECTED
HCOV NL63 RNA SPEC QL NAA+PROBE: NOT DETECTED
HCOV OC43 RNA SPEC QL NAA+PROBE: NOT DETECTED
HGB UR QL STRIP.AUTO: ABNORMAL
HMPV RNA NPH QL NAA+NON-PROBE: NOT DETECTED
HPIV1 RNA ISLT QL NAA+PROBE: NOT DETECTED
HPIV2 RNA SPEC QL NAA+PROBE: NOT DETECTED
HPIV3 RNA NPH QL NAA+PROBE: NOT DETECTED
HPIV4 P GENE NPH QL NAA+PROBE: NOT DETECTED
HYALINE CASTS UR QL AUTO: ABNORMAL /LPF
INHALED O2 CONCENTRATION: 80 %
KETONES UR QL STRIP: ABNORMAL
LEUKOCYTE ESTERASE UR QL STRIP.AUTO: ABNORMAL
LIPASE SERPL-CCNC: 35 U/L (ref 13–60)
M PNEUMO IGG SER IA-ACNC: NOT DETECTED
MODALITY: ABNORMAL
MRSA DNA SPEC QL NAA+PROBE: NORMAL
NITRITE UR QL STRIP: NEGATIVE
NT-PROBNP SERPL-MCNC: 3812 PG/ML (ref 0–900)
PCO2 BLDV: 51.3 MM HG (ref 42–51)
PH BLDV: 7.31 PH UNITS (ref 7.32–7.43)
PH UR STRIP.AUTO: <=5 [PH] (ref 5–8)
PO2 BLDV: 143.5 MM HG (ref 40–42)
POTASSIUM SERPL-SCNC: 5.4 MMOL/L (ref 3.5–5.2)
PROT SERPL-MCNC: 7.9 G/DL (ref 6–8.5)
PROT UR QL STRIP: ABNORMAL
RBC # UR STRIP: ABNORMAL /HPF
REF LAB TEST METHOD: ABNORMAL
RHINOVIRUS RNA SPEC NAA+PROBE: NOT DETECTED
RSV RNA NPH QL NAA+NON-PROBE: NOT DETECTED
SAO2 % BLDCOV: 99 % (ref 45–75)
SARS-COV-2 RNA NPH QL NAA+NON-PROBE: NOT DETECTED
SODIUM SERPL-SCNC: 128 MMOL/L (ref 136–145)
SP GR UR STRIP: 1.02 (ref 1–1.03)
SQUAMOUS #/AREA URNS HPF: ABNORMAL /HPF
TROPONIN T DELTA: 3 NG/L
TROPONIN T SERPL HS-MCNC: 20 NG/L
UROBILINOGEN UR QL STRIP: ABNORMAL
WBC # UR STRIP: ABNORMAL /HPF

## 2024-07-19 PROCEDURE — 25010000002 CEFEPIME PER 500 MG: Performed by: NURSE PRACTITIONER

## 2024-07-19 PROCEDURE — 81001 URINALYSIS AUTO W/SCOPE: CPT | Performed by: NURSE PRACTITIONER

## 2024-07-19 PROCEDURE — 25810000003 SODIUM CHLORIDE 0.9 % SOLUTION: Performed by: NURSE PRACTITIONER

## 2024-07-19 PROCEDURE — 94799 UNLISTED PULMONARY SVC/PX: CPT

## 2024-07-19 PROCEDURE — 25010000002 FUROSEMIDE PER 20 MG: Performed by: NURSE PRACTITIONER

## 2024-07-19 PROCEDURE — 82803 BLOOD GASES ANY COMBINATION: CPT

## 2024-07-19 PROCEDURE — 87641 MR-STAPH DNA AMP PROBE: CPT | Performed by: NURSE PRACTITIONER

## 2024-07-19 PROCEDURE — 25010000002 PIPERACILLIN SOD-TAZOBACTAM PER 1 G: Performed by: INTERNAL MEDICINE

## 2024-07-19 PROCEDURE — 83605 ASSAY OF LACTIC ACID: CPT | Performed by: NURSE PRACTITIONER

## 2024-07-19 PROCEDURE — 74177 CT ABD & PELVIS W/CONTRAST: CPT

## 2024-07-19 PROCEDURE — 87086 URINE CULTURE/COLONY COUNT: CPT | Performed by: NURSE PRACTITIONER

## 2024-07-19 PROCEDURE — 25510000001 IOPAMIDOL PER 1 ML: Performed by: EMERGENCY MEDICINE

## 2024-07-19 PROCEDURE — 84484 ASSAY OF TROPONIN QUANT: CPT | Performed by: NURSE PRACTITIONER

## 2024-07-19 PROCEDURE — 25810000003 SODIUM CHLORIDE 0.9 % SOLUTION 250 ML FLEX CONT: Performed by: NURSE PRACTITIONER

## 2024-07-19 PROCEDURE — 25010000002 VANCOMYCIN 1 G RECONSTITUTED SOLUTION 1 EACH VIAL: Performed by: NURSE PRACTITIONER

## 2024-07-19 PROCEDURE — 87040 BLOOD CULTURE FOR BACTERIA: CPT | Performed by: NURSE PRACTITIONER

## 2024-07-19 PROCEDURE — 71275 CT ANGIOGRAPHY CHEST: CPT

## 2024-07-19 RX ORDER — IPRATROPIUM BROMIDE AND ALBUTEROL SULFATE 2.5; .5 MG/3ML; MG/3ML
3 SOLUTION RESPIRATORY (INHALATION) ONCE
Status: COMPLETED | OUTPATIENT
Start: 2024-07-19 | End: 2024-07-19

## 2024-07-19 RX ORDER — LACTULOSE 10 G/15ML
15 SOLUTION ORAL 2 TIMES DAILY PRN
COMMUNITY

## 2024-07-19 RX ORDER — SOTALOL HYDROCHLORIDE 80 MG/1
80 TABLET ORAL ONCE
Status: COMPLETED | OUTPATIENT
Start: 2024-07-19 | End: 2024-07-19

## 2024-07-19 RX ORDER — FUROSEMIDE 10 MG/ML
40 INJECTION INTRAMUSCULAR; INTRAVENOUS ONCE
Status: COMPLETED | OUTPATIENT
Start: 2024-07-19 | End: 2024-07-19

## 2024-07-19 RX ORDER — MONTELUKAST SODIUM 10 MG/1
10 TABLET ORAL NIGHTLY
Status: DISCONTINUED | OUTPATIENT
Start: 2024-07-19 | End: 2024-07-27 | Stop reason: HOSPADM

## 2024-07-19 RX ORDER — AMLODIPINE BESYLATE 2.5 MG/1
2.5 TABLET ORAL DAILY
Status: DISCONTINUED | OUTPATIENT
Start: 2024-07-19 | End: 2024-07-27 | Stop reason: HOSPADM

## 2024-07-19 RX ORDER — SODIUM CHLORIDE 9 MG/ML
50 INJECTION, SOLUTION INTRAVENOUS CONTINUOUS
Status: DISCONTINUED | OUTPATIENT
Start: 2024-07-19 | End: 2024-07-24

## 2024-07-19 RX ORDER — LUBIPROSTONE 24 UG/1
24 CAPSULE ORAL 2 TIMES DAILY
Status: DISCONTINUED | OUTPATIENT
Start: 2024-07-19 | End: 2024-07-27 | Stop reason: HOSPADM

## 2024-07-19 RX ORDER — MULTIPLE VITAMINS W/ MINERALS TAB 9MG-400MCG
1 TAB ORAL DAILY
COMMUNITY

## 2024-07-19 RX ORDER — SOTALOL HYDROCHLORIDE 80 MG/1
80 TABLET ORAL EVERY 12 HOURS SCHEDULED
Status: DISCONTINUED | OUTPATIENT
Start: 2024-07-19 | End: 2024-07-27 | Stop reason: HOSPADM

## 2024-07-19 RX ORDER — ATORVASTATIN CALCIUM 10 MG/1
10 TABLET, FILM COATED ORAL NIGHTLY
Status: DISCONTINUED | OUTPATIENT
Start: 2024-07-19 | End: 2024-07-27 | Stop reason: HOSPADM

## 2024-07-19 RX ADMIN — FUROSEMIDE 40 MG: 10 INJECTION, SOLUTION INTRAVENOUS at 04:41

## 2024-07-19 RX ADMIN — PIPERACILLIN AND TAZOBACTAM 3.38 G: 3; .375 INJECTION, POWDER, FOR SOLUTION INTRAVENOUS at 17:12

## 2024-07-19 RX ADMIN — AMLODIPINE BESYLATE 2.5 MG: 5 TABLET ORAL at 11:48

## 2024-07-19 RX ADMIN — IOPAMIDOL 100 ML: 755 INJECTION, SOLUTION INTRAVENOUS at 02:43

## 2024-07-19 RX ADMIN — SODIUM CHLORIDE 1000 MG: 9 INJECTION, SOLUTION INTRAVENOUS at 00:06

## 2024-07-19 RX ADMIN — APIXABAN 5 MG: 5 TABLET, FILM COATED ORAL at 11:48

## 2024-07-19 RX ADMIN — IPRATROPIUM BROMIDE AND ALBUTEROL SULFATE 3 ML: .5; 3 SOLUTION RESPIRATORY (INHALATION) at 01:35

## 2024-07-19 RX ADMIN — SODIUM CHLORIDE 50 ML/HR: 9 INJECTION, SOLUTION INTRAVENOUS at 04:10

## 2024-07-19 RX ADMIN — CEFEPIME 2000 MG: 2 INJECTION, POWDER, FOR SOLUTION INTRAVENOUS at 00:01

## 2024-07-19 RX ADMIN — SODIUM ZIRCONIUM CYCLOSILICATE 10 G: 10 POWDER, FOR SUSPENSION ORAL at 04:11

## 2024-07-19 RX ADMIN — SOTALOL HYDROCHLORIDE 80 MG: 80 TABLET ORAL at 00:59

## 2024-07-19 NOTE — PROGRESS NOTES
.    [facility] MEDICINE SERVICE  DAILY PROGRESS NOTE    NAME: Drake Beth  : 1950  MRN: [id number]      LOS: 0 days     PROVIDER OF SERVICE: Ponce Tucker MD    Chief Complaint: Sepsis    Subjective:     Interval History:  History taken from: patient  Patient seen and examined at bedside this morning.  He remains nonverbal but does not seem like he is in distress.  Requiring 15 L via nonrebreather.    Review of Systems:   Review of Systems unable to obtain due to patient mentation    Objective:     Vital Signs  Temp:  [97.6 °F (36.4 °C)-99.3 °F (37.4 °C)] 97.9 °F (36.6 °C)  Heart Rate:  [67-86] 67  Resp:  [16-20] 19  BP: (105-210)/() 152/84  Flow (L/min):  [15] 15   Body mass index is 19.93 kg/m².    Physical Exam  Physical Exam  Constitutional:       Appearance: Normal appearance.   HENT:      Head: Normocephalic and atraumatic.      Right Ear: External ear normal.      Left Ear: External ear normal.      Nose: Nose normal.      Mouth/Throat:      Mouth: Mucous membranes are moist.   Eyes:      Extraocular Movements: Extraocular movements intact.   Cardiovascular:      Rate and Rhythm: Normal rate and regular rhythm.      Pulses: Normal pulses.      Heart sounds: Normal heart sounds.   Pulmonary:      Breath sounds: wnl     Comments: On 15 L non rebreather   Abdominal:      Palpations: Abdomen is soft.   Genitourinary:     Penis: Normal.    Musculoskeletal:         General: Normal range of motion.      Cervical back: Normal range of motion and neck supple.   Skin:     General: Skin is warm and dry.   Neurological:      Mental Status: He is alert. Mental status is at baseline.   Psychiatric:      Comments: Intellectual disability, cooperative, follows simple  commands      Scheduled Meds   amLODIPine, 2.5 mg, Oral, Daily  apixaban, 5 mg, Oral, BID  atorvastatin, 10 mg, Oral, Nightly  lubiprostone, 24 mcg, Oral, BID  montelukast, 10 mg, Oral, Nightly  piperacillin-tazobactam, 3.375 g,  Intravenous, Once  piperacillin-tazobactam, 3.375 g, Intravenous, Q8H  sotalol, 80 mg, Oral, Q12H       PRN Meds      Infusions  sodium chloride, 50 mL/hr, Last Rate: 50 mL/hr (07/19/24 0410)          Diagnostic Data    Results from last 7 days   Lab Units 07/18/24  2338   WBC 10*3/mm3 14.14*   HEMOGLOBIN g/dL 13.4   HEMATOCRIT % 39.7   PLATELETS 10*3/mm3 226   GLUCOSE mg/dL 149*   CREATININE mg/dL 1.00   BUN mg/dL 15   SODIUM mmol/L 128*   POTASSIUM mmol/L 5.4*   AST (SGOT) U/L 44*   ALT (SGPT) U/L 11   ALK PHOS U/L 83   BILIRUBIN mg/dL 2.1*   ANION GAP mmol/L 15.2*       CT Abdomen Pelvis With Contrast    Result Date: 7/19/2024  Increase in size of bilateral pleural effusions. Otherwise, no significant interval change is identified since the prior exam.    Please note that portions of this note were completed with a voice recognition program.        Electronically Signed By-Moe Bourne MD On:7/19/2024 3:12 AM      CT Angiogram Chest Pulmonary Embolism    Result Date: 7/19/2024   1. No evidence of pulmonary embolus. 2. Multifocal pneumonia with patchy airspace disease present, most pronounced within the left upper lobe. Mild to moderate underlying pulmonary edema pattern suspected versus groundglass airspace disease with small to moderate-sized bilateral pleural effusions.       Electronically Signed By-Lili Zaldivar MD On:7/19/2024 3:10 AM      XR Chest 1 View    Result Date: 7/19/2024  Impression: No significant change. Stable cardiomegaly with probable mild pulmonary edema pattern with subsegmental atelectatic changes and scarring. Electronically Signed: Lili Zaldivar MD  7/19/2024 12:22 AM EDT  Workstation ID: [id number]       I reviewed the patient's new clinical results.    Assessment/Plan:     Active and Resolved Problems  Active Hospital Problems    Diagnosis  POA    **Sepsis [A41.9]  Yes    Acute on chronic diastolic heart failure [I50.33]  Yes    Paroxysmal atrial fibrillation [I48.0]  Yes    Acute  respiratory failure with hypoxia and hypercapnia [J96.01, J96.02]  Yes    Hyperkalemia [E87.5]  Yes    Hyponatremia [E87.1]  Yes    Hyperbilirubinemia [E80.6]  Yes    Acute UTI (urinary tract infection) [N39.0]  Yes    Presence of cardiac pacemaker [Z95.0]  Yes    Pneumonia [J18.9]  Yes    Mitral valve replaced [Z95.2]  Not Applicable    Mentally challenged [F79]  Yes    Hypertension [I10]  Yes    Hyperlipidemia [E78.5]  Yes    Coronary artery disease [I25.10]  Yes      Resolved Hospital Problems   No resolved problems to display.       Severe Sepsis without septic shock, initial lactic 3.5 now 2.0 elevated WBC, likely secondary to continued bilateral pneumonia per CT and possible UTI  infectious disease consulted, recommend discontinuing IV cefepime.  Initiated IV Zosyn every 8 hour.  Final culture and sensitivities pending.     Acute respiratory failure with hypoxia and hypercapnia, likely secondary to pneumonia and acute CHF  tolerating 15 L oxygen nonrebreather attempt to wean, albuterol as needed  Pulmonology consulted for possible bronchoscopy.    Acute on chronic CHF  proBNP elevated     Acute UTI,   trace bacteria see plan above acute urine cultures pending     Paroxysmal atrial fibrillation  EKG shows sinus rhythm, on home sotalol and Eliquis reordered continuous cardiac monitoring     Hyperkalemia     Hyponatremia sodium 128,   normal saline at 50 cc/h   repeat BMP  Urine sodium, urine osmolality and serum osmolality ordered.     Hyperbilirubinemia, likely secondary to sepsis, CT abdomen unchanged from previous exam last admission, seen by gastroenterology diagnosed with constipation on Linzess     Presence of a cardiac pacemaker, noted infectious disease consulted     Mitral valve replacement noted     Mentally challenged, noted cooperative follows simple commands     Hypertension   reordered Norvasc and home sotalol     Hyper lipidemia, on statin     Coronary artery disease status post CABG,   troponin  not elevated EKG no ischemic changes, continuous cardiac monitoring          VTE Prophylaxis:  Pharmacologic VTE prophylaxis orders are present.         Code status is   Code Status and Medical Interventions:   Ordered at: 07/19/24 0354     Code Status (Patient has no pulse and is not breathing):    CPR (Attempt to Resuscitate)     Medical Interventions (Patient has pulse or is breathing):    Full Support       Plan for disposition: Home versus SNF in 1-2 days pending clinical course, prognosis seems poor.    Time: 30 minutes    Signature: Electronically signed by Ponce Tucker MD, 07/19/24, 17:21 EDT.  [facility] Hospitalist Team

## 2024-07-19 NOTE — ED PROVIDER NOTES
"Subjective   Chief Complaint   Patient presents with    Hypertension     Pt brought by EMS for HTN. Pt is nonverbal and family states that pt has been \"moaning and grabbing at chest and upper abdomen.\"        History of Present Illness  History provided by family and EMS-sister at bedside.   Patient is a 73-year-old male who is nonverbal, presents the emergency department for evaluation of elevated blood pressure, \"moaning and grabbing at chest and upper abdomen\".  Review of Systems   Unable to perform ROS: Patient nonverbal       Past Medical History:   Diagnosis Date    Allergic rhinitis     Anemia     Atrial fibrillation     Chesty cough     CHF (congestive heart failure)     Coronary artery disease     Cough     Fever     Hyperlipidemia     Hypertension     Mentally challenged     MVP (mitral valve prolapse)        No Known Allergies    Past Surgical History:   Procedure Laterality Date    CARDIAC ELECTROPHYSIOLOGY PROCEDURE N/A 4/26/2022    Procedure: PACEMAKER EXTRACTION;  Surgeon: Rj Dominique MD;  Location: [facility] INVASIVE LOCATION;  Service: Cardiology;  Laterality: N/A;    COLONOSCOPY      He has never had the test done.  His family is refusing any form of colon cancer screening for him.    CORONARY ARTERY BYPASS GRAFT  2008    DR SWEAT    MITRAL VALVE REPLACEMENT  04/2008    WITH BIOPROSTHETIC TISSUE VALVE       Family History   Problem Relation Age of Onset    Coronary artery disease Mother 64    Hyperlipidemia Sister     Liver disease Sister         FATTY LIVER    Rheum arthritis Sister     Lung cancer Sister     Hypertension Brother     Other Brother     Rheum arthritis Brother        Social History     Socioeconomic History    Marital status: Single   Tobacco Use    Smoking status: Never    Smokeless tobacco: Never   Vaping Use    Vaping status: Never Used   Substance and Sexual Activity    Alcohol use: Never    Drug use: Never    Sexual activity: Never           Objective   Physical " "Exam  Vitals and nursing note reviewed.   HENT:      Head: Normocephalic and atraumatic.      Mouth/Throat:      Mouth: Mucous membranes are moist.      Pharynx: Oropharynx is clear.   Eyes:      Extraocular Movements: Extraocular movements intact.      Conjunctiva/sclera: Conjunctivae normal.      Pupils: Pupils are equal, round, and reactive to light.   Cardiovascular:      Rate and Rhythm: Rhythm irregular.      Heart sounds: No murmur heard.     No friction rub. No gallop.   Skin:     General: Skin is warm and dry.      Capillary Refill: Capillary refill takes less than 2 seconds.   Neurological:      Mental Status: He is alert and oriented to person, place, and time.         Procedures           ED Course  ED Course as of 07/19/24 0416   Thu Jul 18, 2024   2354 To 3.5, leukocytosis 14.1.  Patient initiated on antibiotics for sepsis unknown source. [LB]   2354 BP: 105/85  Nursing staff reports this was an ems blood pressure. Not taken at our facility with triage [LB]   Fri Jul 19, 2024   0024 Pt tolerating non rebreather at this time.  [LB]   0043 Blood cultures not drawn prior to initiation of antibiotics [LB]   0117 Patient given his home dose of sotalol, he had not had his nighttime dose yet [LB]   0143 Delay in CT [LB]   0245 System wide outage for epic and radiology [LB]   0346 Discussed with Nae BLISS [LB]   0414 Blood Gas, Venous -(!)  RT reports this abg is actually arterial.  [LB]      ED Course User Index  [LB] Angelina Wise APRN      /95   Pulse 78   Temp 99.3 °F (37.4 °C) (Rectal)   Resp 20   Ht 152.4 cm (60\")   Wt 46.3 kg (102 lb 1.2 oz)   SpO2 100%   BMI 19.93 kg/m²   Medications   furosemide (LASIX) injection 40 mg (has no administration in time range)   sodium chloride 0.9 % infusion (has no administration in time range)   sodium zirconium cyclosilicate (LOKELMA) packet 10 g (has no administration in time range)   Pharmacy to Dose Cefepime (has no administration in time " range)   Pharmacy to dose vancomycin (has no administration in time range)   cefepime 2000 mg IVPB in 100 mL NS (MBP) (has no administration in time range)   cefepime 2000 mg IVPB in 100 mL NS (MBP) (0 mg Intravenous Stopped 7/19/24 0031)   vancomycin (VANCOCIN) 1,000 mg in sodium chloride 0.9 % 250 mL IVPB-VTB (0 mg Intravenous Stopped 7/19/24 0106)   sotalol (BETAPACE) tablet 80 mg (80 mg Oral Given 7/19/24 0059)   ipratropium-albuterol (DUO-NEB) nebulizer solution 3 mL (3 mL Nebulization Given 7/19/24 0135)   iopamidol (ISOVUE-370) 76 % injection 100 mL (100 mL Intravenous Given 7/19/24 0243)     CT Abdomen Pelvis With Contrast    Result Date: 7/19/2024  Increase in size of bilateral pleural effusions. Otherwise, no significant interval change is identified since the prior exam.    Please note that portions of this note were completed with a voice recognition program.        Electronically Signed By-Moe Bourne MD On:7/19/2024 3:12 AM      CT Angiogram Chest Pulmonary Embolism    Result Date: 7/19/2024   1. No evidence of pulmonary embolus. 2. Multifocal pneumonia with patchy airspace disease present, most pronounced within the left upper lobe. Mild to moderate underlying pulmonary edema pattern suspected versus groundglass airspace disease with small to moderate-sized bilateral pleural effusions.       Electronically Signed By-Lili Zaldivar MD On:7/19/2024 3:10 AM      XR Chest 1 View    Result Date: 7/19/2024  Impression: No significant change. Stable cardiomegaly with probable mild pulmonary edema pattern with subsegmental atelectatic changes and scarring. Electronically Signed: Lili Zaldivar MD  7/19/2024 12:22 AM EDT  Workstation ID: [id number]   Lab Results (last 24 hours)       Procedure Component Value Units Date/Time    CBC & Differential [[id number]]  (Abnormal) Collected: 07/18/24 2335    Specimen: Blood Updated: 07/18/24 6018    Narrative:      The following orders were created for panel order CBC &  Differential.  Procedure                               Abnormality         Status                     ---------                               -----------         ------                     CBC Auto Differential[[id number]]        Abnormal            Final result                 Please view results for these tests on the individual orders.    Comprehensive Metabolic Panel [[id number]]  (Abnormal) Collected: 07/18/24 2338    Specimen: Blood Updated: 07/19/24 0059     Glucose 149 mg/dL      BUN 15 mg/dL      Creatinine 1.00 mg/dL      Sodium 128 mmol/L      Potassium 5.4 mmol/L      Comment: Slight hemolysis detected by analyzer. Result may be falsely elevated.        Chloride 89 mmol/L      CO2 23.8 mmol/L      Calcium 9.3 mg/dL      Total Protein 7.9 g/dL      Albumin 4.2 g/dL      ALT (SGPT) 11 U/L      AST (SGOT) 44 U/L      Comment: Slight hemolysis detected by analyzer. Result may be falsely elevated.        Alkaline Phosphatase 83 U/L      Total Bilirubin 2.1 mg/dL      Globulin 3.7 gm/dL      A/G Ratio 1.1 g/dL      BUN/Creatinine Ratio 15.0     Anion Gap 15.2 mmol/L      eGFR 80.0 mL/min/1.73     Narrative:      GFR Normal >60  Chronic Kidney Disease <60  Kidney Failure <15    The GFR formula is only valid for adults with stable renal function between ages 18 and 70.    Lipase [[id number]]  (Normal) Collected: 07/18/24 2338    Specimen: Blood Updated: 07/19/24 0051     Lipase 35 U/L     BNP [[id number]]  (Abnormal) Collected: 07/18/24 2338    Specimen: Blood Updated: 07/19/24 0051     proBNP 3,812.0 pg/mL     Narrative:      This assay is used as an aid in the diagnosis of individuals suspected of having heart failure. It can be used as an aid in the diagnosis of acute decompensated heart failure (ADHF) in patients presenting with signs and symptoms of ADHF to the emergency department (ED). In addition, NT-proBNP of <300 pg/mL indicates ADHF is not likely.    Age Range Result Interpretation  NT-proBNP  Concentration (pg/mL:      <50             Positive            >450                   Gray                 300-450                    Negative             <300    50-75           Positive            >900                  Gray                300-900                  Negative            <300      >75             Positive            >1800                  Gray                300-1800                  Negative            <300    High Sensitivity Troponin T [[id number]]  (Normal) Collected: 07/18/24 2338    Specimen: Blood Updated: 07/19/24 0051     HS Troponin T 20 ng/L     Narrative:      High Sensitive Troponin T Reference Range:  <14.0 ng/L- Negative Female for AMI  <22.0 ng/L- Negative Male for AMI  >=14 - Abnormal Female indicating possible myocardial injury.  >=22 - Abnormal Male indicating possible myocardial injury.   Clinicians would have to utilize clinical acumen, EKG, Troponin, and serial changes to determine if it is an Acute Myocardial Infarction or myocardial injury due to an underlying chronic condition.         CBC Auto Differential [[id number]]  (Abnormal) Collected: 07/18/24 2338    Specimen: Blood Updated: 07/18/24 2352     WBC 14.14 10*3/mm3      RBC 3.92 10*6/mm3      Hemoglobin 13.4 g/dL      Hematocrit 39.7 %      .3 fL      MCH 34.2 pg      MCHC 33.8 g/dL      RDW 13.6 %      RDW-SD 50.8 fl      MPV 9.3 fL      Platelets 226 10*3/mm3      Neutrophil % 85.1 %      Lymphocyte % 7.4 %      Monocyte % 4.7 %      Eosinophil % 0.6 %      Basophil % 0.4 %      Immature Grans % 1.8 %      Neutrophils, Absolute 12.03 10*3/mm3      Lymphocytes, Absolute 1.05 10*3/mm3      Monocytes, Absolute 0.66 10*3/mm3      Eosinophils, Absolute 0.09 10*3/mm3      Basophils, Absolute 0.06 10*3/mm3      Immature Grans, Absolute 0.25 10*3/mm3      nRBC 0.1 /100 WBC     Respiratory Panel PCR w/COVID-19(SARS-CoV-2) [facility] In-House, NP Swab in UTM/VTM, 2 HR TAT - Swab, Nasopharynx [[id number]]  (Normal)  Collected: 07/18/24 2346    Specimen: Swab from Nasopharynx Updated: 07/19/24 0100     ADENOVIRUS, PCR Not Detected     Coronavirus 229E Not Detected     Coronavirus HKU1 Not Detected     Coronavirus NL63 Not Detected     Coronavirus OC43 Not Detected     COVID19 Not Detected     Human Metapneumovirus Not Detected     Human Rhinovirus/Enterovirus Not Detected     Influenza A PCR Not Detected     Influenza B PCR Not Detected     Parainfluenza Virus 1 Not Detected     Parainfluenza Virus 2 Not Detected     Parainfluenza Virus 3 Not Detected     Parainfluenza Virus 4 Not Detected     RSV, PCR Not Detected     Bordetella pertussis pcr Not Detected     Bordetella parapertussis PCR Not Detected     Chlamydophila pneumoniae PCR Not Detected     Mycoplasma pneumo by PCR Not Detected    Narrative:      In the setting of a positive respiratory panel with a viral infection PLUS a negative procalcitonin without other underlying concern for bacterial infection, consider observing off antibiotics or discontinuation of antibiotics and continue supportive care. If the respiratory panel is positive for atypical bacterial infection (Bordetella pertussis, Chlamydophila pneumoniae, or Mycoplasma pneumoniae), consider antibiotic de-escalation to target atypical bacterial infection.    Blood Gas, Arterial - [[id number]]  (Abnormal) Collected: 07/18/24 2351    Specimen: Arterial Blood Updated: 07/18/24 2357     Site Right Radial     Ankur's Test Positive     pH, Arterial 7.308 pH units      pCO2, Arterial 52.2 mm Hg      pO2, Arterial 69.8 mm Hg      HCO3, Arterial 26.2 mmol/L      Base Excess, Arterial -0.9 mmol/L      Comment: Serial Number: [id number]:  [id number]        O2 Saturation, Arterial 91.7 %      CO2 Content 27.8 mmol/L      Barometric Pressure for Blood Gas --     Comment: N/A        Modality Cannula     FIO2 40 %      Hemodilution No     PO2/FIO2 175    POC Lactate [[id number]]  (Abnormal) Collected: 07/18/24 2351    Specimen:  Blood Updated: 07/18/24 2353     Lactate 3.5 mmol/L      Comment: Serial Number: [id number]:  [id number]       Urinalysis With Culture If Indicated - Straight Cath [[id number]]  (Abnormal) Collected: 07/19/24 0031    Specimen: Urine from Straight Cath Updated: 07/19/24 0101     Color, UA Orange     Comment: Any Substance that causes an abnormal urine color can alter the accuracy of the chemical reactions.        Appearance, UA Turbid     pH, UA <=5.0     Specific Gravity, UA 1.024     Glucose, UA Negative     Ketones, UA Trace     Bilirubin, UA Negative     Blood, UA Large (3+)     Protein, UA >=300 mg/dL (3+)     Leuk Esterase, UA Trace     Nitrite, UA Negative     Urobilinogen, UA 1.0 E.U./dL    Narrative:      In absence of clinical symptoms, the presence of pyuria, bacteria, and/or nitrites on the urinalysis result does not correlate with infection.    Urinalysis, Microscopic Only - Straight Cath [[id number]]  (Abnormal) Collected: 07/19/24 0031    Specimen: Urine from Straight Cath Updated: 07/19/24 0129     RBC, UA Too Numerous to Count /HPF      WBC, UA 11-20 /HPF      Bacteria, UA Trace /HPF      Squamous Epithelial Cells, UA 0-2 /HPF      Hyaline Casts, UA 21-30 /LPF      Methodology Manual Light Microscopy    Urine Culture - Urine, Straight Cath [[id number]] Collected: 07/19/24 0031    Specimen: Urine from Straight Cath Updated: 07/19/24 0129    Blood Culture - Blood, Arm, Right [[id number]] Collected: 07/19/24 0053    Specimen: Blood from Arm, Right Updated: 07/19/24 0059    Blood Culture - Blood, Arm, Left [[id number]] Collected: 07/19/24 0053    Specimen: Blood from Arm, Left Updated: 07/19/24 0059    STAT Lactic Acid, Reflex [[id number]]  (Normal) Collected: 07/19/24 0330    Specimen: Blood Updated: 07/19/24 0406     Lactate 2.0 mmol/L     High Sensitivity Troponin T 2Hr [[id number]]  (Abnormal) Collected: 07/19/24 0330    Specimen: Blood Updated: 07/19/24 0407     HS Troponin T 23 ng/L      Troponin T  Delta 3 ng/L     Narrative:      High Sensitive Troponin T Reference Range:  <14.0 ng/L- Negative Female for AMI  <22.0 ng/L- Negative Male for AMI  >=14 - Abnormal Female indicating possible myocardial injury.  >=22 - Abnormal Male indicating possible myocardial injury.   Clinicians would have to utilize clinical acumen, EKG, Troponin, and serial changes to determine if it is an Acute Myocardial Infarction or myocardial injury due to an underlying chronic condition.         Blood Gas, Venous - [[id number]]  (Abnormal) Collected: 07/19/24 0339    Specimen: Venous Blood Updated: 07/19/24 0342     Site Left Radial     pH, Venous 7.313 pH Units      pCO2, Venous 51.3 mm Hg      pO2, Venous 143.5 mm Hg      HCO3, Venous 26.0 mmol/L      Base Excess, Venous -0.8 mmol/L      Comment: Serial Number: [id number]:  [id number]        O2 Saturation, Venous 99.0 %      CO2 Content 27.6 mmol/L      Barometric Pressure for Blood Gas --     Comment: N/A        Modality NRB     FIO2 80 %                                                  Medical Decision Making  Problems Addressed:  Dyspnea, unspecified type: complicated acute illness or injury  Generalized abdominal pain: complicated acute illness or injury  Pneumonia of both lungs due to infectious organism, unspecified part of lung: complicated acute illness or injury    Amount and/or Complexity of Data Reviewed  Labs: ordered.  Radiology: ordered.    Risk  Prescription drug management.  Decision regarding hospitalization.      Chart Review: Discharge summary 7/15/2024 probable pneumonia, CHF, paroxysmal A-fib, respite failure, constipation, chronic anticoagulation  Imaging: CT Abdomen Pelvis With Contrast    Result Date: 7/19/2024  Increase in size of bilateral pleural effusions. Otherwise, no significant interval change is identified since the prior exam.    Please note that portions of this note were completed with a voice recognition program.        Electronically Signed By-Moe  MD Tayla On:7/19/2024 3:12 AM      CT Angiogram Chest Pulmonary Embolism    Result Date: 7/19/2024   1. No evidence of pulmonary embolus. 2. Multifocal pneumonia with patchy airspace disease present, most pronounced within the left upper lobe. Mild to moderate underlying pulmonary edema pattern suspected versus groundglass airspace disease with small to moderate-sized bilateral pleural effusions.       Electronically Signed By-Lili Zaldivar MD On:7/19/2024 3:10 AM      XR Chest 1 View    Result Date: 7/19/2024  Impression: No significant change. Stable cardiomegaly with probable mild pulmonary edema pattern with subsegmental atelectatic changes and scarring. Electronically Signed: Lili Zaldivar MD  7/19/2024 12:22 AM EDT  Workstation ID: [id number]     EKG interpreted independently per ED attending physican-EKG sinus rhythm rate of 71.  No acute ST changes.  Labs  Pt was Placed on appropriate monitoring.  Differential diagnoses considered for patient presentation, this list is not all inclusive of diagnoses considered: COVID, flu, pneumonia  Patient presents to the ED for the above complaint, underwent the above, exam and workup.   Was not given a sepsis bolus given concern for fluid overload, dyspnea.  Patient is nonverbal, his sister assists in history.  Given report of chest pain abdominal pain workup initiated as above.  Leukocytosis 14.1, lactate 3.5 initiated on antibiotics for sepsis unknown source.  Urine trace bacteria, 11-20 white cells, trace understates.  RVP negative.  CMP reviewed.  Creatinine is 1, sodium 128, potassium 5.4 but is hemolyzed.  CT PE protocol was ordered given patient's hypoxia, he is on a nonrebreather, he is breathing through his mouth which makes cannula difficult.  CT PE study is negative for PE but does reveal multifocal bilateral pneumonia, pulmonary edema.  Given patient's respiratory status, dyspnea, hypoxia initiated on antibiotic therapy, ordered Lasix.  He is tolerating his  nonrebreather, and he will be admitted to the hospitalist service for further evaluation and management.  I discussed my findings with the patient and family at bedside.  They verbalized understanding    Note Disclaimer: At [facility], we believe that sharing information builds trust and better relationships. You are receiving this note because you recently visited [facility]. It is possible you will see health information before a provider has talked with you about it. This kind of information can be easy to misunderstand. To help you fully understand what it means for your health, we urge you to discuss this note with your provider  Note dictated utilizing Dragon Dictation.  Appropriate PPE worn during patient interactions.      Final diagnoses:   Pneumonia of both lungs due to infectious organism, unspecified part of lung   Generalized abdominal pain   Dyspnea, unspecified type       ED Disposition  ED Disposition       ED Disposition   Decision to Admit    Condition   --    Comment   Level of Care: Progressive Care [20]   Diagnosis: Sepsis [[id number]]   Admitting Physician: MAURICIO HUERTAS [[id number]]   Attending Physician: MAURICIO HUERTAS [[id number]]   Bed Request Comments: cardiac monitor   Certification: I Certify That Inpatient Hospital Services Are Medically Necessary For Greater Than 2 Midnights                 No follow-up provider specified.       Medication List      No changes were made to your prescriptions during this visit.            Angelina Wise, APRN  07/19/24 0417

## 2024-07-19 NOTE — CONSULTS
Infectious Diseases Consult Note    Referring Provider: Ponce Tucker MD    Reason for Consultation: Recurrent pneumonia    Patient Care Team:  Amada Haskins MD as PCP - General  Josr Rodriguez DPM as Consulting Physician (Podiatry)  Rj Dominique MD as Consulting Physician (Cardiology)    Chief complaint shortness of breath and decreased appetite    Subjective     History of present illness:      This is 73-year-old male who was hospitalized to [facility] on July 18, 2024.  The patient lives with his brothers who takes care of him secondary to intellectual development.  He had remote history of mitral valve replacement during childhood.  Apparently patient was at the hospital recently and was diagnosed with possible pneumonia and was discharged home.  I did not see antibiotics patient was put on.  The patient continue to have poor appetite.  He was brought to the emergency room by his brother who present at the bedside.  He was found to be hypoxic and CT scan of the chest showed bilateral pleural effusion with patchy lung infiltrates.  He was put on IV cefepime and given 1 dose of IV vancomycin.  The patient is currently on 15 L oxygen via nasal cannula    Review of Systems   Review of Systems   Reason unable to perform ROS: The patient is mentally challenged.   Respiratory:  Positive for shortness of breath.        Medications  (Not in a hospital admission)      History  Past Medical History:   Diagnosis Date    Allergic rhinitis     Anemia     Atrial fibrillation     Chesty cough     CHF (congestive heart failure)     Coronary artery disease     Cough     Fever     Hyperlipidemia     Hypertension     Mentally challenged     MVP (mitral valve prolapse)      Past Surgical History:   Procedure Laterality Date    CARDIAC ELECTROPHYSIOLOGY PROCEDURE N/A 4/26/2022    Procedure: PACEMAKER EXTRACTION;  Surgeon: Rj Dominique MD;  Location: [facility] INVASIVE LOCATION;  Service: Cardiology;   Laterality: N/A;    COLONOSCOPY      He has never had the test done.  His family is refusing any form of colon cancer screening for him.    CORONARY ARTERY BYPASS GRAFT  2008    DR SWEAT    MITRAL VALVE REPLACEMENT  04/2008    WITH BIOPROSTHETIC TISSUE VALVE       Family History  Family History   Problem Relation Age of Onset    Coronary artery disease Mother 64    Hyperlipidemia Sister     Liver disease Sister         FATTY LIVER    Rheum arthritis Sister     Lung cancer Sister     Hypertension Brother     Other Brother     Rheum arthritis Brother        Social History   reports that he has never smoked. He has never used smokeless tobacco. He reports that he does not drink alcohol and does not use drugs.    Allergies  Patient has no known allergies.    Objective     Vital Signs   Vital Signs (last 24 hours)         07/18 0700  07/19 0659 07/19 0700 07/19 1650   Most Recent      Temp (°F) 97.6 -  99.3      97.9     97.9 (36.6) 07/19 0800    Heart Rate 67 -  86      67     67 07/19 0800    Resp 16 -  20      19     19 07/19 0800    /85 -  210/103      152/84     152/84 07/19 0800    SpO2 (%) 90 -  100      100     100 07/19 0800    Flow (L/min)   15      15     15 07/19 0800    Oxygen Concentration (%)   50       50 07/19 0005            Physical Exam:  Physical Exam  Constitutional:       General: He is in acute distress.      Appearance: He is ill-appearing.   HENT:      Head: Normocephalic and atraumatic.      Mouth/Throat:      Mouth: Mucous membranes are dry.   Pulmonary:      Effort: Pulmonary effort is normal.      Breath sounds: Rales present.   Abdominal:      General: Abdomen is flat. Bowel sounds are normal.      Palpations: Abdomen is soft.   Musculoskeletal:      Cervical back: Neck supple.      Right lower leg: No edema.      Left lower leg: No edema.   Neurological:      Mental Status: He is disoriented.         Microbiology  Microbiology Results (last 10 days)       Procedure Component Value -  Date/Time    MRSA Screen, PCR (Inpatient) - Swab, Nares [[id number]]  (Normal) Collected: 07/19/24 0520    Lab Status: Final result Specimen: Swab from Nares Updated: 07/19/24 0634     MRSA PCR No MRSA Detected    Narrative:      The negative predictive value of this diagnostic test is high and should only be used to consider de-escalating anti-MRSA therapy. A positive result may indicate colonization with MRSA and must be correlated clinically.    Respiratory Panel PCR w/COVID-19(SARS-CoV-2) [facility] In-House, NP Swab in UTM/VTM, 2 HR TAT - Swab, Nasopharynx [[id number]]  (Normal) Collected: 07/18/24 2346    Lab Status: Final result Specimen: Swab from Nasopharynx Updated: 07/19/24 0100     ADENOVIRUS, PCR Not Detected     Coronavirus 229E Not Detected     Coronavirus HKU1 Not Detected     Coronavirus NL63 Not Detected     Coronavirus OC43 Not Detected     COVID19 Not Detected     Human Metapneumovirus Not Detected     Human Rhinovirus/Enterovirus Not Detected     Influenza A PCR Not Detected     Influenza B PCR Not Detected     Parainfluenza Virus 1 Not Detected     Parainfluenza Virus 2 Not Detected     Parainfluenza Virus 3 Not Detected     Parainfluenza Virus 4 Not Detected     RSV, PCR Not Detected     Bordetella pertussis pcr Not Detected     Bordetella parapertussis PCR Not Detected     Chlamydophila pneumoniae PCR Not Detected     Mycoplasma pneumo by PCR Not Detected    Narrative:      In the setting of a positive respiratory panel with a viral infection PLUS a negative procalcitonin without other underlying concern for bacterial infection, consider observing off antibiotics or discontinuation of antibiotics and continue supportive care. If the respiratory panel is positive for atypical bacterial infection (Bordetella pertussis, Chlamydophila pneumoniae, or Mycoplasma pneumoniae), consider antibiotic de-escalation to target atypical bacterial infection.    Legionella Antigen, Urine - Urine,  Urine, Clean Catch [[id number]]  (Normal) Collected: 07/14/24 1708    Lab Status: Final result Specimen: Urine, Clean Catch Updated: 07/14/24 1733     LEGIONELLA ANTIGEN, URINE Negative    S. Pneumo Ag Urine or CSF - Urine, Urine, Clean Catch [[id number]]  (Normal) Collected: 07/14/24 1708    Lab Status: Final result Specimen: Urine, Clean Catch Updated: 07/14/24 1733     Strep Pneumo Ag Negative    Respiratory Panel PCR w/COVID-19(SARS-CoV-2) [facility] In-House, NP Swab in UTM/VTM, 2 HR TAT - Swab, Nasopharynx [[id number]]  (Normal) Collected: 07/11/24 1655    Lab Status: Final result Specimen: Swab from Nasopharynx Updated: 07/11/24 1845     ADENOVIRUS, PCR Not Detected     Coronavirus 229E Not Detected     Coronavirus HKU1 Not Detected     Coronavirus NL63 Not Detected     Coronavirus OC43 Not Detected     COVID19 Not Detected     Human Metapneumovirus Not Detected     Human Rhinovirus/Enterovirus Not Detected     Influenza A PCR Not Detected     Influenza B PCR Not Detected     Parainfluenza Virus 1 Not Detected     Parainfluenza Virus 2 Not Detected     Parainfluenza Virus 3 Not Detected     Parainfluenza Virus 4 Not Detected     RSV, PCR Not Detected     Bordetella pertussis pcr Not Detected     Bordetella parapertussis PCR Not Detected     Chlamydophila pneumoniae PCR Not Detected     Mycoplasma pneumo by PCR Not Detected    Narrative:      In the setting of a positive respiratory panel with a viral infection PLUS a negative procalcitonin without other underlying concern for bacterial infection, consider observing off antibiotics or discontinuation of antibiotics and continue supportive care. If the respiratory panel is positive for atypical bacterial infection (Bordetella pertussis, Chlamydophila pneumoniae, or Mycoplasma pneumoniae), consider antibiotic de-escalation to target atypical bacterial infection.    Blood Culture - Blood, Arm, Left [[id number]]  (Normal) Collected: 07/11/24 1650    Lab  Status: Final result Specimen: Blood from Arm, Left Updated: 07/16/24 1700     Blood Culture No growth at 5 days    Narrative:      Less than seven (7) mL's of blood was collected.  Insufficient quantity may yield false negative results.    Blood Culture - Blood, Arm, Left [[id number]]  (Normal) Collected: 07/11/24 1650    Lab Status: Final result Specimen: Blood from Arm, Left Updated: 07/16/24 1700     Blood Culture No growth at 5 days    Narrative:      Less than seven (7) mL's of blood was collected.  Insufficient quantity may yield false negative results.            Laboratory  Results from last 7 days   Lab Units 07/18/24  2338   WBC 10*3/mm3 14.14*   HEMOGLOBIN g/dL 13.4   HEMATOCRIT % 39.7   PLATELETS 10*3/mm3 226     Results from last 7 days   Lab Units 07/18/24  2338   SODIUM mmol/L 128*   POTASSIUM mmol/L 5.4*   CHLORIDE mmol/L 89*   CO2 mmol/L 23.8   BUN mg/dL 15   CREATININE mg/dL 1.00   GLUCOSE mg/dL 149*   CALCIUM mg/dL 9.3     Results from last 7 days   Lab Units 07/18/24  2338   SODIUM mmol/L 128*   POTASSIUM mmol/L 5.4*   CHLORIDE mmol/L 89*   CO2 mmol/L 23.8   BUN mg/dL 15   CREATININE mg/dL 1.00   GLUCOSE mg/dL 149*   CALCIUM mg/dL 9.3                   Radiology  Imaging Results (Last 72 Hours)       Procedure Component Value Units Date/Time    CT Abdomen Pelvis With Contrast [[id number]] Collected: 07/19/24 0308     Updated: 07/19/24 0314    Narrative:         DATE OF EXAM:  7/19/2024, 2:31 A.M.     PROCEDURE:  CT ABDOMEN PELVIS W CONTRAST-     INDICATIONS:   abdominal pain, not otherwise specified     COMPARISON:   7/11/2024.     TECHNIQUE:  Routine transaxial slices were obtained through the abdomen and pelvis  after the intravenous administration of Isovue 370. Reconstructed  coronal and sagittal images were also obtained. Automated exposure  control and iterative construction methods were used. No oral contrast  agent was administered for the exam.     FINDINGS:  Interval increase in size of  the bilateral pleural effusions since the  prior 7/11/2024 study. There is motion artifact on the study, especially  of scarring in the upper abdomen. There is gaseous greater distention of  the stomach. The right kidney is thought to be surgically absent. No  mechanical bowel obstruction. No pneumoperitoneum or pneumatosis. No  acute colitis or diverticulitis. No acute appendicitis is suggested.  Otherwise, no significant interval change is seen since the prior  7/11/2024 CT exam with additional findings as described in the prior  exam report.       Impression:      Increase in size of bilateral pleural effusions. Otherwise, no  significant interval change is identified since the prior exam.            Please note that portions of this note were completed with a voice  recognition program.                       Electronically Signed By-Moe Bourne MD On:7/19/2024 3:12 AM       CT Angiogram Chest Pulmonary Embolism [[id number]] Collected: 07/19/24 0308     Updated: 07/19/24 0312    Narrative:      CT ANGIOGRAM CHEST PULMONARY EMBOLISM-     Date of Exam: 7/19/2024 2:31 AM     Indication: hypoxia, chest pain.     Comparison: None available.     Technique: Serial and axial CT images of the chest were obtained  following the uneventful intravenous administration of 100 cc contrast.  Reconstructions in the coronal and sagittal planes were also performed.  In addition, a 3 D volume rendered image was obtained after post  processing. Automated exposure control and iterative reconstruction  methods were used.     FINDINGS:     Pulmonary Arteries: Excellent contrast opacification of the pulmonary  arteries.  No intraluminal filling defects to suggest pulmonary embolus.   The pulmonary arteries are normal in caliber.     Hilum and Mediastinum: No pathologically enlarged lymph nodes. The heart  appears enlarged..  No significant coronary artery atherosclerotic  disease. Unremarkable thoracic aorta.   No pericardial  effusion.     Lung Parenchyma and Pleura: Small to moderate-sized bilateral pleural  effusions are present. Intralobular septal thickening is present. Patchy  groundglass changes are present throughout the lungs bilaterally. Patchy  areas of airspace disease present within the left upper lobe and the  bilateral lower lobes..     Upper Abdomen: Unremarkable.     Soft tissues: Unremarkable.     Osseous structures: No aggressive focal lytic or sclerotic osseous  lesions.       Impression:         1. No evidence of pulmonary embolus.  2. Multifocal pneumonia with patchy airspace disease present, most  pronounced within the left upper lobe. Mild to moderate underlying  pulmonary edema pattern suspected versus groundglass airspace disease  with small to moderate-sized bilateral pleural effusions.                    Electronically Signed By-Lili Zaldivar MD On:7/19/2024 3:10 AM       XR Chest 1 View [[id number]] Collected: 07/19/24 0021     Updated: 07/19/24 0024    Narrative:      XR CHEST 1 VW    Date of Exam: 7/18/2024 11:53 PM EDT    Indication: dyspnea    Comparison: 7/11/2024.    Findings:  There are no airspace consolidations. No pleural fluid. No pneumothorax. The pulmonary vasculature appears indistinct. The heart appears enlarged. Subsegmental atelectatic changes and scarring present.. No acute osseous abnormality identified.      Impression:      Impression:  No significant change. Stable cardiomegaly with probable mild pulmonary edema pattern with subsegmental atelectatic changes and scarring.        Electronically Signed: Lili Zaldivar MD    7/19/2024 12:22 AM EDT    Workstation ID: [id number]            Cardiology      Results Review:  I have reviewed all clinical data, test, lab, and imaging results.       Schedule Meds  amLODIPine, 2.5 mg, Oral, Daily  apixaban, 5 mg, Oral, BID  atorvastatin, 10 mg, Oral, Nightly  cefepime, 2,000 mg, Intravenous, Q12H  lubiprostone, 24 mcg, Oral, BID  montelukast, 10 mg, Oral,  Nightly  sotalol, 80 mg, Oral, Q12H        Infusion Meds  [START ON 7/20/2024] Pharmacy to Dose Cefepime,   sodium chloride, 50 mL/hr, Last Rate: 50 mL/hr (07/19/24 0410)        PRN Meds    [START ON 7/20/2024] Pharmacy to Dose Cefepime      Assessment & Plan       Assessment    Possible aspiration pneumonia versus hospital-acquired pneumonia.  Patient is currently on 15 L oxygen via nasal cannula.  MRSA screen was negative    Developmental and intellectual delay.  Patient lives with his brothers    Recent hospital admission for possible pneumonia    S/p mitral valve replacement in the past    Plan    Discontinue IV cefepime  Start IV Zosyn 3.375 g every 8 hours  Consult pulmonary service to evaluate patient for possible bronchoscopy  Speech therapy consultation for swallow evaluation  Supportive care  A.m. labs  The case was discussed with the patient's brother at bedside    Stacie Hirsch MD  07/19/24  16:50 EDT    Note is dictated utilizing voice recognition software/Dragon

## 2024-07-19 NOTE — H&P
[facility] Medicine Services  History & Physical    Patient Name: Drake Beth  : 1950  MRN: [id number]  Primary Care Physician:  Amada Haskins MD  Date of admission: 2024  Date and Time of Service: 24 at 0400    Subjective      Chief Complaint: moaning     History of Present Illness: Drake Beth is a 73 y.o. male with a CMH of being mentally challenged patient nonverbal at baseline, presence of a cardiac pacemaker, paroxysmal atrial fibrillation, bioprosthetic mitral valve replacement, coronary artery disease status post CABG, hypertension, hyperlipidemia who presented to [facility] on 2024 with reports from family that he was moaning and grabbing at his chest and abdomen.  Review of records shows he was just admitted here 2024 to 7/15/2024 for likely bilateral pneumonia possible acute on chronic diastolic congestive heart failure paroxysmal atrial fibrillation and abdominal pain.  He was seen by both cardiology and gastroenterology.  CT at that time showed a large stool burden consistent with constipation he was treated with laxatives.  Family apparently refused a colonoscopy per note.  Patient is awake and alert.  He is cooperative and follows simple commands.  He could tell me he needed to use the bathroom.  Minimal information can be obtained from patient due to intellectual disability and nonverbal at baseline.  Family at bedside.  He continues to moan but does not appear to be in pain.  Initial ABG showed a pH of 7.308 pCO2 52.2 pO2 69.8.  He is currently tolerating 15 L high flow nasal cannula oxygen.  Troponin is 20 proBNP 3812, sodium 128 potassium 5.4 chloride 89 glucose 149 AST 44 bilirubin 2.1 lactate was initially 3.5 now 2.0 WBC 14.4 chest x-ray per radiology showed no significant change stable cardiomegaly with probable mild pulmonary edema pattern with subsegmental atelectatic changes and scarring.  With regard to no significant change  previous EKG dated 7/11/2024 showed findings most compatible with bilateral pneumonia.  CT abdomen and pelvis per radiology today shows increase in size of bilateral pleural effusions otherwise no significant interval changes identified since prior exam.  CTA chest per radiology today showed no PE multifocal pneumonia with patchy airspace disease present most pronounced within the left upper lobe mild to moderate underlying pulmonary edema pattern suspected versus groundglass airspace disease with small to moderate bilateral pleural effusions.  EKG today shows sinus rhythm heart rate 71.  Temperature was 99.3.  He was actually hypertensive in the emergency department.  He was given a DuoNeb treatment emergency department, 80 mg p.o. sotalol 40 mg IV Lasix.  Since he triggered sepsis he was started on IV cefepime and IV vancomycin with blood cultures urine ulcers pending. No  IV fluid bolus was given in emergency department due to hypertension and elevated proBNP.  Urinalysis today does show 3+ blood trace leukocytes 11-20 WBCs and trace bacteria.  Given his recent admission here for pneumonia and now triggering sepsis infectious disease has been consulted and he will be admitted for further evaluation and treatment.      Review of Systems   Unable to perform ROS: Patient nonverbal       Personal History     Past Medical History:   Diagnosis Date    Allergic rhinitis     Anemia     Atrial fibrillation     Chesty cough     CHF (congestive heart failure)     Coronary artery disease     Cough     Fever     Hyperlipidemia     Hypertension     Mentally challenged     MVP (mitral valve prolapse)        Past Surgical History:   Procedure Laterality Date    CARDIAC ELECTROPHYSIOLOGY PROCEDURE N/A 4/26/2022    Procedure: PACEMAKER EXTRACTION;  Surgeon: Rj Dominique MD;  Location: [facility] CATH INVASIVE LOCATION;  Service: Cardiology;  Laterality: N/A;    COLONOSCOPY      He has never had the test done.  His family is  refusing any form of colon cancer screening for him.    CORONARY ARTERY BYPASS GRAFT  2008    DR SWEAT    MITRAL VALVE REPLACEMENT  04/2008    WITH BIOPROSTHETIC TISSUE VALVE       Family History: family history includes Coronary artery disease (age of onset: 64) in his mother; Hyperlipidemia in his sister; Hypertension in his brother; Liver disease in his sister; Lung cancer in his sister; Other in his brother; Rheum arthritis in his brother and sister. Otherwise pertinent FHx was reviewed and not pertinent to current issue.    Social History:  reports that he has never smoked. He has never used smokeless tobacco. He reports that he does not drink alcohol and does not use drugs.    Home Medications:  Prior to Admission Medications       Prescriptions Last Dose Informant Patient Reported? Taking?    amLODIPine (NORVASC) 2.5 MG tablet   No No    TAKE 1 TABLET BY MOUTH DAILY    apixaban (Eliquis) 5 MG tablet tablet   No No    TAKE 1 TABLET BY MOUTH TWICE DAILY    atorvastatin (LIPITOR) 10 MG tablet   No No    TAKE 1 TABLET BY MOUTH DAILY    colestipol (COLESTID) 1 g tablet   No No    TAKE 1 TABLET BY MOUTH FOUR TIMES DAILY    linaclotide (LINZESS) 72 MCG capsule capsule   Yes No    Take 1 capsule by mouth Every Morning Before Breakfast.    montelukast (SINGULAIR) 10 MG tablet   No No    TAKE 1 TABLET BY MOUTH DAILY    sotalol (BETAPACE AF) 80 MG tablet tablet   No No    TAKE 1 TABLET BY MOUTH TWICE DAILY              Allergies:  No Known Allergies    Objective      Vitals:   Temp:  [97.6 °F (36.4 °C)-99.3 °F (37.4 °C)] 99.3 °F (37.4 °C)  Heart Rate:  [70-86] 78  Resp:  [16-20] 20  BP: (105-210)/() 169/95  Body mass index is 19.93 kg/m².  Physical Exam  Vitals reviewed.   Constitutional:       Appearance: Normal appearance.   HENT:      Head: Normocephalic and atraumatic.      Right Ear: External ear normal.      Left Ear: External ear normal.      Nose: Nose normal.      Mouth/Throat:      Mouth: Mucous  membranes are moist.   Eyes:      Extraocular Movements: Extraocular movements intact.   Cardiovascular:      Rate and Rhythm: Normal rate and regular rhythm.      Pulses: Normal pulses.      Heart sounds: Normal heart sounds.   Pulmonary:      Breath sounds: Wheezing present.      Comments: On 15 L non rebreather   Abdominal:      Palpations: Abdomen is soft.   Genitourinary:     Penis: Normal.    Musculoskeletal:         General: Normal range of motion.      Cervical back: Normal range of motion and neck supple.   Skin:     General: Skin is warm and dry.   Neurological:      Mental Status: He is alert. Mental status is at baseline.   Psychiatric:      Comments: Intellectual disability, cooperative, follows simple  commands          Diagnostic Data:  Lab Results (last 24 hours)       Procedure Component Value Units Date/Time    Blood Gas, Venous - [[id number]]  (Abnormal) Collected: 07/19/24 0339    Specimen: Venous Blood Updated: 07/19/24 0342     Site Left Radial     pH, Venous 7.313 pH Units      pCO2, Venous 51.3 mm Hg      pO2, Venous 143.5 mm Hg      HCO3, Venous 26.0 mmol/L      Base Excess, Venous -0.8 mmol/L      Comment: Serial Number: [id number]:  [id number]        O2 Saturation, Venous 99.0 %      CO2 Content 27.6 mmol/L      Barometric Pressure for Blood Gas --     Comment: N/A        Modality NRB     FIO2 80 %     High Sensitivity Troponin T 2Hr [[id number]] Collected: 07/19/24 0330    Specimen: Blood Updated: 07/19/24 0333    STAT Lactic Acid, Reflex [[id number]] Collected: 07/19/24 0330    Specimen: Blood Updated: 07/19/24 0333    Urinalysis, Microscopic Only - Straight Cath [[id number]]  (Abnormal) Collected: 07/19/24 0031    Specimen: Urine from Straight Cath Updated: 07/19/24 0129     RBC, UA Too Numerous to Count /HPF      WBC, UA 11-20 /HPF      Bacteria, UA Trace /HPF      Squamous Epithelial Cells, UA 0-2 /HPF      Hyaline Casts, UA 21-30 /LPF      Methodology Manual Light Microscopy    Urine  Culture - Urine, Straight Cath [[id number]] Collected: 07/19/24 0031    Specimen: Urine from Straight Cath Updated: 07/19/24 0129    Urinalysis With Culture If Indicated - Straight Cath [[id number]]  (Abnormal) Collected: 07/19/24 0031    Specimen: Urine from Straight Cath Updated: 07/19/24 0101     Color, UA Orange     Comment: Any Substance that causes an abnormal urine color can alter the accuracy of the chemical reactions.        Appearance, UA Turbid     pH, UA <=5.0     Specific Gravity, UA 1.024     Glucose, UA Negative     Ketones, UA Trace     Bilirubin, UA Negative     Blood, UA Large (3+)     Protein, UA >=300 mg/dL (3+)     Leuk Esterase, UA Trace     Nitrite, UA Negative     Urobilinogen, UA 1.0 E.U./dL    Narrative:      In absence of clinical symptoms, the presence of pyuria, bacteria, and/or nitrites on the urinalysis result does not correlate with infection.    Respiratory Panel PCR w/COVID-19(SARS-CoV-2) DIDI/[facility]/YING In-House, NP Swab in UTM/VTM, 2 HR TAT - Swab, Nasopharynx [[id number]]  (Normal) Collected: 07/18/24 2346    Specimen: Swab from Nasopharynx Updated: 07/19/24 0100     ADENOVIRUS, PCR Not Detected     Coronavirus 229E Not Detected     Coronavirus HKU1 Not Detected     Coronavirus NL63 Not Detected     Coronavirus OC43 Not Detected     COVID19 Not Detected     Human Metapneumovirus Not Detected     Human Rhinovirus/Enterovirus Not Detected     Influenza A PCR Not Detected     Influenza B PCR Not Detected     Parainfluenza Virus 1 Not Detected     Parainfluenza Virus 2 Not Detected     Parainfluenza Virus 3 Not Detected     Parainfluenza Virus 4 Not Detected     RSV, PCR Not Detected     Bordetella pertussis pcr Not Detected     Bordetella parapertussis PCR Not Detected     Chlamydophila pneumoniae PCR Not Detected     Mycoplasma pneumo by PCR Not Detected    Narrative:      In the setting of a positive respiratory panel with a viral infection PLUS a negative procalcitonin  without other underlying concern for bacterial infection, consider observing off antibiotics or discontinuation of antibiotics and continue supportive care. If the respiratory panel is positive for atypical bacterial infection (Bordetella pertussis, Chlamydophila pneumoniae, or Mycoplasma pneumoniae), consider antibiotic de-escalation to target atypical bacterial infection.    Comprehensive Metabolic Panel [[id number]]  (Abnormal) Collected: 07/18/24 2338    Specimen: Blood Updated: 07/19/24 0059     Glucose 149 mg/dL      BUN 15 mg/dL      Creatinine 1.00 mg/dL      Sodium 128 mmol/L      Potassium 5.4 mmol/L      Comment: Slight hemolysis detected by analyzer. Result may be falsely elevated.        Chloride 89 mmol/L      CO2 23.8 mmol/L      Calcium 9.3 mg/dL      Total Protein 7.9 g/dL      Albumin 4.2 g/dL      ALT (SGPT) 11 U/L      AST (SGOT) 44 U/L      Comment: Slight hemolysis detected by analyzer. Result may be falsely elevated.        Alkaline Phosphatase 83 U/L      Total Bilirubin 2.1 mg/dL      Globulin 3.7 gm/dL      A/G Ratio 1.1 g/dL      BUN/Creatinine Ratio 15.0     Anion Gap 15.2 mmol/L      eGFR 80.0 mL/min/1.73     Narrative:      GFR Normal >60  Chronic Kidney Disease <60  Kidney Failure <15    The GFR formula is only valid for adults with stable renal function between ages 18 and 70.    Blood Culture - Blood, Arm, Right [[id number]] Collected: 07/19/24 0053    Specimen: Blood from Arm, Right Updated: 07/19/24 0059    Blood Culture - Blood, Arm, Left [[id number]] Collected: 07/19/24 0053    Specimen: Blood from Arm, Left Updated: 07/19/24 0059    BNP [[id number]]  (Abnormal) Collected: 07/18/24 2338    Specimen: Blood Updated: 07/19/24 0051     proBNP 3,812.0 pg/mL     Narrative:      This assay is used as an aid in the diagnosis of individuals suspected of having heart failure. It can be used as an aid in the diagnosis of acute decompensated heart failure (ADHF) in patients presenting with signs  and symptoms of ADHF to the emergency department (ED). In addition, NT-proBNP of <300 pg/mL indicates ADHF is not likely.    Age Range Result Interpretation  NT-proBNP Concentration (pg/mL:      <50             Positive            >450                   Gray                 300-450                    Negative             <300    50-75           Positive            >900                  Gray                300-900                  Negative            <300      >75             Positive            >1800                  Gray                300-1800                  Negative            <300    High Sensitivity Troponin T [[id number]]  (Normal) Collected: 07/18/24 2338    Specimen: Blood Updated: 07/19/24 0051     HS Troponin T 20 ng/L     Narrative:      High Sensitive Troponin T Reference Range:  <14.0 ng/L- Negative Female for AMI  <22.0 ng/L- Negative Male for AMI  >=14 - Abnormal Female indicating possible myocardial injury.  >=22 - Abnormal Male indicating possible myocardial injury.   Clinicians would have to utilize clinical acumen, EKG, Troponin, and serial changes to determine if it is an Acute Myocardial Infarction or myocardial injury due to an underlying chronic condition.         Lipase [[id number]]  (Normal) Collected: 07/18/24 2338    Specimen: Blood Updated: 07/19/24 0051     Lipase 35 U/L     Blood Gas, Arterial - [[id number]]  (Abnormal) Collected: 07/18/24 2351    Specimen: Arterial Blood Updated: 07/18/24 2357     Site Right Radial     Ankur's Test Positive     pH, Arterial 7.308 pH units      pCO2, Arterial 52.2 mm Hg      pO2, Arterial 69.8 mm Hg      HCO3, Arterial 26.2 mmol/L      Base Excess, Arterial -0.9 mmol/L      Comment: Serial Number: [id number]:  [id number]        O2 Saturation, Arterial 91.7 %      CO2 Content 27.8 mmol/L      Barometric Pressure for Blood Gas --     Comment: N/A        Modality Cannula     FIO2 40 %      Hemodilution No     PO2/FIO2 175    POC Lactate [[id number]]   (Abnormal) Collected: 07/18/24 2351    Specimen: Blood Updated: 07/18/24 2353     Lactate 3.5 mmol/L      Comment: Serial Number: [id number]:  [id number]       CBC & Differential [[id number]]  (Abnormal) Collected: 07/18/24 2338    Specimen: Blood Updated: 07/18/24 2352    Narrative:      The following orders were created for panel order CBC & Differential.  Procedure                               Abnormality         Status                     ---------                               -----------         ------                     CBC Auto Differential[[id number]]        Abnormal            Final result                 Please view results for these tests on the individual orders.    CBC Auto Differential [[id number]]  (Abnormal) Collected: 07/18/24 2338    Specimen: Blood Updated: 07/18/24 2352     WBC 14.14 10*3/mm3      RBC 3.92 10*6/mm3      Hemoglobin 13.4 g/dL      Hematocrit 39.7 %      .3 fL      MCH 34.2 pg      MCHC 33.8 g/dL      RDW 13.6 %      RDW-SD 50.8 fl      MPV 9.3 fL      Platelets 226 10*3/mm3      Neutrophil % 85.1 %      Lymphocyte % 7.4 %      Monocyte % 4.7 %      Eosinophil % 0.6 %      Basophil % 0.4 %      Immature Grans % 1.8 %      Neutrophils, Absolute 12.03 10*3/mm3      Lymphocytes, Absolute 1.05 10*3/mm3      Monocytes, Absolute 0.66 10*3/mm3      Eosinophils, Absolute 0.09 10*3/mm3      Basophils, Absolute 0.06 10*3/mm3      Immature Grans, Absolute 0.25 10*3/mm3      nRBC 0.1 /100 WBC     [place] Draw [[id number]] Collected: 07/18/24 2338    Specimen: Blood Updated: 07/18/24 2346    Narrative:      The following orders were created for panel order [place] Draw.  Procedure                               Abnormality         Status                     ---------                               -----------         ------                     Green Top (Gel)[[id number]]                                  Final result               Lavender Top[[id number]]                                      Final result               Gold Top - SST[[id number]]                                   Final result               Light Blue Top[[id number]]                                   Final result                 Please view results for these tests on the individual orders.    Green Top (Gel) [[id number]] Collected: 07/18/24 2338    Specimen: Blood Updated: 07/18/24 2346     Extra Tube Hold for add-ons.     Comment: Auto resulted.       Lavender Top [[id number]] Collected: 07/18/24 2338    Specimen: Blood Updated: 07/18/24 2346     Extra Tube hold for add-on     Comment: Auto resulted       Gold Top - SST [[id number]] Collected: 07/18/24 2338    Specimen: Blood Updated: 07/18/24 2346     Extra Tube Hold for add-ons.     Comment: Auto resulted.       Light Blue Top [[id number]] Collected: 07/18/24 2338    Specimen: Blood Updated: 07/18/24 2346     Extra Tube Hold for add-ons.     Comment: Auto resulted                Imaging Results (Last 24 Hours)       Procedure Component Value Units Date/Time    CT Abdomen Pelvis With Contrast [[id number]] Collected: 07/19/24 0308     Updated: 07/19/24 0314    Narrative:         DATE OF EXAM:  7/19/2024, 2:31 A.M.     PROCEDURE:  CT ABDOMEN PELVIS W CONTRAST-     INDICATIONS:   abdominal pain, not otherwise specified     COMPARISON:   7/11/2024.     TECHNIQUE:  Routine transaxial slices were obtained through the abdomen and pelvis  after the intravenous administration of Isovue 370. Reconstructed  coronal and sagittal images were also obtained. Automated exposure  control and iterative construction methods were used. No oral contrast  agent was administered for the exam.     FINDINGS:  Interval increase in size of the bilateral pleural effusions since the  prior 7/11/2024 study. There is motion artifact on the study, especially  of scarring in the upper abdomen. There is gaseous greater distention of  the stomach. The right kidney is thought to be surgically absent. No  mechanical bowel  obstruction. No pneumoperitoneum or pneumatosis. No  acute colitis or diverticulitis. No acute appendicitis is suggested.  Otherwise, no significant interval change is seen since the prior  7/11/2024 CT exam with additional findings as described in the prior  exam report.       Impression:      Increase in size of bilateral pleural effusions. Otherwise, no  significant interval change is identified since the prior exam.            Please note that portions of this note were completed with a voice  recognition program.                       Electronically Signed By-Moe Bourne MD On:7/19/2024 3:12 AM       CT Angiogram Chest Pulmonary Embolism [[id number]] Collected: 07/19/24 0308     Updated: 07/19/24 0312    Narrative:      CT ANGIOGRAM CHEST PULMONARY EMBOLISM-     Date of Exam: 7/19/2024 2:31 AM     Indication: hypoxia, chest pain.     Comparison: None available.     Technique: Serial and axial CT images of the chest were obtained  following the uneventful intravenous administration of 100 cc contrast.  Reconstructions in the coronal and sagittal planes were also performed.  In addition, a 3 D volume rendered image was obtained after post  processing. Automated exposure control and iterative reconstruction  methods were used.     FINDINGS:     Pulmonary Arteries: Excellent contrast opacification of the pulmonary  arteries.  No intraluminal filling defects to suggest pulmonary embolus.   The pulmonary arteries are normal in caliber.     Hilum and Mediastinum: No pathologically enlarged lymph nodes. The heart  appears enlarged..  No significant coronary artery atherosclerotic  disease. Unremarkable thoracic aorta.   No pericardial effusion.     Lung Parenchyma and Pleura: Small to moderate-sized bilateral pleural  effusions are present. Intralobular septal thickening is present. Patchy  groundglass changes are present throughout the lungs bilaterally. Patchy  areas of airspace disease present within the left  upper lobe and the  bilateral lower lobes..     Upper Abdomen: Unremarkable.     Soft tissues: Unremarkable.     Osseous structures: No aggressive focal lytic or sclerotic osseous  lesions.       Impression:         1. No evidence of pulmonary embolus.  2. Multifocal pneumonia with patchy airspace disease present, most  pronounced within the left upper lobe. Mild to moderate underlying  pulmonary edema pattern suspected versus groundglass airspace disease  with small to moderate-sized bilateral pleural effusions.                    Electronically Signed By-Lili Zaldivar MD On:7/19/2024 3:10 AM       XR Chest 1 View [[id number]] Collected: 07/19/24 0021     Updated: 07/19/24 0024    Narrative:      XR CHEST 1 VW    Date of Exam: 7/18/2024 11:53 PM EDT    Indication: dyspnea    Comparison: 7/11/2024.    Findings:  There are no airspace consolidations. No pleural fluid. No pneumothorax. The pulmonary vasculature appears indistinct. The heart appears enlarged. Subsegmental atelectatic changes and scarring present.. No acute osseous abnormality identified.      Impression:      Impression:  No significant change. Stable cardiomegaly with probable mild pulmonary edema pattern with subsegmental atelectatic changes and scarring.        Electronically Signed: Lili Zaldivar MD    7/19/2024 12:22 AM EDT    Workstation ID: [id number]              Assessment & Plan        This is a 73 y.o. male with:    Active and Resolved Problems  Active Hospital Problems    Diagnosis  POA    **Sepsis [A41.9]  Yes     Priority: High    Acute respiratory failure with hypoxia and hypercapnia [J96.01, J96.02]  Yes     Priority: High    Acute on chronic diastolic heart failure [I50.33]  Yes     Priority: Medium    Acute UTI (urinary tract infection) [N39.0]  Yes     Priority: Medium    Pneumonia [J18.9]  Yes     Priority: Medium    Paroxysmal atrial fibrillation [I48.0]  Yes    Hyperkalemia [E87.5]  Yes    Hyponatremia [E87.1]  Yes     Hyperbilirubinemia [E80.6]  Yes    Presence of cardiac pacemaker [Z95.0]  Yes    Mitral valve replaced [Z95.2]  Not Applicable    Mentally challenged [F79]  Yes    Hypertension [I10]  Yes    Hyperlipidemia [E78.5]  Yes    Coronary artery disease [I25.10]  Yes      Resolved Hospital Problems   No resolved problems to display.     Sepsis, initial lactic 3.5 now 2.0 elevated WBC, likely secondary to continued bilateral pneumonia per CT and possible UTI, not hypotensive no fluid bolus given hypertension and CHF exacerbation normal saline at 50 cc/h, continue IV cefepime and IV vancomycin pharmacy to dose infectious disease consulted    Acute respiratory failure with hypoxia and hypercapnia, likely secondary to pneumonia and acute CHF, tolerating 15 L oxygen nonrebreather attempt to wean, albuterol as needed    Acute on chronic diastolic heart failure proBNP elevated, one-time dose 40 mg IV Lasix, shortness of air likely more secondary to pneumonia but consider cardiology consult if indicated    Acute UTI, trace bacteria see plan above acute urine cultures pending    Continued bilateral pneumonia, see plan above    Paroxysmal atrial fibrillation, EKG shows sinus rhythm, on home sotalol and Eliquis reordered continuous cardiac monitoring    Hyperkalemia potassium 5.4 one-time dose Lokelma    Hyponatremia sodium 128, normal saline at 50 cc/h repeat BMP    Hyperbilirubinemia, likely secondary to sepsis, CT abdomen unchanged from previous exam last admission, seen by gastroenterology diagnosed with constipation on Linzess    Presence of a cardiac pacemaker, noted infectious disease consulted    Mitral valve replacement noted    Mentally challenged, noted cooperative follows simple commands    Hypertension was hypertensive in ED was given home dose of sotalol reordered Norvasc and home sotalol    Hyper lipidemia, on statin    Coronary artery disease status post CABG, troponin not elevated EKG no ischemic changes, continuous  cardiac monitoring      VTE Prophylaxis:  No VTE prophylaxis order currently exists.        The patient desires to be as follows:    CODE STATUS:    Code Status (Patient has no pulse and is not breathing): CPR (Attempt to Resuscitate)  Medical Interventions (Patient has pulse or is breathing): Full Support            Admission Status:  I believe this patient meets inpatient  status.    Expected Length of Stay: pending clinical course     PDMP and Medication Dispenses via Sidebar reviewed and consistent with patient reported medications.    I discussed the patient's findings and my recommendations with patient and nursing staff.      Signature:     This document has been electronically signed by IZAIAH Sethi on July 19, 2024 03:58 EDT   [facility]

## 2024-07-20 LAB
ALBUMIN SERPL-MCNC: 3.3 G/DL (ref 3.5–5.2)
ALBUMIN/GLOB SERPL: 1.4 G/DL
ALP SERPL-CCNC: 58 U/L (ref 39–117)
ALT SERPL W P-5'-P-CCNC: 12 U/L (ref 1–41)
ANION GAP SERPL CALCULATED.3IONS-SCNC: 10.4 MMOL/L (ref 5–15)
AST SERPL-CCNC: 35 U/L (ref 1–40)
BACTERIA SPEC AEROBE CULT: NO GROWTH
BASOPHILS # BLD AUTO: 0.04 10*3/MM3 (ref 0–0.2)
BASOPHILS NFR BLD AUTO: 0.3 % (ref 0–1.5)
BILIRUB SERPL-MCNC: 1.5 MG/DL (ref 0–1.2)
BUN SERPL-MCNC: 18 MG/DL (ref 8–23)
BUN/CREAT SERPL: 22 (ref 7–25)
CALCIUM SPEC-SCNC: 8.1 MG/DL (ref 8.6–10.5)
CHLORIDE SERPL-SCNC: 97 MMOL/L (ref 98–107)
CO2 SERPL-SCNC: 27.6 MMOL/L (ref 22–29)
CREAT SERPL-MCNC: 0.82 MG/DL (ref 0.76–1.27)
DEPRECATED RDW RBC AUTO: 50.2 FL (ref 37–54)
EGFRCR SERPLBLD CKD-EPI 2021: 93.3 ML/MIN/1.73
EOSINOPHIL # BLD AUTO: 0.38 10*3/MM3 (ref 0–0.4)
EOSINOPHIL NFR BLD AUTO: 3 % (ref 0.3–6.2)
ERYTHROCYTE [DISTWIDTH] IN BLOOD BY AUTOMATED COUNT: 13.9 % (ref 12.3–15.4)
GLOBULIN UR ELPH-MCNC: 2.4 GM/DL
GLUCOSE BLDC GLUCOMTR-MCNC: 59 MG/DL (ref 70–105)
GLUCOSE BLDC GLUCOMTR-MCNC: 74 MG/DL (ref 70–105)
GLUCOSE SERPL-MCNC: 68 MG/DL (ref 65–99)
HCT VFR BLD AUTO: 31.1 % (ref 37.5–51)
HGB BLD-MCNC: 10.3 G/DL (ref 13–17.7)
IMM GRANULOCYTES # BLD AUTO: 0.04 10*3/MM3 (ref 0–0.05)
IMM GRANULOCYTES NFR BLD AUTO: 0.3 % (ref 0–0.5)
LYMPHOCYTES # BLD AUTO: 0.77 10*3/MM3 (ref 0.7–3.1)
LYMPHOCYTES NFR BLD AUTO: 6.1 % (ref 19.6–45.3)
MCH RBC QN AUTO: 33.2 PG (ref 26.6–33)
MCHC RBC AUTO-ENTMCNC: 33.1 G/DL (ref 31.5–35.7)
MCV RBC AUTO: 100.3 FL (ref 79–97)
MONOCYTES # BLD AUTO: 1.27 10*3/MM3 (ref 0.1–0.9)
MONOCYTES NFR BLD AUTO: 10 % (ref 5–12)
NEUTROPHILS NFR BLD AUTO: 10.16 10*3/MM3 (ref 1.7–7)
NEUTROPHILS NFR BLD AUTO: 80.3 % (ref 42.7–76)
NRBC BLD AUTO-RTO: 0 /100 WBC (ref 0–0.2)
PLATELET # BLD AUTO: 166 10*3/MM3 (ref 140–450)
PMV BLD AUTO: 9.4 FL (ref 6–12)
POTASSIUM SERPL-SCNC: 3.3 MMOL/L (ref 3.5–5.2)
PROT SERPL-MCNC: 5.7 G/DL (ref 6–8.5)
RBC # BLD AUTO: 3.1 10*6/MM3 (ref 4.14–5.8)
SODIUM SERPL-SCNC: 135 MMOL/L (ref 136–145)
WBC NRBC COR # BLD AUTO: 12.66 10*3/MM3 (ref 3.4–10.8)

## 2024-07-20 PROCEDURE — 85025 COMPLETE CBC W/AUTO DIFF WBC: CPT | Performed by: NURSE PRACTITIONER

## 2024-07-20 PROCEDURE — 82948 REAGENT STRIP/BLOOD GLUCOSE: CPT

## 2024-07-20 PROCEDURE — 25810000003 SODIUM CHLORIDE 0.9 % SOLUTION: Performed by: NURSE PRACTITIONER

## 2024-07-20 PROCEDURE — 25010000002 PIPERACILLIN SOD-TAZOBACTAM PER 1 G: Performed by: INTERNAL MEDICINE

## 2024-07-20 PROCEDURE — 92610 EVALUATE SWALLOWING FUNCTION: CPT

## 2024-07-20 PROCEDURE — 80053 COMPREHEN METABOLIC PANEL: CPT | Performed by: INTERNAL MEDICINE

## 2024-07-20 RX ADMIN — LUBIPROSTONE 24 MCG: 24 CAPSULE, GELATIN COATED ORAL at 20:34

## 2024-07-20 RX ADMIN — ATORVASTATIN CALCIUM 10 MG: 10 TABLET, FILM COATED ORAL at 20:34

## 2024-07-20 RX ADMIN — SODIUM CHLORIDE 50 ML/HR: 9 INJECTION, SOLUTION INTRAVENOUS at 15:21

## 2024-07-20 RX ADMIN — AMLODIPINE BESYLATE 2.5 MG: 5 TABLET ORAL at 07:44

## 2024-07-20 RX ADMIN — MONTELUKAST 10 MG: 10 TABLET, FILM COATED ORAL at 20:34

## 2024-07-20 RX ADMIN — PIPERACILLIN AND TAZOBACTAM 3.38 G: 3; .375 INJECTION, POWDER, FOR SOLUTION INTRAVENOUS at 15:05

## 2024-07-20 RX ADMIN — APIXABAN 5 MG: 5 TABLET, FILM COATED ORAL at 20:34

## 2024-07-20 RX ADMIN — LUBIPROSTONE 24 MCG: 24 CAPSULE, GELATIN COATED ORAL at 07:44

## 2024-07-20 RX ADMIN — PIPERACILLIN AND TAZOBACTAM 3.38 G: 3; .375 INJECTION, POWDER, FOR SOLUTION INTRAVENOUS at 23:20

## 2024-07-20 RX ADMIN — SOTALOL HYDROCHLORIDE 80 MG: 80 TABLET ORAL at 07:44

## 2024-07-20 RX ADMIN — APIXABAN 5 MG: 5 TABLET, FILM COATED ORAL at 07:44

## 2024-07-20 RX ADMIN — SOTALOL HYDROCHLORIDE 80 MG: 80 TABLET ORAL at 20:34

## 2024-07-20 RX ADMIN — PIPERACILLIN AND TAZOBACTAM 3.38 G: 3; .375 INJECTION, POWDER, FOR SOLUTION INTRAVENOUS at 07:45

## 2024-07-20 NOTE — PROGRESS NOTES
Infectious Diseases Progress Note      LOS: 1 day   Patient Care Team:  Amada Haskins MD as PCP - General  Josr Rodriguez DPM as Consulting Physician (Podiatry)  Rj Dominique MD as Consulting Physician (Cardiology)    Chief Complaint: Unable to complete    Subjective     Patient had no fever during the last 24 hours.  He remained hemodynamically stable.  Currently on 5 L of oxygen     Review of Systems:   Review of Systems   Unable to perform ROS: Mental status change        Objective     Vital Signs  Temp:  [97.3 °F (36.3 °C)-98 °F (36.7 °C)] 97.9 °F (36.6 °C)  Heart Rate:  [62-71] 71  Resp:  [13-20] 19  BP: (125-146)/(66-74) 130/67    Physical Exam:  Physical Exam  HENT:      Head: Normocephalic and atraumatic.      Mouth/Throat:      Mouth: Mucous membranes are moist.   Eyes:      Pupils: Pupils are equal, round, and reactive to light.   Cardiovascular:      Rate and Rhythm: Normal rate and regular rhythm.   Pulmonary:      Breath sounds: Rales present.   Abdominal:      General: Abdomen is flat. Bowel sounds are normal.      Palpations: Abdomen is soft.   Musculoskeletal:      Cervical back: Neck supple.      Right lower leg: No edema.      Left lower leg: No edema.   Neurological:      Mental Status: He is disoriented.          Results Review:    I have reviewed all clinical data, test, lab, and imaging results.     Radiology  No Radiology Exams Resulted Within Past 24 Hours    Cardiology    Laboratory    Results from last 7 days   Lab Units 07/20/24  0543 07/18/24  2338 07/15/24  0602   WBC 10*3/mm3 12.66* 14.14* 11.57*   HEMOGLOBIN g/dL 10.3* 13.4 11.9*   HEMATOCRIT % 31.1* 39.7 35.4*   PLATELETS 10*3/mm3 166 226 192     Results from last 7 days   Lab Units 07/20/24  0543 07/18/24  2338 07/15/24  0602   SODIUM mmol/L 135* 128* 135*   POTASSIUM mmol/L 3.3* 5.4* 4.0   CHLORIDE mmol/L 97* 89* 97*   CO2 mmol/L 27.6 23.8 30.5*   BUN mg/dL 18 15 12   CREATININE mg/dL 0.82 1.00 0.76   GLUCOSE mg/dL 68  149* 89   ALBUMIN g/dL 3.3* 4.2 3.5   BILIRUBIN mg/dL 1.5* 2.1* 0.6   ALK PHOS U/L 58 83 64   AST (SGOT) U/L 35 44* 28   ALT (SGPT) U/L 12 11 6   LIPASE U/L  --  35  --    CALCIUM mg/dL 8.1* 9.3 8.9                 Microbiology   Microbiology Results (last 10 days)       Procedure Component Value - Date/Time    MRSA Screen, PCR (Inpatient) - Swab, Nares [[id number]]  (Normal) Collected: 07/19/24 0520    Lab Status: Final result Specimen: Swab from Nares Updated: 07/19/24 0634     MRSA PCR No MRSA Detected    Narrative:      The negative predictive value of this diagnostic test is high and should only be used to consider de-escalating anti-MRSA therapy. A positive result may indicate colonization with MRSA and must be correlated clinically.    Blood Culture - Blood, Arm, Right [[id number]]  (Normal) Collected: 07/19/24 0053    Lab Status: Preliminary result Specimen: Blood from Arm, Right Updated: 07/20/24 0101     Blood Culture No growth at 24 hours    Narrative:      Less than seven (7) mL's of blood was collected.  Insufficient quantity may yield false negative results.    Blood Culture - Blood, Arm, Left [[id number]]  (Normal) Collected: 07/19/24 0053    Lab Status: Preliminary result Specimen: Blood from Arm, Left Updated: 07/20/24 0101     Blood Culture No growth at 24 hours    Narrative:      Less than seven (7) mL's of blood was collected.  Insufficient quantity may yield false negative results.    Urine Culture - Urine, Straight Cath [[id number]]  (Normal) Collected: 07/19/24 0031    Lab Status: Final result Specimen: Urine from Straight Cath Updated: 07/20/24 1127     Urine Culture No growth    Respiratory Panel PCR w/COVID-19(SARS-CoV-2) [facility] In-House, NP Swab in UTM/VTM, 2 HR TAT - Swab, Nasopharynx [[id number]]  (Normal) Collected: 07/18/24 2346    Lab Status: Final result Specimen: Swab from Nasopharynx Updated: 07/19/24 0100     ADENOVIRUS, PCR Not Detected     Coronavirus 229E Not  Detected     Coronavirus HKU1 Not Detected     Coronavirus NL63 Not Detected     Coronavirus OC43 Not Detected     COVID19 Not Detected     Human Metapneumovirus Not Detected     Human Rhinovirus/Enterovirus Not Detected     Influenza A PCR Not Detected     Influenza B PCR Not Detected     Parainfluenza Virus 1 Not Detected     Parainfluenza Virus 2 Not Detected     Parainfluenza Virus 3 Not Detected     Parainfluenza Virus 4 Not Detected     RSV, PCR Not Detected     Bordetella pertussis pcr Not Detected     Bordetella parapertussis PCR Not Detected     Chlamydophila pneumoniae PCR Not Detected     Mycoplasma pneumo by PCR Not Detected    Narrative:      In the setting of a positive respiratory panel with a viral infection PLUS a negative procalcitonin without other underlying concern for bacterial infection, consider observing off antibiotics or discontinuation of antibiotics and continue supportive care. If the respiratory panel is positive for atypical bacterial infection (Bordetella pertussis, Chlamydophila pneumoniae, or Mycoplasma pneumoniae), consider antibiotic de-escalation to target atypical bacterial infection.    Legionella Antigen, Urine - Urine, Urine, Clean Catch [[id number]]  (Normal) Collected: 07/14/24 1708    Lab Status: Final result Specimen: Urine, Clean Catch Updated: 07/14/24 1733     LEGIONELLA ANTIGEN, URINE Negative    S. Pneumo Ag Urine or CSF - Urine, Urine, Clean Catch [[id number]]  (Normal) Collected: 07/14/24 1708    Lab Status: Final result Specimen: Urine, Clean Catch Updated: 07/14/24 1733     Strep Pneumo Ag Negative    Respiratory Panel PCR w/COVID-19(SARS-CoV-2) [facility] In-House, NP Swab in UTM/VTM, 2 HR TAT - Swab, Nasopharynx [[id number]]  (Normal) Collected: 07/11/24 1655    Lab Status: Final result Specimen: Swab from Nasopharynx Updated: 07/11/24 1845     ADENOVIRUS, PCR Not Detected     Coronavirus 229E Not Detected     Coronavirus HKU1 Not Detected      Coronavirus NL63 Not Detected     Coronavirus OC43 Not Detected     COVID19 Not Detected     Human Metapneumovirus Not Detected     Human Rhinovirus/Enterovirus Not Detected     Influenza A PCR Not Detected     Influenza B PCR Not Detected     Parainfluenza Virus 1 Not Detected     Parainfluenza Virus 2 Not Detected     Parainfluenza Virus 3 Not Detected     Parainfluenza Virus 4 Not Detected     RSV, PCR Not Detected     Bordetella pertussis pcr Not Detected     Bordetella parapertussis PCR Not Detected     Chlamydophila pneumoniae PCR Not Detected     Mycoplasma pneumo by PCR Not Detected    Narrative:      In the setting of a positive respiratory panel with a viral infection PLUS a negative procalcitonin without other underlying concern for bacterial infection, consider observing off antibiotics or discontinuation of antibiotics and continue supportive care. If the respiratory panel is positive for atypical bacterial infection (Bordetella pertussis, Chlamydophila pneumoniae, or Mycoplasma pneumoniae), consider antibiotic de-escalation to target atypical bacterial infection.    Blood Culture - Blood, Arm, Left [[id number]]  (Normal) Collected: 07/11/24 1650    Lab Status: Final result Specimen: Blood from Arm, Left Updated: 07/16/24 1700     Blood Culture No growth at 5 days    Narrative:      Less than seven (7) mL's of blood was collected.  Insufficient quantity may yield false negative results.    Blood Culture - Blood, Arm, Left [[id number]]  (Normal) Collected: 07/11/24 1650    Lab Status: Final result Specimen: Blood from Arm, Left Updated: 07/16/24 1700     Blood Culture No growth at 5 days    Narrative:      Less than seven (7) mL's of blood was collected.  Insufficient quantity may yield false negative results.            Medication Review:       Schedule Meds  amLODIPine, 2.5 mg, Oral, Daily  apixaban, 5 mg, Oral, BID  atorvastatin, 10 mg, Oral, Nightly  lubiprostone, 24 mcg, Oral, BID  montelukast, 10 mg,  Oral, Nightly  piperacillin-tazobactam, 3.375 g, Intravenous, Q8H  sotalol, 80 mg, Oral, Q12H        Infusion Meds  sodium chloride, 50 mL/hr, Last Rate: 50 mL/hr (07/20/24 1521)        PRN Meds          Assessment & Plan       Antimicrobial Therapy   1.  IV Zosyn        2.        3.        4.        5.              Assessment     Possible aspiration pneumonia versus hospital-acquired pneumonia.  Patient is currently on 5 L oxygen via nasal cannula.  MRSA screen was negative.  Speech therapy saw the patient and cleared him for diet     Developmental and intellectual delay.  Patient lives with his brothers     Recent hospital admission for possible pneumonia     S/p mitral valve replacement in the past     Plan     Continue IV Zosyn 3.375 g every 8 hours  Consult pulmonary service to evaluate patient for possible bronchoscopy if no improvement in condition  A.m. labs  Supportive care        Stacie Hirsch MD  07/20/24  17:35 EDT    Note is dictated utilizing voice recognition software/Dragon

## 2024-07-20 NOTE — THERAPY EVALUATION
Acute Care - Speech Language Pathology   Swallow Initial Evaluation [facility]     Patient Name: Drake Beth  : 1950  MRN: [id number]  Today's Date: 2024               Admit Date: 2024    Visit Dx:     ICD-10-CM ICD-9-CM   1. Pneumonia of both lungs due to infectious organism, unspecified part of lung  J18.9 483.8   2. Generalized abdominal pain  R10.84 789.07   3. Dyspnea, unspecified type  R06.00 786.09     Patient Active Problem List   Diagnosis    Allergic rhinitis    Anemia    Atrial fibrillation    Congestive heart failure    Coronary artery disease    Subacute cough    Hyperlipidemia    Hypertension    Medicare annual wellness visit, subsequent    Mentally challenged    Prostate cancer screening    Pacemaker infection    Mitral valve replaced    Gingivitis, chronic    Acute URI    Atrial flutter with controlled response    Pneumonia    Sepsis    Acute on chronic diastolic heart failure    Paroxysmal atrial fibrillation    Acute respiratory failure with hypoxia and hypercapnia    Hyperkalemia    Hyponatremia    Hyperbilirubinemia    Acute UTI (urinary tract infection)    Presence of cardiac pacemaker     Past Medical History:   Diagnosis Date    Allergic rhinitis     Anemia     Atrial fibrillation     Chesty cough     CHF (congestive heart failure)     Coronary artery disease     Cough     Fever     Hyperlipidemia     Hypertension     Mentally challenged     MVP (mitral valve prolapse)      Past Surgical History:   Procedure Laterality Date    CARDIAC ELECTROPHYSIOLOGY PROCEDURE N/A 2022    Procedure: PACEMAKER EXTRACTION;  Surgeon: Rj Dominique MD;  Location: [facility] INVASIVE LOCATION;  Service: Cardiology;  Laterality: N/A;    COLONOSCOPY      He has never had the test done.  His family is refusing any form of colon cancer screening for him.    CORONARY ARTERY BYPASS GRAFT      DR SWEAT    MITRAL VALVE REPLACEMENT  2008    WITH BIOPROSTHETIC TISSUE VALVE       SLP  "Recommendation and Plan  SLP Swallowing Diagnosis: swallow WFL/no suspected pharyngeal impairment (07/20/24 1600)  SLP Diet Recommendation: regular textures, thin liquids (07/20/24 1600)  Recommended Precautions and Strategies: upright posture during/after eating, general aspiration precautions (07/20/24 1600)  SLP Rec. for Method of Medication Administration: as tolerated (07/20/24 1600)     Monitor for Signs of Aspiration: yes, notify SLP if any concerns (07/20/24 1600)  Recommended Diagnostics: No further SLP services recommended (07/20/24 1600)  Swallow Criteria for Skilled Therapeutic Interventions Met: no problems identified which require skilled intervention (07/20/24 1600)  Anticipated Discharge Disposition (SLP): unknown (07/20/24 1600)     Therapy Frequency (Swallow): evaluation only (07/20/24 1600)     Oral Care Recommendations: Oral Care before breakfast, after meals and PRN (07/20/24 1600)        Daily Summary of Progress (SLP): prepare for discharge (07/20/24 1600)          Plan for Continued Treatment (SLP): continue treatment per plan of care (07/20/24 1600)       SWALLOW EVALUATION (Last 72 Hours)       SLP Adult Swallow Evaluation       Row Name 07/20/24 1600       Rehab Evaluation    Document Type evaluation  -MS    Subjective Information no complaints  -MS    Patient Observations agree to therapy;alert;cooperative  -MS    Patient/Family/Caregiver Comments/Observations No family at bedside.  -MS    Patient Effort adequate  -MS    Symptoms Noted During/After Treatment none  -MS       General Information    Patient Profile Reviewed yes  -MS    Pertinent History Of Current Problem History provided by family and EMS-sister at bedside.   Patient is a 73-year-old male who is nonverbal, presents the emergency department for evaluation of elevated blood pressure, \"moaning and grabbing at chest and upper abdomen\". ST consulted for dysphagia evaluation per MD orders.    -MS    Current Method of Nutrition " regular textures;thin liquids  -MS    Precautions/Limitations, Vision WFL  -MS    Precautions/Limitations, Hearing WFL  -MS    Prior Level of Function-Communication WFL  -MS    Prior Level of Function-Swallowing no diet consistency restrictions;safe, efficient swallowing in all situations  -MS    Plans/Goals Discussed with patient  -MS    Barriers to Rehab none identified  -MS    Patient's Goals for Discharge patient did not state  -MS       Pain    Additional Documentation Pain Scale: FACES Pre/Post-Treatment (Group)  -MS       Pain Scale: FACES Pre/Post-Treatment    Pain: FACES Scale, Pretreatment 0-->no hurt  -MS    Posttreatment Pain Rating 0-->no hurt  -MS       Oral Motor Structure and Function    Dentition Assessment teeth are in poor condition  -MS    Secretion Management WNL/WFL  -MS    Mucosal Quality moist, healthy  -MS    Volitional Swallow WFL  -MS    Volitional Cough WFL  -MS       Oral Musculature and Cranial Nerve Assessment    Oral Motor General Assessment WFL  -MS       General Eating/Swallowing Observations    Respiratory Support Currently in Use nasal cannula  -MS    O2 Liters 5L  -MS    Eating/Swallowing Skills self-fed;fed by SLP  -MS    Positioning During Eating upright 90 degree;upright in bed  -MS    Utensils Used spoon;cup;straw  -MS    Consistencies Trialed regular textures;soft to chew textures;mixed consistency;mechanical ground textures;pureed;ice chips;thin liquids  -MS       Respiratory    Respiratory Status WFL  -MS       Clinical Swallow Eval    Oral Prep Phase WFL  -MS    Oral Transit WFL  -MS    Oral Residue WFL  -MS    Pharyngeal Phase no overt signs/symptoms of pharyngeal impairment  -MS    Esophageal Phase unremarkable  -MS    Clinical Swallow Evaluation Summary Clinical swallow eval completed. Pt was awake and alert, able to follow commands w/visual cue, oriented x1. Pt has intellectual disability as noted in chart. Upon room entry, pt was awake lying in bed, no family at  bedside. SLP and nursing staff adjusted pt to sitting at 90 degrees for swallow evaluation. Pt was on a regular diet and thin liquids at the time of evaluation. Poor natural dentition, missing some teeth. Pt reported regular/thin liquid is baseline diet and denied swallowing difficulty. Last saw by ST department here on 7/13/24 for dysphagia evaluation in which we recommended regular diet and thin liquids. Oral motor exam unremarkable. Trials assessed: ice chips x1, thin by cup x2, thin by straw x2, puree x2, mixed/mech soft x2, STC x2, and regular x2. Pt had good bolus control of ice chip, able to self-feed. Adequate labial seal w/straw. No anterior loss. Adequate mastication during all trials. Oral transit appeared timely. There was no pocketing or residue. Digital palpation suggests adequate hyolaryngeal excursion. Pt demonstrated no overt s/s of penetration/aspiration throughout the evaluation. Vocal quality remained clear. Per above, pt presents w/ no dysphagia and swallow is WNL. Recommending regular diet and thin liquids. Pt able to self-feed. Meds as tolerated. No further ST services indicated at this time. Re-consult if needed.      SLP Plan & Recommendation:      - Regular diet & thin liquids  - Only feed when pt is fully awake and alert  - Meds: as tolerated   - Safe swallow strategies: HOB at a 90 degree angle, slow rate of intake, small bites/sips  -Oral care at least x2 daily   -No further ST services indicated at this time.    -MS       SLP Evaluation Clinical Impression    SLP Swallowing Diagnosis swallow WFL/no suspected pharyngeal impairment  -MS    Functional Impact no impact on function  -MS    Swallow Criteria for Skilled Therapeutic Interventions Met no problems identified which require skilled intervention  -MS       SLP Treatment Clinical Impressions    Daily Summary of Progress (SLP) prepare for discharge  -MS    Plan for Continued Treatment (SLP) continue treatment per plan of care  -MS     Care Plan Review evaluation/treatment results reviewed  -MS       Recommendations    Therapy Frequency (Swallow) evaluation only  -MS    SLP Diet Recommendation regular textures;thin liquids  -MS    Recommended Diagnostics No further SLP services recommended  -MS    Recommended Precautions and Strategies upright posture during/after eating;general aspiration precautions  -MS    Oral Care Recommendations Oral Care before breakfast, after meals and PRN  -MS    SLP Rec. for Method of Medication Administration as tolerated  -MS    Monitor for Signs of Aspiration yes;notify SLP if any concerns  -MS    Anticipated Discharge Disposition (SLP) unknown  -MS              User Key  (r) = Recorded By, (t) = Taken By, (c) = Cosigned By      Initials Name Effective Dates    Richie Ray, SLP 04/22/24 -                     RENAN Florian  7/20/2024

## 2024-07-20 NOTE — PROGRESS NOTES
Enter Query Response Below      Query Response:   Chronic diastolic congestive heart failure           If applicable, please update the problem list.       Patient: Drake Beth        : 1950  Account: [id number]           Admit Date:         How to Respond to this query:       a. Click New Note     b. Answer query within the yellow box.                c. Update the Problem List, if applicable.      If you have any questions about this query contact me at: [email or web address]"     Dr. Zayas:     History of congestive heart failure.  Transthoracic echo states ejection fraction appears to be 51-55%. Patient maintained on norvasc po daily, received lasix 40mg iv x1.  proBNP 1,485.0.  Cardiology EP progress note states BNP is elevated but clinically does not seem to be in CHF.     Please clarify the type of heart failure treated/monitored:     Chronic diastolic heart failure/HFpEF  Other- specify_________  Unable to determine.    By submitting this query, we are merely seeking further clarification of documentation to accurately reflect all conditions that you are monitoring, evaluating, treating or that extend the hospitalization or utilize additional resources of care. Please utilize your independent clinical judgment when addressing the question(s) above.     This query and your response, once completed, will be entered into the legal medical record.    Sincerely,  Inga GRIER RN BSN   Clinical Documentation Integrity Program   [email or web address]

## 2024-07-20 NOTE — PLAN OF CARE
Problem: Adult Inpatient Plan of Care  Goal: Absence of Hospital-Acquired Illness or Injury  Intervention: Prevent Skin Injury  Recent Flowsheet Documentation  Taken 7/20/2024 0800 by Latha Encinas, RN  Body Position:   turned   weight shifting     Goal Outcome Evaluation:  Plan of Care Reviewed With: patient        Progress: no change

## 2024-07-20 NOTE — PLAN OF CARE
Goal Outcome Evaluation:   Clinical swallow eval completed. Pt was awake and alert, able to follow commands w/visual cue, oriented x1. Pt has intellectual disability as noted in chart. Upon room entry, pt was awake lying in bed, no family at bedside. SLP and nursing staff adjusted pt to sitting at 90 degrees for swallow evaluation. Pt was on a regular diet and thin liquids at the time of evaluation. Poor natural dentition, missing some teeth. Pt reported regular/thin liquid is baseline diet and denied swallowing difficulty. Last saw by ST department here on 7/13/24 for dysphagia evaluation in which we recommended regular diet and thin liquids. Oral motor exam unremarkable. Trials assessed: ice chips x1, thin by cup x2, thin by straw x2, puree x2, mixed/mech soft x2, STC x2, and regular x2. Pt had good bolus control of ice chip, able to self-feed. Adequate labial seal w/straw. No anterior loss. Adequate mastication during all trials. Oral transit appeared timely. There was no pocketing or residue. Digital palpation suggests adequate hyolaryngeal excursion. Pt demonstrated no overt s/s of penetration/aspiration throughout the evaluation. Vocal quality remained clear. Per above, pt presents w/ no dysphagia and swallow is WNL. Recommending regular diet and thin liquids. Pt able to self-feed. Meds as tolerated. No further ST services indicated at this time. Re-consult if needed.      SLP Plan & Recommendation:      - Regular diet & thin liquids  - Only feed when pt is fully awake and alert  - Meds: as tolerated   - Safe swallow strategies: HOB at a 90 degree angle, slow rate of intake, small bites/sips  -Oral care at least x2 daily   -No further ST services indicated at this time.    -MS    Anticipated Discharge Disposition (SLP): unknown          SLP Swallowing Diagnosis: swallow WFL/no suspected pharyngeal impairment (07/20/24 1600)        Plan for Continued Treatment (SLP): continue treatment per plan of care  (07/20/24 4140)

## 2024-07-20 NOTE — OUTREACH NOTE
COPD/PN Week 2 Survey      Flowsheet Row Responses   [facility] facility patient discharged from? [facility]   Does the patient have one of the following disease processes/diagnoses(primary or secondary)? Pneumonia   Week 2 attempt successful? No   Unsuccessful attempts Attempt 1   Revoke Readmitted            ANNE MARIE BLADERAS - Registered Nurse

## 2024-07-20 NOTE — PROGRESS NOTES
.      [facility] MEDICINE SERVICE  DAILY PROGRESS NOTE    NAME: Drake Beth  : 1950  MRN: [id number]      LOS: 1 day     PROVIDER OF SERVICE: Briseyda Cason MD    Chief Complaint: Sepsis    Subjective:     Interval History:  History taken from: patient  Patient Complaints: seen and examined at bedside this morning, oxygen requirements are coming down.  He continues to remain nonverbal, He is alert and oriented x 1, does not know why he is in the hospital  Patient Denies:       Review of Systems: Negative except as described above  Review of Systems     Objective:     Vital Signs  Temp:  [97.3 °F (36.3 °C)-98 °F (36.7 °C)] 97.3 °F (36.3 °C)  Heart Rate:  [58-69] 69  Resp:  [13-20] 14  BP: (107-146)/(55-74) 125/74  Flow (L/min):  [4-5] 5   Body mass index is 20.24 kg/m².    Physical Exam Constitutional:       Appearance: Normal appearance.   HENT:      Head: Normocephalic and atraumatic.      Right Ear: External ear normal.      Left Ear: External ear normal.      Nose: Nose normal.      Mouth/Throat:      Mouth: Mucous membranes are moist.   Eyes:      Extraocular Movements: Extraocular movements intact.   Cardiovascular:      Rate and Rhythm: Normal rate and regular rhythm.      Pulses: Normal pulses.      Heart sounds: Normal heart sounds.   Pulmonary:      Breath sounds: wnl     Comments: On 5l nc  Abdominal:      Palpations: Abdomen is soft.   Genitourinary:     Penis: Normal.    Musculoskeletal:         General: Normal range of motion.      Cervical back: Normal range of motion and neck supple.   Skin:     General: Skin is warm and dry.   Neurological:      Mental Status: He is alert. Mental status is at baseline.   Psychiatric:      Comments: Intellectual disability, cooperative, follows simple   Physical Exam    Scheduled Meds   amLODIPine, 2.5 mg, Oral, Daily  apixaban, 5 mg, Oral, BID  atorvastatin, 10 mg, Oral, Nightly  lubiprostone, 24 mcg, Oral, BID  montelukast, 10 mg, Oral,  Nightly  piperacillin-tazobactam, 3.375 g, Intravenous, Q8H  sotalol, 80 mg, Oral, Q12H       PRN Meds      Infusions  sodium chloride, 50 mL/hr, Last Rate: 50 mL/hr (07/20/24 0329)          Diagnostic Data    Results from last 7 days   Lab Units 07/20/24  0543   WBC 10*3/mm3 12.66*   HEMOGLOBIN g/dL 10.3*   HEMATOCRIT % 31.1*   PLATELETS 10*3/mm3 166   GLUCOSE mg/dL 68   CREATININE mg/dL 0.82   BUN mg/dL 18   SODIUM mmol/L 135*   POTASSIUM mmol/L 3.3*   AST (SGOT) U/L 35   ALT (SGPT) U/L 12   ALK PHOS U/L 58   BILIRUBIN mg/dL 1.5*   ANION GAP mmol/L 10.4       CT Abdomen Pelvis With Contrast    Result Date: 7/19/2024  Increase in size of bilateral pleural effusions. Otherwise, no significant interval change is identified since the prior exam.    Please note that portions of this note were completed with a voice recognition program.        Electronically Signed By-Moe Bourne MD On:7/19/2024 3:12 AM      CT Angiogram Chest Pulmonary Embolism    Result Date: 7/19/2024   1. No evidence of pulmonary embolus. 2. Multifocal pneumonia with patchy airspace disease present, most pronounced within the left upper lobe. Mild to moderate underlying pulmonary edema pattern suspected versus groundglass airspace disease with small to moderate-sized bilateral pleural effusions.       Electronically Signed By-Lili Zaldivar MD On:7/19/2024 3:10 AM      XR Chest 1 View    Result Date: 7/19/2024  Impression: No significant change. Stable cardiomegaly with probable mild pulmonary edema pattern with subsegmental atelectatic changes and scarring. Electronically Signed: Lili Zaldivar MD  7/19/2024 12:22 AM EDT  Workstation ID: [id number]       I reviewed the patient's new clinical results.    Assessment/Plan:     Active and Resolved Problems  Active Hospital Problems    Diagnosis  POA    **Sepsis [A41.9]  Yes    Acute on chronic diastolic heart failure [I50.33]  Yes    Paroxysmal atrial fibrillation [I48.0]  Yes    Acute respiratory failure  with hypoxia and hypercapnia [J96.01, J96.02]  Yes    Hyperkalemia [E87.5]  Yes    Hyponatremia [E87.1]  Yes    Hyperbilirubinemia [E80.6]  Yes    Acute UTI (urinary tract infection) [N39.0]  Yes    Presence of cardiac pacemaker [Z95.0]  Yes    Pneumonia [J18.9]  Yes    Mitral valve replaced [Z95.2]  Not Applicable    Mentally challenged [F79]  Yes    Hypertension [I10]  Yes    Hyperlipidemia [E78.5]  Yes    Coronary artery disease [I25.10]  Yes      Resolved Hospital Problems   No resolved problems to display.       Severe Sepsis without septic shock, initial lactic 3.5 now 2.0 elevated WBC, likely secondary to continued bilateral pneumonia per CT and possible UTI  CT angio chest was negative for PE, showed bilateral multifocal pneumonia with patchy airspace disease  infectious disease consulted, recommend discontinuing IV cefepime.  Initiated IV Zosyn every 8 hour.  Urine culture showed no growth, blood culture showed no growth at 24 hours, MRSA PCR negative, respiratory panel PCR negative     Acute respiratory failure with hypoxia and hypercapnia, likely secondary to pneumonia and acute CHF  tolerating NC attempt to wean, albuterol as needed  Pulmonology consulted for possible bronchoscopy.     chronic diastolic CHF  proBNP elevated     Acute UTI,   On Zosyn     Paroxysmal atrial fibrillation  EKG shows sinus rhythm, on home sotalol and Eliquis reordered continuous cardiac monitoring        Hyperbilirubinemia, likely secondary to sepsis, CT abdomen unchanged from previous exam last admission, seen by gastroenterology diagnosed with constipation on Linzess     Presence of a cardiac pacemaker, noted infectious disease consulted     Mitral valve replacement noted     Mentally challenged, noted cooperative follows simple commands     Hypertension   reordered Norvasc and home sotalol     Hyper lipidemia, on statin     Coronary artery disease status post CABG,   troponin not elevated EKG no ischemic changes, continuous  cardiac monitoring    VTE Prophylaxis:  Pharmacologic VTE prophylaxis orders are present.         Code status is   Code Status and Medical Interventions:   Ordered at: 07/19/24 0354     Code Status (Patient has no pulse and is not breathing):    CPR (Attempt to Resuscitate)     Medical Interventions (Patient has pulse or is breathing):    Full Support           Time: 30 minutes    Signature: Electronically signed by Briseyda Cason MD, 07/20/24, 12:38 EDT.  [facility] Hospitalist Team

## 2024-07-21 LAB
ALBUMIN SERPL-MCNC: 3.1 G/DL (ref 3.5–5.2)
ALBUMIN/GLOB SERPL: 1.3 G/DL
ALP SERPL-CCNC: 51 U/L (ref 39–117)
ALT SERPL W P-5'-P-CCNC: 11 U/L (ref 1–41)
ANION GAP SERPL CALCULATED.3IONS-SCNC: 6 MMOL/L (ref 5–15)
AST SERPL-CCNC: 26 U/L (ref 1–40)
BASOPHILS # BLD AUTO: 0.03 10*3/MM3 (ref 0–0.2)
BASOPHILS NFR BLD AUTO: 0.3 % (ref 0–1.5)
BILIRUB SERPL-MCNC: 1.6 MG/DL (ref 0–1.2)
BUN SERPL-MCNC: 12 MG/DL (ref 8–23)
BUN/CREAT SERPL: 15.2 (ref 7–25)
CALCIUM SPEC-SCNC: 7.9 MG/DL (ref 8.6–10.5)
CHLORIDE SERPL-SCNC: 100 MMOL/L (ref 98–107)
CO2 SERPL-SCNC: 29 MMOL/L (ref 22–29)
CREAT SERPL-MCNC: 0.79 MG/DL (ref 0.76–1.27)
D-LACTATE SERPL-SCNC: 1.7 MMOL/L (ref 0.5–2)
DEPRECATED RDW RBC AUTO: 52.4 FL (ref 37–54)
EGFRCR SERPLBLD CKD-EPI 2021: 94.4 ML/MIN/1.73
EOSINOPHIL # BLD AUTO: 0.14 10*3/MM3 (ref 0–0.4)
EOSINOPHIL NFR BLD AUTO: 1.2 % (ref 0.3–6.2)
ERYTHROCYTE [DISTWIDTH] IN BLOOD BY AUTOMATED COUNT: 14.2 % (ref 12.3–15.4)
GLOBULIN UR ELPH-MCNC: 2.3 GM/DL
GLUCOSE SERPL-MCNC: 119 MG/DL (ref 65–99)
HCT VFR BLD AUTO: 27.5 % (ref 37.5–51)
HGB BLD-MCNC: 9.1 G/DL (ref 13–17.7)
IMM GRANULOCYTES # BLD AUTO: 0.05 10*3/MM3 (ref 0–0.05)
IMM GRANULOCYTES NFR BLD AUTO: 0.4 % (ref 0–0.5)
LYMPHOCYTES # BLD AUTO: 0.72 10*3/MM3 (ref 0.7–3.1)
LYMPHOCYTES NFR BLD AUTO: 6.1 % (ref 19.6–45.3)
MCH RBC QN AUTO: 33.8 PG (ref 26.6–33)
MCHC RBC AUTO-ENTMCNC: 33.1 G/DL (ref 31.5–35.7)
MCV RBC AUTO: 102.2 FL (ref 79–97)
MONOCYTES # BLD AUTO: 1.32 10*3/MM3 (ref 0.1–0.9)
MONOCYTES NFR BLD AUTO: 11.2 % (ref 5–12)
NEUTROPHILS NFR BLD AUTO: 80.8 % (ref 42.7–76)
NEUTROPHILS NFR BLD AUTO: 9.56 10*3/MM3 (ref 1.7–7)
NRBC BLD AUTO-RTO: 0 /100 WBC (ref 0–0.2)
PLATELET # BLD AUTO: 151 10*3/MM3 (ref 140–450)
PMV BLD AUTO: 9.3 FL (ref 6–12)
POTASSIUM SERPL-SCNC: 3.5 MMOL/L (ref 3.5–5.2)
PROT SERPL-MCNC: 5.4 G/DL (ref 6–8.5)
RBC # BLD AUTO: 2.69 10*6/MM3 (ref 4.14–5.8)
SODIUM SERPL-SCNC: 135 MMOL/L (ref 136–145)
WBC NRBC COR # BLD AUTO: 11.82 10*3/MM3 (ref 3.4–10.8)

## 2024-07-21 PROCEDURE — 85025 COMPLETE CBC W/AUTO DIFF WBC: CPT | Performed by: NURSE PRACTITIONER

## 2024-07-21 PROCEDURE — 97162 PT EVAL MOD COMPLEX 30 MIN: CPT

## 2024-07-21 PROCEDURE — 25810000003 SODIUM CHLORIDE 0.9 % SOLUTION: Performed by: NURSE PRACTITIONER

## 2024-07-21 PROCEDURE — 25010000002 MORPHINE PER 10 MG

## 2024-07-21 PROCEDURE — 80053 COMPREHEN METABOLIC PANEL: CPT | Performed by: INTERNAL MEDICINE

## 2024-07-21 PROCEDURE — 25010000002 ONDANSETRON PER 1 MG

## 2024-07-21 PROCEDURE — 25010000002 PIPERACILLIN SOD-TAZOBACTAM PER 1 G: Performed by: INTERNAL MEDICINE

## 2024-07-21 PROCEDURE — 83605 ASSAY OF LACTIC ACID: CPT

## 2024-07-21 RX ORDER — BISACODYL 10 MG
10 SUPPOSITORY, RECTAL RECTAL DAILY PRN
Status: DISCONTINUED | OUTPATIENT
Start: 2024-07-21 | End: 2024-07-27 | Stop reason: HOSPADM

## 2024-07-21 RX ORDER — AMOXICILLIN 250 MG
2 CAPSULE ORAL 2 TIMES DAILY PRN
Status: DISCONTINUED | OUTPATIENT
Start: 2024-07-21 | End: 2024-07-27 | Stop reason: HOSPADM

## 2024-07-21 RX ORDER — ONDANSETRON 2 MG/ML
4 INJECTION INTRAMUSCULAR; INTRAVENOUS EVERY 6 HOURS PRN
Status: DISCONTINUED | OUTPATIENT
Start: 2024-07-21 | End: 2024-07-27 | Stop reason: HOSPADM

## 2024-07-21 RX ORDER — MORPHINE SULFATE 2 MG/ML
2 INJECTION, SOLUTION INTRAMUSCULAR; INTRAVENOUS EVERY 4 HOURS PRN
Status: DISPENSED | OUTPATIENT
Start: 2024-07-21 | End: 2024-07-26

## 2024-07-21 RX ORDER — POLYETHYLENE GLYCOL 3350 17 G/17G
17 POWDER, FOR SOLUTION ORAL DAILY PRN
Status: DISCONTINUED | OUTPATIENT
Start: 2024-07-21 | End: 2024-07-27 | Stop reason: HOSPADM

## 2024-07-21 RX ORDER — BISACODYL 5 MG/1
5 TABLET, DELAYED RELEASE ORAL DAILY PRN
Status: DISCONTINUED | OUTPATIENT
Start: 2024-07-21 | End: 2024-07-27 | Stop reason: HOSPADM

## 2024-07-21 RX ADMIN — LUBIPROSTONE 24 MCG: 24 CAPSULE, GELATIN COATED ORAL at 11:45

## 2024-07-21 RX ADMIN — POLYETHYLENE GLYCOL 3350 17 G: 17 POWDER, FOR SOLUTION ORAL at 00:52

## 2024-07-21 RX ADMIN — MORPHINE SULFATE 2 MG: 2 INJECTION, SOLUTION INTRAMUSCULAR; INTRAVENOUS at 00:52

## 2024-07-21 RX ADMIN — PIPERACILLIN AND TAZOBACTAM 3.38 G: 3; .375 INJECTION, POWDER, FOR SOLUTION INTRAVENOUS at 23:47

## 2024-07-21 RX ADMIN — ATORVASTATIN CALCIUM 10 MG: 10 TABLET, FILM COATED ORAL at 20:35

## 2024-07-21 RX ADMIN — SOTALOL HYDROCHLORIDE 80 MG: 80 TABLET ORAL at 20:34

## 2024-07-21 RX ADMIN — SODIUM CHLORIDE 50 ML/HR: 9 INJECTION, SOLUTION INTRAVENOUS at 14:31

## 2024-07-21 RX ADMIN — LUBIPROSTONE 24 MCG: 24 CAPSULE, GELATIN COATED ORAL at 20:34

## 2024-07-21 RX ADMIN — MONTELUKAST 10 MG: 10 TABLET, FILM COATED ORAL at 20:34

## 2024-07-21 RX ADMIN — SOTALOL HYDROCHLORIDE 80 MG: 80 TABLET ORAL at 07:50

## 2024-07-21 RX ADMIN — APIXABAN 5 MG: 5 TABLET, FILM COATED ORAL at 07:50

## 2024-07-21 RX ADMIN — AMLODIPINE BESYLATE 2.5 MG: 5 TABLET ORAL at 07:50

## 2024-07-21 RX ADMIN — BISACODYL 5 MG: 5 TABLET, COATED ORAL at 23:47

## 2024-07-21 RX ADMIN — POLYETHYLENE GLYCOL 3350 17 G: 17 POWDER, FOR SOLUTION ORAL at 23:47

## 2024-07-21 RX ADMIN — PIPERACILLIN AND TAZOBACTAM 3.38 G: 3; .375 INJECTION, POWDER, FOR SOLUTION INTRAVENOUS at 14:30

## 2024-07-21 RX ADMIN — PIPERACILLIN AND TAZOBACTAM 3.38 G: 3; .375 INJECTION, POWDER, FOR SOLUTION INTRAVENOUS at 07:50

## 2024-07-21 RX ADMIN — BISACODYL 5 MG: 5 TABLET, COATED ORAL at 00:52

## 2024-07-21 RX ADMIN — ONDANSETRON 4 MG: 2 INJECTION INTRAMUSCULAR; INTRAVENOUS at 00:52

## 2024-07-21 RX ADMIN — APIXABAN 5 MG: 5 TABLET, FILM COATED ORAL at 20:34

## 2024-07-21 NOTE — PROGRESS NOTES
Infectious Diseases Progress Note      LOS: 2 days   Patient Care Team:  Amada Haskins MD as PCP - General  Josr Rodriguez DPM as Consulting Physician (Podiatry)  Rj Dominique MD as Consulting Physician (Cardiology)    Chief Complaint: Unable to complete    Subjective     Patient had no fever during the last 24 hours.  He remained hemodynamically stable.  Currently on 3 L of oxygen.  He is in the chair eating without any signs of distress.  Denies any new issues    Review of Systems:   Review of Systems   Unable to perform ROS: Mental status change        Objective     Vital Signs  Temp:  [97 °F (36.1 °C)-98.1 °F (36.7 °C)] 97 °F (36.1 °C)  Heart Rate:  [59-76] 59  Resp:  [12-20] 12  BP: ()/(48-73) 91/48    Physical Exam:  Physical Exam  HENT:      Head: Normocephalic and atraumatic.      Mouth/Throat:      Mouth: Mucous membranes are moist.   Eyes:      Pupils: Pupils are equal, round, and reactive to light.   Cardiovascular:      Rate and Rhythm: Normal rate and regular rhythm.   Pulmonary:      Breath sounds: Rales present.   Abdominal:      General: Abdomen is flat. Bowel sounds are normal.      Palpations: Abdomen is soft.   Musculoskeletal:      Cervical back: Neck supple.      Right lower leg: No edema.      Left lower leg: No edema.   Neurological:      Mental Status: He is disoriented.          Results Review:    I have reviewed all clinical data, test, lab, and imaging results.     Radiology  No Radiology Exams Resulted Within Past 24 Hours    Cardiology    Laboratory    Results from last 7 days   Lab Units 07/21/24  0515 07/20/24  0543 07/18/24  2338 07/15/24  0602   WBC 10*3/mm3 11.82* 12.66* 14.14* 11.57*   HEMOGLOBIN g/dL 9.1* 10.3* 13.4 11.9*   HEMATOCRIT % 27.5* 31.1* 39.7 35.4*   PLATELETS 10*3/mm3 151 166 226 192     Results from last 7 days   Lab Units 07/21/24  0515 07/20/24  0543 07/18/24  2338 07/15/24  0602   SODIUM mmol/L 135* 135* 128* 135*   POTASSIUM mmol/L 3.5 3.3* 5.4*  4.0   CHLORIDE mmol/L 100 97* 89* 97*   CO2 mmol/L 29.0 27.6 23.8 30.5*   BUN mg/dL 12 18 15 12   CREATININE mg/dL 0.79 0.82 1.00 0.76   GLUCOSE mg/dL 119* 68 149* 89   ALBUMIN g/dL 3.1* 3.3* 4.2 3.5   BILIRUBIN mg/dL 1.6* 1.5* 2.1* 0.6   ALK PHOS U/L 51 58 83 64   AST (SGOT) U/L 26 35 44* 28   ALT (SGPT) U/L 11 12 11 6   LIPASE U/L  --   --  35  --    CALCIUM mg/dL 7.9* 8.1* 9.3 8.9                 Microbiology   Microbiology Results (last 10 days)       Procedure Component Value - Date/Time    MRSA Screen, PCR (Inpatient) - Swab, Nares [[id number]]  (Normal) Collected: 07/19/24 0520    Lab Status: Final result Specimen: Swab from Nares Updated: 07/19/24 0634     MRSA PCR No MRSA Detected    Narrative:      The negative predictive value of this diagnostic test is high and should only be used to consider de-escalating anti-MRSA therapy. A positive result may indicate colonization with MRSA and must be correlated clinically.    Blood Culture - Blood, Arm, Right [[id number]]  (Normal) Collected: 07/19/24 0053    Lab Status: Preliminary result Specimen: Blood from Arm, Right Updated: 07/21/24 0101     Blood Culture No growth at 2 days    Narrative:      Less than seven (7) mL's of blood was collected.  Insufficient quantity may yield false negative results.    Blood Culture - Blood, Arm, Left [[id number]]  (Normal) Collected: 07/19/24 0053    Lab Status: Preliminary result Specimen: Blood from Arm, Left Updated: 07/21/24 0101     Blood Culture No growth at 2 days    Narrative:      Less than seven (7) mL's of blood was collected.  Insufficient quantity may yield false negative results.    Urine Culture - Urine, Straight Cath [[id number]]  (Normal) Collected: 07/19/24 0031    Lab Status: Final result Specimen: Urine from Straight Cath Updated: 07/20/24 1127     Urine Culture No growth    Respiratory Panel PCR w/COVID-19(SARS-CoV-2) DIDI/ANJUM/[facility]/PAD/COR/YING In-House, NP Swab in UTM/VTM, 2 HR TAT - Swab, Nasopharynx  [[id number]]  (Normal) Collected: 07/18/24 2346    Lab Status: Final result Specimen: Swab from Nasopharynx Updated: 07/19/24 0100     ADENOVIRUS, PCR Not Detected     Coronavirus 229E Not Detected     Coronavirus HKU1 Not Detected     Coronavirus NL63 Not Detected     Coronavirus OC43 Not Detected     COVID19 Not Detected     Human Metapneumovirus Not Detected     Human Rhinovirus/Enterovirus Not Detected     Influenza A PCR Not Detected     Influenza B PCR Not Detected     Parainfluenza Virus 1 Not Detected     Parainfluenza Virus 2 Not Detected     Parainfluenza Virus 3 Not Detected     Parainfluenza Virus 4 Not Detected     RSV, PCR Not Detected     Bordetella pertussis pcr Not Detected     Bordetella parapertussis PCR Not Detected     Chlamydophila pneumoniae PCR Not Detected     Mycoplasma pneumo by PCR Not Detected    Narrative:      In the setting of a positive respiratory panel with a viral infection PLUS a negative procalcitonin without other underlying concern for bacterial infection, consider observing off antibiotics or discontinuation of antibiotics and continue supportive care. If the respiratory panel is positive for atypical bacterial infection (Bordetella pertussis, Chlamydophila pneumoniae, or Mycoplasma pneumoniae), consider antibiotic de-escalation to target atypical bacterial infection.    Legionella Antigen, Urine - Urine, Urine, Clean Catch [[id number]]  (Normal) Collected: 07/14/24 1708    Lab Status: Final result Specimen: Urine, Clean Catch Updated: 07/14/24 1733     LEGIONELLA ANTIGEN, URINE Negative    S. Pneumo Ag Urine or CSF - Urine, Urine, Clean Catch [[id number]]  (Normal) Collected: 07/14/24 1708    Lab Status: Final result Specimen: Urine, Clean Catch Updated: 07/14/24 1733     Strep Pneumo Ag Negative    Respiratory Panel PCR w/COVID-19(SARS-CoV-2) DIDI/ANJUM/MC/PAD/COR/YING In-House, NP Swab in UTM/VTM, 2 HR TAT - Swab, Nasopharynx [[id number]]  (Normal) Collected: 07/11/24 1655    Lab  Status: Final result Specimen: Swab from Nasopharynx Updated: 07/11/24 1845     ADENOVIRUS, PCR Not Detected     Coronavirus 229E Not Detected     Coronavirus HKU1 Not Detected     Coronavirus NL63 Not Detected     Coronavirus OC43 Not Detected     COVID19 Not Detected     Human Metapneumovirus Not Detected     Human Rhinovirus/Enterovirus Not Detected     Influenza A PCR Not Detected     Influenza B PCR Not Detected     Parainfluenza Virus 1 Not Detected     Parainfluenza Virus 2 Not Detected     Parainfluenza Virus 3 Not Detected     Parainfluenza Virus 4 Not Detected     RSV, PCR Not Detected     Bordetella pertussis pcr Not Detected     Bordetella parapertussis PCR Not Detected     Chlamydophila pneumoniae PCR Not Detected     Mycoplasma pneumo by PCR Not Detected    Narrative:      In the setting of a positive respiratory panel with a viral infection PLUS a negative procalcitonin without other underlying concern for bacterial infection, consider observing off antibiotics or discontinuation of antibiotics and continue supportive care. If the respiratory panel is positive for atypical bacterial infection (Bordetella pertussis, Chlamydophila pneumoniae, or Mycoplasma pneumoniae), consider antibiotic de-escalation to target atypical bacterial infection.    Blood Culture - Blood, Arm, Left [[id number]]  (Normal) Collected: 07/11/24 1650    Lab Status: Final result Specimen: Blood from Arm, Left Updated: 07/16/24 1700     Blood Culture No growth at 5 days    Narrative:      Less than seven (7) mL's of blood was collected.  Insufficient quantity may yield false negative results.    Blood Culture - Blood, Arm, Left [[id number]]  (Normal) Collected: 07/11/24 1650    Lab Status: Final result Specimen: Blood from Arm, Left Updated: 07/16/24 1700     Blood Culture No growth at 5 days    Narrative:      Less than seven (7) mL's of blood was collected.  Insufficient quantity may yield false negative results.             Medication Review:       Schedule Meds  amLODIPine, 2.5 mg, Oral, Daily  apixaban, 5 mg, Oral, BID  atorvastatin, 10 mg, Oral, Nightly  lubiprostone, 24 mcg, Oral, BID  montelukast, 10 mg, Oral, Nightly  piperacillin-tazobactam, 3.375 g, Intravenous, Q8H  sotalol, 80 mg, Oral, Q12H        Infusion Meds  sodium chloride, 50 mL/hr, Last Rate: 50 mL/hr (07/20/24 1521)        PRN Meds    senna-docusate sodium **AND** polyethylene glycol **AND** bisacodyl **AND** bisacodyl    Morphine    ondansetron        Assessment & Plan       Antimicrobial Therapy   1.  IV Zosyn        2.        3.        4.        5.              Assessment     Possible aspiration pneumonia versus hospital-acquired pneumonia.  Patient is currently on 3 L oxygen via nasal cannula.  MRSA screen was negative.  Speech therapy saw the patient and cleared him for diet     Developmental and intellectual delay.  Patient lives with his brothers     Recent hospital admission for possible pneumonia     S/p mitral valve replacement in the past     Plan     Continue IV Zosyn 3.375 g every 8 hours  Will likely be able to switch over to oral antibiotics tomorrow if patient continues to improve  Continue supportive care  Katie Cullen, APRN  07/21/24  13:56 EDT    Note is dictated utilizing voice recognition software/Dragon

## 2024-07-21 NOTE — PLAN OF CARE
"Goal Outcome Evaluation:  Plan of Care Reviewed With: patient           Outcome Evaluation: 72 yo male adm 7/18/24 for multifocal pna, sepsis, uti. Pt had recent admission for pna as well. CT (-) for PE. PMH: intellectual disability/impairment; cad, mvt, pacemaker. At baseline, pt lives w/ his brother and is able to ambulate independently for short community distances. Pt reports he goes \"dancing w/ my brother every weekend.\" Today, pt is alert and cooperative w/ therapy. He often does seek sensory input by holding PT's arm or hand, reaching for handrails, etc. Not able to follow formal mm testing, but he presents w/ strength which is WFL for all muscle groups. Comes to sitting and standing w/ cga/sba. Able to amb 80 ft w/ cga and one hand held. Pt at risk for increased weakness and other complications of bedrest. Just a little below his baseline for functional mobility. Recommend home w/ 24 hr care at d/c. Will follow 3xwk.                               "

## 2024-07-21 NOTE — PLAN OF CARE
Goal Outcome Evaluation:  Plan of Care Reviewed With: patient              Patient experiencing constipation in the night. Patient unable to state pain but continued to moan and rock back in forth while holding stomach, and voices need to use the toilet. Ambulated to bathroom Assist x1 with Walker, and only had very small BM, continued to rock back and forth while on toilet. PRN pain medication, nausea medication, and stool softener given to patient.  Pain improving, and patient able to rest comfortably.

## 2024-07-21 NOTE — PROGRESS NOTES
[facility] MEDICINE SERVICE  DAILY PROGRESS NOTE    NAME: Drake Beth  : 1950  MRN: [id number]      LOS: 2 days     PROVIDER OF SERVICE: Briseyda Cason MD    Chief Complaint: Sepsis    Subjective:     Interval History:  History taken from: patient  Patient Complaints: Seen and examined at bedside this morning.  On 3 L nasal cannula  Patient Denies:       Review of Systems: Unable to accurately assess due to mental condition  Review of Systems    Objective:     Vital Signs  Temp:  [97 °F (36.1 °C)-98.1 °F (36.7 °C)] 97 °F (36.1 °C)  Heart Rate:  [59-76] 59  Resp:  [12-20] 12  BP: ()/(48-73) 91/48  Flow (L/min):  [3-5] 3   Body mass index is 20.24 kg/m².    Physical Exam Constitutional:       Appearance: Normal appearance.   HENT:      Head: Normocephalic and atraumatic.      Right Ear: External ear normal.      Left Ear: External ear normal.      Nose: Nose normal.      Mouth/Throat:      Mouth: Mucous membranes are moist.   Eyes:      Extraocular Movements: Extraocular movements intact.   Cardiovascular:      Rate and Rhythm: Normal rate and regular rhythm.      Pulses: Normal pulses.      Heart sounds: Normal heart sounds.   Pulmonary:      Breath sounds: wnl     Comments: On 3l nc  Abdominal:      Palpations: Abdomen is soft.   Genitourinary:     Penis: Normal.    Musculoskeletal:         General: Normal range of motion.      Cervical back: Normal range of motion and neck supple.   Skin:     General: Skin is warm and dry.   Neurological:      Mental Status: He is alert. Mental status is at baseline.   Psychiatric:      Comments: Intellectual disability, cooperative, follows simple   Physical Exam  Physical Exam    Scheduled Meds   amLODIPine, 2.5 mg, Oral, Daily  apixaban, 5 mg, Oral, BID  atorvastatin, 10 mg, Oral, Nightly  lubiprostone, 24 mcg, Oral, BID  montelukast, 10 mg, Oral, Nightly  piperacillin-tazobactam, 3.375 g, Intravenous, Q8H  sotalol, 80 mg, Oral, Q12H        PRN Meds     senna-docusate sodium **AND** polyethylene glycol **AND** bisacodyl **AND** bisacodyl    Morphine    ondansetron   Infusions  sodium chloride, 50 mL/hr, Last Rate: 50 mL/hr (07/20/24 1521)          Diagnostic Data    Results from last 7 days   Lab Units 07/21/24  0515   WBC 10*3/mm3 11.82*   HEMOGLOBIN g/dL 9.1*   HEMATOCRIT % 27.5*   PLATELETS 10*3/mm3 151   GLUCOSE mg/dL 119*   CREATININE mg/dL 0.79   BUN mg/dL 12   SODIUM mmol/L 135*   POTASSIUM mmol/L 3.5   AST (SGOT) U/L 26   ALT (SGPT) U/L 11   ALK PHOS U/L 51   BILIRUBIN mg/dL 1.6*   ANION GAP mmol/L 6.0       No radiology results for the last day      I reviewed the patient's new clinical results.    Assessment/Plan:     Active and Resolved Problems  Active Hospital Problems    Diagnosis  POA    **Sepsis [A41.9]  Yes    Acute on chronic diastolic heart failure [I50.33]  Yes    Paroxysmal atrial fibrillation [I48.0]  Yes    Acute respiratory failure with hypoxia and hypercapnia [J96.01, J96.02]  Yes    Hyperkalemia [E87.5]  Yes    Hyponatremia [E87.1]  Yes    Hyperbilirubinemia [E80.6]  Yes    Acute UTI (urinary tract infection) [N39.0]  Yes    Presence of cardiac pacemaker [Z95.0]  Yes    Pneumonia [J18.9]  Yes    Mitral valve replaced [Z95.2]  Not Applicable    Mentally challenged [F79]  Yes    Hypertension [I10]  Yes    Hyperlipidemia [E78.5]  Yes    Coronary artery disease [I25.10]  Yes      Resolved Hospital Problems   No resolved problems to display.       Severe Sepsis without septic shock, initial lactic 3.5 was trending down however slightly went up so repeat ordered.  Elevated WBC, likely secondary to continued bilateral pneumonia per CT and possible UTI  CT angio chest was negative for PE, showed bilateral multifocal pneumonia with patchy airspace disease  infectious disease consulted, recommend discontinuing IV cefepime.  Initiated IV Zosyn every 8 hour.  Urine culture showed no growth, blood culture showed no growth at 24 hours,  MRSA PCR negative, respiratory panel PCR negative     Acute respiratory failure with hypoxia and hypercapnia, likely secondary to pneumonia and acute CHF  tolerating NC attempt to wean, albuterol as needed  Pulmonology consulted for possible bronchoscopy.     chronic diastolic CHF  proBNP elevated     Acute UTI,   On Zosyn     Paroxysmal atrial fibrillation  EKG shows sinus rhythm, on home sotalol and Eliquis reordered continuous cardiac monitoring        Hyperbilirubinemia, likely secondary to sepsis, CT abdomen unchanged from previous exam last admission, seen by gastroenterology diagnosed with constipation on Linzess     Presence of a cardiac pacemaker, noted infectious disease consulted     Mitral valve replacement noted     Mentally challenged, noted cooperative follows simple commands     Hypertension   reordered Norvasc and home sotalol     Hyper lipidemia, on statin     Coronary artery disease status post CABG,   troponin not elevated EKG no ischemic changes, continuous cardiac monitoring    VTE Prophylaxis:  Pharmacologic VTE prophylaxis orders are present.         Code status is   Code Status and Medical Interventions:   Ordered at: 07/19/24 0354     Code Status (Patient has no pulse and is not breathing):    CPR (Attempt to Resuscitate)     Medical Interventions (Patient has pulse or is breathing):    Full Support            Time: 30 minutes    Signature: Electronically signed by Briseyda Cason MD, 07/21/24, 12:58 EDT.  [facility] Hospitalist Team

## 2024-07-21 NOTE — THERAPY EVALUATION
Patient Name: Drake Beth  : 1950    MRN: [id number]                              Today's Date: 2024       Admit Date: 2024    Visit Dx:     ICD-10-CM ICD-9-CM   1. Pneumonia of both lungs due to infectious organism, unspecified part of lung  J18.9 483.8   2. Generalized abdominal pain  R10.84 789.07   3. Dyspnea, unspecified type  R06.00 786.09     Patient Active Problem List   Diagnosis    Allergic rhinitis    Anemia    Atrial fibrillation    Congestive heart failure    Coronary artery disease    Subacute cough    Hyperlipidemia    Hypertension    Medicare annual wellness visit, subsequent    Mentally challenged    Prostate cancer screening    Pacemaker infection    Mitral valve replaced    Gingivitis, chronic    Acute URI    Atrial flutter with controlled response    Pneumonia    Sepsis    Acute on chronic diastolic heart failure    Paroxysmal atrial fibrillation    Acute respiratory failure with hypoxia and hypercapnia    Hyperkalemia    Hyponatremia    Hyperbilirubinemia    Acute UTI (urinary tract infection)    Presence of cardiac pacemaker     Past Medical History:   Diagnosis Date    Allergic rhinitis     Anemia     Atrial fibrillation     Chesty cough     CHF (congestive heart failure)     Coronary artery disease     Cough     Fever     Hyperlipidemia     Hypertension     Mentally challenged     MVP (mitral valve prolapse)      Past Surgical History:   Procedure Laterality Date    CARDIAC ELECTROPHYSIOLOGY PROCEDURE N/A 2022    Procedure: PACEMAKER EXTRACTION;  Surgeon: Rj Dominique MD;  Location: [facility] INVASIVE LOCATION;  Service: Cardiology;  Laterality: N/A;    COLONOSCOPY      He has never had the test done.  His family is refusing any form of colon cancer screening for him.    CORONARY ARTERY BYPASS GRAFT      DR SWEAT    MITRAL VALVE REPLACEMENT  2008    WITH BIOPROSTHETIC TISSUE VALVE      General Information       Row Name 24 1256           Physical Therapy Time and Intention    Document Type evaluation  -CM     Mode of Treatment physical therapy  -CM       Row Name 07/21/24 1256          General Information    Patient Profile Reviewed yes  -CM     Prior Level of Function community mobility;gait;independent:  not on home O2 at baseline; does not use assistive device normally  -CM     Existing Precautions/Restrictions fall;oxygen therapy device and L/min  -CM               User Key  (r) = Recorded By, (t) = Taken By, (c) = Cosigned By      Initials Name Provider Type    Daylin Thrahser, PT Physical Therapist                   Mobility       Row Name 07/21/24 1257          Bed Mobility    Bed Mobility bed mobility (all) activities  -CM     All Activities, [place] (Bed Mobility) supervision  -CM       Row Name 07/21/24 1257          Sit-Stand Transfer    Sit-Stand [place] (Transfers) supervision  -CM       Row Name 07/21/24 1257          Gait/Stairs (Locomotion)    [place] Level (Gait) contact guard  -CM     Assistive Device (Gait) other (see comments)  gait belt, non skid socks, one hand held  -CM     Patient was able to Ambulate yes  -CM     Distance in Feet (Gait) 80  somewhat shuffled steps; no lateral sway; pt likes to reach for handrail but more as sensory stimulation than using for balance; mildly crouched gait pattern  -CM               User Key  (r) = Recorded By, (t) = Taken By, (c) = Cosigned By      Initials Name Provider Type    Daylin Thrasher, PT Physical Therapist                   Obj/Interventions       Row Name 07/21/24 1258          Range of Motion Comprehensive    General Range of Motion bilateral upper extremity ROM WFL;bilateral lower extremity ROM WFL  -       Row Name 07/21/24 1258          Strength Comprehensive (MMT)    General Manual Muscle Testing (MMT) Assessment other (see comments)  -CM     Comment, General Manual Muscle Testing (MMT) Assessment not able to follow directions for formal muscle  testing, but presents w/ strength which is WFL for BUEs and BLEs  -CM       Row Name 07/21/24 1258          Balance    Balance Assessment sitting static balance;sitting dynamic balance;standing static balance;standing dynamic balance  -CM     Static Sitting Balance independent  -CM     Dynamic Sitting Balance independent  -CM     Position, Sitting Balance unsupported  -CM     Static Standing Balance supervision  -CM     Dynamic Standing Balance supervision  -CM     Position/Device Used, Standing Balance unsupported  -CM       Row Name 07/21/24 1258          Sensory Assessment (Somatosensory)    Sensory Assessment (Somatosensory) unable/difficult to assess  -CM               User Key  (r) = Recorded By, (t) = Taken By, (c) = Cosigned By      Initials Name Provider Type    CM Daylin Valle, PT Physical Therapist                   Goals/Plan       Row Name 07/21/24 1304          Bed Mobility Goal 1 (PT)    Activity/Assistive Device (Bed Mobility Goal 1, PT) bed mobility activities, all  -CM     [place] Level/Cues Needed (Bed Mobility Goal 1, PT) independent  -CM     Time Frame (Bed Mobility Goal 1, PT) 2 weeks  -CM       Row Name 07/21/24 1304          Transfer Goal 1 (PT)    Activity/Assistive Device (Transfer Goal 1, PT) transfers, all  -CM     [place] Level/Cues Needed (Transfer Goal 1, PT) independent  -CM     Time Frame (Transfer Goal 1, PT) 2 weeks  -CM       Row Name 07/21/24 1304          Gait Training Goal 1 (PT)    Activity/Assistive Device (Gait Training Goal 1, PT) gait (walking locomotion)  -CM     [place] Level (Gait Training Goal 1, PT) independent  -CM     Distance (Gait Training Goal 1, PT) 300 ft  -CM     Time Frame (Gait Training Goal 1, PT) 2 weeks  -CM       Row Name 07/21/24 1304          Therapy Assessment/Plan (PT)    Planned Therapy Interventions (PT) balance training;bed mobility training;gait training;home exercise program;strengthening;ROM (range of motion);postural  "re-education;patient/family education;transfer training  -               User Key  (r) = Recorded By, (t) = Taken By, (c) = Cosigned By      Initials Name Provider Type    Daylin Thrasher, PT Physical Therapist                   Clinical Impression       Row Name 07/21/24 9904          Pain    Pretreatment Pain Rating 0/10 - no pain  -CM     Posttreatment Pain Rating 0/10 - no pain  -CM     Pre/Posttreatment Pain Comment denies pain; no evidence of discomfort during eval  -CM     Pain Intervention(s) Ambulation/increased activity;Emotional support;Repositioned  -CM       Row Name 07/21/24 1255          Plan of Care Review    Plan of Care Reviewed With patient  -CM     Outcome Evaluation 72 yo male adm 7/18/24 for multifocal pna, sepsis, uti. Pt had recent admission for pna as well. CT (-) for PE. PMH: intellectual disability/impairment; cad, mvt, pacemaker. At baseline, pt lives w/ his brother and is able to ambulate independently for short community distances. Pt reports he goes \"dancing w/ my brother every weekend.\" Today, pt is alert and cooperative w/ therapy. He often does seek sensory input by holding PT's arm or hand, reaching for handrails, etc. Not able to follow formal mm testing, but he presents w/ strength which is WFL for all muscle groups. Comes to sitting and standing w/ cga/sba. Able to amb 80 ft w/ cga and one hand held. Pt at risk for increased weakness and other complications of bedrest. Just a little below his baseline for functional mobility. Recommend home w/ 24 hr care at d/c. Will follow 3xwk.  -CM       Row Name 07/21/24 2215          Therapy Assessment/Plan (PT)    Rehab Potential (PT) good, to achieve stated therapy goals  -CM     Criteria for Skilled Interventions Met (PT) yes;meets criteria;skilled treatment is necessary  -CM     Therapy Frequency (PT) 3 times/wk  -CM     Predicted Duration of Therapy Intervention (PT) until d/c  -CM       Row Name 07/21/24 1309          Vital " Signs    O2 Delivery Pre Treatment supplemental O2  3L  -CM     O2 Delivery Intra Treatment supplemental O2  -CM     O2 Delivery Post Treatment supplemental O2  -CM     Recovery Time VSS  -CM       Row Name 07/21/24 1303          Positioning and Restraints    Pre-Treatment Position in bed  -CM     Post Treatment Position chair  -CM     In Chair notified nsg;sitting;call light within reach;encouraged to call for assist;exit alarm on  -CM               User Key  (r) = Recorded By, (t) = Taken By, (c) = Cosigned By      Initials Name Provider Type    Daylin Thrasher, PT Physical Therapist                   Outcome Measures       Row Name 07/21/24 1305 07/21/24 0828       How much help from another person do you currently need...    Turning from your back to your side while in flat bed without using bedrails? 4  -CM 3  -SE    Moving from lying on back to sitting on the side of a flat bed without bedrails? 4  -CM 3  -SE    Moving to and from a bed to a chair (including a wheelchair)? 3  -CM 3  -SE    Standing up from a chair using your arms (e.g., wheelchair, bedside chair)? 3  -CM 3  -SE    Climbing 3-5 steps with a railing? 3  -CM 2  -SE    To walk in hospital room? 3  -CM 3  -SE    AM-PAC 6 Clicks Score (PT) 20  -CM 17  -SE    Highest Level of Mobility Goal 6 --> Walk 10 steps or more  -CM 5 --> Static standing  -SE      Row Name 07/21/24 1305          Modified Taliaferro Scale    Pre-Stroke Modified Taliaferro Scale 3 - Moderate disability.  Requiring some help, but able to walk without assistance.  -CM     Modified Taliaferro Scale 4 - Moderately severe disability.  Unable to walk without assistance, and unable to attend to own bodily needs without assistance.  -CM       Row Name 07/21/24 1305          Functional Assessment    Outcome Measure Options Modified Taliaferro  -CM               User Key  (r) = Recorded By, (t) = Taken By, (c) = Cosigned By      Initials Name Provider Type    Daylin Thrasher, PT Physical  "Therapist    Latha Kee, RN Registered Nurse                                 Physical Therapy Education       Title: PT OT SLP Therapies (Done)       Topic: Physical Therapy (Done)       Point: Mobility training (Done)       Learning Progress Summary             Patient Acceptance, E,TB, VU,NR by JM at 7/21/2024 1305                                         User Key       Initials Effective Dates Name Provider Type Discipline    JM 06/16/21 -  Daylin Valle, PT Physical Therapist PT                  PT Recommendation and Plan  Planned Therapy Interventions (PT): balance training, bed mobility training, gait training, home exercise program, strengthening, ROM (range of motion), postural re-education, patient/family education, transfer training  Plan of Care Reviewed With: patient  Outcome Evaluation: 72 yo male adm 7/18/24 for multifocal pna, sepsis, uti. Pt had recent admission for pna as well. CT (-) for PE. PMH: intellectual disability/impairment; cad, mvt, pacemaker. At baseline, pt lives w/ his brother and is able to ambulate independently for short community distances. Pt reports he goes \"dancing w/ my brother every weekend.\" Today, pt is alert and cooperative w/ therapy. He often does seek sensory input by holding PT's arm or hand, reaching for handrails, etc. Not able to follow formal mm testing, but he presents w/ strength which is WFL for all muscle groups. Comes to sitting and standing w/ cga/sba. Able to amb 80 ft w/ cga and one hand held. Pt at risk for increased weakness and other complications of bedrest. Just a little below his baseline for functional mobility. Recommend home w/ 24 hr care at d/c. Will follow 3xwk.     Time Calculation:   PT Evaluation Complexity  History, PT Evaluation Complexity: 3 or more personal factors and/or comorbidities  Examination of Body Systems (PT Eval Complexity): total of 4 or more elements  Clinical Presentation (PT Evaluation Complexity): evolving  Clinical " Decision Making (PT Evaluation Complexity): moderate complexity  Overall Complexity (PT Evaluation Complexity): moderate complexity     PT Charges       Row Name 07/21/24 1306             Time Calculation    Start Time 0935  -CM      Stop Time 0951  -CM      Time Calculation (min) 16 min  -CM      PT Received On 07/21/24  -CM      PT - Next Appointment 07/23/24  -CM      PT Goal Re-Cert Due Date 08/04/24  -CM         Time Calculation- PT    Total Timed Code Minutes- PT 0 minute(s)  -CM                User Key  (r) = Recorded By, (t) = Taken By, (c) = Cosigned By      Initials Name Provider Type    Daylin Thrasher, PT Physical Therapist                  Therapy Charges for Today       Code Description Service Date Service Provider Modifiers Qty    20684388365 HC PT EVAL MOD COMPLEXITY 3 7/21/2024 Daylin Valle, PT GP 1            PT G-Codes  Outcome Measure Options: Modified Lalo  AM-PAC 6 Clicks Score (PT): 20  Modified Lalo Scale: 4 - Moderately severe disability.  Unable to walk without assistance, and unable to attend to own bodily needs without assistance.  PT Discharge Summary  Anticipated Discharge Disposition (PT): home with 24/7 care    Daylin Valle, PT  7/21/2024

## 2024-07-21 NOTE — PLAN OF CARE
Problem: Adult Inpatient Plan of Care  Goal: Absence of Hospital-Acquired Illness or Injury  Intervention: Prevent Skin Injury  Recent Flowsheet Documentation  Taken 7/21/2024 0828 by Latha Encinas RN  Body Position:   turned   weight shifting     Problem: Adult Inpatient Plan of Care  Goal: Absence of Hospital-Acquired Illness or Injury  Intervention: Prevent and Manage VTE (Venous Thromboembolism) Risk  Recent Flowsheet Documentation  Taken 7/21/2024 0828 by Latha Encinas RN  Activity Management: back to bed  VTE Prevention/Management: bilateral  Range of Motion: active ROM (range of motion) encouraged     Problem: Adult Inpatient Plan of Care  Goal: Absence of Hospital-Acquired Illness or Injury  Intervention: Prevent Infection  Recent Flowsheet Documentation  Taken 7/21/2024 1800 by Latha Encinas RN  Infection Prevention:   hand hygiene promoted   personal protective equipment utilized   rest/sleep promoted   single patient room provided  Taken 7/21/2024 1645 by Latha Encinas RN  Infection Prevention:   hand hygiene promoted   personal protective equipment utilized   rest/sleep promoted   single patient room provided  Taken 7/21/2024 1400 by Latha Encinas RN  Infection Prevention:   hand hygiene promoted   personal protective equipment utilized   rest/sleep promoted   single patient room provided  Taken 7/21/2024 1227 by Latha Encinas RN  Infection Prevention:   hand hygiene promoted   personal protective equipment utilized   rest/sleep promoted   single patient room provided  Taken 7/21/2024 1010 by Latha Encinas RN  Infection Prevention:   hand hygiene promoted   personal protective equipment utilized   rest/sleep promoted   single patient room provided  Taken 7/21/2024 0828 by Latha Encinas RN  Infection Prevention:   hand hygiene promoted   personal protective equipment utilized   rest/sleep promoted   single patient room provided     Goal Outcome Evaluation:  Plan of Care Reviewed With: patient         Progress: improving

## 2024-07-22 LAB
ALBUMIN SERPL-MCNC: 3 G/DL (ref 3.5–5.2)
ALBUMIN/GLOB SERPL: 1.2 G/DL
ALP SERPL-CCNC: 50 U/L (ref 39–117)
ALT SERPL W P-5'-P-CCNC: 9 U/L (ref 1–41)
ANION GAP SERPL CALCULATED.3IONS-SCNC: 6.6 MMOL/L (ref 5–15)
AST SERPL-CCNC: 26 U/L (ref 1–40)
BASOPHILS # BLD AUTO: 0.04 10*3/MM3 (ref 0–0.2)
BASOPHILS NFR BLD AUTO: 0.4 % (ref 0–1.5)
BILIRUB SERPL-MCNC: 1.4 MG/DL (ref 0–1.2)
BUN SERPL-MCNC: 11 MG/DL (ref 8–23)
BUN/CREAT SERPL: 12.6 (ref 7–25)
CALCIUM SPEC-SCNC: 8 MG/DL (ref 8.6–10.5)
CHLORIDE SERPL-SCNC: 104 MMOL/L (ref 98–107)
CO2 SERPL-SCNC: 29.4 MMOL/L (ref 22–29)
CREAT SERPL-MCNC: 0.87 MG/DL (ref 0.76–1.27)
DEPRECATED RDW RBC AUTO: 55.1 FL (ref 37–54)
EGFRCR SERPLBLD CKD-EPI 2021: 91.7 ML/MIN/1.73
EOSINOPHIL # BLD AUTO: 0.68 10*3/MM3 (ref 0–0.4)
EOSINOPHIL NFR BLD AUTO: 6.7 % (ref 0.3–6.2)
ERYTHROCYTE [DISTWIDTH] IN BLOOD BY AUTOMATED COUNT: 14.6 % (ref 12.3–15.4)
GLOBULIN UR ELPH-MCNC: 2.6 GM/DL
GLUCOSE SERPL-MCNC: 102 MG/DL (ref 65–99)
HCT VFR BLD AUTO: 28.1 % (ref 37.5–51)
HGB BLD-MCNC: 9.2 G/DL (ref 13–17.7)
IMM GRANULOCYTES # BLD AUTO: 0.06 10*3/MM3 (ref 0–0.05)
IMM GRANULOCYTES NFR BLD AUTO: 0.6 % (ref 0–0.5)
LYMPHOCYTES # BLD AUTO: 1.11 10*3/MM3 (ref 0.7–3.1)
LYMPHOCYTES NFR BLD AUTO: 11 % (ref 19.6–45.3)
MCH RBC QN AUTO: 34.1 PG (ref 26.6–33)
MCHC RBC AUTO-ENTMCNC: 32.7 G/DL (ref 31.5–35.7)
MCV RBC AUTO: 104.1 FL (ref 79–97)
MONOCYTES # BLD AUTO: 1.24 10*3/MM3 (ref 0.1–0.9)
MONOCYTES NFR BLD AUTO: 12.3 % (ref 5–12)
NEUTROPHILS NFR BLD AUTO: 6.96 10*3/MM3 (ref 1.7–7)
NEUTROPHILS NFR BLD AUTO: 69 % (ref 42.7–76)
NRBC BLD AUTO-RTO: 0 /100 WBC (ref 0–0.2)
PLATELET # BLD AUTO: 168 10*3/MM3 (ref 140–450)
PMV BLD AUTO: 9.5 FL (ref 6–12)
POTASSIUM SERPL-SCNC: 3.5 MMOL/L (ref 3.5–5.2)
PROT SERPL-MCNC: 5.6 G/DL (ref 6–8.5)
RBC # BLD AUTO: 2.7 10*6/MM3 (ref 4.14–5.8)
SODIUM SERPL-SCNC: 140 MMOL/L (ref 136–145)
WBC NRBC COR # BLD AUTO: 10.09 10*3/MM3 (ref 3.4–10.8)

## 2024-07-22 PROCEDURE — 94799 UNLISTED PULMONARY SVC/PX: CPT

## 2024-07-22 PROCEDURE — 94640 AIRWAY INHALATION TREATMENT: CPT

## 2024-07-22 PROCEDURE — 85025 COMPLETE CBC W/AUTO DIFF WBC: CPT | Performed by: NURSE PRACTITIONER

## 2024-07-22 PROCEDURE — 94664 DEMO&/EVAL PT USE INHALER: CPT

## 2024-07-22 PROCEDURE — 97166 OT EVAL MOD COMPLEX 45 MIN: CPT | Performed by: OCCUPATIONAL THERAPIST

## 2024-07-22 PROCEDURE — 25810000003 SODIUM CHLORIDE 0.9 % SOLUTION: Performed by: NURSE PRACTITIONER

## 2024-07-22 PROCEDURE — 25010000002 PIPERACILLIN SOD-TAZOBACTAM PER 1 G: Performed by: INTERNAL MEDICINE

## 2024-07-22 PROCEDURE — 94761 N-INVAS EAR/PLS OXIMETRY MLT: CPT

## 2024-07-22 PROCEDURE — 80053 COMPREHEN METABOLIC PANEL: CPT

## 2024-07-22 RX ORDER — AMOXICILLIN AND CLAVULANATE POTASSIUM 875; 125 MG/1; MG/1
1 TABLET, FILM COATED ORAL EVERY 12 HOURS SCHEDULED
Qty: 6 TABLET | Refills: 0 | Status: SHIPPED | OUTPATIENT
Start: 2024-07-22 | End: 2024-07-26

## 2024-07-22 RX ORDER — IPRATROPIUM BROMIDE AND ALBUTEROL SULFATE 2.5; .5 MG/3ML; MG/3ML
3 SOLUTION RESPIRATORY (INHALATION)
Status: DISCONTINUED | OUTPATIENT
Start: 2024-07-22 | End: 2024-07-27 | Stop reason: HOSPADM

## 2024-07-22 RX ORDER — ARFORMOTEROL TARTRATE 15 UG/2ML
15 SOLUTION RESPIRATORY (INHALATION)
Status: DISCONTINUED | OUTPATIENT
Start: 2024-07-22 | End: 2024-07-27 | Stop reason: HOSPADM

## 2024-07-22 RX ORDER — BUDESONIDE 0.5 MG/2ML
1 INHALANT ORAL
Status: DISCONTINUED | OUTPATIENT
Start: 2024-07-22 | End: 2024-07-27 | Stop reason: HOSPADM

## 2024-07-22 RX ORDER — PANTOPRAZOLE SODIUM 40 MG/1
40 TABLET, DELAYED RELEASE ORAL NIGHTLY
Status: DISCONTINUED | OUTPATIENT
Start: 2024-07-22 | End: 2024-07-27 | Stop reason: HOSPADM

## 2024-07-22 RX ORDER — AMOXICILLIN AND CLAVULANATE POTASSIUM 875; 125 MG/1; MG/1
1 TABLET, FILM COATED ORAL EVERY 12 HOURS SCHEDULED
Status: COMPLETED | OUTPATIENT
Start: 2024-07-22 | End: 2024-07-26

## 2024-07-22 RX ORDER — AMOXICILLIN AND CLAVULANATE POTASSIUM 875; 125 MG/1; MG/1
1 TABLET, FILM COATED ORAL EVERY 12 HOURS SCHEDULED
Qty: 6 TABLET | Refills: 0 | Status: SHIPPED | OUTPATIENT
Start: 2024-07-22 | End: 2024-07-22

## 2024-07-22 RX ADMIN — AMOXICILLIN AND CLAVULANATE POTASSIUM 1 TABLET: 875; 125 TABLET, FILM COATED ORAL at 15:31

## 2024-07-22 RX ADMIN — PANTOPRAZOLE SODIUM 40 MG: 40 TABLET, DELAYED RELEASE ORAL at 20:24

## 2024-07-22 RX ADMIN — LUBIPROSTONE 24 MCG: 24 CAPSULE, GELATIN COATED ORAL at 08:11

## 2024-07-22 RX ADMIN — AMLODIPINE BESYLATE 2.5 MG: 5 TABLET ORAL at 08:11

## 2024-07-22 RX ADMIN — SODIUM CHLORIDE 50 ML/HR: 9 INJECTION, SOLUTION INTRAVENOUS at 16:31

## 2024-07-22 RX ADMIN — BUDESONIDE 1 MG: 0.5 INHALANT RESPIRATORY (INHALATION) at 18:52

## 2024-07-22 RX ADMIN — IPRATROPIUM BROMIDE AND ALBUTEROL SULFATE 3 ML: .5; 3 SOLUTION RESPIRATORY (INHALATION) at 15:37

## 2024-07-22 RX ADMIN — ARFORMOTEROL TARTRATE 15 MCG: 15 SOLUTION RESPIRATORY (INHALATION) at 18:48

## 2024-07-22 RX ADMIN — APIXABAN 5 MG: 5 TABLET, FILM COATED ORAL at 20:25

## 2024-07-22 RX ADMIN — APIXABAN 5 MG: 5 TABLET, FILM COATED ORAL at 08:11

## 2024-07-22 RX ADMIN — MONTELUKAST 10 MG: 10 TABLET, FILM COATED ORAL at 20:25

## 2024-07-22 RX ADMIN — AMOXICILLIN AND CLAVULANATE POTASSIUM 1 TABLET: 875; 125 TABLET, FILM COATED ORAL at 20:28

## 2024-07-22 RX ADMIN — ATORVASTATIN CALCIUM 10 MG: 10 TABLET, FILM COATED ORAL at 20:24

## 2024-07-22 RX ADMIN — LUBIPROSTONE 24 MCG: 24 CAPSULE, GELATIN COATED ORAL at 20:25

## 2024-07-22 RX ADMIN — SOTALOL HYDROCHLORIDE 80 MG: 80 TABLET ORAL at 08:11

## 2024-07-22 RX ADMIN — SOTALOL HYDROCHLORIDE 80 MG: 80 TABLET ORAL at 20:25

## 2024-07-22 RX ADMIN — PIPERACILLIN AND TAZOBACTAM 3.38 G: 3; .375 INJECTION, POWDER, FOR SOLUTION INTRAVENOUS at 08:11

## 2024-07-22 NOTE — PLAN OF CARE
Goal Outcome Evaluation:  Plan of Care Reviewed With: patient        Progress: improving               Patient resting well through the night, without any complaints. Abdominal discomfort improving. Vitals stable.

## 2024-07-22 NOTE — PROGRESS NOTES
Infectious Diseases Progress Note      LOS: 3 days   Patient Care Team:  Amada Haskins MD as PCP - General  Josr Rodriguez DPM as Consulting Physician (Podiatry)  Rj Dominique MD as Consulting Physician (Cardiology)    Chief Complaint: Unable to complete    Subjective     Patient had no fever during the last 24 hours.  He remained hemodynamically stable.  Currently on 3 L of oxygen.  He is in the chair eating without any signs of distress.  Denies any new issues    Review of Systems:   Review of Systems   Unable to perform ROS: Mental status change        Objective     Vital Signs  Temp:  [97.5 °F (36.4 °C)-98.3 °F (36.8 °C)] 97.5 °F (36.4 °C)  Heart Rate:  [55-66] 62  Resp:  [11-22] 13  BP: (105-123)/(59-76) 120/74    Physical Exam:  Physical Exam  HENT:      Head: Normocephalic and atraumatic.      Mouth/Throat:      Mouth: Mucous membranes are moist.   Eyes:      Pupils: Pupils are equal, round, and reactive to light.   Cardiovascular:      Rate and Rhythm: Normal rate and regular rhythm.   Pulmonary:      Breath sounds: Rales present.   Abdominal:      General: Abdomen is flat. Bowel sounds are normal.      Palpations: Abdomen is soft.   Musculoskeletal:      Cervical back: Neck supple.      Right lower leg: No edema.      Left lower leg: No edema.   Neurological:      Mental Status: He is disoriented.          Results Review:    I have reviewed all clinical data, test, lab, and imaging results.     Radiology  Walking Oximetry    Result Date: 7/22/2024  Lea Carney, [facility]     7/22/2024  1:19 PM Exercise Oximetry Patient Name:Drake Beth MRN: [id number] Date: 07/22/24         ROOM AIR BASELINE SpO2% 85 Heart Rate  Blood Pressure  EXERCISE ON ROOM AIR SpO2% EXERCISE ON O2 @  LPM SpO2% 1 MINUTE 85 1 MINUTE  1LNC 90 2 MINUTES 91 2 MINUTES  3 MINUTES 87 3 MINUTES  2LNC 90 4 MINUTES 90 4 MINUTES  5 MINUTES 87 5 MINUTES  3LNC 91 6 MINUTES 93 6 MINUTES           Distance Walked   Distance Walked  Dyspnea (Nishant Scale)   Dyspnea (Nishant Scale) Fatigue (Nishant Scale)   Fatigue (Nishant Scale) SpO2% Post Exercise   SpO2% Post Exercise HR Post Exercise   HR Post Exercise Time to Recovery   Time to Recovery Comments: PT REFUSED TO GET UP AND WALK .  ME AND RN BOTH HAD HIM EXERCISE IN THE CHAIR.  PT REQUIRED 3LNC TO KEEP SATS ABOVE 88%.     Cardiology    Laboratory    Results from last 7 days   Lab Units 07/22/24  0508 07/21/24  0515 07/20/24  0543 07/18/24  2338   WBC 10*3/mm3 10.09 11.82* 12.66* 14.14*   HEMOGLOBIN g/dL 9.2* 9.1* 10.3* 13.4   HEMATOCRIT % 28.1* 27.5* 31.1* 39.7   PLATELETS 10*3/mm3 168 151 166 226     Results from last 7 days   Lab Units 07/22/24  0508 07/21/24  0515 07/20/24  0543 07/18/24  2338   SODIUM mmol/L 140 135* 135* 128*   POTASSIUM mmol/L 3.5 3.5 3.3* 5.4*   CHLORIDE mmol/L 104 100 97* 89*   CO2 mmol/L 29.4* 29.0 27.6 23.8   BUN mg/dL 11 12 18 15   CREATININE mg/dL 0.87 0.79 0.82 1.00   GLUCOSE mg/dL 102* 119* 68 149*   ALBUMIN g/dL 3.0* 3.1* 3.3* 4.2   BILIRUBIN mg/dL 1.4* 1.6* 1.5* 2.1*   ALK PHOS U/L 50 51 58 83   AST (SGOT) U/L 26 26 35 44*   ALT (SGPT) U/L 9 11 12 11   LIPASE U/L  --   --   --  35   CALCIUM mg/dL 8.0* 7.9* 8.1* 9.3                 Microbiology   Microbiology Results (last 10 days)       Procedure Component Value - Date/Time    MRSA Screen, PCR (Inpatient) - Swab, Nares [[id number]]  (Normal) Collected: 07/19/24 0520    Lab Status: Final result Specimen: Swab from Nares Updated: 07/19/24 0634     MRSA PCR No MRSA Detected    Narrative:      The negative predictive value of this diagnostic test is high and should only be used to consider de-escalating anti-MRSA therapy. A positive result may indicate colonization with MRSA and must be correlated clinically.    Blood Culture - Blood, Arm, Right [[id number]]  (Normal) Collected: 07/19/24 0053    Lab Status: Preliminary result Specimen: Blood from Arm, Right Updated: 07/22/24 0100     Blood Culture No growth at 3 days     Narrative:      Less than seven (7) mL's of blood was collected.  Insufficient quantity may yield false negative results.    Blood Culture - Blood, Arm, Left [[id number]]  (Normal) Collected: 07/19/24 0053    Lab Status: Preliminary result Specimen: Blood from Arm, Left Updated: 07/22/24 0100     Blood Culture No growth at 3 days    Narrative:      Less than seven (7) mL's of blood was collected.  Insufficient quantity may yield false negative results.    Urine Culture - Urine, Straight Cath [[id number]]  (Normal) Collected: 07/19/24 0031    Lab Status: Final result Specimen: Urine from Straight Cath Updated: 07/20/24 1127     Urine Culture No growth    Respiratory Panel PCR w/COVID-19(SARS-CoV-2) [facility] In-House, NP Swab in UTM/VTM, 2 HR TAT - Swab, Nasopharynx [[id number]]  (Normal) Collected: 07/18/24 2346    Lab Status: Final result Specimen: Swab from Nasopharynx Updated: 07/19/24 0100     ADENOVIRUS, PCR Not Detected     Coronavirus 229E Not Detected     Coronavirus HKU1 Not Detected     Coronavirus NL63 Not Detected     Coronavirus OC43 Not Detected     COVID19 Not Detected     Human Metapneumovirus Not Detected     Human Rhinovirus/Enterovirus Not Detected     Influenza A PCR Not Detected     Influenza B PCR Not Detected     Parainfluenza Virus 1 Not Detected     Parainfluenza Virus 2 Not Detected     Parainfluenza Virus 3 Not Detected     Parainfluenza Virus 4 Not Detected     RSV, PCR Not Detected     Bordetella pertussis pcr Not Detected     Bordetella parapertussis PCR Not Detected     Chlamydophila pneumoniae PCR Not Detected     Mycoplasma pneumo by PCR Not Detected    Narrative:      In the setting of a positive respiratory panel with a viral infection PLUS a negative procalcitonin without other underlying concern for bacterial infection, consider observing off antibiotics or discontinuation of antibiotics and continue supportive care. If the respiratory panel is positive for atypical  bacterial infection (Bordetella pertussis, Chlamydophila pneumoniae, or Mycoplasma pneumoniae), consider antibiotic de-escalation to target atypical bacterial infection.    Legionella Antigen, Urine - Urine, Urine, Clean Catch [[id number]]  (Normal) Collected: 07/14/24 1708    Lab Status: Final result Specimen: Urine, Clean Catch Updated: 07/14/24 1733     LEGIONELLA ANTIGEN, URINE Negative    S. Pneumo Ag Urine or CSF - Urine, Urine, Clean Catch [[id number]]  (Normal) Collected: 07/14/24 1708    Lab Status: Final result Specimen: Urine, Clean Catch Updated: 07/14/24 1733     Strep Pneumo Ag Negative            Medication Review:       Schedule Meds  amLODIPine, 2.5 mg, Oral, Daily  amoxicillin-clavulanate, 1 tablet, Oral, Q12H  apixaban, 5 mg, Oral, BID  arformoterol, 15 mcg, Nebulization, BID - RT  atorvastatin, 10 mg, Oral, Nightly  budesonide, 1 mg, Nebulization, BID - RT  ipratropium-albuterol, 3 mL, Nebulization, 4x Daily - RT  lubiprostone, 24 mcg, Oral, BID  montelukast, 10 mg, Oral, Nightly  pantoprazole, 40 mg, Oral, Nightly  sotalol, 80 mg, Oral, Q12H        Infusion Meds  sodium chloride, 50 mL/hr, Last Rate: 50 mL/hr (07/22/24 1631)        PRN Meds    senna-docusate sodium **AND** polyethylene glycol **AND** bisacodyl **AND** bisacodyl    Morphine    ondansetron        Assessment & Plan       Antimicrobial Therapy   1.  IV Zosyn        2.  P.o. Augmentin        3.        4.        5.              Assessment     Possible aspiration pneumonia versus hospital-acquired pneumonia.  Patient is currently on 3 L oxygen via nasal cannula.  MRSA screen was negative.  Speech therapy saw the patient and cleared him for diet     Developmental and intellectual delay.  Patient lives with his brothers     Recent hospital admission for possible pneumonia     S/p mitral valve replacement in the past     Plan     Discontinue IV Zosyn  Start p.o. Augmentin 875/125 mg twice daily for 5 days for 7 days of treatment  Continue  supportive care  A okay to discharge from Infectious Disease standpoint          Marichuy Cullen, IZAIAH  07/22/24  17:44 EDT    Note is dictated utilizing voice recognition software/Dragon

## 2024-07-22 NOTE — PLAN OF CARE
Goal Outcome Evaluation:  Plan of Care Reviewed With: patient           Outcome Evaluation: Pt is a 79 y.o. M adm to [facility] on 07/18/24 with multifocal PNA, sepsis & UTI. Pt has had a another recent hospitalization for PNA as well. PMH: intellectual disability/impairment, CAD, pacemaker. At baseline, pt lives with his brother & he has another brother who assists him as well. There is no family present to confirm his baseline status. Pt reports he is able to walk,  bathe, dress & toilet himself & his brothers help with laundry, cooking & helping him shave. He completed ADL transfers with CGA as a safety prec as pt wanting to hold therapist's hand. He had no detected LOB. He required min A & v.c. & tactile cuing for oral care, face grooming and LB dressing. Pt requiring cuing to encourage independence as pt wanting therapist to perform tasks for him. He needed assist for thoroughness of task. He is on 1L O2. His O2 sat dropped to 88% with transfer, but recovered with v.c. for pt to take in a few deep breaths. Pt does not use O2 at home reportedly. He presents with generalized dec endurance, but anticipate he will make good progress as his medical status improves. OT will follow while in acute care & recommends home with 24/7 assist as before upon discharge.      Anticipated Discharge Disposition (OT): home with 24/7 care, home with assist

## 2024-07-22 NOTE — CONSULTS
"Patient reports he is doing ok. Asked about his plush toy and drawings. Patient mostly responded with a \"yes or no\". Retrieved another plush toy to add with his belongings. Informed patient that if scared or worried that I am available to visit or speak with.    Will continue to follow.    Chaplain Moe Mott  "

## 2024-07-22 NOTE — CONSULTS
PULMONARY CRITICAL CARE CONSULT NOTE      PATIENT IDENTIFICATION:  Name: Drake Beth  MRN: [id number]  :  1950     Age: 73 y.o.  Sex: male        DATE OF CONSULTATION:  2024  PRIMARY CARE PHYSICIAN    Amada Haskins MD                  CHIEF COMPLAINT: SOB    History of Present Illness:   Drake Beth is a 73 y.o. male with a history of A-fib s/p pacemaker, Hx MVR, CAD s/p CABG, HTN, Hyperlipidemia, and mentally vchallenged individual who came to the ER with complaints of moaning and grabbing at chest. Patient is non-verbal. In the ER patient was noted with pneumonia and admitted for further treatment.       Review of Systems:   Constitutional: As above    Eyes: negative   ENT/oropharynx: negative   Cardiovascular: As above    Respiratory: As above    Gastrointestinal: negative   Genitourinary: negative   Neurological: negative   Musculoskeletal: negative   Integument/breast: negative   Endocrine: negative   Allergic/Immunologic: negative     Past Medical History:  Past Medical History:   Diagnosis Date    Allergic rhinitis     Anemia     Atrial fibrillation     Chesty cough     CHF (congestive heart failure)     Coronary artery disease     Cough     Fever     Hyperlipidemia     Hypertension     Mentally challenged     MVP (mitral valve prolapse)        Past Surgical History:  Past Surgical History:   Procedure Laterality Date    CARDIAC ELECTROPHYSIOLOGY PROCEDURE N/A 2022    Procedure: PACEMAKER EXTRACTION;  Surgeon: Rj Dominique MD;  Location: [facility] INVASIVE LOCATION;  Service: Cardiology;  Laterality: N/A;    COLONOSCOPY      He has never had the test done.  His family is refusing any form of colon cancer screening for him.    CORONARY ARTERY BYPASS GRAFT      DR SWEAT    MITRAL VALVE REPLACEMENT  2008    WITH BIOPROSTHETIC TISSUE VALVE        Family History:  Family History   Problem Relation Age of Onset    Coronary artery disease Mother 64    Hyperlipidemia  "Sister     Liver disease Sister         FATTY LIVER    Rheum arthritis Sister     Lung cancer Sister     Hypertension Brother     Other Brother     Rheum arthritis Brother         Social History:   Social History     Tobacco Use    Smoking status: Never    Smokeless tobacco: Never   Substance Use Topics    Alcohol use: Never        Allergies:  No Known Allergies    Home Meds:  Medications Prior to Admission   Medication Sig Dispense Refill Last Dose    amLODIPine (NORVASC) 2.5 MG tablet TAKE 1 TABLET BY MOUTH DAILY 90 tablet 3     apixaban (Eliquis) 5 MG tablet tablet TAKE 1 TABLET BY MOUTH TWICE DAILY 180 tablet 3     atorvastatin (LIPITOR) 10 MG tablet TAKE 1 TABLET BY MOUTH DAILY 90 tablet 3     montelukast (SINGULAIR) 10 MG tablet TAKE 1 TABLET BY MOUTH DAILY 90 tablet 1     sotalol (BETAPACE AF) 80 MG tablet tablet TAKE 1 TABLET BY MOUTH TWICE DAILY 180 tablet 1     lactulose (CHRONULAC) 10 GM/15ML solution Take 15 mL by mouth 2 (Two) Times a Day As Needed (Constipation).       multivitamin with minerals tablet tablet Take 1 tablet by mouth Daily.          Objective:  tMax 24 hrs: Temp (24hrs), Av.7 °F (36.5 °C), Min:97.4 °F (36.3 °C), Max:98.3 °F (36.8 °C)      Vitals Ranges:   Temp:  [97.4 °F (36.3 °C)-98.3 °F (36.8 °C)] 97.7 °F (36.5 °C)  Heart Rate:  [55-64] 55  Resp:  [11-22] 11  BP: (105-123)/(59-76) 109/60    Intake and Output Last 3 Shifts:   I/O last 3 completed shifts:  In: 780 [P.O.:780]  Out: 800 [Urine:800]    Exam:  /60 (BP Location: Left arm, Patient Position: Sitting)   Pulse 55   Temp 97.7 °F (36.5 °C) (Oral)   Resp 11   Ht 152.4 cm (60\")   Wt 47 kg (103 lb 9.9 oz)   SpO2 92%   BMI 20.24 kg/m²       249   Weight: 46.3 kg (102 lb 1.2 oz) 47 kg (103 lb 9.9 oz)     General Appearance: Alert, non-verbal     HEENT:  Normocephalic, without obvious abnormality, atraumatic. Conjunctivae/corneas clear.  Normal external ear canals. Nares normal, no " drainage.  Neck:  Supple, symmetrical, trachea midline. No JVD.  Lungs /Chest wall:   Bilateral basal rhonchi, respirations unlabored, symmetrical wall movement.     Heart:  Regular rate and rhythm, systolic murmur PMI left sternal border  Abdomen: Soft, nontender, no masses, no organomegaly.    Extremities: Trace edema, no clubbing or cyanosis        Data Review:  All labs (24hrs):   Recent Results (from the past 24 hour(s))   Lactic Acid, Plasma    Collection Time: 07/21/24  1:11 PM    Specimen: Arm, Right; Blood   Result Value Ref Range    Lactate 1.7 0.5 - 2.0 mmol/L   Comprehensive Metabolic Panel    Collection Time: 07/22/24  5:08 AM    Specimen: Blood   Result Value Ref Range    Glucose 102 (H) 65 - 99 mg/dL    BUN 11 8 - 23 mg/dL    Creatinine 0.87 0.76 - 1.27 mg/dL    Sodium 140 136 - 145 mmol/L    Potassium 3.5 3.5 - 5.2 mmol/L    Chloride 104 98 - 107 mmol/L    CO2 29.4 (H) 22.0 - 29.0 mmol/L    Calcium 8.0 (L) 8.6 - 10.5 mg/dL    Total Protein 5.6 (L) 6.0 - 8.5 g/dL    Albumin 3.0 (L) 3.5 - 5.2 g/dL    ALT (SGPT) 9 1 - 41 U/L    AST (SGOT) 26 1 - 40 U/L    Alkaline Phosphatase 50 39 - 117 U/L    Total Bilirubin 1.4 (H) 0.0 - 1.2 mg/dL    Globulin 2.6 gm/dL    A/G Ratio 1.2 g/dL    BUN/Creatinine Ratio 12.6 7.0 - 25.0    Anion Gap 6.6 5.0 - 15.0 mmol/L    eGFR 91.7 >60.0 mL/min/1.73   CBC Auto Differential    Collection Time: 07/22/24  5:08 AM    Specimen: Blood   Result Value Ref Range    WBC 10.09 3.40 - 10.80 10*3/mm3    RBC 2.70 (L) 4.14 - 5.80 10*6/mm3    Hemoglobin 9.2 (L) 13.0 - 17.7 g/dL    Hematocrit 28.1 (L) 37.5 - 51.0 %    .1 (H) 79.0 - 97.0 fL    MCH 34.1 (H) 26.6 - 33.0 pg    MCHC 32.7 31.5 - 35.7 g/dL    RDW 14.6 12.3 - 15.4 %    RDW-SD 55.1 (H) 37.0 - 54.0 fl    MPV 9.5 6.0 - 12.0 fL    Platelets 168 140 - 450 10*3/mm3    Neutrophil % 69.0 42.7 - 76.0 %    Lymphocyte % 11.0 (L) 19.6 - 45.3 %    Monocyte % 12.3 (H) 5.0 - 12.0 %    Eosinophil % 6.7 (H) 0.3 - 6.2 %    Basophil % 0.4  0.0 - 1.5 %    Immature Grans % 0.6 (H) 0.0 - 0.5 %    Neutrophils, Absolute 6.96 1.70 - 7.00 10*3/mm3    Lymphocytes, Absolute 1.11 0.70 - 3.10 10*3/mm3    Monocytes, Absolute 1.24 (H) 0.10 - 0.90 10*3/mm3    Eosinophils, Absolute 0.68 (H) 0.00 - 0.40 10*3/mm3    Basophils, Absolute 0.04 0.00 - 0.20 10*3/mm3    Immature Grans, Absolute 0.06 (H) 0.00 - 0.05 10*3/mm3    nRBC 0.0 0.0 - 0.2 /100 WBC        Imaging:  CT Abdomen Pelvis With Contrast    Result Date: 7/19/2024   DATE OF EXAM: 7/19/2024, 2:31 A.M.  PROCEDURE: CT ABDOMEN PELVIS W CONTRAST-  INDICATIONS: abdominal pain, not otherwise specified  COMPARISON: 7/11/2024.  TECHNIQUE: Routine transaxial slices were obtained through the abdomen and pelvis after the intravenous administration of Isovue 370. Reconstructed coronal and sagittal images were also obtained. Automated exposure control and iterative construction methods were used. No oral contrast agent was administered for the exam.  FINDINGS: Interval increase in size of the bilateral pleural effusions since the prior 7/11/2024 study. There is motion artifact on the study, especially of scarring in the upper abdomen. There is gaseous greater distention of the stomach. The right kidney is thought to be surgically absent. No mechanical bowel obstruction. No pneumoperitoneum or pneumatosis. No acute colitis or diverticulitis. No acute appendicitis is suggested. Otherwise, no significant interval change is seen since the prior 7/11/2024 CT exam with additional findings as described in the prior exam report.      Increase in size of bilateral pleural effusions. Otherwise, no significant interval change is identified since the prior exam.    Please note that portions of this note were completed with a voice recognition program.        Electronically Signed By-Moe Bourne MD On:7/19/2024 3:12 AM      CT Angiogram Chest Pulmonary Embolism    Result Date: 7/19/2024  CT ANGIOGRAM CHEST PULMONARY EMBOLISM-  Date  of Exam: 7/19/2024 2:31 AM  Indication: hypoxia, chest pain.  Comparison: None available.  Technique: Serial and axial CT images of the chest were obtained following the uneventful intravenous administration of 100 cc contrast. Reconstructions in the coronal and sagittal planes were also performed. In addition, a 3 D volume rendered image was obtained after post processing. Automated exposure control and iterative reconstruction methods were used.  FINDINGS:  Pulmonary Arteries: Excellent contrast opacification of the pulmonary arteries.  No intraluminal filling defects to suggest pulmonary embolus.  The pulmonary arteries are normal in caliber.  Hilum and Mediastinum: No pathologically enlarged lymph nodes. The heart appears enlarged..  No significant coronary artery atherosclerotic disease. Unremarkable thoracic aorta.   No pericardial effusion.  Lung Parenchyma and Pleura: Small to moderate-sized bilateral pleural effusions are present. Intralobular septal thickening is present. Patchy groundglass changes are present throughout the lungs bilaterally. Patchy areas of airspace disease present within the left upper lobe and the bilateral lower lobes..  Upper Abdomen: Unremarkable.  Soft tissues: Unremarkable.  Osseous structures: No aggressive focal lytic or sclerotic osseous lesions.       1. No evidence of pulmonary embolus. 2. Multifocal pneumonia with patchy airspace disease present, most pronounced within the left upper lobe. Mild to moderate underlying pulmonary edema pattern suspected versus groundglass airspace disease with small to moderate-sized bilateral pleural effusions.       Electronically Signed By-Lili Zaldivar MD On:7/19/2024 3:10 AM      XR Chest 1 View    Result Date: 7/19/2024  XR CHEST 1 VW Date of Exam: 7/18/2024 11:53 PM EDT Indication: dyspnea Comparison: 7/11/2024. Findings: There are no airspace consolidations. No pleural fluid. No pneumothorax. The pulmonary vasculature appears indistinct.  The heart appears enlarged. Subsegmental atelectatic changes and scarring present.. No acute osseous abnormality identified.     Impression: No significant change. Stable cardiomegaly with probable mild pulmonary edema pattern with subsegmental atelectatic changes and scarring. Electronically Signed: Lili Zaldivar MD  7/19/2024 12:22 AM EDT  Workstation ID: [id number]    Walking Oximetry    Result Date: 7/15/2024  Evelin Puri, YUSRA     7/15/2024  3:41 PM Exercise Oximetry Patient Name:Drake Beth MRN: [id number] Date: 07/15/24         ROOM AIR BASELINE SpO2% 97 Heart Rate  83 Blood Pressure  EXERCISE ON ROOM AIR SpO2% EXERCISE ON O2 @LPM SpO2% 1 MINUTE 95 1 MINUTE  2 MINUTES 96 2 MINUTES  3 MINUTES 98 3 MINUTES  4 MINUTES 94 4 MINUTES  5 MINUTES 96 5 MINUTES  6 MINUTES 96 6 MINUTES           Distance Walked  6mins Distance Walked Dyspnea (Nishant Scale)   Dyspnea (Nishant Scale) Fatigue (Nishant Scale)   Fatigue (Nishant Scale) SpO2% Post Exercise  98 SpO2% Post Exercise HR Post Exercise  100 HR Post Exercise Time to Recovery  NA Time to Recovery Comments: Patient completed walking exercise on room air without O2, at this time patient does not require o2 for home.     Adult Transthoracic Echo Complete w/ Color, Spectral and Contrast if Necessary Per Protocol    Result Date: 7/13/2024    Left ventricular systolic function is normal. Left ventricular ejection fraction appears to be 51 - 55%.   Left ventricular diastolic function was indeterminate.   The left atrial cavity is moderately dilated.   Left atrial volume is moderately increased.   There is mild calcification of the aortic valve.   A mitral valve mass is present.   Moderate to severe mitral valve regurgitation is present.   There is a bioprosthetic mitral valve present. Transvalvular regurgitation is present in the prosthetic mitral valve.   Moderate tricuspid valve regurgitation is present.   Estimated right ventricular systolic pressure from tricuspid  regurgitation is markedly elevated (>55 mmHg).   Moderate to severe pulmonary hypertension is present. Abnormal study Recommend transesophageal echo imaging for evaluation of bioprosthetic mitral valve which appears severely regurgitant .,  See documented other abnormalities and findings     Walking Oximetry    Result Date: 7/13/2024  Yoandy Sanderson, RT Student     7/13/2024  4:09 PM Exercise Oximetry Patient Name:Drake Beth MRN: [id number] Date: 07/13/24         ROOM AIR BASELINE SpO2% 93%  Heart Rate 71 BPM Blood Pressure  EXERCISE ON ROOM AIR SpO2% EXERCISE ON O2 @  LPM SpO2% 1 MINUTE 92% on RA 92% 1 MINUTE  2 MINUTES 92% on RA 92% 2 MINUTES  3 MINUTES 91 % on RA 91% 3 MINUTES  4 MINUTES 90% on RA 90% 4 MINUTES  5 MINUTES 89% on RA 89% 5 MINUTES  6 MINUTES 89% on RA 89% 6 MINUTES           Distance Walked  6 mins Distance Walked Dyspnea (Nishant Scale)   Dyspnea (Nishant Scale) Fatigue (Nishant Scale)   Fatigue (Nishant Scale) SpO2% Post Exercise  90% on RA SpO2% Post Exercise HR Post Exercise  85 BPM HR Post Exercise Time to Recovery   Time to Recovery Comments: Pt walked for 6 min's without stopping while on room air.     XR Chest 1 View    Result Date: 7/11/2024  XR CHEST 1 VW Date of Exam: 7/11/2024 3:50 PM EDT Indication: abnormal CT, cough Comparison: 12/15/2022. Findings: There are new infiltrates of the bilateral upper and lower lobes, overall greatest involving the lower lobes. There are no definite effusions. There is stable mild cardiomegaly. There are sternal suture wires.     Impression: Findings are most compatible with bilateral pneumonia. Electronically Signed: Mallorie Monge MD  7/11/2024 4:17 PM EDT  Workstation ID: [id number]    CT Abdomen Pelvis With Contrast    Result Date: 7/11/2024  CT ABDOMEN PELVIS W CONTRAST Date of Exam: 7/11/2024 3:00 PM EDT Indication: abd pain. Comparison: None available. Technique: Axial CT images were obtained of the abdomen and pelvis following the uneventful intravenous  administration of iodinated contrast. Sagittal and coronal reconstructions were performed.  Automated exposure control and iterative reconstruction methods were used. Findings: Liver: The liver is unremarkable in morphology. No focal liver lesion is seen. No biliary dilation is seen. Gallbladder: Absent. Pancreas: Unremarkable. Spleen: Unremarkable. Adrenal glands: Unremarkable. Genitourinary tract: Patient is status post right nephrectomy. Small left renal cyst and additional subcentimeter left renal hypodensity too small to characterize noted. No hydronephrosis is seen. The visualized left ureter is unremarkable. Urinary bladder and prostate gland are unremarkable. Gastrointestinal tract: Colonic diverticulosis is present. The hollow viscera appear otherwise unremarkable. There is no evidence of bowel obstruction. Appendix: No findings to suggest acute appendicitis. Other findings: No free air or free fluid. No pathologically enlarged lymph nodes are seen. Mild vascular calcifications are present. The IVC is unremarkable. Bones and soft tissues: No acute osseous lesion is identified. There are degenerative changes within the spine and of the right hip. Superficial patient appears to be status post left orchiectomy. Lung bases: Cardiomegaly, probable pulmonary vascular congestion, and small bilateral pleural effusions noted. Tiny calcified granuloma within the right lung base.     Impression: 1.No acute abnormality identified within the abdomen or pelvis. 2.Colonic diverticulosis. 3.Absent right kidney. 4.Cardiomegaly with probable pulmonary vascular congestion and small bilateral pleural effusions noted. Atypical infiltrates within the lung bases may have a similar appearance. Please correlate clinically. Electronically Signed: Shane Vaughn MD  7/11/2024 3:09 PM EDT  Workstation ID: [id number]       Current:  amLODIPine, 2.5 mg, Oral, Daily  amoxicillin-clavulanate, 1 tablet, Oral, Q12H  apixaban, 5 mg, Oral,  BID  atorvastatin, 10 mg, Oral, Nightly  lubiprostone, 24 mcg, Oral, BID  montelukast, 10 mg, Oral, Nightly  sotalol, 80 mg, Oral, Q12H        Infusion:  sodium chloride, 50 mL/hr, Last Rate: 50 mL/hr (07/22/24 0500)         PRN:    senna-docusate sodium **AND** polyethylene glycol **AND** bisacodyl **AND** bisacodyl    Morphine    ondansetron    ASSESSMENT:  Acute hypoxic/hypercapnic resp failure  Multifocal pneumonia  Sepsis  CAD  HTN  Diastolic CHF  UTI  Hyponatremia  A-fib s/p pacemaker     PLAN:  Antibiotics  Bronchodilators  Inhaled corticosteroids  Incentive spirometer  Electrolytes/ glycemic control  DVT prophylaxis-Apixaban    Discussed with Dr Devonte Prescott, APRN  7/22/2024  13:02 EDT      I personally have examined  and interviewed the patient. I have reviewed the history, data, problems, assessment and plan with our NP.  Total Critical care time in direct medical management (   ) minutes, This time specifically excludes time spent performing procedures.    Gilberto Whitney MD

## 2024-07-22 NOTE — DISCHARGE SUMMARY
[facility] Medicine Services  Discharge Summary    Date of Service: 2024  Patient Name: Drake Beth  : 1950  MRN: [id number]    Date of Admission: 2024  Discharge Diagnosis:   Severe sepsis secondary to aspiration pneumonia, unspecified organism  Acute respiratory failure with hypoxia and hypercapnia due to pneumonia  Chronic diastolic heart failure with acute on chronic diastolic heart failure ruled out  Acute UTI  Paroxysmal A-fib  Hyperbilirubinemia  Hypertension  Mentally challenged  Dyslipidemia    Date of Discharge: 2024  Primary Care Physician: Amada Haskins MD      Presenting Problem:   Generalized abdominal pain [R10.84]  Sepsis [A41.9]  Pneumonia of both lungs due to infectious organism, unspecified part of lung [J18.9]  Dyspnea, unspecified type [R06.00]    Active and Resolved Hospital Problems:  Active Hospital Problems    Diagnosis POA    **Sepsis [A41.9] Yes    Acute on chronic diastolic heart failure [I50.33] Yes    Paroxysmal atrial fibrillation [I48.0] Yes    Acute respiratory failure with hypoxia and hypercapnia [J96.01, J96.02] Yes    Hyperkalemia [E87.5] Yes    Hyponatremia [E87.1] Yes    Hyperbilirubinemia [E80.6] Yes    Acute UTI (urinary tract infection) [N39.0] Yes    Presence of cardiac pacemaker [Z95.0] Yes    Pneumonia [J18.9] Yes    Mitral valve replaced [Z95.2] Not Applicable    Mentally challenged [F79] Yes    Hypertension [I10] Yes    Hyperlipidemia [E78.5] Yes    Coronary artery disease [I25.10] Yes      Resolved Hospital Problems   No resolved problems to display.         Hospital Course     HPI:  Patient is a 73-year-old male who presented to the hospital with complaints of shortness of breath.  Please see H&P for details.    Hospital Course:  The patient was admitted to the hospital for severe sepsis secondary to pneumonia.  He was recently mated to the hospital for pneumonia and was treated as such, however there was concern for  aspiration pneumonia given his presentation.  The patient was started on broad-spectrum IV antibiotics with Zosyn.  He was hypoxic on presentation although his O2 requirements did improve prior to discharge.  However 6-minute walk prior to discharge reveals that he will still need oxygen therapy on discharge temporarily.  He was transitioned to oral antibiotics prior to discharge to complete a course of treatment for his pneumonia.  Speech therapy did evaluate the patient and felt that he would benefit from a mechanical soft diet to reduce his risk of aspiration.  There was concern for possible decompensated heart failure as well, however his respiratory symptoms were most likely related to pneumonia and not pulmonary edema.  He did receive 1 dose of IV diuretics but this was not continued.  He will return back home with his brother.  He is stable for discharge.        DISCHARGE Follow Up Recommendations for labs and diagnostics: Follow-up with your PCP in 1 week.      Reasons For Change In Medications and Indications for New Medications:      Day of Discharge     Vital Signs:  Temp:  [97.4 °F (36.3 °C)-98.3 °F (36.8 °C)] 97.7 °F (36.5 °C)  Heart Rate:  [55-64] 55  Resp:  [11-22] 11  BP: (105-123)/(59-76) 109/60  Flow (L/min):  [1-3] 1    Physical Exam:  Physical Exam   General Appearance:  Alert, cooperative, no distress, appears stated age  Head:  Normocephalic, without obvious abnormality, atraumatic  Eyes:  PERRL, conjunctiva/corneas clear, EOM's intact, fundi benign, both eyes  Ears:  Normal TM's and external ear canals, both ears  Nose: Nares normal, septum midline, mucosa normal, no drainage or sinus tenderness  Throat: Lips, mucosa, and tongue normal; teeth and gums normal  Neck: Supple, symmetrical, trachea midline, no adenopathy, thyroid: not enlarged, symmetric, no tenderness/mass/nodules, no carotid bruit or JVD  Lungs:   Clear to auscultation bilaterally, respirations unlabored  Heart:  Regular rate and  rhythm, S1, S2 normal, no murmur, rub or gallop  Abdomen:  Soft, non-tender, bowel sounds active all four quadrants,  no masses, no organomegaly  Extremities: Extremities normal, atraumatic, no cyanosis or edema  Pulses: 2+ and symmetric  Skin: Skin color, texture, turgor normal, no rashes or lesions  Neurologic: Normal        Pertinent  and/or Most Recent Results     LAB RESULTS:      Lab 07/22/24  0508 07/21/24  1311 07/21/24  0515 07/20/24  0543 07/19/24  0520 07/19/24  0330 07/18/24  2351 07/18/24  2338   WBC 10.09  --  11.82* 12.66*  --   --   --  14.14*   HEMOGLOBIN 9.2*  --  9.1* 10.3*  --   --   --  13.4   HEMATOCRIT 28.1*  --  27.5* 31.1*  --   --   --  39.7   PLATELETS 168  --  151 166  --   --   --  226   NEUTROS ABS 6.96  --  9.56* 10.16*  --   --   --  12.03*   IMMATURE GRANS (ABS) 0.06*  --  0.05 0.04  --   --   --  0.25*   LYMPHS ABS 1.11  --  0.72 0.77  --   --   --  1.05   MONOS ABS 1.24*  --  1.32* 1.27*  --   --   --  0.66   EOS ABS 0.68*  --  0.14 0.38  --   --   --  0.09   .1*  --  102.2* 100.3*  --   --   --  101.3*   LACTATE  --  1.7  --   --  2.2* 2.0 3.5*  --          Lab 07/22/24  0508 07/21/24  0515 07/20/24  0543 07/18/24  2338   SODIUM 140 135* 135* 128*   POTASSIUM 3.5 3.5 3.3* 5.4*   CHLORIDE 104 100 97* 89*   CO2 29.4* 29.0 27.6 23.8   ANION GAP 6.6 6.0 10.4 15.2*   BUN 11 12 18 15   CREATININE 0.87 0.79 0.82 1.00   EGFR 91.7 94.4 93.3 80.0   GLUCOSE 102* 119* 68 149*   CALCIUM 8.0* 7.9* 8.1* 9.3         Lab 07/22/24  0508 07/21/24  0515 07/20/24  0543 07/18/24  2338   TOTAL PROTEIN 5.6* 5.4* 5.7* 7.9   ALBUMIN 3.0* 3.1* 3.3* 4.2   GLOBULIN 2.6 2.3 2.4 3.7   ALT (SGPT) 9 11 12 11   AST (SGOT) 26 26 35 44*   BILIRUBIN 1.4* 1.6* 1.5* 2.1*   ALK PHOS 50 51 58 83   LIPASE  --   --   --  35         Lab 07/19/24  0330 07/18/24  2338   PROBNP  --  3,812.0*   HSTROP T 23* 20                 Lab 07/19/24  0339 07/18/24  2351   PH, ARTERIAL  --  7.308*   PCO2, ARTERIAL  --  52.2*   PO2  ART  --  69.8*   O2 SATURATION ART  --  91.7*   FIO2 80 40   HCO3 ART  --  26.2   BASE EXCESS ART  --  -0.9*     Brief Urine Lab Results  (Last result in the past 365 days)        Color   Clarity   Blood   Leuk Est   Nitrite   Protein   CREAT   Urine HCG        07/19/24 0031 Orange  Comment: Any Substance that causes an abnormal urine color can alter the accuracy of the chemical reactions.   Turbid   Large (3+)   Trace   Negative   >=300 mg/dL (3+)                 Microbiology Results (last 10 days)       Procedure Component Value - Date/Time    MRSA Screen, PCR (Inpatient) - Swab, Nares [[id number]]  (Normal) Collected: 07/19/24 0520    Lab Status: Final result Specimen: Swab from Nares Updated: 07/19/24 0634     MRSA PCR No MRSA Detected    Narrative:      The negative predictive value of this diagnostic test is high and should only be used to consider de-escalating anti-MRSA therapy. A positive result may indicate colonization with MRSA and must be correlated clinically.    Blood Culture - Blood, Arm, Right [[id number]]  (Normal) Collected: 07/19/24 0053    Lab Status: Preliminary result Specimen: Blood from Arm, Right Updated: 07/22/24 0100     Blood Culture No growth at 3 days    Narrative:      Less than seven (7) mL's of blood was collected.  Insufficient quantity may yield false negative results.    Blood Culture - Blood, Arm, Left [[id number]]  (Normal) Collected: 07/19/24 0053    Lab Status: Preliminary result Specimen: Blood from Arm, Left Updated: 07/22/24 0100     Blood Culture No growth at 3 days    Narrative:      Less than seven (7) mL's of blood was collected.  Insufficient quantity may yield false negative results.    Urine Culture - Urine, Straight Cath [[id number]]  (Normal) Collected: 07/19/24 0031    Lab Status: Final result Specimen: Urine from Straight Cath Updated: 07/20/24 1127     Urine Culture No growth    Respiratory Panel PCR w/COVID-19(SARS-CoV-2) DIDI/ANJUM/MC/PAD/COR/YING In-House, NP Swab  in UTM/VTM, 2 HR TAT - Swab, Nasopharynx [[id number]]  (Normal) Collected: 07/18/24 2346    Lab Status: Final result Specimen: Swab from Nasopharynx Updated: 07/19/24 0100     ADENOVIRUS, PCR Not Detected     Coronavirus 229E Not Detected     Coronavirus HKU1 Not Detected     Coronavirus NL63 Not Detected     Coronavirus OC43 Not Detected     COVID19 Not Detected     Human Metapneumovirus Not Detected     Human Rhinovirus/Enterovirus Not Detected     Influenza A PCR Not Detected     Influenza B PCR Not Detected     Parainfluenza Virus 1 Not Detected     Parainfluenza Virus 2 Not Detected     Parainfluenza Virus 3 Not Detected     Parainfluenza Virus 4 Not Detected     RSV, PCR Not Detected     Bordetella pertussis pcr Not Detected     Bordetella parapertussis PCR Not Detected     Chlamydophila pneumoniae PCR Not Detected     Mycoplasma pneumo by PCR Not Detected    Narrative:      In the setting of a positive respiratory panel with a viral infection PLUS a negative procalcitonin without other underlying concern for bacterial infection, consider observing off antibiotics or discontinuation of antibiotics and continue supportive care. If the respiratory panel is positive for atypical bacterial infection (Bordetella pertussis, Chlamydophila pneumoniae, or Mycoplasma pneumoniae), consider antibiotic de-escalation to target atypical bacterial infection.    Legionella Antigen, Urine - Urine, Urine, Clean Catch [[id number]]  (Normal) Collected: 07/14/24 1708    Lab Status: Final result Specimen: Urine, Clean Catch Updated: 07/14/24 1733     LEGIONELLA ANTIGEN, URINE Negative    S. Pneumo Ag Urine or CSF - Urine, Urine, Clean Catch [[id number]]  (Normal) Collected: 07/14/24 1708    Lab Status: Final result Specimen: Urine, Clean Catch Updated: 07/14/24 1733     Strep Pneumo Ag Negative            CT Abdomen Pelvis With Contrast    Result Date: 7/19/2024  Impression: Increase in size of bilateral pleural effusions. Otherwise,  no significant interval change is identified since the prior exam.    Please note that portions of this note were completed with a voice recognition program.        Electronically Signed By-Moe Bourne MD On:7/19/2024 3:12 AM      CT Angiogram Chest Pulmonary Embolism    Result Date: 7/19/2024  Impression:  1. No evidence of pulmonary embolus. 2. Multifocal pneumonia with patchy airspace disease present, most pronounced within the left upper lobe. Mild to moderate underlying pulmonary edema pattern suspected versus groundglass airspace disease with small to moderate-sized bilateral pleural effusions.       Electronically Signed By-Lili Zaldivar MD On:7/19/2024 3:10 AM      XR Chest 1 View    Result Date: 7/19/2024  Impression: Impression: No significant change. Stable cardiomegaly with probable mild pulmonary edema pattern with subsegmental atelectatic changes and scarring. Electronically Signed: Lili Zaldivar MD  7/19/2024 12:22 AM EDT  Workstation ID: [id number]    XR Chest 1 View    Result Date: 7/11/2024  Impression: Impression: Findings are most compatible with bilateral pneumonia. Electronically Signed: Mallorie Monge MD  7/11/2024 4:17 PM EDT  Workstation ID: [id number]    CT Abdomen Pelvis With Contrast    Result Date: 7/11/2024  Impression: Impression: 1.No acute abnormality identified within the abdomen or pelvis. 2.Colonic diverticulosis. 3.Absent right kidney. 4.Cardiomegaly with probable pulmonary vascular congestion and small bilateral pleural effusions noted. Atypical infiltrates within the lung bases may have a similar appearance. Please correlate clinically. Electronically Signed: Shane Vaughn MD  7/11/2024 3:09 PM EDT  Workstation ID: [id number]             Results for orders placed during the hospital encounter of 07/11/24    Adult Transthoracic Echo Complete w/ Color, Spectral and Contrast if Necessary Per Protocol    Interpretation Summary    Left ventricular systolic function is normal. Left  ventricular ejection fraction appears to be 51 - 55%.    Left ventricular diastolic function was indeterminate.    The left atrial cavity is moderately dilated.    Left atrial volume is moderately increased.    There is mild calcification of the aortic valve.    A mitral valve mass is present.    Moderate to severe mitral valve regurgitation is present.    There is a bioprosthetic mitral valve present. Transvalvular regurgitation is present in the prosthetic mitral valve.    Moderate tricuspid valve regurgitation is present.    Estimated right ventricular systolic pressure from tricuspid regurgitation is markedly elevated (>55 mmHg).    Moderate to severe pulmonary hypertension is present.    Abnormal study  Recommend transesophageal echo imaging for evaluation of bioprosthetic mitral valve which appears severely regurgitant .,  See documented other abnormalities and findings      Labs Pending at Discharge:  Pending Labs       Order Current Status    Blood Culture - Blood, Arm, Left Preliminary result    Blood Culture - Blood, Arm, Right Preliminary result            Procedures Performed    07/22 1315 Walking Oximetry      Consults:   Consults       Date and Time Order Name Status Description    7/19/2024  4:56 PM Inpatient Pulmonology Consult Completed     7/19/2024  3:57 AM Inpatient Infectious Diseases Consult Completed     7/19/2024  3:23 AM Hospitalist (on-call MD unless specified)      7/13/2024 10:18 AM Inpatient Hospitalist Consult Completed     7/11/2024 11:53 PM Inpatient Gastroenterology Consult Completed               Discharge Details        Discharge Medications        New Medications        Instructions Start Date   amoxicillin-clavulanate 875-125 MG per tablet  Commonly known as: AUGMENTIN   1 tablet, Oral, Every 12 Hours Scheduled             Continue These Medications        Instructions Start Date   amLODIPine 2.5 MG tablet  Commonly known as: NORVASC   TAKE 1 TABLET BY MOUTH DAILY       atorvastatin 10 MG tablet  Commonly known as: LIPITOR   10 mg, Oral, Daily      Eliquis 5 MG tablet tablet  Generic drug: apixaban   5 mg, Oral, 2 Times Daily      lactulose 10 GM/15ML solution  Commonly known as: CHRONULAC   15 mL, Oral, 2 Times Daily PRN      montelukast 10 MG tablet  Commonly known as: SINGULAIR   10 mg, Oral, Daily      multivitamin with minerals tablet tablet   1 tablet, Oral, Daily      sotalol 80 MG tablet tablet  Commonly known as: BETAPACE AF   TAKE 1 TABLET BY MOUTH TWICE DAILY               No Known Allergies      Discharge Disposition:     Home or Self Care    Diet:  Hospital:  Diet Order   Procedures    Diet: Regular/House; Fluid Consistency: Thin (IDDSI 0)         Discharge Activity:         CODE STATUS:  Code Status and Medical Interventions:   Ordered at: 07/19/24 0354     Code Status (Patient has no pulse and is not breathing):    CPR (Attempt to Resuscitate)     Medical Interventions (Patient has pulse or is breathing):    Full Support         Future Appointments   Date Time Provider Department Center   7/25/2024 11:30 AM Amdaa Haskins MD [facility]       Additional Instructions for the Follow-ups that You Need to Schedule       Discharge Follow-up with PCP   As directed       Currently Documented PCP:    Amada Haskins MD    PCP Phone Number:    [phone]     Follow Up Details: Follow-up with your PCP in 1 week.                Time spent on Discharge including face to face service:  >30 minutes    Signature: Electronically signed by Ritchie Garcia MD, 07/22/24, 15:09 EDT.  [facility] Hospitalist Team

## 2024-07-22 NOTE — PROCEDURES
Exercise Oximetry    Patient Name:Drake Beth   MRN: [id number]   Date: 07/22/24             ROOM AIR BASELINE   SpO2% 85   Heart Rate    Blood Pressure      EXERCISE ON ROOM AIR SpO2% EXERCISE ON O2 @  LPM SpO2%   1 MINUTE 85 1 MINUTE  1LNC 90   2 MINUTES 91 2 MINUTES    3 MINUTES 87 3 MINUTES  2LNC 90   4 MINUTES 90 4 MINUTES    5 MINUTES 87 5 MINUTES  3LNC 91   6 MINUTES 93 6 MINUTES               Distance Walked   Distance Walked   Dyspnea (Nishant Scale)   Dyspnea (Nishant Scale)   Fatigue (Nishant Scale)   Fatigue (Nishant Scale)   SpO2% Post Exercise   SpO2% Post Exercise   HR Post Exercise   HR Post Exercise   Time to Recovery   Time to Recovery     Comments: PT REFUSED TO GET UP AND WALK .  ME AND RN BOTH HAD HIM EXERCISE IN THE CHAIR.  PT REQUIRED 3LNC TO KEEP SATS ABOVE 88%.

## 2024-07-22 NOTE — PROGRESS NOTES
[facility] MEDICINE SERVICE  DAILY PROGRESS NOTE    NAME: Drake Beth  : 1950  MRN: [id number]      LOS: 3 days     PROVIDER OF SERVICE: Ritchie Garcia MD    Chief Complaint: Sepsis    Subjective:     Interval History: Patient states he is feeling overall much better and that his breathing has improved.  He does have a mild cough but no sputum production.    Review of Systems: Unable to accurately assess due to mental condition  Review of Systems    Objective:     Vital Signs  Temp:  [97.5 °F (36.4 °C)-98.3 °F (36.8 °C)] 97.5 °F (36.4 °C)  Heart Rate:  [55-66] 62  Resp:  [11-22] 13  BP: (105-123)/(59-76) 120/74  Flow (L/min):  [1-3] 3   Body mass index is 20.24 kg/m².    Physical Exam Constitutional:       Appearance: Normal appearance.   HENT:      Head: Normocephalic and atraumatic.      Right Ear: External ear normal.      Left Ear: External ear normal.      Nose: Nose normal.      Mouth/Throat:      Mouth: Mucous membranes are moist.   Eyes:      Extraocular Movements: Extraocular movements intact.   Cardiovascular:      Rate and Rhythm: Normal rate and regular rhythm.      Pulses: Normal pulses.      Heart sounds: Normal heart sounds.   Pulmonary:      Breath sounds: wnl     Comments: On 3l nc  Abdominal:      Palpations: Abdomen is soft.   Genitourinary:     Penis: Normal.    Musculoskeletal:         General: Normal range of motion.      Cervical back: Normal range of motion and neck supple.   Skin:     General: Skin is warm and dry.   Neurological:      Mental Status: He is alert. Mental status is at baseline.   Psychiatric:      Comments: Intellectual disability, cooperative, follows simple   Physical Exam  Physical Exam    Scheduled Meds   amLODIPine, 2.5 mg, Oral, Daily  amoxicillin-clavulanate, 1 tablet, Oral, Q12H  apixaban, 5 mg, Oral, BID  arformoterol, 15 mcg, Nebulization, BID - RT  atorvastatin, 10 mg, Oral, Nightly  budesonide, 1 mg, Nebulization, BID -  RT  ipratropium-albuterol, 3 mL, Nebulization, 4x Daily - RT  lubiprostone, 24 mcg, Oral, BID  montelukast, 10 mg, Oral, Nightly  sotalol, 80 mg, Oral, Q12H       PRN Meds     senna-docusate sodium **AND** polyethylene glycol **AND** bisacodyl **AND** bisacodyl    Morphine    ondansetron   Infusions  sodium chloride, 50 mL/hr, Last Rate: 50 mL/hr (07/22/24 0500)          Diagnostic Data    Results from last 7 days   Lab Units 07/22/24  0508   WBC 10*3/mm3 10.09   HEMOGLOBIN g/dL 9.2*   HEMATOCRIT % 28.1*   PLATELETS 10*3/mm3 168   GLUCOSE mg/dL 102*   CREATININE mg/dL 0.87   BUN mg/dL 11   SODIUM mmol/L 140   POTASSIUM mmol/L 3.5   AST (SGOT) U/L 26   ALT (SGPT) U/L 9   ALK PHOS U/L 50   BILIRUBIN mg/dL 1.4*   ANION GAP mmol/L 6.6       No radiology results for the last day      I reviewed the patient's new clinical results.    Assessment/Plan:     Active and Resolved Problems  Active Hospital Problems    Diagnosis  POA    **Sepsis [A41.9]  Yes    Acute on chronic diastolic heart failure [I50.33]  Yes    Paroxysmal atrial fibrillation [I48.0]  Yes    Acute respiratory failure with hypoxia and hypercapnia [J96.01, J96.02]  Yes    Hyperkalemia [E87.5]  Yes    Hyponatremia [E87.1]  Yes    Hyperbilirubinemia [E80.6]  Yes    Acute UTI (urinary tract infection) [N39.0]  Yes    Presence of cardiac pacemaker [Z95.0]  Yes    Pneumonia [J18.9]  Yes    Mitral valve replaced [Z95.2]  Not Applicable    Mentally challenged [F79]  Yes    Hypertension [I10]  Yes    Hyperlipidemia [E78.5]  Yes    Coronary artery disease [I25.10]  Yes      Resolved Hospital Problems   No resolved problems to display.       Severe Sepsis without septic shock, initial lactic 3.5 was trending down however slightly went up so repeat ordered.  Elevated WBC, likely secondary to continued bilateral pneumonia per CT and possible UTI  CT angio chest was negative for PE, showed bilateral multifocal pneumonia with patchy airspace disease  -Initiated on IV  antibiotics with Zosyn but transition to oral antibiotics prior to discharge  -Wean O2 as tolerated       Acute respiratory failure with hypoxia and hypercapnia, likely secondary to pneumonia  -Clinically improving  Continue antibiotics as above  -Wean O2 as tolerated     chronic diastolic CHF without acute on chronic decompensated heart failure  -Appears euvolemic  -Continue GDMT     Acute UTI  -Continue antibiotics as above     Paroxysmal atrial fibrillation  -EKG shows sinus rhythm, on home sotalol and Eliquis reordered continuous cardiac monitoring    Abdominal pain with constipation  -Per patient's family, patient has been having issues with constipation for several weeks  -Has been on laxatives without any improvement  -CT of the abdomen did not show significant stool burden  -Patient's pain seems more consistent with PUD  -Initiate PPI  -GI consult     Hyperbilirubinemia  -Likely secondary to sepsis  -Bilirubin improved     Presence of a cardiac pacemaker  -Noted infectious disease consulted     Mitral valve replacement noted     Mentally challenged, noted cooperative follows simple commands     Hypertension   -Reordered Norvasc and home sotalol     Hyperlipidemia  -On statin     Coronary artery disease status post CABG,   troponin not elevated EKG no ischemic changes, continuous cardiac monitoring    VTE Prophylaxis:  Pharmacologic VTE prophylaxis orders are present.         Code status is   Code Status and Medical Interventions:   Ordered at: 07/19/24 0354     Code Status (Patient has no pulse and is not breathing):    CPR (Attempt to Resuscitate)     Medical Interventions (Patient has pulse or is breathing):    Full Support            Time: 30 minutes    Signature: Electronically signed by Ritchie Garcia MD, 07/22/24, 16:18 EDT.  [facility] Hospitalist Team

## 2024-07-22 NOTE — CASE MANAGEMENT/SOCIAL WORK
Continued Stay Note  [facility]     Patient Name: Drake Beth  MRN: [id number]  Today's Date: 7/22/2024    Admit Date: 7/18/2024    Plan: Return home with brother.  New 3L O2 set up with [facility].   Discharge Plan       Row Name 07/22/24 1637       Plan    Plan Comments Patient lives at home with brother at baseline. Brothers assist with adl due to intellectual disability. Recent admission 7/11-7/15. Readmission assessment done. Call placed to  LILLIAM Atkinson left, no response.  Later spoke with Demetrio.   Discussed IMM.  Copy left, and number given over phone.   Discussed orders for DC.   Demetrio does not think patient medically ready, likely appeal after discussing with RN/ MD this evening.  Notified MD and RN.  Decision to hold DC tonight.   Nurse called Demetrio to notify.      Row Name 07/22/24 8792       Plan    Plan Comments Patient lives at home with brother at baseline. Brothers assist with adl due to intellectual disability.   Recent admission 7/11-7/15.  Readmission assessment done.  Call placed to  LILLIAM Atkinson left, no response.               Expected Discharge Date and Time       Expected Discharge Date Expected Discharge Time    Jul 22, 2024               Kaitlin Freedman RN     Office Phone [phone]  Office Cell [phone]    Case Management Readmission Assessment Note    Case Management Readmission Assessment (all recorded)       Readmission Interview       Row Name 07/22/24 1547 07/22/24 1032          Readmission Indications    Is the patient and/or family able to complete the readmission assessment questions? No  Patient able to discuss basic questions only, VM left with Brother Demetrio.   No family at bedside. --     Is this hospitalization related to the prior hospital diagnosis? Yes Yes       Row Name 07/22/24 2296             Recommendation for rehospitalization    Did you speak with your physician prior to coming to the hospital No        Row Name 07/22/24 1032              Follow-up Appointments    Do you have a PCP? Yes      Did you have an appointment with PCP after your hospitalization? Yes      When was your appointment scheduled? 07/25/24      Did you go to appointment? No  Readmitted prior to appointment      Are you current with the Pulmonary Clinic? No      Are you current with the CHF Clinic? No        Row Name 07/22/24 1547 07/22/24 1032          Medications    Did you have newly prescribed medications at discharge? Yes --     Did you understand the reasons for your medications at discharge and how to take them? Yes --     Did you understand the side effects of your medications? Yes --     Are you taking all of you prescribed medications? Yes --     What pharmacy was used to fill prescription(s)? -- [facility]     Were medications picked up? Yes --       Row Name 07/22/24 1547             Discharge Instructions    Did you understand your discharge instructions? Yes      Did your family/caregiver hear your instructions? Yes      Were you told to eat a special diet? Yes      Did you adhere to the diet? Yes      Were you given a number of someone to call if you had questions or concerns? Yes        [facility] Name 07/22/24 1032             Index discharge location/services    Where did you go upon discharge? Home      Do you have supportive family or friends in the home? Yes  brother provides care.        Row Name 07/22/24 1547             Discharge Readiness    On a scale of 1-5 (5 being well prepared), how ready were you for discharge 4        [facility] Name 07/22/24 1032             Palliative Care/Hospice    Are you current with Palliative Care? No      Are you current with Hospice Care? No        Row Name 07/22/24 1032 07/19/24 6327          Advance Directives (For Healthcare)    Pre-existing AND/MOST/POLST Order No No     Advance Directive Status Patient does not have advance directive Patient does not have advance directive     Have you reviewed your Advance Directive and is it valid  for this stay? No Not applicable     Literature Provided on Advance Directives No No     Patient Requests Assistance on Advance Directives Patient Declined Patient Declined       Row Name 07/22/24 1547 07/22/24 1032          Readmission Assessment Final Comments    Final Comments Baseline lives at home with brother 24/7 care. Intellectual disability. last admit 7/11-7/15. Possible PNa, Possible CHF exacerbation (labs elevated, but not thougth to be primary problem). Cardio, GI consults. Speech eval. Abx while admitted x 5 days. No dc abx. FU appt 7/25 with PCP. This admit 7/18- More restless and appear to be in pain at home. IV abx. for sepsis. Possible aspiration PNA vs hospital aquired PNA per ID notes. ID, pulm following. Initially 15L O2, weaned to 3L O2.   Plan to dc home with new 3L O2 set up and oral abx. Baseline lives at home with brother 24/7 care.   Intellectual disability.   last admit 7/11-7/15. Possible PNa, Possible CHF exacerbation (labs elevated, but not thougth to be primary problem).  Cardio, GI consults.  Speech eval.  Abx while admitted x 5 days.   No dc abx.   FU appt 7/25 with PCP.   This admit 7/18- More restless and appear to be in pain at home.   IV abx. for sepsis. Possible aspiration PNA vs hospital aquired PNA per ID notes.  ID, pulm following.  Initially 15L O2, weaned to 3L O2.

## 2024-07-22 NOTE — CONSULTS
Nutrition Services    Patient Name: Drake Beth  YOB: 1950  MRN: [id number]  Admission date: 7/18/2024    Comment:  -- Continue current diet and encourage good PO intakes    -- Add Boost Plus q daily (Provides 360 kcals, 14 g of protein if consumed)         CLINICAL NUTRITION ASSESSMENT      Reason for Assessment 7/22: MST of 3, chronic poor intake consult     H&P      Past Medical History:   Diagnosis Date    Allergic rhinitis     Anemia     Atrial fibrillation     Chesty cough     CHF (congestive heart failure)     Coronary artery disease     Cough     Fever     Hyperlipidemia     Hypertension     Mentally challenged     MVP (mitral valve prolapse)        Past Surgical History:   Procedure Laterality Date    CARDIAC ELECTROPHYSIOLOGY PROCEDURE N/A 4/26/2022    Procedure: PACEMAKER EXTRACTION;  Surgeon: Rj Dominique MD;  Location: [facility] CATH INVASIVE LOC[facility];  Service: Cardiology;  Laterality: N/A;    COLONOSCOPY      He has never had the test done.  His family is refusing any form of colon cancer screening for him.    CORONARY ARTERY BYPASS GRAFT  2008    DR SWEAT    MITRAL VALVE REPLACEMENT  04/2008    WITH BIOPROSTHETIC TISSUE VALVE        Current Problems   Sepsis  - ID following    Acute respiratory failure with hypoxia and hypercapnia     Acute on chronic diastolic heart failure proBNP elevated     Acute UTI     Continued bilateral pneumonia     Paroxysmal atrial fibrillation    Hyperbilirubinemia     Presence of a cardiac pacemaker     Mitral valve replacement      Mentally challenged     Hypertension      Hyper lipidemia     Coronary artery disease status post CABG       Encounter Information        Trending Narrative     7/22: Admitted for moaning and grabbing at his chest and abdomen.  Patient unable to fully express himself due to intellectual disability.  RD visited patient at bedside.  No visitors present.  NFPE completed and not consistent with nutrition diagnosis of  "malnutrition at this time using AND/ASPEN criteria.         Anthropometrics        Current Height, Weight Height: 152.4 cm (60\")  Weight: 47 kg (103 lb 9.9 oz) (07/19/24 2339)       Usual Body Weight (UBW) Unable to obtain from patient        Trending Weight Hx     This admission: 7/22: 103#             PTA: Stable weight     Wt Readings from Last 30 Encounters:   07/19/24 2339 47 kg (103 lb 9.9 oz)   07/18/24 2328 46.3 kg (102 lb 1.2 oz)   07/13/24 1041 46.3 kg (102 lb)   07/11/24 1848 46.3 kg (102 lb 1.2 oz)   07/11/24 1259 46.3 kg (102 lb 1.2 oz)   04/09/24 1407 46.3 kg (102 lb)   02/05/24 1408 45.8 kg (101 lb)   01/23/24 1539 46.4 kg (102 lb 6.4 oz)   09/06/23 1143 44.5 kg (98 lb)   09/06/23 1237 45.6 kg (100 lb 8 oz)   07/25/23 1420 44.7 kg (98 lb 9.6 oz)   12/27/22 1517 44.9 kg (99 lb)   12/27/22 1053 45 kg (99 lb 3.2 oz)   12/08/22 1414 45.3 kg (99 lb 12.8 oz)   11/29/22 1603 47.6 kg (105 lb)   06/07/22 1325 46.8 kg (103 lb 4 oz)   05/12/22 1329 47.5 kg (104 lb 12.8 oz)   04/26/22 1319 46.7 kg (102 lb 15.3 oz)   04/22/22 1317 47.2 kg (104 lb)   02/17/22 1507 48.3 kg (106 lb 6.4 oz)   02/05/21 1344 49 kg (108 lb)   12/14/20 1340 49.9 kg (110 lb)   02/04/20 1110 49 kg (108 lb)   04/18/19 0621 49.4 kg (109 lb)   01/18/19 1132 49 kg (108 lb)   12/12/18 0703 49.7 kg (109 lb 9.6 oz)   08/22/18 0714 48.5 kg (107 lb)   12/13/17 1422 49.4 kg (109 lb)   12/21/16 1254 51.7 kg (114 lb)   01/27/16 1026 50.3 kg (111 lb)      BMI kg/m2 Body mass index is 20.24 kg/m².       Labs        Pertinent Labs    Results from last 7 days   Lab Units 07/22/24  0508 07/21/24  0515 07/20/24  0543   SODIUM mmol/L 140 135* 135*   POTASSIUM mmol/L 3.5 3.5 3.3*   CHLORIDE mmol/L 104 100 97*   CO2 mmol/L 29.4* 29.0 27.6   BUN mg/dL 11 12 18   CREATININE mg/dL 0.87 0.79 0.82   CALCIUM mg/dL 8.0* 7.9* 8.1*   BILIRUBIN mg/dL 1.4* 1.6* 1.5*   ALK PHOS U/L 50 51 58   ALT (SGPT) U/L 9 11 12   AST (SGOT) U/L 26 26 35   GLUCOSE mg/dL 102* 119* 68 " "    Results from last 7 days   Lab Units 07/22/24  0508   HEMOGLOBIN g/dL 9.2*   HEMATOCRIT % 28.1*     No results found for: \"HGBA1C\"     Medications    Scheduled Medications amLODIPine, 2.5 mg, Oral, Daily  amoxicillin-clavulanate, 1 tablet, Oral, Q12H  apixaban, 5 mg, Oral, BID  atorvastatin, 10 mg, Oral, Nightly  lubiprostone, 24 mcg, Oral, BID  montelukast, 10 mg, Oral, Nightly  sotalol, 80 mg, Oral, Q12H        Infusions sodium chloride, 50 mL/hr, Last Rate: 50 mL/hr (07/22/24 0500)        PRN Medications   senna-docusate sodium **AND** polyethylene glycol **AND** bisacodyl **AND** bisacodyl    Morphine    ondansetron     Physical Findings        Trending Physical   Appearance, NFPE 7/22: NFPE completed and not consistent with nutrition diagnosis of malnutrition at this time using AND/ASPEN criteria      --  Edema  No edema documented      Bowel Function Last documented BM 7/22 (today)     Tubes No feeding tube      Chewing/Swallowing SLP following, regular diet recommended 7/20     Skin Intact      --  Current Nutrition Orders & Evaluation of Intake       Oral Nutrition     Food Allergies NKFA   Current PO Diet Diet: Regular/House; Fluid Consistency: Thin (IDDSI 0)   Supplement None ordered   PO Evaluation     Trending % PO Intake 7/22: 58% average PO intakes x 6 documented meals since admission    --  Nutritional Risk Screening        NRS-2002 Score          Nutrition Diagnosis         Nutrition Dx Problem 1 No nutritional diagnosis noted at this time, RD will continue to monitor for any nutritional diagnosis that may arise.      Nutrition Dx Problem 2        Intervention Goal         Intervention Goal(s) PO intakes continue at least 60%  No weight loss     Nutrition Intervention        RD Action Monitor PO intakes      Nutrition Prescription          Diet Prescription Regular    Supplement Prescription Boost Plus daily    --  Monitor/Evaluation        Monitor Per protocol, I&O, PO intake, Supplement intake, " Pertinent labs, Weight, Skin status, GI status, Symptoms, POC/GOC       Electronically signed by:  Kasandra Parson RD  07/22/24 13:01 EDT

## 2024-07-22 NOTE — SIGNIFICANT NOTE
07/22/24 1541   OTHER   Discipline physical therapist   Rehab Time/Intention   Session Not Performed other (see comments)  (hospital d/c pending)   Recommendation   PT - Next Appointment 07/23/24

## 2024-07-22 NOTE — DISCHARGE PLACEMENT REQUEST
"Marilee Fleming (73 y.o. Male)       Date of Birth   1950    Social Security Number       Address   [address] [place] [address]    Home Phone   [phone]    MRN   [id number]       [place]   None    Marital Status   Single                            Admission Date   7/18/24    Admission Type   Emergency    Admitting Provider   Tyrone Raymundo MD    Attending Provider   Ritchie Garcia MD    Department, Room/Bed   [facility] 2D, 269/1       Discharge Date       Discharge Disposition   Home or Self Care    Discharge Destination                                 Attending Provider: Ritchie Garcia MD    Allergies: No Known Allergies    Isolation: None   Infection: None   Code Status: CPR    Ht: 152.4 cm (60\")   Wt: 47 kg (103 lb 9.9 oz)    Admission Cmt: None   Principal Problem: Sepsis [A41.9]                   Active Insurance as of 7/18/2024       Primary Coverage       Payor Plan Insurance Group Employer/Plan Group    MEDICARE MEDICARE A & B        Payor Plan Address Payor Plan Phone Number Payor Plan Fax Number Effective Dates    [address] [phone]  7/1/1973 - None Entered    [facility] [id number]         Subscriber Name Subscriber Birth Date Member ID       MARILEE FLEMING 1950 [id number]               Secondary Coverage       Payor Plan Insurance Group Employer/Plan Group    [place]        Payor Plan Address Payor Plan Phone Number Payor Plan Fax Number Effective Dates    [address]   1/1/2000 - None Entered    [place] [id number]         Subscriber Name Subscriber Birth Date Member ID       MARILEE FLEMING 1950 [id number]                     Emergency Contacts        (Rel.) Home Phone Work Phone Mobile Phone    LONNIEKEYLAPETEY (Brother) [phone] -- [phone]    Richi Fleming (Brother) [phone] -- [phone]                 History & Physical        Essing-Nae Singh APRN at 07/19/24 0358       " Attestation signed by Tyrone Raymundo MD at 24 0502    I have reviewed this documentation and agree.                      [facility] Medicine Services  History & Physical    Patient Name: Drake Beth  : 1950  MRN: [id number]  Primary Care Physician:  Amada Haskins MD  Date of admission: 2024  Date and Time of Service: 24 at 0400    Subjective      Chief Complaint: moaning     History of Present Illness: Drake Beth is a 73 y.o. male with a CMH of being mentally challenged patient nonverbal at baseline, presence of a cardiac pacemaker, paroxysmal atrial fibrillation, bioprosthetic mitral valve replacement, coronary artery disease status post CABG, hypertension, hyperlipidemia who presented to [facility] on 2024 with reports from family that he was moaning and grabbing at his chest and abdomen.  Review of records shows he was just admitted here 2024 to 7/15/2024 for likely bilateral pneumonia possible acute on chronic diastolic congestive heart failure paroxysmal atrial fibrillation and abdominal pain.  He was seen by both cardiology and gastroenterology.  CT at that time showed a large stool burden consistent with constipation he was treated with laxatives.  Family apparently refused a colonoscopy per note.  Patient is awake and alert.  He is cooperative and follows simple commands.  He could tell me he needed to use the bathroom.  Minimal information can be obtained from patient due to intellectual disability and nonverbal at baseline.  Family at bedside.  He continues to moan but does not appear to be in pain.  Initial ABG showed a pH of 7.308 pCO2 52.2 pO2 69.8.  He is currently tolerating 15 L high flow nasal cannula oxygen.  Troponin is 20 proBNP 3812, sodium 128 potassium 5.4 chloride 89 glucose 149 AST 44 bilirubin 2.1 lactate was initially 3.5 now 2.0 WBC 14.4 chest x-ray per radiology showed no significant change stable cardiomegaly with  probable mild pulmonary edema pattern with subsegmental atelectatic changes and scarring.  With regard to no significant change previous EKG dated 7/11/2024 showed findings most compatible with bilateral pneumonia.  CT abdomen and pelvis per radiology today shows increase in size of bilateral pleural effusions otherwise no significant interval changes identified since prior exam.  CTA chest per radiology today showed no PE multifocal pneumonia with patchy airspace disease present most pronounced within the left upper lobe mild to moderate underlying pulmonary edema pattern suspected versus groundglass airspace disease with small to moderate bilateral pleural effusions.  EKG today shows sinus rhythm heart rate 71.  Temperature was 99.3.  He was actually hypertensive in the emergency department.  He was given a DuoNeb treatment emergency department, 80 mg p.o. sotalol 40 mg IV Lasix.  Since he triggered sepsis he was started on IV cefepime and IV vancomycin with blood cultures urine ulcers pending. No  IV fluid bolus was given in emergency department due to hypertension and elevated proBNP.  Urinalysis today does show 3+ blood trace leukocytes 11-20 WBCs and trace bacteria.  Given his recent admission here for pneumonia and now triggering sepsis infectious disease has been consulted and he will be admitted for further evaluation and treatment.      Review of Systems   Unable to perform ROS: Patient nonverbal       Personal History     Past Medical History:   Diagnosis Date    Allergic rhinitis     Anemia     Atrial fibrillation     Chesty cough     CHF (congestive heart failure)     Coronary artery disease     Cough     Fever     Hyperlipidemia     Hypertension     Mentally challenged     MVP (mitral valve prolapse)        Past Surgical History:   Procedure Laterality Date    CARDIAC ELECTROPHYSIOLOGY PROCEDURE N/A 4/26/2022    Procedure: PACEMAKER EXTRACTION;  Surgeon: Rj Dominique MD;  Location: [facility]  INVASIVE LOCATION;  Service: Cardiology;  Laterality: N/A;    COLONOSCOPY      He has never had the test done.  His family is refusing any form of colon cancer screening for him.    CORONARY ARTERY BYPASS GRAFT  2008    DR SWEAT    MITRAL VALVE REPLACEMENT  04/2008    WITH BIOPROSTHETIC TISSUE VALVE       Family History: family history includes Coronary artery disease (age of onset: 64) in his mother; Hyperlipidemia in his sister; Hypertension in his brother; Liver disease in his sister; Lung cancer in his sister; Other in his brother; Rheum arthritis in his brother and sister. Otherwise pertinent FHx was reviewed and not pertinent to current issue.    Social History:  reports that he has never smoked. He has never used smokeless tobacco. He reports that he does not drink alcohol and does not use drugs.    Home Medications:  Prior to Admission Medications       Prescriptions Last Dose Informant Patient Reported? Taking?    amLODIPine (NORVASC) 2.5 MG tablet   No No    TAKE 1 TABLET BY MOUTH DAILY    apixaban (Eliquis) 5 MG tablet tablet   No No    TAKE 1 TABLET BY MOUTH TWICE DAILY    atorvastatin (LIPITOR) 10 MG tablet   No No    TAKE 1 TABLET BY MOUTH DAILY    colestipol (COLESTID) 1 g tablet   No No    TAKE 1 TABLET BY MOUTH FOUR TIMES DAILY    linaclotide (LINZESS) 72 MCG capsule capsule   Yes No    Take 1 capsule by mouth Every Morning Before Breakfast.    montelukast (SINGULAIR) 10 MG tablet   No No    TAKE 1 TABLET BY MOUTH DAILY    sotalol (BETAPACE AF) 80 MG tablet tablet   No No    TAKE 1 TABLET BY MOUTH TWICE DAILY              Allergies:  No Known Allergies    Objective      Vitals:   Temp:  [97.6 °F (36.4 °C)-99.3 °F (37.4 °C)] 99.3 °F (37.4 °C)  Heart Rate:  [70-86] 78  Resp:  [16-20] 20  BP: (105-210)/() 169/95  Body mass index is 19.93 kg/m².  Physical Exam  Vitals reviewed.   Constitutional:       Appearance: Normal appearance.   HENT:      Head: Normocephalic and atraumatic.      Right Ear:  External ear normal.      Left Ear: External ear normal.      Nose: Nose normal.      Mouth/Throat:      Mouth: Mucous membranes are moist.   Eyes:      Extraocular Movements: Extraocular movements intact.   Cardiovascular:      Rate and Rhythm: Normal rate and regular rhythm.      Pulses: Normal pulses.      Heart sounds: Normal heart sounds.   Pulmonary:      Breath sounds: Wheezing present.      Comments: On 15 L non rebreather   Abdominal:      Palpations: Abdomen is soft.   Genitourinary:     Penis: Normal.    Musculoskeletal:         General: Normal range of motion.      Cervical back: Normal range of motion and neck supple.   Skin:     General: Skin is warm and dry.   Neurological:      Mental Status: He is alert. Mental status is at baseline.   Psychiatric:      Comments: Intellectual disability, cooperative, follows simple  commands          Diagnostic Data:  Lab Results (last 24 hours)       Procedure Component Value Units Date/Time    Blood Gas, Venous - [[id number]]  (Abnormal) Collected: 07/19/24 0339    Specimen: Venous Blood Updated: 07/19/24 0342     Site Left Radial     pH, Venous 7.313 pH Units      pCO2, Venous 51.3 mm Hg      pO2, Venous 143.5 mm Hg      HCO3, Venous 26.0 mmol/L      Base Excess, Venous -0.8 mmol/L      Comment: Serial Number: [id number]:  [id number]        O2 Saturation, Venous 99.0 %      CO2 Content 27.6 mmol/L      Barometric Pressure for Blood Gas --     Comment: N/A        Modality NRB     FIO2 80 %     High Sensitivity Troponin T 2Hr [[id number]] Collected: 07/19/24 0330    Specimen: Blood Updated: 07/19/24 0333    STAT Lactic Acid, Reflex [[id number]] Collected: 07/19/24 0330    Specimen: Blood Updated: 07/19/24 0333    Urinalysis, Microscopic Only - Straight Cath [[id number]]  (Abnormal) Collected: 07/19/24 0031    Specimen: Urine from Straight Cath Updated: 07/19/24 0129     RBC, UA Too Numerous to Count /HPF      WBC, UA 11-20 /HPF      Bacteria, UA Trace /HPF      Squamous  Epithelial Cells, UA 0-2 /HPF      Hyaline Casts, UA 21-30 /LPF      Methodology Manual Light Microscopy    Urine Culture - Urine, Straight Cath [[id number]] Collected: 07/19/24 0031    Specimen: Urine from Straight Cath Updated: 07/19/24 0129    Urinalysis With Culture If Indicated - Straight Cath [[id number]]  (Abnormal) Collected: 07/19/24 0031    Specimen: Urine from Straight Cath Updated: 07/19/24 0101     Color, UA Orange     Comment: Any Substance that causes an abnormal urine color can alter the accuracy of the chemical reactions.        Appearance, UA Turbid     pH, UA <=5.0     Specific Gravity, UA 1.024     Glucose, UA Negative     Ketones, UA Trace     Bilirubin, UA Negative     Blood, UA Large (3+)     Protein, UA >=300 mg/dL (3+)     Leuk Esterase, UA Trace     Nitrite, UA Negative     Urobilinogen, UA 1.0 E.U./dL    Narrative:      In absence of clinical symptoms, the presence of pyuria, bacteria, and/or nitrites on the urinalysis result does not correlate with infection.    Respiratory Panel PCR w/COVID-19(SARS-CoV-2) DIDI/ANJUM/[facility]/PAD/COR/YING In-House, NP Swab in UTM/VTM, 2 HR TAT - Swab, Nasopharynx [[id number]]  (Normal) Collected: 07/18/24 2346    Specimen: Swab from Nasopharynx Updated: 07/19/24 0100     ADENOVIRUS, PCR Not Detected     Coronavirus 229E Not Detected     Coronavirus HKU1 Not Detected     Coronavirus NL63 Not Detected     Coronavirus OC43 Not Detected     COVID19 Not Detected     Human Metapneumovirus Not Detected     Human Rhinovirus/Enterovirus Not Detected     Influenza A PCR Not Detected     Influenza B PCR Not Detected     Parainfluenza Virus 1 Not Detected     Parainfluenza Virus 2 Not Detected     Parainfluenza Virus 3 Not Detected     Parainfluenza Virus 4 Not Detected     RSV, PCR Not Detected     Bordetella pertussis pcr Not Detected     Bordetella parapertussis PCR Not Detected     Chlamydophila pneumoniae PCR Not Detected     Mycoplasma pneumo by PCR Not Detected     Narrative:      In the setting of a positive respiratory panel with a viral infection PLUS a negative procalcitonin without other underlying concern for bacterial infection, consider observing off antibiotics or discontinuation of antibiotics and continue supportive care. If the respiratory panel is positive for atypical bacterial infection (Bordetella pertussis, Chlamydophila pneumoniae, or Mycoplasma pneumoniae), consider antibiotic de-escalation to target atypical bacterial infection.    Comprehensive Metabolic Panel [[id number]]  (Abnormal) Collected: 07/18/24 2338    Specimen: Blood Updated: 07/19/24 0059     Glucose 149 mg/dL      BUN 15 mg/dL      Creatinine 1.00 mg/dL      Sodium 128 mmol/L      Potassium 5.4 mmol/L      Comment: Slight hemolysis detected by analyzer. Result may be falsely elevated.        Chloride 89 mmol/L      CO2 23.8 mmol/L      Calcium 9.3 mg/dL      Total Protein 7.9 g/dL      Albumin 4.2 g/dL      ALT (SGPT) 11 U/L      AST (SGOT) 44 U/L      Comment: Slight hemolysis detected by analyzer. Result may be falsely elevated.        Alkaline Phosphatase 83 U/L      Total Bilirubin 2.1 mg/dL      Globulin 3.7 gm/dL      A/G Ratio 1.1 g/dL      BUN/Creatinine Ratio 15.0     Anion Gap 15.2 mmol/L      eGFR 80.0 mL/min/1.73     Narrative:      GFR Normal >60  Chronic Kidney Disease <60  Kidney Failure <15    The GFR formula is only valid for adults with stable renal function between ages 18 and 70.    Blood Culture - Blood, Arm, Right [[id number]] Collected: 07/19/24 0053    Specimen: Blood from Arm, Right Updated: 07/19/24 0059    Blood Culture - Blood, Arm, Left [[id number]] Collected: 07/19/24 0053    Specimen: Blood from Arm, Left Updated: 07/19/24 0059    BNP [[id number]]  (Abnormal) Collected: 07/18/24 2338    Specimen: Blood Updated: 07/19/24 0051     proBNP 3,812.0 pg/mL     Narrative:      This assay is used as an aid in the diagnosis of individuals suspected of having heart failure. It  can be used as an aid in the diagnosis of acute decompensated heart failure (ADHF) in patients presenting with signs and symptoms of ADHF to the emergency department (ED). In addition, NT-proBNP of <300 pg/mL indicates ADHF is not likely.    Age Range Result Interpretation  NT-proBNP Concentration (pg/mL:      <50             Positive            >450                   Gray                 300-450                    Negative             <300    50-75           Positive            >900                  Gray                300-900                  Negative            <300      >75             Positive            >1800                  Gray                300-1800                  Negative            <300    High Sensitivity Troponin T [[id number]]  (Normal) Collected: 07/18/24 2338    Specimen: Blood Updated: 07/19/24 0051     HS Troponin T 20 ng/L     Narrative:      High Sensitive Troponin T Reference Range:  <14.0 ng/L- Negative Female for AMI  <22.0 ng/L- Negative Male for AMI  >=14 - Abnormal Female indicating possible myocardial injury.  >=22 - Abnormal Male indicating possible myocardial injury.   Clinicians would have to utilize clinical acumen, EKG, Troponin, and serial changes to determine if it is an Acute Myocardial Infarction or myocardial injury due to an underlying chronic condition.         Lipase [[id number]]  (Normal) Collected: 07/18/24 2338    Specimen: Blood Updated: 07/19/24 0051     Lipase 35 U/L     Blood Gas, Arterial - [[id number]]  (Abnormal) Collected: 07/18/24 2351    Specimen: Arterial Blood Updated: 07/18/24 2357     Site Right Radial     Ankur's Test Positive     pH, Arterial 7.308 pH units      pCO2, Arterial 52.2 mm Hg      pO2, Arterial 69.8 mm Hg      HCO3, Arterial 26.2 mmol/L      Base Excess, Arterial -0.9 mmol/L      Comment: Serial Number: [id number]:  [id number]        O2 Saturation, Arterial 91.7 %      CO2 Content 27.8 mmol/L      Barometric Pressure for Blood Gas --     Comment:  N/A        Modality Cannula     FIO2 40 %      Hemodilution No     PO2/FIO2 175    POC Lactate [[id number]]  (Abnormal) Collected: 07/18/24 2351    Specimen: Blood Updated: 07/18/24 2353     Lactate 3.5 mmol/L      Comment: Serial Number: [id number]:  [id number]       CBC & Differential [[id number]]  (Abnormal) Collected: 07/18/24 2338    Specimen: Blood Updated: 07/18/24 2352    Narrative:      The following orders were created for panel order CBC & Differential.  Procedure                               Abnormality         Status                     ---------                               -----------         ------                     CBC Auto Differential[[id number]]        Abnormal            Final result                 Please view results for these tests on the individual orders.    CBC Auto Differential [[id number]]  (Abnormal) Collected: 07/18/24 2338    Specimen: Blood Updated: 07/18/24 2352     WBC 14.14 10*3/mm3      RBC 3.92 10*6/mm3      Hemoglobin 13.4 g/dL      Hematocrit 39.7 %      .3 fL      MCH 34.2 pg      MCHC 33.8 g/dL      RDW 13.6 %      RDW-SD 50.8 fl      MPV 9.3 fL      Platelets 226 10*3/mm3      Neutrophil % 85.1 %      Lymphocyte % 7.4 %      Monocyte % 4.7 %      Eosinophil % 0.6 %      Basophil % 0.4 %      Immature Grans % 1.8 %      Neutrophils, Absolute 12.03 10*3/mm3      Lymphocytes, Absolute 1.05 10*3/mm3      Monocytes, Absolute 0.66 10*3/mm3      Eosinophils, Absolute 0.09 10*3/mm3      Basophils, Absolute 0.06 10*3/mm3      Immature Grans, Absolute 0.25 10*3/mm3      nRBC 0.1 /100 WBC     [place] Draw [[id number]] Collected: 07/18/24 2338    Specimen: Blood Updated: 07/18/24 2346    Narrative:      The following orders were created for panel order [place] Draw.  Procedure                               Abnormality         Status                     ---------                               -----------         ------                     Green Top (Gel)[[id number]]                                   Final result               Lavender Top[[id number]]                                     Final result               Gold Top - SST[[id number]]                                   Final result               Light Blue Top[[id number]]                                   Final result                 Please view results for these tests on the individual orders.    Green Top (Gel) [[id number]] Collected: 07/18/24 2338    Specimen: Blood Updated: 07/18/24 2346     Extra Tube Hold for add-ons.     Comment: Auto resulted.       Lavender Top [[id number]] Collected: 07/18/24 2338    Specimen: Blood Updated: 07/18/24 2346     Extra Tube hold for add-on     Comment: Auto resulted       Gold Top - SST [[id number]] Collected: 07/18/24 2338    Specimen: Blood Updated: 07/18/24 2346     Extra Tube Hold for add-ons.     Comment: Auto resulted.       Light Blue Top [[id number]] Collected: 07/18/24 2338    Specimen: Blood Updated: 07/18/24 2346     Extra Tube Hold for add-ons.     Comment: Auto resulted                Imaging Results (Last 24 Hours)       Procedure Component Value Units Date/Time    CT Abdomen Pelvis With Contrast [[id number]] Collected: 07/19/24 0308     Updated: 07/19/24 0314    Narrative:         DATE OF EXAM:  7/19/2024, 2:31 A.M.     PROCEDURE:  CT ABDOMEN PELVIS W CONTRAST-     INDICATIONS:   abdominal pain, not otherwise specified     COMPARISON:   7/11/2024.     TECHNIQUE:  Routine transaxial slices were obtained through the abdomen and pelvis  after the intravenous administration of Isovue 370. Reconstructed  coronal and sagittal images were also obtained. Automated exposure  control and iterative construction methods were used. No oral contrast  agent was administered for the exam.     FINDINGS:  Interval increase in size of the bilateral pleural effusions since the  prior 7/11/2024 study. There is motion artifact on the study, especially  of scarring in the upper abdomen. There is gaseous greater  distention of  the stomach. The right kidney is thought to be surgically absent. No  mechanical bowel obstruction. No pneumoperitoneum or pneumatosis. No  acute colitis or diverticulitis. No acute appendicitis is suggested.  Otherwise, no significant interval change is seen since the prior  7/11/2024 CT exam with additional findings as described in the prior  exam report.       Impression:      Increase in size of bilateral pleural effusions. Otherwise, no  significant interval change is identified since the prior exam.            Please note that portions of this note were completed with a voice  recognition program.                       Electronically Signed By-Moe Bourne MD On:7/19/2024 3:12 AM       CT Angiogram Chest Pulmonary Embolism [[id number]] Collected: 07/19/24 0308     Updated: 07/19/24 0312    Narrative:      CT ANGIOGRAM CHEST PULMONARY EMBOLISM-     Date of Exam: 7/19/2024 2:31 AM     Indication: hypoxia, chest pain.     Comparison: None available.     Technique: Serial and axial CT images of the chest were obtained  following the uneventful intravenous administration of 100 cc contrast.  Reconstructions in the coronal and sagittal planes were also performed.  In addition, a 3 D volume rendered image was obtained after post  processing. Automated exposure control and iterative reconstruction  methods were used.     FINDINGS:     Pulmonary Arteries: Excellent contrast opacification of the pulmonary  arteries.  No intraluminal filling defects to suggest pulmonary embolus.   The pulmonary arteries are normal in caliber.     Hilum and Mediastinum: No pathologically enlarged lymph nodes. The heart  appears enlarged..  No significant coronary artery atherosclerotic  disease. Unremarkable thoracic aorta.   No pericardial effusion.     Lung Parenchyma and Pleura: Small to moderate-sized bilateral pleural  effusions are present. Intralobular septal thickening is present. Patchy  groundglass changes are  present throughout the lungs bilaterally. Patchy  areas of airspace disease present within the left upper lobe and the  bilateral lower lobes..     Upper Abdomen: Unremarkable.     Soft tissues: Unremarkable.     Osseous structures: No aggressive focal lytic or sclerotic osseous  lesions.       Impression:         1. No evidence of pulmonary embolus.  2. Multifocal pneumonia with patchy airspace disease present, most  pronounced within the left upper lobe. Mild to moderate underlying  pulmonary edema pattern suspected versus groundglass airspace disease  with small to moderate-sized bilateral pleural effusions.                    Electronically Signed By-Lili Zaldivar MD On:7/19/2024 3:10 AM       XR Chest 1 View [[id number]] Collected: 07/19/24 0021     Updated: 07/19/24 0024    Narrative:      XR CHEST 1 VW    Date of Exam: 7/18/2024 11:53 PM EDT    Indication: dyspnea    Comparison: 7/11/2024.    Findings:  There are no airspace consolidations. No pleural fluid. No pneumothorax. The pulmonary vasculature appears indistinct. The heart appears enlarged. Subsegmental atelectatic changes and scarring present.. No acute osseous abnormality identified.      Impression:      Impression:  No significant change. Stable cardiomegaly with probable mild pulmonary edema pattern with subsegmental atelectatic changes and scarring.        Electronically Signed: Lili Zaldivar MD    7/19/2024 12:22 AM EDT    Workstation ID: [id number]              Assessment & Plan        This is a 73 y.o. male with:    Active and Resolved Problems  Active Hospital Problems    Diagnosis  POA    **Sepsis [A41.9]  Yes     Priority: High    Acute respiratory failure with hypoxia and hypercapnia [J96.01, J96.02]  Yes     Priority: High    Acute on chronic diastolic heart failure [I50.33]  Yes     Priority: Medium    Acute UTI (urinary tract infection) [N39.0]  Yes     Priority: Medium    Pneumonia [J18.9]  Yes     Priority: Medium    Paroxysmal atrial  fibrillation [I48.0]  Yes    Hyperkalemia [E87.5]  Yes    Hyponatremia [E87.1]  Yes    Hyperbilirubinemia [E80.6]  Yes    Presence of cardiac pacemaker [Z95.0]  Yes    Mitral valve replaced [Z95.2]  Not Applicable    Mentally challenged [F79]  Yes    Hypertension [I10]  Yes    Hyperlipidemia [E78.5]  Yes    Coronary artery disease [I25.10]  Yes      Resolved Hospital Problems   No resolved problems to display.     Sepsis, initial lactic 3.5 now 2.0 elevated WBC, likely secondary to continued bilateral pneumonia per CT and possible UTI, not hypotensive no fluid bolus given hypertension and CHF exacerbation normal saline at 50 cc/h, continue IV cefepime and IV vancomycin pharmacy to dose infectious disease consulted    Acute respiratory failure with hypoxia and hypercapnia, likely secondary to pneumonia and acute CHF, tolerating 15 L oxygen nonrebreather attempt to wean, albuterol as needed    Acute on chronic diastolic heart failure proBNP elevated, one-time dose 40 mg IV Lasix, shortness of air likely more secondary to pneumonia but consider cardiology consult if indicated    Acute UTI, trace bacteria see plan above acute urine cultures pending    Continued bilateral pneumonia, see plan above    Paroxysmal atrial fibrillation, EKG shows sinus rhythm, on home sotalol and Eliquis reordered continuous cardiac monitoring    Hyperkalemia potassium 5.4 one-time dose Lokelma    Hyponatremia sodium 128, normal saline at 50 cc/h repeat BMP    Hyperbilirubinemia, likely secondary to sepsis, CT abdomen unchanged from previous exam last admission, seen by gastroenterology diagnosed with constipation on Linzess    Presence of a cardiac pacemaker, noted infectious disease consulted    Mitral valve replacement noted    Mentally challenged, noted cooperative follows simple commands    Hypertension was hypertensive in ED was given home dose of sotalol reordered Norvasc and home sotalol    Hyper lipidemia, on statin    Coronary  artery disease status post CABG, troponin not elevated EKG no ischemic changes, continuous cardiac monitoring      VTE Prophylaxis:  No VTE prophylaxis order currently exists.        The patient desires to be as follows:    CODE STATUS:    Code Status (Patient has no pulse and is not breathing): CPR (Attempt to Resuscitate)  Medical Interventions (Patient has pulse or is breathing): Full Support            Admission Status:  I believe this patient meets inpatient  status.    Expected Length of Stay: pending clinical course     PDMP and Medication Dispenses via Sidebar reviewed and consistent with patient reported medications.    I discussed the patient's findings and my recommendations with patient and nursing staff.      Signature:     This document has been electronically signed by IZAIAH Sethi on 2024 03:58 EDT   [facility]    Electronically signed by Tyrone Raymundo MD at 24 0502       Physician Progress Notes (last 24 hours)  Notes from 24 1550 through 24 1550   No notes of this type exist for this encounter.          Consult Notes (last 24 hours)        Aleta Prescott APRN at 24 1302        Consult Orders    1. Inpatient Pulmonology Consult [[id number]] ordered by Stacie Hirsch MD at 24 1656                 PULMONARY CRITICAL CARE CONSULT NOTE      PATIENT IDENTIFICATION:  Name: Drake Beth  MRN: [id number]  :  1950     Age: 73 y.o.  Sex: male        DATE OF CONSULTATION:  2024  PRIMARY CARE PHYSICIAN    Amada Haskisn MD                  CHIEF COMPLAINT: SOB    History of Present Illness:   Drake Beth is a 73 y.o. male with a history of A-fib s/p pacemaker, Hx MVR, CAD s/p CABG, HTN, Hyperlipidemia, and mentally vchallenged individual who came to the ER with complaints of moaning and grabbing at chest. Patient is non-verbal. In the ER patient was noted with pneumonia and admitted for further treatment.        Review of Systems:   Constitutional: As above    Eyes: negative   ENT/oropharynx: negative   Cardiovascular: As above    Respiratory: As above    Gastrointestinal: negative   Genitourinary: negative   Neurological: negative   Musculoskeletal: negative   Integument/breast: negative   Endocrine: negative   Allergic/Immunologic: negative     Past Medical History:  Past Medical History:   Diagnosis Date    Allergic rhinitis     Anemia     Atrial fibrillation     Chesty cough     CHF (congestive heart failure)     Coronary artery disease     Cough     Fever     Hyperlipidemia     Hypertension     Mentally challenged     MVP (mitral valve prolapse)        Past Surgical History:  Past Surgical History:   Procedure Laterality Date    CARDIAC ELECTROPHYSIOLOGY PROCEDURE N/A 4/26/2022    Procedure: PACEMAKER EXTRACTION;  Surgeon: Rj Dominique MD;  Location: [facility] CATH INVASIVE LOCATION;  Service: Cardiology;  Laterality: N/A;    COLONOSCOPY      He has never had the test done.  His family is refusing any form of colon cancer screening for him.    CORONARY ARTERY BYPASS GRAFT  2008    DR SWEAT    MITRAL VALVE REPLACEMENT  04/2008    WITH BIOPROSTHETIC TISSUE VALVE        Family History:  Family History   Problem Relation Age of Onset    Coronary artery disease Mother 64    Hyperlipidemia Sister     Liver disease Sister         FATTY LIVER    Rheum arthritis Sister     Lung cancer Sister     Hypertension Brother     Other Brother     Rheum arthritis Brother         Social History:   Social History     Tobacco Use    Smoking status: Never    Smokeless tobacco: Never   Substance Use Topics    Alcohol use: Never        Allergies:  No Known Allergies    Home Meds:  Medications Prior to Admission   Medication Sig Dispense Refill Last Dose    amLODIPine (NORVASC) 2.5 MG tablet TAKE 1 TABLET BY MOUTH DAILY 90 tablet 3     apixaban (Eliquis) 5 MG tablet tablet TAKE 1 TABLET BY MOUTH TWICE DAILY 180 tablet 3      "atorvastatin (LIPITOR) 10 MG tablet TAKE 1 TABLET BY MOUTH DAILY 90 tablet 3     montelukast (SINGULAIR) 10 MG tablet TAKE 1 TABLET BY MOUTH DAILY 90 tablet 1     sotalol (BETAPACE AF) 80 MG tablet tablet TAKE 1 TABLET BY MOUTH TWICE DAILY 180 tablet 1     lactulose (CHRONULAC) 10 GM/15ML solution Take 15 mL by mouth 2 (Two) Times a Day As Needed (Constipation).       multivitamin with minerals tablet tablet Take 1 tablet by mouth Daily.          Objective:  tMax 24 hrs: Temp (24hrs), Av.7 °F (36.5 °C), Min:97.4 °F (36.3 °C), Max:98.3 °F (36.8 °C)      Vitals Ranges:   Temp:  [97.4 °F (36.3 °C)-98.3 °F (36.8 °C)] 97.7 °F (36.5 °C)  Heart Rate:  [55-64] 55  Resp:  [11-22] 11  BP: (105-123)/(59-76) 109/60    Intake and Output Last 3 Shifts:   I/O last 3 completed shifts:  In: 780 [P.O.:780]  Out: 800 [Urine:800]    Exam:  /60 (BP Location: Left arm, Patient Position: Sitting)   Pulse 55   Temp 97.7 °F (36.5 °C) (Oral)   Resp 11   Ht 152.4 cm (60\")   Wt 47 kg (103 lb 9.9 oz)   SpO2 92%   BMI 20.24 kg/m²       248 24  2339   Weight: 46.3 kg (102 lb 1.2 oz) 47 kg (103 lb 9.9 oz)     General Appearance: Alert, non-verbal     HEENT:  Normocephalic, without obvious abnormality, atraumatic. Conjunctivae/corneas clear.  Normal external ear canals. Nares normal, no drainage.  Neck:  Supple, symmetrical, trachea midline. No JVD.  Lungs /Chest wall:   Bilateral basal rhonchi, respirations unlabored, symmetrical wall movement.     Heart:  Regular rate and rhythm, systolic murmur PMI left sternal border  Abdomen: Soft, nontender, no masses, no organomegaly.    Extremities: Trace edema, no clubbing or cyanosis        Data Review:  All labs (24hrs):   Recent Results (from the past 24 hour(s))   Lactic Acid, Plasma    Collection Time: 24  1:11 PM    Specimen: Arm, Right; Blood   Result Value Ref Range    Lactate 1.7 0.5 - 2.0 mmol/L   Comprehensive Metabolic Panel    Collection Time: 24  " 5:08 AM    Specimen: Blood   Result Value Ref Range    Glucose 102 (H) 65 - 99 mg/dL    BUN 11 8 - 23 mg/dL    Creatinine 0.87 0.76 - 1.27 mg/dL    Sodium 140 136 - 145 mmol/L    Potassium 3.5 3.5 - 5.2 mmol/L    Chloride 104 98 - 107 mmol/L    CO2 29.4 (H) 22.0 - 29.0 mmol/L    Calcium 8.0 (L) 8.6 - 10.5 mg/dL    Total Protein 5.6 (L) 6.0 - 8.5 g/dL    Albumin 3.0 (L) 3.5 - 5.2 g/dL    ALT (SGPT) 9 1 - 41 U/L    AST (SGOT) 26 1 - 40 U/L    Alkaline Phosphatase 50 39 - 117 U/L    Total Bilirubin 1.4 (H) 0.0 - 1.2 mg/dL    Globulin 2.6 gm/dL    A/G Ratio 1.2 g/dL    BUN/Creatinine Ratio 12.6 7.0 - 25.0    Anion Gap 6.6 5.0 - 15.0 mmol/L    eGFR 91.7 >60.0 mL/min/1.73   CBC Auto Differential    Collection Time: 07/22/24  5:08 AM    Specimen: Blood   Result Value Ref Range    WBC 10.09 3.40 - 10.80 10*3/mm3    RBC 2.70 (L) 4.14 - 5.80 10*6/mm3    Hemoglobin 9.2 (L) 13.0 - 17.7 g/dL    Hematocrit 28.1 (L) 37.5 - 51.0 %    .1 (H) 79.0 - 97.0 fL    MCH 34.1 (H) 26.6 - 33.0 pg    MCHC 32.7 31.5 - 35.7 g/dL    RDW 14.6 12.3 - 15.4 %    RDW-SD 55.1 (H) 37.0 - 54.0 fl    MPV 9.5 6.0 - 12.0 fL    Platelets 168 140 - 450 10*3/mm3    Neutrophil % 69.0 42.7 - 76.0 %    Lymphocyte % 11.0 (L) 19.6 - 45.3 %    Monocyte % 12.3 (H) 5.0 - 12.0 %    Eosinophil % 6.7 (H) 0.3 - 6.2 %    Basophil % 0.4 0.0 - 1.5 %    Immature Grans % 0.6 (H) 0.0 - 0.5 %    Neutrophils, Absolute 6.96 1.70 - 7.00 10*3/mm3    Lymphocytes, Absolute 1.11 0.70 - 3.10 10*3/mm3    Monocytes, Absolute 1.24 (H) 0.10 - 0.90 10*3/mm3    Eosinophils, Absolute 0.68 (H) 0.00 - 0.40 10*3/mm3    Basophils, Absolute 0.04 0.00 - 0.20 10*3/mm3    Immature Grans, Absolute 0.06 (H) 0.00 - 0.05 10*3/mm3    nRBC 0.0 0.0 - 0.2 /100 WBC        Imaging:  CT Abdomen Pelvis With Contrast    Result Date: 7/19/2024   DATE OF EXAM: 7/19/2024, 2:31 A.M.  PROCEDURE: CT ABDOMEN PELVIS W CONTRAST-  INDICATIONS: abdominal pain, not otherwise specified  COMPARISON: 7/11/2024.   TECHNIQUE: Routine transaxial slices were obtained through the abdomen and pelvis after the intravenous administration of Isovue 370. Reconstructed coronal and sagittal images were also obtained. Automated exposure control and iterative construction methods were used. No oral contrast agent was administered for the exam.  FINDINGS: Interval increase in size of the bilateral pleural effusions since the prior 7/11/2024 study. There is motion artifact on the study, especially of scarring in the upper abdomen. There is gaseous greater distention of the stomach. The right kidney is thought to be surgically absent. No mechanical bowel obstruction. No pneumoperitoneum or pneumatosis. No acute colitis or diverticulitis. No acute appendicitis is suggested. Otherwise, no significant interval change is seen since the prior 7/11/2024 CT exam with additional findings as described in the prior exam report.      Increase in size of bilateral pleural effusions. Otherwise, no significant interval change is identified since the prior exam.    Please note that portions of this note were completed with a voice recognition program.        Electronically Signed By-Moe Bourne MD On:7/19/2024 3:12 AM      CT Angiogram Chest Pulmonary Embolism    Result Date: 7/19/2024  CT ANGIOGRAM CHEST PULMONARY EMBOLISM-  Date of Exam: 7/19/2024 2:31 AM  Indication: hypoxia, chest pain.  Comparison: None available.  Technique: Serial and axial CT images of the chest were obtained following the uneventful intravenous administration of 100 cc contrast. Reconstructions in the coronal and sagittal planes were also performed. In addition, a 3 D volume rendered image was obtained after post processing. Automated exposure control and iterative reconstruction methods were used.  FINDINGS:  Pulmonary Arteries: Excellent contrast opacification of the pulmonary arteries.  No intraluminal filling defects to suggest pulmonary embolus.  The pulmonary arteries are  normal in caliber.  Hilum and Mediastinum: No pathologically enlarged lymph nodes. The heart appears enlarged..  No significant coronary artery atherosclerotic disease. Unremarkable thoracic aorta.   No pericardial effusion.  Lung Parenchyma and Pleura: Small to moderate-sized bilateral pleural effusions are present. Intralobular septal thickening is present. Patchy groundglass changes are present throughout the lungs bilaterally. Patchy areas of airspace disease present within the left upper lobe and the bilateral lower lobes..  Upper Abdomen: Unremarkable.  Soft tissues: Unremarkable.  Osseous structures: No aggressive focal lytic or sclerotic osseous lesions.       1. No evidence of pulmonary embolus. 2. Multifocal pneumonia with patchy airspace disease present, most pronounced within the left upper lobe. Mild to moderate underlying pulmonary edema pattern suspected versus groundglass airspace disease with small to moderate-sized bilateral pleural effusions.       Electronically Signed By-Lili Zaldivar MD On:7/19/2024 3:10 AM      XR Chest 1 View    Result Date: 7/19/2024  XR CHEST 1 VW Date of Exam: 7/18/2024 11:53 PM EDT Indication: dyspnea Comparison: 7/11/2024. Findings: There are no airspace consolidations. No pleural fluid. No pneumothorax. The pulmonary vasculature appears indistinct. The heart appears enlarged. Subsegmental atelectatic changes and scarring present.. No acute osseous abnormality identified.     Impression: No significant change. Stable cardiomegaly with probable mild pulmonary edema pattern with subsegmental atelectatic changes and scarring. Electronically Signed: Lili Zaldivar MD  7/19/2024 12:22 AM EDT  Workstation ID: [id number]    Walking Oximetry    Result Date: 7/15/2024  Evelin Puri, YUSRA     7/15/2024  3:41 PM Exercise Oximetry Patient Name:Drake Beth MRN: [id number] Date: 07/15/24         ROOM AIR BASELINE SpO2% 97 Heart Rate  83 Blood Pressure  EXERCISE ON ROOM AIR  SpO2% EXERCISE ON O2 @LPM SpO2% 1 MINUTE 95 1 MINUTE  2 MINUTES 96 2 MINUTES  3 MINUTES 98 3 MINUTES  4 MINUTES 94 4 MINUTES  5 MINUTES 96 5 MINUTES  6 MINUTES 96 6 MINUTES           Distance Walked  6mins Distance Walked Dyspnea (Nishant Scale)   Dyspnea (Nishant Scale) Fatigue (Nishant Scale)   Fatigue (Nishant Scale) SpO2% Post Exercise  98 SpO2% Post Exercise HR Post Exercise  100 HR Post Exercise Time to Recovery  NA Time to Recovery Comments: Patient completed walking exercise on room air without O2, at this time patient does not require o2 for home.     Adult Transthoracic Echo Complete w/ Color, Spectral and Contrast if Necessary Per Protocol    Result Date: 7/13/2024    Left ventricular systolic function is normal. Left ventricular ejection fraction appears to be 51 - 55%.   Left ventricular diastolic function was indeterminate.   The left atrial cavity is moderately dilated.   Left atrial volume is moderately increased.   There is mild calcification of the aortic valve.   A mitral valve mass is present.   Moderate to severe mitral valve regurgitation is present.   There is a bioprosthetic mitral valve present. Transvalvular regurgitation is present in the prosthetic mitral valve.   Moderate tricuspid valve regurgitation is present.   Estimated right ventricular systolic pressure from tricuspid regurgitation is markedly elevated (>55 mmHg).   Moderate to severe pulmonary hypertension is present. Abnormal study Recommend transesophageal echo imaging for evaluation of bioprosthetic mitral valve which appears severely regurgitant .,  See documented other abnormalities and findings     Walking Oximetry    Result Date: 7/13/2024  Yoandy Sanderson, RT Student     7/13/2024  4:09 PM Exercise Oximetry Patient Name:Drake Beth MRN: [id number] Date: 07/13/24         ROOM AIR BASELINE SpO2% 93%  Heart Rate 71 BPM Blood Pressure  EXERCISE ON ROOM AIR SpO2% EXERCISE ON O2 @  LPM SpO2% 1 MINUTE 92% on RA 92% 1 MINUTE  2 MINUTES 92%  on RA 92% 2 MINUTES  3 MINUTES 91 % on RA 91% 3 MINUTES  4 MINUTES 90% on RA 90% 4 MINUTES  5 MINUTES 89% on RA 89% 5 MINUTES  6 MINUTES 89% on RA 89% 6 MINUTES           Distance Walked  6 mins Distance Walked Dyspnea (Nishant Scale)   Dyspnea (Nishant Scale) Fatigue (Nishant Scale)   Fatigue (Nishant Scale) SpO2% Post Exercise  90% on RA SpO2% Post Exercise HR Post Exercise  85 BPM HR Post Exercise Time to Recovery   Time to Recovery Comments: Pt walked for 6 min's without stopping while on room air.     XR Chest 1 View    Result Date: 7/11/2024  XR CHEST 1 VW Date of Exam: 7/11/2024 3:50 PM EDT Indication: abnormal CT, cough Comparison: 12/15/2022. Findings: There are new infiltrates of the bilateral upper and lower lobes, overall greatest involving the lower lobes. There are no definite effusions. There is stable mild cardiomegaly. There are sternal suture wires.     Impression: Findings are most compatible with bilateral pneumonia. Electronically Signed: Mallorie Monge MD  7/11/2024 4:17 PM EDT  Workstation ID: [id number]    CT Abdomen Pelvis With Contrast    Result Date: 7/11/2024  CT ABDOMEN PELVIS W CONTRAST Date of Exam: 7/11/2024 3:00 PM EDT Indication: abd pain. Comparison: None available. Technique: Axial CT images were obtained of the abdomen and pelvis following the uneventful intravenous administration of iodinated contrast. Sagittal and coronal reconstructions were performed.  Automated exposure control and iterative reconstruction methods were used. Findings: Liver: The liver is unremarkable in morphology. No focal liver lesion is seen. No biliary dilation is seen. Gallbladder: Absent. Pancreas: Unremarkable. Spleen: Unremarkable. Adrenal glands: Unremarkable. Genitourinary tract: Patient is status post right nephrectomy. Small left renal cyst and additional subcentimeter left renal hypodensity too small to characterize noted. No hydronephrosis is seen. The visualized left ureter is unremarkable. Urinary bladder  and prostate gland are unremarkable. Gastrointestinal tract: Colonic diverticulosis is present. The hollow viscera appear otherwise unremarkable. There is no evidence of bowel obstruction. Appendix: No findings to suggest acute appendicitis. Other findings: No free air or free fluid. No pathologically enlarged lymph nodes are seen. Mild vascular calcifications are present. The IVC is unremarkable. Bones and soft tissues: No acute osseous lesion is identified. There are degenerative changes within the spine and of the right hip. Superficial patient appears to be status post left orchiectomy. Lung bases: Cardiomegaly, probable pulmonary vascular congestion, and small bilateral pleural effusions noted. Tiny calcified granuloma within the right lung base.     Impression: 1.No acute abnormality identified within the abdomen or pelvis. 2.Colonic diverticulosis. 3.Absent right kidney. 4.Cardiomegaly with probable pulmonary vascular congestion and small bilateral pleural effusions noted. Atypical infiltrates within the lung bases may have a similar appearance. Please correlate clinically. Electronically Signed: Shane Vaughn MD  7/11/2024 3:09 PM EDT  Workstation ID: [id number]       Current:  amLODIPine, 2.5 mg, Oral, Daily  amoxicillin-clavulanate, 1 tablet, Oral, Q12H  apixaban, 5 mg, Oral, BID  atorvastatin, 10 mg, Oral, Nightly  lubiprostone, 24 mcg, Oral, BID  montelukast, 10 mg, Oral, Nightly  sotalol, 80 mg, Oral, Q12H        Infusion:  sodium chloride, 50 mL/hr, Last Rate: 50 mL/hr (07/22/24 0500)         PRN:    senna-docusate sodium **AND** polyethylene glycol **AND** bisacodyl **AND** bisacodyl    Morphine    ondansetron    ASSESSMENT:  Acute hypoxic/hypercapnic resp failure  Multifocal pneumonia  Sepsis  CAD  HTN  Diastolic CHF  UTI  Hyponatremia  A-fib s/p pacemaker     PLAN:  Antibiotics  Bronchodilators  Inhaled corticosteroids  Incentive spirometer  Electrolytes/ glycemic control  DVT  "prophylaxis-Apixaban    Discussed with IZAIAH Becerra  2024  13:02 EDT          Electronically signed by Aleta Prescott APRN at 24 1322       Moe Mott at 24 0945        Consult Orders    1. Inpatient Spiritual Care Consult [[id number]] ordered by Ponce Tucker MD at 24 0015              Summary:Patient request for  support                 Patient reports he is doing ok. Asked about his plush toy and drawings. Patient mostly responded with a \"yes or no\". Retrieved another plush toy to add with his belongings. Informed patient that if scared or worried that I am available to visit or speak with.    Will continue to follow.     Moe Mott    Electronically signed by Moe Mott at 24 1235          Physical Therapy Notes (last 24 hours)        Daylin Valle, PT at 24 1541  Version 1 of 1            24 1541   OTHER   Discipline physical therapist   Rehab Time/Intention   Session Not Performed other (see comments)  (hospital d/c pending)   Recommendation   PT - Next Appointment 24         Electronically signed by Daylin Valle, PT at 24 1541          Occupational Therapy Notes (last 24 hours)        Lea Nguyen, OT at 24 1115          Patient Name: Draek Beth  : 1950    MRN: [id number]                              Today's Date: 2024       Admit Date: 2024    Visit Dx:     ICD-10-CM ICD-9-CM   1. Pneumonia of both lungs due to infectious organism, unspecified part of lung  J18.9 483.8   2. Generalized abdominal pain  R10.84 789.07   3. Dyspnea, unspecified type  R06.00 786.09     Patient Active Problem List   Diagnosis    Allergic rhinitis    Anemia    Atrial fibrillation    Congestive heart failure    Coronary artery disease    Subacute cough    Hyperlipidemia    Hypertension    Medicare annual wellness visit, subsequent    Mentally challenged    Prostate cancer screening    " "Pacemaker infection    Mitral valve replaced    Gingivitis, chronic    Acute URI    Atrial flutter with controlled response    Pneumonia    Sepsis    Acute on chronic diastolic heart failure    Paroxysmal atrial fibrillation    Acute respiratory failure with hypoxia and hypercapnia    Hyperkalemia    Hyponatremia    Hyperbilirubinemia    Acute UTI (urinary tract infection)    Presence of cardiac pacemaker     Past Medical History:   Diagnosis Date    Allergic rhinitis     Anemia     Atrial fibrillation     Chesty cough     CHF (congestive heart failure)     Coronary artery disease     Cough     Fever     Hyperlipidemia     Hypertension     Mentally challenged     MVP (mitral valve prolapse)      Past Surgical History:   Procedure Laterality Date    CARDIAC ELECTROPHYSIOLOGY PROCEDURE N/A 4/26/2022    Procedure: PACEMAKER EXTRACTION;  Surgeon: Rj Dominique MD;  Location: [facility] CATH INVASIVE LOCATION;  Service: Cardiology;  Laterality: N/A;    COLONOSCOPY      He has never had the test done.  His family is refusing any form of colon cancer screening for him.    CORONARY ARTERY BYPASS GRAFT  2008    DR SWEAT    MITRAL VALVE REPLACEMENT  04/2008    WITH BIOPROSTHETIC TISSUE VALVE      General Information       Row Name 07/22/24 105          OT Time and Intention    Document Type evaluation  -DT     Mode of Treatment occupational therapy  -DT       Row Name 07/22/24 4935          General Information    Patient Profile Reviewed yes  -DT     Prior Level of Function --  No family present to confirm pt's baseline status. Pt reports his brothers help him with shaving. He states he dresses, bathes & toilets himself. He likes to color & watch t.v. He does not use O2 at home.  -DT     Existing Precautions/Restrictions fall;oxygen therapy device and L/min  -DT     Barriers to Rehab none identified  -DT       Row Name 07/22/24 7453          Living Environment    People in Home sibling(s)  Lives [facility] brother \"Demetrio\" & " "brother Gen comes over & helps him too per pt reporrt  -DT       Row Name 07/22/24 1056          Cognition    Orientation Status (Cognition) oriented to;person;place  states he is at \"hospital\"  -DT       Row Name 07/22/24 1056          Safety Issues, Functional Mobility    Impairments Affecting Function (Mobility) endurance/activity tolerance  -DT               User Key  (r) = Recorded By, (t) = Taken By, (c) = Cosigned By      Initials Name Provider Type    DT Lea Nguyen, OT Occupational Therapist                     Mobility/ADL's       Row Name 07/22/24 1059          Bed Mobility    Bed Mobility bed mobility (all) activities  -DT     All Activities, [place] (Bed Mobility) supervision  -DT       Row Name 07/22/24 1059          Transfers    Transfers bed-chair transfer;sit-stand transfer;stand-sit transfer  -DT       Row Name 07/22/24 1059          Bed-Chair Transfer    Bed-Chair [place] (Transfers) contact guard  -DT       Row Name 07/22/24 1059          Sit-Stand Transfer    Sit-Stand [place] (Transfers) contact guard  -DT       Row Name 07/22/24 1059          Stand-Sit Transfer    Stand-Sit [place] (Transfers) contact guard  -DT       Row Name 07/22/24 1059          Activities of Daily Living    BADL Assessment/Intervention grooming;lower body dressing  -DT       Row Name 07/22/24 1059          Grooming Assessment/Training    [place] Level (Grooming) grooming skills;oral care regimen;wash face, hands;verbal cues;minimum assist (75% patient effort);nonverbal cues (demo/gesture)  assist, v.c. & tactile cues for initiation & thoroughness. Pt trying to get therapist to do it for him.  -DT     Oral Care dental appliance cleaned;teeth brushed - regular toothbrush  -DT       Row Name 07/22/24 1059          Lower Body Dressing Assessment/Training    [place] Level (Lower Body Dressing) lower body dressing skills;minimum assist (75% patient effort);verbal cues  Pt requiring " terri for encouragement to participate. Pt trying to get therapist to perform tasks for him.  -DT     Position (Lower Body Dressing) edge of bed sitting  -DT               User Key  (r) = Recorded By, (t) = Taken By, (c) = Cosigned By      Initials Name Provider Type    Lea Coleman OT Occupational Therapist                   Obj/Interventions       Row Name 07/22/24 1102          Range of Motion Comprehensive    General Range of Motion bilateral upper extremity ROM WFL;bilateral lower extremity ROM WFL  -DT       Row Name 07/22/24 1102          Strength Comprehensive (MMT)    Comment, General Manual Muscle Testing (MMT) Assessment Unable to formally test due to dec comprehension. BUE = grossly 4/5 noted from pt grasping & pulling therapist's hand.  -DT       Row Name 07/22/24 1102          Balance    Balance Assessment sitting static balance;sitting dynamic balance;standing static balance;standing dynamic balance  -DT     Static Sitting Balance independent  -DT     Dynamic Sitting Balance independent  -DT     Position, Sitting Balance unsupported;sitting edge of bed  -DT     Static Standing Balance contact guard  -DT     Dynamic Standing Balance contact guard  -DT     Position/Device Used, Standing Balance unsupported  -DT               User Key  (r) = Recorded By, (t) = Taken By, (c) = Cosigned By      Initials Name Provider Type    DT Lea Nguyen OT Occupational Therapist                   Goals/Plan       Row Name 07/22/24 1113          Transfer Goal 1 (OT)    Activity/Assistive Device (Transfer Goal 1, OT) transfers, all  -DT     [place] Level/Cues Needed (Transfer Goal 1, OT) supervision required  -DT     Time Frame (Transfer Goal 1, OT) 2 weeks  -DT       Row Name 07/22/24 1113          Bathing Goal 1 (OT)    Activity/Device (Bathing Goal 1, OT) bathing skills, all  -DT     [place] Level/Cues Needed (Bathing Goal 1, OT) supervision required  -DT     Time Frame (Bathing  Goal 1, OT) 2 weeks  -DT       Row Name 07/22/24 1113          Dressing Goal 1 (OT)    Activity/Device (Dressing Goal 1, OT) dressing skills, all  -DT     [place]/Cues Needed (Dressing Goal 1, OT) supervision required  -DT     Time Frame (Dressing Goal 1, OT) 2 weeks  -DT       Row Name 07/22/24 1113          Toileting Goal 1 (OT)    Activity/Device (Toileting Goal 1, OT) toileting skills, all  -DT     [place] Level/Cues Needed (Toileting Goal 1, OT) supervision required  -DT     Time Frame (Toileting Goal 1, OT) 2 weeks  -DT       Row Name 07/22/24 1113          Therapy Assessment/Plan (OT)    Planned Therapy Interventions (OT) activity tolerance training;cognitive/visual perception retraining;BADL retraining;functional balance retraining;neuromuscular control/coordination retraining;occupation/activity based interventions;ROM/therapeutic exercise;strengthening exercise;patient/caregiver education/training;transfer/mobility retraining  -DT               User Key  (r) = Recorded By, (t) = Taken By, (c) = Cosigned By      Initials Name Provider Type    DT Lea Nguyen, OT Occupational Therapist                   Clinical Impression       Row Name 07/22/24 1104          Pain Assessment    Pretreatment Pain Rating 0/10 - no pain  -DT     Posttreatment Pain Rating 0/10 - no pain  -DT       Row Name 07/22/24 1104          Pain Scale: FACES Pre/Post-Treatment    Pain: FACES Scale, Pretreatment 0-->no hurt  -DT     Posttreatment Pain Rating 0-->no hurt  -DT       Row Name 07/22/24 1104          Plan of Care Review    Plan of Care Reviewed With patient  -DT     Outcome Evaluation Pt is a 79 y.o. M adm to [facility] on 07/18/24 with multifocal PNA, sepsis & UTI. Pt has had a another recent hospitalization for PNA as well. PMH: intellectual disability/impairment, CAD, pacemaker. At baseline, pt lives with his brother & he has another brother who assists him as well. There is no family present to confirm his  baseline status. Pt reports he is able to walk,  bathe, dress & toilet himself & his brothers help with laundry, cooking & helping him shave. He completed ADL transfers with CGA as a safety prec as pt wanting to hold therapist's hand. He had no detected LOB. He required min A & v.c. & tactile cuing for oral care, face grooming and LB dressing. Pt requiring cuing to encourage independence as pt wanting therapist to perform tasks for him. He needed assist for thoroughness of task. He is on 1L O2. His O2 sat dropped to 88% with transfer, but recovered with v.c. for pt to take in a few deep breaths. Pt does not use O2 at home reportedly. He presents with generalized dec endurance, but anticipate he will make good progress as his medical status improves. OT will follow while in acute care & recommends home with 24/7 assist as before upon discharge.  -DT       Row Name 07/22/24 1104          Therapy Assessment/Plan (OT)    Rehab Potential (OT) good, to achieve stated therapy goals  -DT     Criteria for Skilled Therapeutic Interventions Met (OT) yes;meets criteria;skilled treatment is necessary  -DT     Therapy Frequency (OT) 3 times/wk  -DT     Predicted Duration of Therapy Intervention (OT) until d/c  -DT       Row Name 07/22/24 1104          Therapy Plan Review/Discharge Plan (OT)    Anticipated Discharge Disposition (OT) home with 24/7 care;home with assist  -DT       Row Name 07/22/24 1104          Vital Signs    Pre SpO2 (%) 94  -DT     O2 Delivery Pre Treatment supplemental O2  1L  -DT     Intra SpO2 (%) 88  -DT     O2 Delivery Intra Treatment nasal cannula  1L  -DT     Post SpO2 (%) 91  -DT     O2 Delivery Post Treatment nasal cannula  1L  -DT       Row Name 07/22/24 1104          Positioning and Restraints    Pre-Treatment Position in bed  -DT     Post Treatment Position chair  -DT     In Chair notified nsg;sitting;call light within reach;encouraged to call for assist;exit alarm on;legs elevated  -DT                User Key  (r) = Recorded By, (t) = Taken By, (c) = Cosigned By      Initials Name Provider Type    Lea Coleman, OT Occupational Therapist                   Outcome Measures       Row Name 07/22/24 0810          How much help from another person do you currently need...    Turning from your back to your side while in flat bed without using bedrails? 4  -RODRICK     Moving from lying on back to sitting on the side of a flat bed without bedrails? 4  -RODRICK     Moving to and from a bed to a chair (including a wheelchair)? 3  -RODRICK     Standing up from a chair using your arms (e.g., wheelchair, bedside chair)? 3  -RODRICK     Climbing 3-5 steps with a railing? 3  -RODRICK     To walk in hospital room? 3  -RODRICK     AM-PAC 6 Clicks Score (PT) 20  -RODRICK     Highest Level of Mobility Goal 6 --> Walk 10 steps or more  -RODRICK               User Key  (r) = Recorded By, (t) = Taken By, (c) = Cosigned By      Initials Name Provider Type    Delisa Ayoub RN Registered Nurse                    Occupational Therapy Education       Title: PT OT SLP Therapies (Done)       Topic: Occupational Therapy (Done)       Point: ADL training (Done)       Description:   Instruct learner(s) on proper safety adaptation and remediation techniques during self care or transfers.   Instruct in proper use of assistive devices.                  Learning Progress Summary             Patient Acceptance, E,TB, VU,NR by DT at 7/22/2024 1114    Comment: Role of OT,goals & POC, safety prec.                         Point: Precautions (Done)       Description:   Instruct learner(s) on prescribed precautions during self-care and functional transfers.                  Learning Progress Summary             Patient Acceptance, E,TB, VU,NR by DT at 7/22/2024 1114    Comment: Role of OT,goals & POC, safety prec.                         Point: Body mechanics (Done)       Description:   Instruct learner(s) on proper positioning and spine alignment during self-care,  functional mobility activities and/or exercises.                  Learning Progress Summary             Patient Acceptance, E,TB, VU,NR by DT at 7/22/2024 1114    Comment: Role of OT,goals & POC, safety prec.                                         User Key       Initials Effective Dates Name Provider Type Discipline    DT 07/11/23 -  Lea Nguyen, OT Occupational Therapist OT                  OT Recommendation and Plan  Planned Therapy Interventions (OT): activity tolerance training, cognitive/visual perception retraining, BADL retraining, functional balance retraining, neuromuscular control/coordination retraining, occupation/activity based interventions, ROM/therapeutic exercise, strengthening exercise, patient/caregiver education/training, transfer/mobility retraining  Therapy Frequency (OT): 3 times/wk  Plan of Care Review  Plan of Care Reviewed With: patient  Outcome Evaluation: Pt is a 79 y.o. M adm to [facility] on 07/18/24 with multifocal PNA, sepsis & UTI. Pt has had a another recent hospitalization for PNA as well. PMH: intellectual disability/impairment, CAD, pacemaker. At baseline, pt lives with his brother & he has another brother who assists him as well. There is no family present to confirm his baseline status. Pt reports he is able to walk,  bathe, dress & toilet himself & his brothers help with laundry, cooking & helping him shave. He completed ADL transfers with CGA as a safety prec as pt wanting to hold therapist's hand. He had no detected LOB. He required min A & v.c. & tactile cuing for oral care, face grooming and LB dressing. Pt requiring cuing to encourage independence as pt wanting therapist to perform tasks for him. He needed assist for thoroughness of task. He is on 1L O2. His O2 sat dropped to 88% with transfer, but recovered with v.c. for pt to take in a few deep breaths. Pt does not use O2 at home reportedly. He presents with generalized dec endurance, but anticipate he will make  good progress as his medical status improves. OT will follow while in acute care & recommends home with 24/7 assist as before upon discharge.     Time Calculation:         Time Calculation- OT       Row Name 07/22/24 1115             Time Calculation- OT    OT Start Time 1021  -DT      OT Stop Time 1046  -DT      OT Time Calculation (min) 25 min  -DT      OT Received On 07/22/24  -DT      OT - Next Appointment 07/24/24  -DT      OT Goal Re-Cert Due Date 08/05/24  -DT                User Key  (r) = Recorded By, (t) = Taken By, (c) = Cosigned By      Initials Name Provider Type    DT Lea Nguyen, OT Occupational Therapist                           Lea Nguyen, OT  7/22/2024    Electronically signed by Lea Nguyen, OT at 07/22/24 1115       Lea Nguyen, OT at 07/22/24 1115          Goal Outcome Evaluation:  Plan of Care Reviewed With: patient           Outcome Evaluation: Pt is a 79 y.o. M adm to [facility] on 07/18/24 with multifocal PNA, sepsis & UTI. Pt has had a another recent hospitalization for PNA as well. PMH: intellectual disability/impairment, CAD, pacemaker. At baseline, pt lives with his brother & he has another brother who assists him as well. There is no family present to confirm his baseline status. Pt reports he is able to walk,  bathe, dress & toilet himself & his brothers help with laundry, cooking & helping him shave. He completed ADL transfers with CGA as a safety prec as pt wanting to hold therapist's hand. He had no detected LOB. He required min A & v.c. & tactile cuing for oral care, face grooming and LB dressing. Pt requiring cuing to encourage independence as pt wanting therapist to perform tasks for him. He needed assist for thoroughness of task. He is on 1L O2. His O2 sat dropped to 88% with transfer, but recovered with v.c. for pt to take in a few deep breaths. Pt does not use O2 at home reportedly. He presents with generalized dec endurance, but anticipate  he will make good progress as his medical status improves. OT will follow while in acute care & recommends home with 24/7 assist as before upon discharge.      Anticipated Discharge Disposition (OT): home with 24/7 care, home with assist                          Electronically signed by Lea Nguyen, OT at 07/22/24 0128

## 2024-07-22 NOTE — THERAPY EVALUATION
Patient Name: Drake Beth  : 1950    MRN: [id number]                              Today's Date: 2024       Admit Date: 2024    Visit Dx:     ICD-10-CM ICD-9-CM   1. Pneumonia of both lungs due to infectious organism, unspecified part of lung  J18.9 483.8   2. Generalized abdominal pain  R10.84 789.07   3. Dyspnea, unspecified type  R06.00 786.09     Patient Active Problem List   Diagnosis    Allergic rhinitis    Anemia    Atrial fibrillation    Congestive heart failure    Coronary artery disease    Subacute cough    Hyperlipidemia    Hypertension    Medicare annual wellness visit, subsequent    Mentally challenged    Prostate cancer screening    Pacemaker infection    Mitral valve replaced    Gingivitis, chronic    Acute URI    Atrial flutter with controlled response    Pneumonia    Sepsis    Acute on chronic diastolic heart failure    Paroxysmal atrial fibrillation    Acute respiratory failure with hypoxia and hypercapnia    Hyperkalemia    Hyponatremia    Hyperbilirubinemia    Acute UTI (urinary tract infection)    Presence of cardiac pacemaker     Past Medical History:   Diagnosis Date    Allergic rhinitis     Anemia     Atrial fibrillation     Chesty cough     CHF (congestive heart failure)     Coronary artery disease     Cough     Fever     Hyperlipidemia     Hypertension     Mentally challenged     MVP (mitral valve prolapse)      Past Surgical History:   Procedure Laterality Date    CARDIAC ELECTROPHYSIOLOGY PROCEDURE N/A 2022    Procedure: PACEMAKER EXTRACTION;  Surgeon: Rj Dominique MD;  Location: [facility] INVASIVE LOCATION;  Service: Cardiology;  Laterality: N/A;    COLONOSCOPY      He has never had the test done.  His family is refusing any form of colon cancer screening for him.    CORONARY ARTERY BYPASS GRAFT      DR SWEAT    MITRAL VALVE REPLACEMENT  2008    WITH BIOPROSTHETIC TISSUE VALVE      General Information       Row Name 24 1056          OT Time  "and Intention    Document Type evaluation  -DT     Mode of Treatment occupational therapy  -DT       Row Name 07/22/24 1056          General Information    Patient Profile Reviewed yes  -DT     Prior Level of Function --  No family present to confirm pt's baseline status. Pt reports his brothers help him with shaving. He states he dresses, bathes & toilets himself. He likes to color & watch t.v. He does not use O2 at home.  -DT     Existing Precautions/Restrictions fall;oxygen therapy device and L/min  -DT     Barriers to Rehab none identified  -DT       Row Name 07/22/24 1056          Living Environment    People in Home sibling(s)  Lives wtih brother \"Demetrio\" & brother Gen comes over & helps him too per pt reporrt  -DT       Row Name 07/22/24 1056          Cognition    Orientation Status (Cognition) oriented to;person;place  states he is at \"hospital\"  -DT       Row Name 07/22/24 1056          Safety Issues, Functional Mobility    Impairments Affecting Function (Mobility) endurance/activity tolerance  -DT               User Key  (r) = Recorded By, (t) = Taken By, (c) = Cosigned By      Initials Name Provider Type    DT Lea Nguyen, OT Occupational Therapist                     Mobility/ADL's       Row Name 07/22/24 1059          Bed Mobility    Bed Mobility bed mobility (all) activities  -DT     All Activities, [place] (Bed Mobility) supervision  -DT       Row Name 07/22/24 1059          Transfers    Transfers bed-chair transfer;sit-stand transfer;stand-sit transfer  -DT       Row Name 07/22/24 1059          Bed-Chair Transfer    Bed-Chair [place] (Transfers) contact guard  -DT       Row Name 07/22/24 1059          Sit-Stand Transfer    Sit-Stand [place] (Transfers) contact guard  -DT       Row Name 07/22/24 1059          Stand-Sit Transfer    Stand-Sit [place] (Transfers) contact guard  -DT       Row Name 07/22/24 1059          Activities of Daily Living    BADL " Assessment/Intervention grooming;lower body dressing  -DT       Row Name 07/22/24 1059          Grooming Assessment/Training    [place] Level (Grooming) grooming skills;oral care regimen;wash face, hands;verbal cues;minimum assist (75% patient effort);nonverbal cues (demo/gesture)  assist, v.c. & tactile cues for initiation & thoroughness. Pt trying to get therapist to do it for him.  -DT     Oral Care dental appliance cleaned;teeth brushed - regular toothbrush  -DT       Row Name 07/22/24 1059          Lower Body Dressing Assessment/Training    [place] Level (Lower Body Dressing) lower body dressing skills;minimum assist (75% patient effort);verbal cues  Pt requiring v.c. for encouragement to participate. Pt trying to get therapist to perform tasks for him.  -DT     Position (Lower Body Dressing) edge of bed sitting  -DT               User Key  (r) = Recorded By, (t) = Taken By, (c) = Cosigned By      Initials Name Provider Type    DT Lea Nguyen, OT Occupational Therapist                   Obj/Interventions       Row Name 07/22/24 1102          Range of Motion Comprehensive    General Range of Motion bilateral upper extremity ROM WFL;bilateral lower extremity ROM WFL  -DT       Row Name 07/22/24 1102          Strength Comprehensive (MMT)    Comment, General Manual Muscle Testing (MMT) Assessment Unable to formally test due to dec comprehension. BUE = grossly 4/5 noted from pt grasping & pulling therapist's hand.  -DT       Row Name 07/22/24 1102          Balance    Balance Assessment sitting static balance;sitting dynamic balance;standing static balance;standing dynamic balance  -DT     Static Sitting Balance independent  -DT     Dynamic Sitting Balance independent  -DT     Position, Sitting Balance unsupported;sitting edge of bed  -DT     Static Standing Balance contact guard  -DT     Dynamic Standing Balance contact guard  -DT     Position/Device Used, Standing Balance unsupported  -DT                User Key  (r) = Recorded By, (t) = Taken By, (c) = Cosigned By      Initials Name Provider Type    DT Lea Nguyen, OT Occupational Therapist                   Goals/Plan       Row Name 07/22/24 1113          Transfer Goal 1 (OT)    Activity/Assistive Device (Transfer Goal 1, OT) transfers, all  -DT     [place] Level/Cues Needed (Transfer Goal 1, OT) supervision required  -DT     Time Frame (Transfer Goal 1, OT) 2 weeks  -DT       Row Name 07/22/24 1113          Bathing Goal 1 (OT)    Activity/Device (Bathing Goal 1, OT) bathing skills, all  -DT     [place] Level/Cues Needed (Bathing Goal 1, OT) supervision required  -DT     Time Frame (Bathing Goal 1, OT) 2 weeks  -DT       Row Name 07/22/24 1113          Dressing Goal 1 (OT)    Activity/Device (Dressing Goal 1, OT) dressing skills, all  -DT     [place]/Cues Needed (Dressing Goal 1, OT) supervision required  -DT     Time Frame (Dressing Goal 1, OT) 2 weeks  -DT       Row Name 07/22/24 1113          Toileting Goal 1 (OT)    Activity/Device (Toileting Goal 1, OT) toileting skills, all  -DT     [place] Level/Cues Needed (Toileting Goal 1, OT) supervision required  -DT     Time Frame (Toileting Goal 1, OT) 2 weeks  -DT       Row Name 07/22/24 1113          Therapy Assessment/Plan (OT)    Planned Therapy Interventions (OT) activity tolerance training;cognitive/visual perception retraining;BADL retraining;functional balance retraining;neuromuscular control/coordination retraining;occupation/activity based interventions;ROM/therapeutic exercise;strengthening exercise;patient/caregiver education/training;transfer/mobility retraining  -DT               User Key  (r) = Recorded By, (t) = Taken By, (c) = Cosigned By      Initials Name Provider Type    DT Lea Nguyen, OT Occupational Therapist                   Clinical Impression       Row Name 07/22/24 1104          Pain Assessment    Pretreatment Pain Rating 0/10 - no  pain  -DT     Posttreatment Pain Rating 0/10 - no pain  -DT       Row Name 07/22/24 1104          Pain Scale: FACES Pre/Post-Treatment    Pain: FACES Scale, Pretreatment 0-->no hurt  -DT     Posttreatment Pain Rating 0-->no hurt  -DT       Row Name 07/22/24 1104          Plan of Care Review    Plan of Care Reviewed With patient  -DT     Outcome Evaluation Pt is a 79 y.o. M adm to [facility] on 07/18/24 with multifocal PNA, sepsis & UTI. Pt has had a another recent hospitalization for PNA as well. PMH: intellectual disability/impairment, CAD, pacemaker. At baseline, pt lives with his brother & he has another brother who assists him as well. There is no family present to confirm his baseline status. Pt reports he is able to walk,  bathe, dress & toilet himself & his brothers help with laundry, cooking & helping him shave. He completed ADL transfers with CGA as a safety prec as pt wanting to hold therapist's hand. He had no detected LOB. He required min A & v.c. & tactile cuing for oral care, face grooming and LB dressing. Pt requiring cuing to encourage independence as pt wanting therapist to perform tasks for him. He needed assist for thoroughness of task. He is on 1L O2. His O2 sat dropped to 88% with transfer, but recovered with v.c. for pt to take in a few deep breaths. Pt does not use O2 at home reportedly. He presents with generalized dec endurance, but anticipate he will make good progress as his medical status improves. OT will follow while in acute care & recommends home with 24/7 assist as before upon discharge.  -DT       Row Name 07/22/24 1104          Therapy Assessment/Plan (OT)    Rehab Potential (OT) good, to achieve stated therapy goals  -DT     Criteria for Skilled Therapeutic Interventions Met (OT) yes;meets criteria;skilled treatment is necessary  -DT     Therapy Frequency (OT) 3 times/wk  -DT     Predicted Duration of Therapy Intervention (OT) until d/c  -DT       Row Name 07/22/24 1104          Therapy  Plan Review/Discharge Plan (OT)    Anticipated Discharge Disposition (OT) home with 24/7 care;home with assist  -DT       Row Name 07/22/24 1104          Vital Signs    Pre SpO2 (%) 94  -DT     O2 Delivery Pre Treatment supplemental O2  1L  -DT     Intra SpO2 (%) 88  -DT     O2 Delivery Intra Treatment nasal cannula  1L  -DT     Post SpO2 (%) 91  -DT     O2 Delivery Post Treatment nasal cannula  1L  -DT       Row Name 07/22/24 1104          Positioning and Restraints    Pre-Treatment Position in bed  -DT     Post Treatment Position chair  -DT     In Chair notified nsg;sitting;call light within reach;encouraged to call for assist;exit alarm on;legs elevated  -DT               User Key  (r) = Recorded By, (t) = Taken By, (c) = Cosigned By      Initials Name Provider Type    Lea Coleman, OT Occupational Therapist                   Outcome Measures       Row Name 07/22/24 0810          How much help from another person do you currently need...    Turning from your back to your side while in flat bed without using bedrails? 4  -RODRICK     Moving from lying on back to sitting on the side of a flat bed without bedrails? 4  -RODRICK     Moving to and from a bed to a chair (including a wheelchair)? 3  -RODRICK     Standing up from a chair using your arms (e.g., wheelchair, bedside chair)? 3  -RODRICK     Climbing 3-5 steps with a railing? 3  -RODRICK     To walk in hospital room? 3  -RODRICK     AM-PAC 6 Clicks Score (PT) 20  -RODRICK     Highest Level of Mobility Goal 6 --> Walk 10 steps or more  -RODRICK               User Key  (r) = Recorded By, (t) = Taken By, (c) = Cosigned By      Initials Name Provider Type    Delisa Ayoub RN Registered Nurse                    Occupational Therapy Education       Title: PT OT SLP Therapies (Done)       Topic: Occupational Therapy (Done)       Point: ADL training (Done)       Description:   Instruct learner(s) on proper safety adaptation and remediation techniques during self care or transfers.    Instruct in proper use of assistive devices.                  Learning Progress Summary             Patient Acceptance, E,TB, VU,NR by DT at 7/22/2024 1114    Comment: Role of OT,goals & POC, safety prec.                         Point: Precautions (Done)       Description:   Instruct learner(s) on prescribed precautions during self-care and functional transfers.                  Learning Progress Summary             Patient Acceptance, E,TB, VU,NR by DT at 7/22/2024 1114    Comment: Role of OT,goals & POC, safety prec.                         Point: Body mechanics (Done)       Description:   Instruct learner(s) on proper positioning and spine alignment during self-care, functional mobility activities and/or exercises.                  Learning Progress Summary             Patient Acceptance, E,TB, VU,NR by DT at 7/22/2024 1114    Comment: Role of OT,goals & POC, safety prec.                                         User Key       Initials Effective Dates Name Provider Type Discipline    DT 07/11/23 -  Lea Nguyen, OT Occupational Therapist OT                  OT Recommendation and Plan  Planned Therapy Interventions (OT): activity tolerance training, cognitive/visual perception retraining, BADL retraining, functional balance retraining, neuromuscular control/coordination retraining, occupation/activity based interventions, ROM/therapeutic exercise, strengthening exercise, patient/caregiver education/training, transfer/mobility retraining  Therapy Frequency (OT): 3 times/wk  Plan of Care Review  Plan of Care Reviewed With: patient  Outcome Evaluation: Pt is a 79 y.o. M adm to [facility] on 07/18/24 with multifocal PNA, sepsis & UTI. Pt has had a another recent hospitalization for PNA as well. PMH: intellectual disability/impairment, CAD, pacemaker. At baseline, pt lives with his brother & he has another brother who assists him as well. There is no family present to confirm his baseline status. Pt reports he is  able to walk,  bathe, dress & toilet himself & his brothers help with laundry, cooking & helping him shave. He completed ADL transfers with CGA as a safety prec as pt wanting to hold therapist's hand. He had no detected LOB. He required min A & v.c. & tactile cuing for oral care, face grooming and LB dressing. Pt requiring cuing to encourage independence as pt wanting therapist to perform tasks for him. He needed assist for thoroughness of task. He is on 1L O2. His O2 sat dropped to 88% with transfer, but recovered with v.c. for pt to take in a few deep breaths. Pt does not use O2 at home reportedly. He presents with generalized dec endurance, but anticipate he will make good progress as his medical status improves. OT will follow while in acute care & recommends home with 24/7 assist as before upon discharge.     Time Calculation:         Time Calculation- OT       Row Name 07/22/24 1115             Time Calculation- OT    OT Start Time 1021  -DT      OT Stop Time 1046  -DT      OT Time Calculation (min) 25 min  -DT      OT Received On 07/22/24  -DT      OT - Next Appointment 07/24/24  -DT      OT Goal Re-Cert Due Date 08/05/24  -DT                User Key  (r) = Recorded By, (t) = Taken By, (c) = Cosigned By      Initials Name Provider Type    Lea Coleman, OT Occupational Therapist                           Lea Nguyen, OT  7/22/2024

## 2024-07-23 ENCOUNTER — INPATIENT HOSPITAL (OUTPATIENT)
Dept: URBAN - METROPOLITAN AREA HOSPITAL 84 | Facility: HOSPITAL | Age: 74
End: 2024-07-23
Payer: MEDICARE

## 2024-07-23 ENCOUNTER — APPOINTMENT (OUTPATIENT)
Dept: GENERAL RADIOLOGY | Facility: HOSPITAL | Age: 74
DRG: 871 | End: 2024-07-23
Payer: MEDICARE

## 2024-07-23 DIAGNOSIS — K59.00 CONSTIPATION, UNSPECIFIED: ICD-10-CM

## 2024-07-23 LAB
ALBUMIN SERPL-MCNC: 3.4 G/DL (ref 3.5–5.2)
ALBUMIN SERPL-MCNC: 3.5 G/DL (ref 3.5–5.2)
ALBUMIN/GLOB SERPL: 1.1 G/DL
ALBUMIN/GLOB SERPL: 1.2 G/DL
ALP SERPL-CCNC: 58 U/L (ref 39–117)
ALP SERPL-CCNC: 65 U/L (ref 39–117)
ALT SERPL W P-5'-P-CCNC: 10 U/L (ref 1–41)
ALT SERPL W P-5'-P-CCNC: 11 U/L (ref 1–41)
ANION GAP SERPL CALCULATED.3IONS-SCNC: 5.7 MMOL/L (ref 5–15)
ANION GAP SERPL CALCULATED.3IONS-SCNC: 8.4 MMOL/L (ref 5–15)
ARTERIAL PATENCY WRIST A: POSITIVE
ARTERIAL PATENCY WRIST A: POSITIVE
AST SERPL-CCNC: 30 U/L (ref 1–40)
AST SERPL-CCNC: 31 U/L (ref 1–40)
ATMOSPHERIC PRESS: ABNORMAL MM[HG]
ATMOSPHERIC PRESS: ABNORMAL MM[HG]
BASE EXCESS BLDA CALC-SCNC: 1.7 MMOL/L (ref 0–3)
BASE EXCESS BLDA CALC-SCNC: 4.8 MMOL/L (ref 0–3)
BASOPHILS # BLD AUTO: 0.03 10*3/MM3 (ref 0–0.2)
BASOPHILS # BLD AUTO: 0.03 10*3/MM3 (ref 0–0.2)
BASOPHILS NFR BLD AUTO: 0.3 % (ref 0–1.5)
BASOPHILS NFR BLD AUTO: 0.3 % (ref 0–1.5)
BDY SITE: ABNORMAL
BDY SITE: ABNORMAL
BILIRUB SERPL-MCNC: 1.1 MG/DL (ref 0–1.2)
BILIRUB SERPL-MCNC: 1.2 MG/DL (ref 0–1.2)
BUN SERPL-MCNC: 10 MG/DL (ref 8–23)
BUN SERPL-MCNC: 11 MG/DL (ref 8–23)
BUN/CREAT SERPL: 14.7 (ref 7–25)
BUN/CREAT SERPL: 15.7 (ref 7–25)
CALCIUM SPEC-SCNC: 8.3 MG/DL (ref 8.6–10.5)
CALCIUM SPEC-SCNC: 8.5 MG/DL (ref 8.6–10.5)
CHLORIDE SERPL-SCNC: 102 MMOL/L (ref 98–107)
CHLORIDE SERPL-SCNC: 103 MMOL/L (ref 98–107)
CO2 BLDA-SCNC: 31 MMOL/L (ref 22–29)
CO2 BLDA-SCNC: 32 MMOL/L (ref 22–29)
CO2 SERPL-SCNC: 28.6 MMOL/L (ref 22–29)
CO2 SERPL-SCNC: 29.3 MMOL/L (ref 22–29)
CREAT SERPL-MCNC: 0.68 MG/DL (ref 0.76–1.27)
CREAT SERPL-MCNC: 0.7 MG/DL (ref 0.76–1.27)
D-LACTATE SERPL-SCNC: 2.4 MMOL/L (ref 0.5–2)
DEPRECATED RDW RBC AUTO: 55.2 FL (ref 37–54)
DEPRECATED RDW RBC AUTO: 56.6 FL (ref 37–54)
EGFRCR SERPLBLD CKD-EPI 2021: 97.9 ML/MIN/1.73
EGFRCR SERPLBLD CKD-EPI 2021: 98.8 ML/MIN/1.73
EOSINOPHIL # BLD AUTO: 0.05 10*3/MM3 (ref 0–0.4)
EOSINOPHIL # BLD AUTO: 0.12 10*3/MM3 (ref 0–0.4)
EOSINOPHIL NFR BLD AUTO: 0.5 % (ref 0.3–6.2)
EOSINOPHIL NFR BLD AUTO: 1.1 % (ref 0.3–6.2)
ERYTHROCYTE [DISTWIDTH] IN BLOOD BY AUTOMATED COUNT: 14.6 % (ref 12.3–15.4)
ERYTHROCYTE [DISTWIDTH] IN BLOOD BY AUTOMATED COUNT: 14.7 % (ref 12.3–15.4)
GLOBULIN UR ELPH-MCNC: 2.8 GM/DL
GLOBULIN UR ELPH-MCNC: 3.2 GM/DL
GLUCOSE BLDC GLUCOMTR-MCNC: 204 MG/DL (ref 70–105)
GLUCOSE SERPL-MCNC: 138 MG/DL (ref 65–99)
GLUCOSE SERPL-MCNC: 178 MG/DL (ref 65–99)
HCO3 BLDA-SCNC: 29.1 MMOL/L (ref 21–28)
HCO3 BLDA-SCNC: 30.4 MMOL/L (ref 21–28)
HCT VFR BLD AUTO: 30 % (ref 37.5–51)
HCT VFR BLD AUTO: 32.5 % (ref 37.5–51)
HEMODILUTION: NO
HEMODILUTION: NO
HGB BLD-MCNC: 10.5 G/DL (ref 13–17.7)
HGB BLD-MCNC: 9.6 G/DL (ref 13–17.7)
IMM GRANULOCYTES # BLD AUTO: 0.07 10*3/MM3 (ref 0–0.05)
IMM GRANULOCYTES # BLD AUTO: 0.16 10*3/MM3 (ref 0–0.05)
IMM GRANULOCYTES NFR BLD AUTO: 0.6 % (ref 0–0.5)
IMM GRANULOCYTES NFR BLD AUTO: 1.7 % (ref 0–0.5)
INHALED O2 CONCENTRATION: 37 %
INHALED O2 CONCENTRATION: 50 %
LYMPHOCYTES # BLD AUTO: 0.7 10*3/MM3 (ref 0.7–3.1)
LYMPHOCYTES # BLD AUTO: 0.9 10*3/MM3 (ref 0.7–3.1)
LYMPHOCYTES NFR BLD AUTO: 6.3 % (ref 19.6–45.3)
LYMPHOCYTES NFR BLD AUTO: 9.3 % (ref 19.6–45.3)
MCH RBC QN AUTO: 33.6 PG (ref 26.6–33)
MCH RBC QN AUTO: 34 PG (ref 26.6–33)
MCHC RBC AUTO-ENTMCNC: 32 G/DL (ref 31.5–35.7)
MCHC RBC AUTO-ENTMCNC: 32.3 G/DL (ref 31.5–35.7)
MCV RBC AUTO: 104.9 FL (ref 79–97)
MCV RBC AUTO: 105.2 FL (ref 79–97)
MODALITY: ABNORMAL
MODALITY: ABNORMAL
MONOCYTES # BLD AUTO: 0.73 10*3/MM3 (ref 0.1–0.9)
MONOCYTES # BLD AUTO: 0.84 10*3/MM3 (ref 0.1–0.9)
MONOCYTES NFR BLD AUTO: 7.6 % (ref 5–12)
MONOCYTES NFR BLD AUTO: 7.6 % (ref 5–12)
NEUTROPHILS NFR BLD AUTO: 7.76 10*3/MM3 (ref 1.7–7)
NEUTROPHILS NFR BLD AUTO: 80.6 % (ref 42.7–76)
NEUTROPHILS NFR BLD AUTO: 84.1 % (ref 42.7–76)
NEUTROPHILS NFR BLD AUTO: 9.29 10*3/MM3 (ref 1.7–7)
NRBC BLD AUTO-RTO: 0.2 /100 WBC (ref 0–0.2)
NRBC BLD AUTO-RTO: 0.4 /100 WBC (ref 0–0.2)
PCO2 BLDA: 49.4 MM HG (ref 35–48)
PCO2 BLDA: 61.9 MM HG (ref 35–48)
PEEP RESPIRATORY: 8 CM[H2O]
PH BLDA: 7.28 PH UNITS (ref 7.35–7.45)
PH BLDA: 7.4 PH UNITS (ref 7.35–7.45)
PLATELET # BLD AUTO: 182 10*3/MM3 (ref 140–450)
PLATELET # BLD AUTO: 198 10*3/MM3 (ref 140–450)
PMV BLD AUTO: 9.5 FL (ref 6–12)
PMV BLD AUTO: 9.6 FL (ref 6–12)
PO2 BLD: 113 MM[HG] (ref 0–500)
PO2 BLD: 144 MM[HG] (ref 0–500)
PO2 BLDA: 53.2 MM HG (ref 83–108)
PO2 BLDA: 56.5 MM HG (ref 83–108)
POTASSIUM SERPL-SCNC: 3.6 MMOL/L (ref 3.5–5.2)
POTASSIUM SERPL-SCNC: 3.8 MMOL/L (ref 3.5–5.2)
PROCALCITONIN SERPL-MCNC: 0.22 NG/ML (ref 0–0.25)
PROT SERPL-MCNC: 6.2 G/DL (ref 6–8.5)
PROT SERPL-MCNC: 6.7 G/DL (ref 6–8.5)
PSV: 16 CMH2O
RBC # BLD AUTO: 2.86 10*6/MM3 (ref 4.14–5.8)
RBC # BLD AUTO: 3.09 10*6/MM3 (ref 4.14–5.8)
SAO2 % BLDCOA: 81.7 % (ref 94–98)
SAO2 % BLDCOA: 88.4 % (ref 94–98)
SODIUM SERPL-SCNC: 138 MMOL/L (ref 136–145)
SODIUM SERPL-SCNC: 139 MMOL/L (ref 136–145)
WBC NRBC COR # BLD AUTO: 11.05 10*3/MM3 (ref 3.4–10.8)
WBC NRBC COR # BLD AUTO: 9.63 10*3/MM3 (ref 3.4–10.8)

## 2024-07-23 PROCEDURE — 85025 COMPLETE CBC W/AUTO DIFF WBC: CPT | Performed by: INTERNAL MEDICINE

## 2024-07-23 PROCEDURE — 94799 UNLISTED PULMONARY SVC/PX: CPT

## 2024-07-23 PROCEDURE — 94761 N-INVAS EAR/PLS OXIMETRY MLT: CPT

## 2024-07-23 PROCEDURE — 85025 COMPLETE CBC W/AUTO DIFF WBC: CPT | Performed by: NURSE PRACTITIONER

## 2024-07-23 PROCEDURE — 82948 REAGENT STRIP/BLOOD GLUCOSE: CPT

## 2024-07-23 PROCEDURE — 36600 WITHDRAWAL OF ARTERIAL BLOOD: CPT | Performed by: INTERNAL MEDICINE

## 2024-07-23 PROCEDURE — 71045 X-RAY EXAM CHEST 1 VIEW: CPT

## 2024-07-23 PROCEDURE — 99232 SBSQ HOSP IP/OBS MODERATE 35: CPT | Mod: FS | Performed by: NURSE PRACTITIONER

## 2024-07-23 PROCEDURE — 83605 ASSAY OF LACTIC ACID: CPT | Performed by: INTERNAL MEDICINE

## 2024-07-23 PROCEDURE — 84145 PROCALCITONIN (PCT): CPT | Performed by: INTERNAL MEDICINE

## 2024-07-23 PROCEDURE — 94664 DEMO&/EVAL PT USE INHALER: CPT

## 2024-07-23 PROCEDURE — 25010000002 ONDANSETRON PER 1 MG

## 2024-07-23 PROCEDURE — 94660 CPAP INITIATION&MGMT: CPT

## 2024-07-23 PROCEDURE — 80053 COMPREHEN METABOLIC PANEL: CPT

## 2024-07-23 PROCEDURE — 25010000002 FUROSEMIDE PER 20 MG: Performed by: INTERNAL MEDICINE

## 2024-07-23 PROCEDURE — 25010000002 MORPHINE PER 10 MG

## 2024-07-23 PROCEDURE — 80053 COMPREHEN METABOLIC PANEL: CPT | Performed by: INTERNAL MEDICINE

## 2024-07-23 PROCEDURE — 82803 BLOOD GASES ANY COMBINATION: CPT | Performed by: INTERNAL MEDICINE

## 2024-07-23 RX ORDER — FUROSEMIDE 10 MG/ML
40 INJECTION INTRAMUSCULAR; INTRAVENOUS
Status: DISCONTINUED | OUTPATIENT
Start: 2024-07-23 | End: 2024-07-24

## 2024-07-23 RX ORDER — BISACODYL 10 MG
10 SUPPOSITORY, RECTAL RECTAL ONCE
Status: COMPLETED | OUTPATIENT
Start: 2024-07-23 | End: 2024-07-23

## 2024-07-23 RX ORDER — SORBITOL SOLUTION 70 %
50 SOLUTION, ORAL MISCELLANEOUS ONCE
Status: COMPLETED | OUTPATIENT
Start: 2024-07-23 | End: 2024-07-23

## 2024-07-23 RX ADMIN — APIXABAN 5 MG: 5 TABLET, FILM COATED ORAL at 20:12

## 2024-07-23 RX ADMIN — FUROSEMIDE 40 MG: 10 INJECTION, SOLUTION INTRAMUSCULAR; INTRAVENOUS at 17:19

## 2024-07-23 RX ADMIN — ATORVASTATIN CALCIUM 10 MG: 10 TABLET, FILM COATED ORAL at 20:12

## 2024-07-23 RX ADMIN — SOTALOL HYDROCHLORIDE 80 MG: 80 TABLET ORAL at 07:12

## 2024-07-23 RX ADMIN — MORPHINE SULFATE 2 MG: 2 INJECTION, SOLUTION INTRAMUSCULAR; INTRAVENOUS at 02:01

## 2024-07-23 RX ADMIN — BUDESONIDE 0.5 MG: 0.5 INHALANT RESPIRATORY (INHALATION) at 18:45

## 2024-07-23 RX ADMIN — LUBIPROSTONE 24 MCG: 24 CAPSULE, GELATIN COATED ORAL at 20:12

## 2024-07-23 RX ADMIN — LUBIPROSTONE 24 MCG: 24 CAPSULE, GELATIN COATED ORAL at 07:12

## 2024-07-23 RX ADMIN — APIXABAN 5 MG: 5 TABLET, FILM COATED ORAL at 07:12

## 2024-07-23 RX ADMIN — AMOXICILLIN AND CLAVULANATE POTASSIUM 1 TABLET: 875; 125 TABLET, FILM COATED ORAL at 07:12

## 2024-07-23 RX ADMIN — BUDESONIDE 1 MG: 0.5 INHALANT RESPIRATORY (INHALATION) at 07:30

## 2024-07-23 RX ADMIN — ONDANSETRON 4 MG: 2 INJECTION INTRAMUSCULAR; INTRAVENOUS at 16:32

## 2024-07-23 RX ADMIN — IPRATROPIUM BROMIDE AND ALBUTEROL SULFATE 3 ML: .5; 3 SOLUTION RESPIRATORY (INHALATION) at 18:45

## 2024-07-23 RX ADMIN — MORPHINE SULFATE 1 MG: 2 INJECTION, SOLUTION INTRAMUSCULAR; INTRAVENOUS at 17:04

## 2024-07-23 RX ADMIN — ARFORMOTEROL TARTRATE 15 MCG: 15 SOLUTION RESPIRATORY (INHALATION) at 18:45

## 2024-07-23 RX ADMIN — SORBITOL SOLUTION (BULK) 50 ML: 70 SOLUTION at 16:08

## 2024-07-23 RX ADMIN — AMLODIPINE BESYLATE 2.5 MG: 5 TABLET ORAL at 07:12

## 2024-07-23 RX ADMIN — ARFORMOTEROL TARTRATE 15 MCG: 15 SOLUTION RESPIRATORY (INHALATION) at 07:25

## 2024-07-23 RX ADMIN — BISACODYL 10 MG: 10 SUPPOSITORY RECTAL at 16:07

## 2024-07-23 RX ADMIN — PANTOPRAZOLE SODIUM 40 MG: 40 TABLET, DELAYED RELEASE ORAL at 20:12

## 2024-07-23 RX ADMIN — MONTELUKAST 10 MG: 10 TABLET, FILM COATED ORAL at 20:12

## 2024-07-23 RX ADMIN — IPRATROPIUM BROMIDE AND ALBUTEROL SULFATE 3 ML: .5; 3 SOLUTION RESPIRATORY (INHALATION) at 15:10

## 2024-07-23 RX ADMIN — SOTALOL HYDROCHLORIDE 80 MG: 80 TABLET ORAL at 20:12

## 2024-07-23 RX ADMIN — IPRATROPIUM BROMIDE AND ALBUTEROL SULFATE 3 ML: .5; 3 SOLUTION RESPIRATORY (INHALATION) at 11:10

## 2024-07-23 RX ADMIN — AMOXICILLIN AND CLAVULANATE POTASSIUM 1 TABLET: 875; 125 TABLET, FILM COATED ORAL at 20:12

## 2024-07-23 NOTE — PROGRESS NOTES
LOS: 4 days   Patient Care Team:  Amada Haskins MD as PCP - General  Josr Rodriguez DPM as Consulting Physician (Podiatry)  Rj Dominique MD as Consulting Physician (Cardiology)      Subjective     Interval History:     Subjective: Patient is a 73-year-old male with history of mental disability, A-fib on Eliquis, CHF, CAD s/p CABG, mitral valve replacement with tissue valve, hypertension, and hyperlipidemia who presented on 7/18 with complaints of persistent abdominal pain.  The patient is well-known to our practice and was recently seen during an inpatient admission on 7/12/2024 for similar complaints.  At that time, the patient was given a bowel purge and started on a daily bowel regimen.  He was discharged home, but presents once again with complaints of abdominal pain. History is limited secondary to the patient's mental disability.  Therefore, history was supplemented via chart review.    No previous endoscopy.      ROS:   Unobtainable secondary to mental disability.       Medication Review:     Current Facility-Administered Medications:     amLODIPine (NORVASC) tablet 2.5 mg, 2.5 mg, Oral, Daily, Cheikh-Nae Singh APRN, 2.5 mg at 07/23/24 0712    amoxicillin-clavulanate (AUGMENTIN) 875-125 MG per tablet 1 tablet, 1 tablet, Oral, Q12H, Marichuy Cullen, APRN, 1 tablet at 07/23/24 0712    apixaban (ELIQUIS) tablet 5 mg, 5 mg, Oral, BID, Nae Edwards APRN, 5 mg at 07/23/24 0712    arformoterol (BROVANA) nebulizer solution 15 mcg, 15 mcg, Nebulization, BID - RT, Gilberto Whitney MD, 15 mcg at 07/23/24 0725    atorvastatin (LIPITOR) tablet 10 mg, 10 mg, Oral, Nightly, Nae Edwards APRN, 10 mg at 07/22/24 2024    sennosides-docusate (PERICOLACE) 8.6-50 MG per tablet 2 tablet, 2 tablet, Oral, BID PRN **AND** polyethylene glycol (MIRALAX) packet 17 g, 17 g, Oral, Daily PRN, 17 g at 07/21/24 8948 **AND** bisacodyl (DULCOLAX) EC tablet 5 mg, 5 mg, Oral, Daily PRN, 5 mg at  07/21/24 2347 **AND** bisacodyl (DULCOLAX) suppository 10 mg, 10 mg, Rectal, Daily PRN, Marisela Edouard APRN    budesonide (PULMICORT) nebulizer solution 1 mg, 1 mg, Nebulization, BID - RT, Gilberto Whitney MD, 1 mg at 07/23/24 0730    ipratropium-albuterol (DUO-NEB) nebulizer solution 3 mL, 3 mL, Nebulization, 4x Daily - RT, Aleta Prescott APRN, 3 mL at 07/23/24 1110    lubiprostone (AMITIZA) capsule 24 mcg, 24 mcg, Oral, BID, Nae Edwards APRN, 24 mcg at 07/23/24 0712    montelukast (SINGULAIR) tablet 10 mg, 10 mg, Oral, Nightly, Nae Edwards APRN, 10 mg at 07/22/24 2025    morphine injection 2 mg, 2 mg, Intravenous, Q4H PRN, Marisela Edouard APRN, 2 mg at 07/23/24 0201    ondansetron (ZOFRAN) injection 4 mg, 4 mg, Intravenous, Q6H PRN, Marisela Edouard APRN, 4 mg at 07/21/24 0052    pantoprazole (PROTONIX) EC tablet 40 mg, 40 mg, Oral, Nightly, Ritchie Garcia MD, 40 mg at 07/22/24 2024    sodium chloride 0.9 % infusion, 50 mL/hr, Intravenous, Continuous, Nae Edwards APRN, Last Rate: 50 mL/hr at 07/22/24 1631, 50 mL/hr at 07/22/24 1631    sotalol (BETAPACE) tablet 80 mg, 80 mg, Oral, Q12H, Nae Edwards APRN, 80 mg at 07/23/24 0712      Objective     Vital Signs  Vitals:    07/23/24 1105 07/23/24 1110 07/23/24 1115 07/23/24 1144   BP: 127/63      BP Location: Right arm      Patient Position: Lying      Pulse: 66 65 64 61   Resp: 10 12 12 21   Temp: 98 °F (36.7 °C)      TempSrc: Axillary      SpO2: (!) 89% (!) 86% 90% 96%   Weight:       Height:           Physical Exam:     General Appearance:    Awake and alert, in no acute distress   Head:    Normocephalic, without obvious abnormality   Eyes:          Conjunctivae normal, anicteric sclera   Throat:   No oral lesions, no thrush, oral mucosa moist   Neck:   No adenopathy, supple, no JVD   Lungs:     respirations regular, even and unlabored   Abdomen:     Soft, generalized tenderness, no rebound or guarding, non-distended  "  Rectal:     Deferred   Extremities:   No edema, no cyanosis   Skin:   No bruising or rash, no jaundice        Results Review:    CBC    Results from last 7 days   Lab Units 07/23/24  0338 07/22/24  0508 07/21/24  0515 07/20/24  0543 07/18/24  2338   WBC 10*3/mm3 11.05* 10.09 11.82* 12.66* 14.14*   HEMOGLOBIN g/dL 9.6* 9.2* 9.1* 10.3* 13.4   PLATELETS 10*3/mm3 182 168 151 166 226     CMP   Results from last 7 days   Lab Units 07/23/24  0338 07/22/24  0508 07/21/24  0515 07/20/24  0543 07/18/24  2338   SODIUM mmol/L 138 140 135* 135* 128*   POTASSIUM mmol/L 3.8 3.5 3.5 3.3* 5.4*   CHLORIDE mmol/L 103 104 100 97* 89*   CO2 mmol/L 29.3* 29.4* 29.0 27.6 23.8   BUN mg/dL 10 11 12 18 15   CREATININE mg/dL 0.68* 0.87 0.79 0.82 1.00   GLUCOSE mg/dL 138* 102* 119* 68 149*   ALBUMIN g/dL 3.4* 3.0* 3.1* 3.3* 4.2   BILIRUBIN mg/dL 1.1 1.4* 1.6* 1.5* 2.1*   ALK PHOS U/L 58 50 51 58 83   AST (SGOT) U/L 31 26 26 35 44*   ALT (SGPT) U/L 10 9 11 12 11   LIPASE U/L  --   --   --   --  35     Cr Clearance Estimated Creatinine Clearance: 65.3 mL/min (A) (by C-G formula based on SCr of 0.68 mg/dL (L)).  Coag     HbA1C No results found for: \"HGBA1C\"  Results from last 7 days   Lab Units 07/19/24  0330 07/18/24  2338   HSTROP T ng/L 23* 20     Infection   Results from last 7 days   Lab Units 07/23/24  0338 07/19/24  0053 07/19/24  0031   BLOODCX   --  No growth at 4 days  No growth at 4 days  --    URINECX   --   --  No growth   PROCALCITONIN ng/mL 0.22  --   --      UA    Results from last 7 days   Lab Units 07/19/24  0031   NITRITE UA  Negative   WBC UA /HPF 11-20*   BACTERIA UA /HPF Trace*   SQUAM EPITHEL UA /HPF 0-2   URINECX  No growth     Microbiology Results (last 10 days)       Procedure Component Value - Date/Time    MRSA Screen, PCR (Inpatient) - Swab, Nares [[id number]]  (Normal) Collected: 07/19/24 0520    Lab Status: Final result Specimen: Swab from Nares Updated: 07/19/24 0634     MRSA PCR No MRSA Detected    Narrative:   "    The negative predictive value of this diagnostic test is high and should only be used to consider de-escalating anti-MRSA therapy. A positive result may indicate colonization with MRSA and must be correlated clinically.    Blood Culture - Blood, Arm, Right [[id number]]  (Normal) Collected: 07/19/24 0053    Lab Status: Preliminary result Specimen: Blood from Arm, Right Updated: 07/23/24 0100     Blood Culture No growth at 4 days    Narrative:      Less than seven (7) mL's of blood was collected.  Insufficient quantity may yield false negative results.    Blood Culture - Blood, Arm, Left [[id number]]  (Normal) Collected: 07/19/24 0053    Lab Status: Preliminary result Specimen: Blood from Arm, Left Updated: 07/23/24 0100     Blood Culture No growth at 4 days    Narrative:      Less than seven (7) mL's of blood was collected.  Insufficient quantity may yield false negative results.    Urine Culture - Urine, Straight Cath [[id number]]  (Normal) Collected: 07/19/24 0031    Lab Status: Final result Specimen: Urine from Straight Cath Updated: 07/20/24 1127     Urine Culture No growth    Respiratory Panel PCR w/COVID-19(SARS-CoV-2) [facility] In-House, NP Swab in UTM/VTM, 2 HR TAT - Swab, Nasopharynx [[id number]]  (Normal) Collected: 07/18/24 2346    Lab Status: Final result Specimen: Swab from Nasopharynx Updated: 07/19/24 0100     ADENOVIRUS, PCR Not Detected     Coronavirus 229E Not Detected     Coronavirus HKU1 Not Detected     Coronavirus NL63 Not Detected     Coronavirus OC43 Not Detected     COVID19 Not Detected     Human Metapneumovirus Not Detected     Human Rhinovirus/Enterovirus Not Detected     Influenza A PCR Not Detected     Influenza B PCR Not Detected     Parainfluenza Virus 1 Not Detected     Parainfluenza Virus 2 Not Detected     Parainfluenza Virus 3 Not Detected     Parainfluenza Virus 4 Not Detected     RSV, PCR Not Detected     Bordetella pertussis pcr Not Detected     Bordetella  parapertussis PCR Not Detected     Chlamydophila pneumoniae PCR Not Detected     Mycoplasma pneumo by PCR Not Detected    Narrative:      In the setting of a positive respiratory panel with a viral infection PLUS a negative procalcitonin without other underlying concern for bacterial infection, consider observing off antibiotics or discontinuation of antibiotics and continue supportive care. If the respiratory panel is positive for atypical bacterial infection (Bordetella pertussis, Chlamydophila pneumoniae, or Mycoplasma pneumoniae), consider antibiotic de-escalation to target atypical bacterial infection.    Legionella Antigen, Urine - Urine, Urine, Clean Catch [[id number]]  (Normal) Collected: 07/14/24 1708    Lab Status: Final result Specimen: Urine, Clean Catch Updated: 07/14/24 1733     LEGIONELLA ANTIGEN, URINE Negative    S. Pneumo Ag Urine or CSF - Urine, Urine, Clean Catch [[id number]]  (Normal) Collected: 07/14/24 1708    Lab Status: Final result Specimen: Urine, Clean Catch Updated: 07/14/24 1733     Strep Pneumo Ag Negative          Imaging Results (Last 72 Hours)       ** No results found for the last 72 hours. **            Assessment & Plan     ASSESSMENT:  -Abdominal pain  -Constipation  -Mild macrocytic anemia  -Bilateral pneumonia  -A-fib on Eliquis  -CAD s/p CABG  -CHF  -Mitral valve replacement -tissue valve  -Hypertension  -Hyperlipidemia    PLAN:  Patient is a 73-year-old male with history of mental disability, A-fib on Eliquis, CAD s/p CABG, and tissue mitral valve replacement who presented on 7/18 with complaints of abdominal pain.  Patient recently seen during inpatient admission on 7/12 for similar complaints at which time bowel purge was performed with improvement in symptoms.    CT abdomen/pelvis W on admission shows no significant interval change since previous exam.  We will once again plan bowel purge.  Recommend Amitiza twice daily for bowel regimen.  This will need to be continued on  discharge.  Diet as tolerated.  Antiemetics/analgesics as needed.  Supportive care.        Electronically signed by IZAIAH Peterson, 07/23/24, 1:39 PM EDT.

## 2024-07-23 NOTE — SIGNIFICANT NOTE
07/23/24 1356   OTHER   Discipline physical therapy assistant   Rehab Time/Intention   Session Not Performed other (see comments)  (Pt placed on BiPap. Not appropriate for PT at this time. Will follow up tomorrow as appropriate.)   Therapy Assessment/Plan (PT)   Criteria for Skilled Interventions Met (PT) yes   Recommendation   PT - Next Appointment 07/24/24

## 2024-07-23 NOTE — PROGRESS NOTES
"PULMONARY CRITICAL CARE PROGRESS  NOTE      PATIENT IDENTIFICATION:  Name: Drake Beth  MRN: [id number]  :  1950     Age: 73 y.o.  Sex: male    DATE OF Note:  2024   Referring Physician: Tyrone Raymundo MD                  Subjective:   In bed, no SOB, no chest or abd pain, no bowel or bladder issues       Objective:  tMax 24 hrs: Temp (24hrs), Av.9 °F (36.6 °C), Min:97.5 °F (36.4 °C), Max:98.2 °F (36.8 °C)      Vitals Ranges:   Temp:  [97.5 °F (36.4 °C)-98.2 °F (36.8 °C)] 97.8 °F (36.6 °C)  Heart Rate:  [61-69] 61  Resp:  [10-21] 21  BP: (119-154)/(63-82) 127/63    Intake and Output Last 3 Shifts:   No intake/output data recorded.    Exam:  /63 (BP Location: Right arm, Patient Position: Lying)   Pulse 61   Temp 97.8 °F (36.6 °C) (Oral)   Resp 21   Ht 152.4 cm (60\")   Wt 47 kg (103 lb 9.9 oz)   SpO2 96%   BMI 20.24 kg/m²     General Appearance:     HEENT:  Normocephalic, without obvious abnormality. Conjunctivae/corneas clear.  Normal external ear canals. Nares normal, no drainage     Neck:  Supple, symmetrical, trachea midline. No JVD.  Lungs /Chest wall:   Bilateral basal rhonchi, respirations unlabored, symmetrical wall movement.     Heart:  Regular rate and rhythm, systolic murmur PMI left sternal border  Abdomen: Soft, nontender, no masses, no organomegaly.    Extremities: Trace edema, no clubbing or cyanosis        Medications:  amLODIPine, 2.5 mg, Oral, Daily  amoxicillin-clavulanate, 1 tablet, Oral, Q12H  apixaban, 5 mg, Oral, BID  arformoterol, 15 mcg, Nebulization, BID - RT  atorvastatin, 10 mg, Oral, Nightly  budesonide, 1 mg, Nebulization, BID - RT  ipratropium-albuterol, 3 mL, Nebulization, 4x Daily - RT  lubiprostone, 24 mcg, Oral, BID  montelukast, 10 mg, Oral, Nightly  pantoprazole, 40 mg, Oral, Nightly  sotalol, 80 mg, Oral, Q12H        Infusion:  sodium chloride, 50 mL/hr, Last Rate: 50 mL/hr (24 2889)         PRN:    senna-docusate sodium **AND** " polyethylene glycol **AND** bisacodyl **AND** bisacodyl    Morphine    ondansetron  Data Review:  All labs (24hrs):   Recent Results (from the past 24 hour(s))   Comprehensive Metabolic Panel    Collection Time: 07/23/24  3:38 AM    Specimen: Blood   Result Value Ref Range    Glucose 138 (H) 65 - 99 mg/dL    BUN 10 8 - 23 mg/dL    Creatinine 0.68 (L) 0.76 - 1.27 mg/dL    Sodium 138 136 - 145 mmol/L    Potassium 3.8 3.5 - 5.2 mmol/L    Chloride 103 98 - 107 mmol/L    CO2 29.3 (H) 22.0 - 29.0 mmol/L    Calcium 8.3 (L) 8.6 - 10.5 mg/dL    Total Protein 6.2 6.0 - 8.5 g/dL    Albumin 3.4 (L) 3.5 - 5.2 g/dL    ALT (SGPT) 10 1 - 41 U/L    AST (SGOT) 31 1 - 40 U/L    Alkaline Phosphatase 58 39 - 117 U/L    Total Bilirubin 1.1 0.0 - 1.2 mg/dL    Globulin 2.8 gm/dL    A/G Ratio 1.2 g/dL    BUN/Creatinine Ratio 14.7 7.0 - 25.0    Anion Gap 5.7 5.0 - 15.0 mmol/L    eGFR 98.8 >60.0 mL/min/1.73   Procalcitonin    Collection Time: 07/23/24  3:38 AM    Specimen: Blood   Result Value Ref Range    Procalcitonin 0.22 0.00 - 0.25 ng/mL   CBC Auto Differential    Collection Time: 07/23/24  3:38 AM    Specimen: Blood   Result Value Ref Range    WBC 11.05 (H) 3.40 - 10.80 10*3/mm3    RBC 2.86 (L) 4.14 - 5.80 10*6/mm3    Hemoglobin 9.6 (L) 13.0 - 17.7 g/dL    Hematocrit 30.0 (L) 37.5 - 51.0 %    .9 (H) 79.0 - 97.0 fL    MCH 33.6 (H) 26.6 - 33.0 pg    MCHC 32.0 31.5 - 35.7 g/dL    RDW 14.6 12.3 - 15.4 %    RDW-SD 55.2 (H) 37.0 - 54.0 fl    MPV 9.5 6.0 - 12.0 fL    Platelets 182 140 - 450 10*3/mm3    Neutrophil % 84.1 (H) 42.7 - 76.0 %    Lymphocyte % 6.3 (L) 19.6 - 45.3 %    Monocyte % 7.6 5.0 - 12.0 %    Eosinophil % 1.1 0.3 - 6.2 %    Basophil % 0.3 0.0 - 1.5 %    Immature Grans % 0.6 (H) 0.0 - 0.5 %    Neutrophils, Absolute 9.29 (H) 1.70 - 7.00 10*3/mm3    Lymphocytes, Absolute 0.70 0.70 - 3.10 10*3/mm3    Monocytes, Absolute 0.84 0.10 - 0.90 10*3/mm3    Eosinophils, Absolute 0.12 0.00 - 0.40 10*3/mm3    Basophils, Absolute 0.03  0.00 - 0.20 10*3/mm3    Immature Grans, Absolute 0.07 (H) 0.00 - 0.05 10*3/mm3    nRBC 0.4 (H) 0.0 - 0.2 /100 WBC   Blood Gas, Arterial -    Collection Time: 07/23/24 11:07 AM    Specimen: Arterial Blood   Result Value Ref Range    Site Right Brachial     Ankur's Test Positive     pH, Arterial 7.281 (L) 7.350 - 7.450 pH units    pCO2, Arterial 61.9 (H) 35.0 - 48.0 mm Hg    pO2, Arterial 53.2 (L) 83.0 - 108.0 mm Hg    HCO3, Arterial 29.1 (H) 21.0 - 28.0 mmol/L    Base Excess, Arterial 1.7 0.0 - 3.0 mmol/L    O2 Saturation, Arterial 81.7 (L) 94.0 - 98.0 %    CO2 Content 31.0 (H) 22 - 29 mmol/L    Barometric Pressure for Blood Gas      Modality Cannula     FIO2 37 %    Hemodilution No     PO2/FIO2 144 0 - 500        Imaging:  Walking Oximetry  Lea Carney, [facility]     7/22/2024  1:19 PM  Exercise Oximetry    Patient Name:Drake Beth   MRN: [id number]   Date: 07/22/24             ROOM AIR BASELINE   SpO2% 85   Heart Rate    Blood Pressure      EXERCISE ON ROOM AIR SpO2% EXERCISE ON O2 @  LPM SpO2%   1 MINUTE 85 1 MINUTE  1LNC 90   2 MINUTES 91 2 MINUTES    3 MINUTES 87 3 MINUTES  2LNC 90   4 MINUTES 90 4 MINUTES    5 MINUTES 87 5 MINUTES  3LNC 91   6 MINUTES 93 6 MINUTES               Distance Walked   Distance Walked   Dyspnea (Nishant Scale)   Dyspnea (Nishant Scale)   Fatigue (Nishant Scale)   Fatigue (Nishant Scale)   SpO2% Post Exercise   SpO2% Post Exercise   HR Post Exercise   HR Post Exercise   Time to Recovery   Time to Recovery     Comments: PT REFUSED TO GET UP AND WALK .  ME AND RN BOTH HAD HIM   EXERCISE IN THE CHAIR.  PT REQUIRED 3LNC TO KEEP SATS ABOVE 88%.       ASSESSMENT:  Acute hypoxic/hypercapnic resp failure  Multifocal pneumonia  Sepsis  CAD  HTN  Diastolic CHF  UTI  Hyponatremia  A-fib s/p pacemaker      PLAN:  Encourage OOB during the day   PT/OT  Antibiotics  Bronchodilators  Inhaled corticosteroids  Incentive spirometer  Electrolytes/ glycemic control  DVT prophylaxis-Apixaban     Discussed with   Devonte Prescott, APRN   7/23/2024  12:35 EDT      I personally have examined  and interviewed the patient. I have reviewed the history, data, problems, assessment and plan with our NP.  Total Critical care time in direct medical management (   ) minutes, This time specifically excludes time spent performing procedures.    Gilberto hWitney MD   7/23/2024  23:57 EDT

## 2024-07-23 NOTE — NURSING NOTE
Rapid response called due to patient decline in respiratory status and decreased mental status. CXR, LABS done. Pt placed back on bipap with sitter at bedside. Notified hospitalist of rapid response and elevated lactate. MD does not want to change any medications to IV or order any follow up labs at this time.

## 2024-07-23 NOTE — CASE MANAGEMENT/SOCIAL WORK
Continued Stay Note  ILZA Martinez     Patient Name: Drake Beth  MRN: [id number]  Today's Date: 7/23/2024    Admit Date: 7/18/2024    Plan: Return home with brother.  New 3L O2 set up with [facility].   Discharge Plan       Row Name 07/23/24 1633       Plan    Plan Comments Barriers to discharge: Continous Bipap.   IV diuretics. GI consult. Pulm consult               Expected Discharge Date and Time       Expected Discharge Date Expected Discharge Time    Jul 25, 2024           Kaitlin Freedman RN    Office Phone [phone]  Office Cell [phone]

## 2024-07-23 NOTE — PROGRESS NOTES
[facility] MEDICINE SERVICE  DAILY PROGRESS NOTE    NAME: Drake Beth  : 1950  MRN: [id number]      LOS: 4 days     PROVIDER OF SERVICE: Ritchie Garcia MD    Chief Complaint: Sepsis    Subjective:     Interval History: Patient was somewhat more lethargic this morning and hypoxic.  He was complaining of some right-sided abdominal pain.    Review of Systems: Unable to accurately assess due to mental condition  Review of Systems    Objective:     Vital Signs  Temp:  [97.5 °F (36.4 °C)-98.2 °F (36.8 °C)] 98 °F (36.7 °C)  Heart Rate:  [61-69] 61  Resp:  [10-21] 21  BP: (119-154)/(63-82) 127/63  Flow (L/min):  [2-4] 4   Body mass index is 20.24 kg/m².    Physical Exam Constitutional:       Appearance: Normal appearance.   HENT:      Head: Normocephalic and atraumatic.      Right Ear: External ear normal.      Left Ear: External ear normal.      Nose: Nose normal.      Mouth/Throat:      Mouth: Mucous membranes are moist.   Eyes:      Extraocular Movements: Extraocular movements intact.   Cardiovascular:      Rate and Rhythm: Normal rate and regular rhythm.      Pulses: Normal pulses.      Heart sounds: Normal heart sounds.   Pulmonary:      Breath sounds: wnl     Comments: On 3l nc  Abdominal:      Palpations: Abdomen is soft.   Genitourinary:     Penis: Normal.    Musculoskeletal:         General: Normal range of motion.      Cervical back: Normal range of motion and neck supple.   Skin:     General: Skin is warm and dry.   Neurological:      Mental Status: He is alert. Mental status is at baseline.   Psychiatric:      Comments: Intellectual disability, cooperative, follows simple   Physical Exam  Physical Exam    Scheduled Meds   amLODIPine, 2.5 mg, Oral, Daily  amoxicillin-clavulanate, 1 tablet, Oral, Q12H  apixaban, 5 mg, Oral, BID  arformoterol, 15 mcg, Nebulization, BID - RT  atorvastatin, 10 mg, Oral, Nightly  bisacodyl, 10 mg, Rectal, Once  budesonide, 1 mg, Nebulization, BID -  RT  ipratropium-albuterol, 3 mL, Nebulization, 4x Daily - RT  lubiprostone, 24 mcg, Oral, BID  montelukast, 10 mg, Oral, Nightly  pantoprazole, 40 mg, Oral, Nightly  sorbitol, 50 mL, Oral, Once  sotalol, 80 mg, Oral, Q12H       PRN Meds     senna-docusate sodium **AND** polyethylene glycol **AND** bisacodyl **AND** bisacodyl    Morphine    ondansetron   Infusions  sodium chloride, 50 mL/hr, Last Rate: 50 mL/hr (07/22/24 1631)          Diagnostic Data    Results from last 7 days   Lab Units 07/23/24  0338   WBC 10*3/mm3 11.05*   HEMOGLOBIN g/dL 9.6*   HEMATOCRIT % 30.0*   PLATELETS 10*3/mm3 182   GLUCOSE mg/dL 138*   CREATININE mg/dL 0.68*   BUN mg/dL 10   SODIUM mmol/L 138   POTASSIUM mmol/L 3.8   AST (SGOT) U/L 31   ALT (SGPT) U/L 10   ALK PHOS U/L 58   BILIRUBIN mg/dL 1.1   ANION GAP mmol/L 5.7       No radiology results for the last day      I reviewed the patient's new clinical results.    Assessment/Plan:     Active and Resolved Problems  Active Hospital Problems    Diagnosis  POA    **Sepsis [A41.9]  Yes    Acute on chronic diastolic heart failure [I50.33]  Yes    Paroxysmal atrial fibrillation [I48.0]  Yes    Acute respiratory failure with hypoxia and hypercapnia [J96.01, J96.02]  Yes    Hyperkalemia [E87.5]  Yes    Hyponatremia [E87.1]  Yes    Hyperbilirubinemia [E80.6]  Yes    Acute UTI (urinary tract infection) [N39.0]  Yes    Presence of cardiac pacemaker [Z95.0]  Yes    Pneumonia [J18.9]  Yes    Mitral valve replaced [Z95.2]  Not Applicable    Mentally challenged [F79]  Yes    Hypertension [I10]  Yes    Hyperlipidemia [E78.5]  Yes    Coronary artery disease [I25.10]  Yes      Resolved Hospital Problems   No resolved problems to display.       Severe Sepsis without septic shock due to pneumonia  -CT of the chest and abdomen on admission showed evidence of pneumonia, concerning for aspiration pneumonia  -Initiated on IV antibiotics with Zosyn but now switched to oral antibiotics to complete course of  treatment  -Wean O2 as tolerated  -SLP evaluation recommending mechanical soft diet to reduce risk of aspiration     Acute respiratory failure with hypoxia and hypercapnia, likely secondary to pneumonia  -Was clinically improving but now more hypoxic today  -Repeat chest x-ray  -Continue antibiotics as above  -Wean O2 as tolerated  -Trial of BiPAP today due to hypercapnia and lethargy     chronic diastolic CHF without acute on chronic decompensated heart failure  -Appears euvolemic  -Continue GDMT     Acute UTI  -Continue antibiotics as above     Paroxysmal atrial fibrillation  -EKG shows sinus rhythm, on home sotalol and Eliquis reordered continuous cardiac monitoring    Abdominal pain with constipation  -Per patient's family, patient has been having issues with constipation for several weeks  -Has been on laxatives without any improvement  -CT of the abdomen did not show any significant improvement in his stool burden  -Plan for bowel purge per GI  -Initiated Amitiza which he will need to continue discharge  -Patient's pain seems more consistent with PUD  -Initiated PPI  -GI consult appreciated     Hyperbilirubinemia  -Likely secondary to sepsis  -Bilirubin improved     Presence of a cardiac pacemaker  -Noted infectious disease consulted     Mitral valve replacement noted     Mentally challenged  -Noted cooperative, follows simple commands     Hypertension   -Reordered Norvasc and home sotalol     Hyperlipidemia  -On statin     Coronary artery disease status post CABG,   troponin not elevated EKG no ischemic changes, continuous cardiac monitoring    VTE Prophylaxis:  Pharmacologic VTE prophylaxis orders are present.         Code status is   Code Status and Medical Interventions:   Ordered at: 07/19/24 0354     Code Status (Patient has no pulse and is not breathing):    CPR (Attempt to Resuscitate)     Medical Interventions (Patient has pulse or is breathing):    Full Support            Time: 30  minutes    Signature: Electronically signed by Ritchie Garcia MD, 07/23/24, 14:16 EDT.  [facility] Hospitalist Team

## 2024-07-23 NOTE — PLAN OF CARE
Goal Outcome Evaluation:      Slept at intervals. O2 at 3L in use. IV fluids infusing. PRN IV med given for abd pain. GI consulted to see today. Will continue to monitor.

## 2024-07-23 NOTE — PROGRESS NOTES
Infectious Diseases Progress Note      LOS: 4 days   Patient Care Team:  Amada Haskins MD as PCP - General  Josr Rodriguez DPM as Consulting Physician (Podiatry)  Rj Dominique MD as Consulting Physician (Cardiology)    Chief Complaint: Unable to complete    Subjective     Patient had no fever during the last 24 hours.  He remained hemodynamically stable.  Patient now on BiPAP.  Apparently had some abdominal pain that started last evening along with worsening shortness of breath and lethargy.  Patient has a sitter and his brother at bedside    Review of Systems:   Review of Systems   Unable to perform ROS: Mental status change        Objective     Vital Signs  Temp:  [96.5 °F (35.8 °C)-98.2 °F (36.8 °C)] 96.5 °F (35.8 °C)  Heart Rate:  [57-69] 61  Resp:  [10-26] 26  BP: (119-154)/(62-82) 125/65    Physical Exam:  Physical Exam  Vitals and nursing note reviewed.   Constitutional:       Appearance: He is ill-appearing.   HENT:      Head: Normocephalic and atraumatic.      Mouth/Throat:      Mouth: Mucous membranes are moist.   Eyes:      Pupils: Pupils are equal, round, and reactive to light.   Cardiovascular:      Rate and Rhythm: Normal rate and regular rhythm.   Pulmonary:      Effort: Respiratory distress present.      Comments: Crackles  Abdominal:      General: Abdomen is flat. Bowel sounds are normal.      Palpations: Abdomen is soft.   Musculoskeletal:      Cervical back: Neck supple.      Right lower leg: No edema.      Left lower leg: No edema.   Neurological:      Mental Status: He is alert. He is disoriented.      Comments: Patient is alert and awake          Results Review:    I have reviewed all clinical data, test, lab, and imaging results.     Radiology  XR Chest 1 View    Result Date: 7/23/2024  XR CHEST 1 VW Date of Exam: 7/23/2024 2:35 PM EDT Indication: hypoxia Comparison: 7/18/2024 Findings: There has been worsening with new and increasing infiltrates of the bilateral upper and lower  lobes. There is moderate cardiomegaly. There is a small right pleural effusion. There may be a minimal left pleural effusion. There are sternal suture wires.     Impression: Findings are most compatible with CHF with moderately severe pulmonary edema. Small right effusion. Electronically Signed: Mallorie Monge MD  7/23/2024 2:55 PM EDT  Workstation ID: [id number]     Cardiology    Laboratory    Results from last 7 days   Lab Units 07/23/24  1703 07/23/24  0338 07/22/24  0508 07/21/24  0515 07/20/24  0543 07/18/24  2338   WBC 10*3/mm3 9.63 11.05* 10.09 11.82* 12.66* 14.14*   HEMOGLOBIN g/dL 10.5* 9.6* 9.2* 9.1* 10.3* 13.4   HEMATOCRIT % 32.5* 30.0* 28.1* 27.5* 31.1* 39.7   PLATELETS 10*3/mm3 198 182 168 151 166 226     Results from last 7 days   Lab Units 07/23/24  1703 07/23/24  0338 07/22/24  0508 07/21/24  0515 07/20/24  0543 07/18/24  2338   SODIUM mmol/L 139 138 140 135* 135* 128*   POTASSIUM mmol/L 3.6 3.8 3.5 3.5 3.3* 5.4*   CHLORIDE mmol/L 102 103 104 100 97* 89*   CO2 mmol/L 28.6 29.3* 29.4* 29.0 27.6 23.8   BUN mg/dL 11 10 11 12 18 15   CREATININE mg/dL 0.70* 0.68* 0.87 0.79 0.82 1.00   GLUCOSE mg/dL 178* 138* 102* 119* 68 149*   ALBUMIN g/dL 3.5 3.4* 3.0* 3.1* 3.3* 4.2   BILIRUBIN mg/dL 1.2 1.1 1.4* 1.6* 1.5* 2.1*   ALK PHOS U/L 65 58 50 51 58 83   AST (SGOT) U/L 30 31 26 26 35 44*   ALT (SGPT) U/L 11 10 9 11 12 11   LIPASE U/L  --   --   --   --   --  35   CALCIUM mg/dL 8.5* 8.3* 8.0* 7.9* 8.1* 9.3                 Microbiology   Microbiology Results (last 10 days)       Procedure Component Value - Date/Time    MRSA Screen, PCR (Inpatient) - Swab, Nares [[id number]]  (Normal) Collected: 07/19/24 0520    Lab Status: Final result Specimen: Swab from Nares Updated: 07/19/24 0634     MRSA PCR No MRSA Detected    Narrative:      The negative predictive value of this diagnostic test is high and should only be used to consider de-escalating anti-MRSA therapy. A positive result may indicate colonization with MRSA  and must be correlated clinically.    Blood Culture - Blood, Arm, Right [[id number]]  (Normal) Collected: 07/19/24 0053    Lab Status: Preliminary result Specimen: Blood from Arm, Right Updated: 07/23/24 0100     Blood Culture No growth at 4 days    Narrative:      Less than seven (7) mL's of blood was collected.  Insufficient quantity may yield false negative results.    Blood Culture - Blood, Arm, Left [[id number]]  (Normal) Collected: 07/19/24 0053    Lab Status: Preliminary result Specimen: Blood from Arm, Left Updated: 07/23/24 0100     Blood Culture No growth at 4 days    Narrative:      Less than seven (7) mL's of blood was collected.  Insufficient quantity may yield false negative results.    Urine Culture - Urine, Straight Cath [[id number]]  (Normal) Collected: 07/19/24 0031    Lab Status: Final result Specimen: Urine from Straight Cath Updated: 07/20/24 1127     Urine Culture No growth    Respiratory Panel PCR w/COVID-19(SARS-CoV-2) [facility] In-House, NP Swab in UTM/VTM, 2 HR TAT - Swab, Nasopharynx [[id number]]  (Normal) Collected: 07/18/24 2346    Lab Status: Final result Specimen: Swab from Nasopharynx Updated: 07/19/24 0100     ADENOVIRUS, PCR Not Detected     Coronavirus 229E Not Detected     Coronavirus HKU1 Not Detected     Coronavirus NL63 Not Detected     Coronavirus OC43 Not Detected     COVID19 Not Detected     Human Metapneumovirus Not Detected     Human Rhinovirus/Enterovirus Not Detected     Influenza A PCR Not Detected     Influenza B PCR Not Detected     Parainfluenza Virus 1 Not Detected     Parainfluenza Virus 2 Not Detected     Parainfluenza Virus 3 Not Detected     Parainfluenza Virus 4 Not Detected     RSV, PCR Not Detected     Bordetella pertussis pcr Not Detected     Bordetella parapertussis PCR Not Detected     Chlamydophila pneumoniae PCR Not Detected     Mycoplasma pneumo by PCR Not Detected    Narrative:      In the setting of a positive respiratory panel with a viral  infection PLUS a negative procalcitonin without other underlying concern for bacterial infection, consider observing off antibiotics or discontinuation of antibiotics and continue supportive care. If the respiratory panel is positive for atypical bacterial infection (Bordetella pertussis, Chlamydophila pneumoniae, or Mycoplasma pneumoniae), consider antibiotic de-escalation to target atypical bacterial infection.    Legionella Antigen, Urine - Urine, Urine, Clean Catch [[id number]]  (Normal) Collected: 07/14/24 1708    Lab Status: Final result Specimen: Urine, Clean Catch Updated: 07/14/24 1733     LEGIONELLA ANTIGEN, URINE Negative    S. Pneumo Ag Urine or CSF - Urine, Urine, Clean Catch [[id number]]  (Normal) Collected: 07/14/24 1708    Lab Status: Final result Specimen: Urine, Clean Catch Updated: 07/14/24 1733     Strep Pneumo Ag Negative            Medication Review:       Schedule Meds  amLODIPine, 2.5 mg, Oral, Daily  amoxicillin-clavulanate, 1 tablet, Oral, Q12H  apixaban, 5 mg, Oral, BID  arformoterol, 15 mcg, Nebulization, BID - RT  atorvastatin, 10 mg, Oral, Nightly  budesonide, 1 mg, Nebulization, BID - RT  furosemide, 40 mg, Intravenous, BID  ipratropium-albuterol, 3 mL, Nebulization, 4x Daily - RT  lubiprostone, 24 mcg, Oral, BID  montelukast, 10 mg, Oral, Nightly  pantoprazole, 40 mg, Oral, Nightly  sotalol, 80 mg, Oral, Q12H        Infusion Meds  sodium chloride, 50 mL/hr, Last Rate: 50 mL/hr (07/22/24 1631)        PRN Meds    senna-docusate sodium **AND** polyethylene glycol **AND** bisacodyl **AND** bisacodyl    Morphine    ondansetron        Assessment & Plan       Antimicrobial Therapy   1.  IV Zosyn        2.  P.o. Augmentin        3.        4.        5.              Assessment     Possible aspiration pneumonia versus hospital-acquired pneumonia. .  MRSA screen was negative.  Speech therapy saw the patient and cleared him for diet.  Worsening hypoxia today with patient currently on BiPAP.  Imaging  study shows worsening pulmonary edema with ABG showing some hypercapnia     Developmental and intellectual delay.  Patient lives with his brothers     Recent hospital admission for possible pneumonia     S/p mitral valve replacement in the past     Plan     Continue p.o. Augmentin 875/125 mg twice daily for 5 days for 7 days of treatment  Continue supportive care  A.m. labs  Case discussed with patient and brother at bedside          Marichuy Cullen, APRN  07/23/24  18:45 EDT    Note is dictated utilizing voice recognition software/Dragon

## 2024-07-24 ENCOUNTER — INPATIENT HOSPITAL (OUTPATIENT)
Dept: URBAN - METROPOLITAN AREA HOSPITAL 84 | Facility: HOSPITAL | Age: 74
End: 2024-07-24
Payer: MEDICARE

## 2024-07-24 DIAGNOSIS — R10.9 UNSPECIFIED ABDOMINAL PAIN: ICD-10-CM

## 2024-07-24 DIAGNOSIS — K59.00 CONSTIPATION, UNSPECIFIED: ICD-10-CM

## 2024-07-24 DIAGNOSIS — D53.9 NUTRITIONAL ANEMIA, UNSPECIFIED: ICD-10-CM

## 2024-07-24 LAB
ALBUMIN SERPL-MCNC: 3 G/DL (ref 3.5–5.2)
ALBUMIN/GLOB SERPL: 1 G/DL
ALP SERPL-CCNC: 54 U/L (ref 39–117)
ALT SERPL W P-5'-P-CCNC: 8 U/L (ref 1–41)
ANION GAP SERPL CALCULATED.3IONS-SCNC: 9.8 MMOL/L (ref 5–15)
AST SERPL-CCNC: 26 U/L (ref 1–40)
BACTERIA SPEC AEROBE CULT: NORMAL
BACTERIA SPEC AEROBE CULT: NORMAL
BASOPHILS # BLD AUTO: 0.02 10*3/MM3 (ref 0–0.2)
BASOPHILS NFR BLD AUTO: 0.2 % (ref 0–1.5)
BILIRUB SERPL-MCNC: 1.3 MG/DL (ref 0–1.2)
BUN SERPL-MCNC: 10 MG/DL (ref 8–23)
BUN/CREAT SERPL: 13.2 (ref 7–25)
CALCIUM SPEC-SCNC: 8.2 MG/DL (ref 8.6–10.5)
CHLORIDE SERPL-SCNC: 103 MMOL/L (ref 98–107)
CO2 SERPL-SCNC: 30.2 MMOL/L (ref 22–29)
CREAT SERPL-MCNC: 0.76 MG/DL (ref 0.76–1.27)
DEPRECATED RDW RBC AUTO: 55.1 FL (ref 37–54)
EGFRCR SERPLBLD CKD-EPI 2021: 95.5 ML/MIN/1.73
EOSINOPHIL # BLD AUTO: 0.05 10*3/MM3 (ref 0–0.4)
EOSINOPHIL NFR BLD AUTO: 0.5 % (ref 0.3–6.2)
ERYTHROCYTE [DISTWIDTH] IN BLOOD BY AUTOMATED COUNT: 14.6 % (ref 12.3–15.4)
GLOBULIN UR ELPH-MCNC: 2.9 GM/DL
GLUCOSE SERPL-MCNC: 102 MG/DL (ref 65–99)
HCT VFR BLD AUTO: 27.2 % (ref 37.5–51)
HGB BLD-MCNC: 8.8 G/DL (ref 13–17.7)
IMM GRANULOCYTES # BLD AUTO: 0.06 10*3/MM3 (ref 0–0.05)
IMM GRANULOCYTES NFR BLD AUTO: 0.7 % (ref 0–0.5)
LYMPHOCYTES # BLD AUTO: 0.96 10*3/MM3 (ref 0.7–3.1)
LYMPHOCYTES NFR BLD AUTO: 10.5 % (ref 19.6–45.3)
MCH RBC QN AUTO: 33.6 PG (ref 26.6–33)
MCHC RBC AUTO-ENTMCNC: 32.4 G/DL (ref 31.5–35.7)
MCV RBC AUTO: 103.8 FL (ref 79–97)
MONOCYTES # BLD AUTO: 0.77 10*3/MM3 (ref 0.1–0.9)
MONOCYTES NFR BLD AUTO: 8.4 % (ref 5–12)
NEUTROPHILS NFR BLD AUTO: 7.32 10*3/MM3 (ref 1.7–7)
NEUTROPHILS NFR BLD AUTO: 79.7 % (ref 42.7–76)
NRBC BLD AUTO-RTO: 0 /100 WBC (ref 0–0.2)
NT-PROBNP SERPL-MCNC: ABNORMAL PG/ML (ref 0–900)
PLATELET # BLD AUTO: 165 10*3/MM3 (ref 140–450)
PMV BLD AUTO: 9.9 FL (ref 6–12)
POTASSIUM SERPL-SCNC: 3.3 MMOL/L (ref 3.5–5.2)
POTASSIUM SERPL-SCNC: 3.9 MMOL/L (ref 3.5–5.2)
PROT SERPL-MCNC: 5.9 G/DL (ref 6–8.5)
RBC # BLD AUTO: 2.62 10*6/MM3 (ref 4.14–5.8)
SODIUM SERPL-SCNC: 143 MMOL/L (ref 136–145)
WBC NRBC COR # BLD AUTO: 9.18 10*3/MM3 (ref 3.4–10.8)

## 2024-07-24 PROCEDURE — 25010000002 FUROSEMIDE PER 20 MG: Performed by: INTERNAL MEDICINE

## 2024-07-24 PROCEDURE — 99232 SBSQ HOSP IP/OBS MODERATE 35: CPT | Mod: FS | Performed by: NURSE PRACTITIONER

## 2024-07-24 PROCEDURE — 94799 UNLISTED PULMONARY SVC/PX: CPT

## 2024-07-24 PROCEDURE — 97530 THERAPEUTIC ACTIVITIES: CPT

## 2024-07-24 PROCEDURE — 83880 ASSAY OF NATRIURETIC PEPTIDE: CPT | Performed by: INTERNAL MEDICINE

## 2024-07-24 PROCEDURE — 94660 CPAP INITIATION&MGMT: CPT

## 2024-07-24 PROCEDURE — 84132 ASSAY OF SERUM POTASSIUM: CPT | Performed by: INTERNAL MEDICINE

## 2024-07-24 PROCEDURE — 85025 COMPLETE CBC W/AUTO DIFF WBC: CPT | Performed by: NURSE PRACTITIONER

## 2024-07-24 PROCEDURE — 80053 COMPREHEN METABOLIC PANEL: CPT

## 2024-07-24 PROCEDURE — 97110 THERAPEUTIC EXERCISES: CPT

## 2024-07-24 PROCEDURE — 94664 DEMO&/EVAL PT USE INHALER: CPT

## 2024-07-24 PROCEDURE — 25810000003 SODIUM CHLORIDE 0.9 % SOLUTION: Performed by: NURSE PRACTITIONER

## 2024-07-24 PROCEDURE — 97535 SELF CARE MNGMENT TRAINING: CPT

## 2024-07-24 RX ORDER — SORBITOL SOLUTION 70 %
50 SOLUTION, ORAL MISCELLANEOUS ONCE
Status: COMPLETED | OUTPATIENT
Start: 2024-07-24 | End: 2024-07-24

## 2024-07-24 RX ORDER — POTASSIUM CHLORIDE 20 MEQ/1
40 TABLET, EXTENDED RELEASE ORAL EVERY 4 HOURS
Status: COMPLETED | OUTPATIENT
Start: 2024-07-24 | End: 2024-07-24

## 2024-07-24 RX ORDER — FUROSEMIDE 10 MG/ML
40 INJECTION INTRAMUSCULAR; INTRAVENOUS EVERY 8 HOURS
Status: DISCONTINUED | OUTPATIENT
Start: 2024-07-24 | End: 2024-07-26

## 2024-07-24 RX ADMIN — BUDESONIDE 1 MG: 0.5 INHALANT RESPIRATORY (INHALATION) at 07:14

## 2024-07-24 RX ADMIN — IPRATROPIUM BROMIDE AND ALBUTEROL SULFATE 3 ML: .5; 3 SOLUTION RESPIRATORY (INHALATION) at 10:50

## 2024-07-24 RX ADMIN — FUROSEMIDE 40 MG: 10 INJECTION, SOLUTION INTRAMUSCULAR; INTRAVENOUS at 08:40

## 2024-07-24 RX ADMIN — ARFORMOTEROL TARTRATE 15 MCG: 15 SOLUTION RESPIRATORY (INHALATION) at 07:09

## 2024-07-24 RX ADMIN — BUDESONIDE 1 MG: 0.5 INHALANT RESPIRATORY (INHALATION) at 19:55

## 2024-07-24 RX ADMIN — LUBIPROSTONE 24 MCG: 24 CAPSULE, GELATIN COATED ORAL at 20:54

## 2024-07-24 RX ADMIN — SORBITOL SOLUTION (BULK) 50 ML: 70 SOLUTION at 16:37

## 2024-07-24 RX ADMIN — IPRATROPIUM BROMIDE AND ALBUTEROL SULFATE 3 ML: .5; 3 SOLUTION RESPIRATORY (INHALATION) at 19:49

## 2024-07-24 RX ADMIN — SODIUM CHLORIDE 50 ML/HR: 9 INJECTION, SOLUTION INTRAVENOUS at 00:25

## 2024-07-24 RX ADMIN — APIXABAN 5 MG: 5 TABLET, FILM COATED ORAL at 20:54

## 2024-07-24 RX ADMIN — SOTALOL HYDROCHLORIDE 80 MG: 80 TABLET ORAL at 20:54

## 2024-07-24 RX ADMIN — AMLODIPINE BESYLATE 2.5 MG: 5 TABLET ORAL at 08:40

## 2024-07-24 RX ADMIN — POTASSIUM CHLORIDE 40 MEQ: 1500 TABLET, EXTENDED RELEASE ORAL at 11:04

## 2024-07-24 RX ADMIN — AMOXICILLIN AND CLAVULANATE POTASSIUM 1 TABLET: 875; 125 TABLET, FILM COATED ORAL at 20:54

## 2024-07-24 RX ADMIN — PANTOPRAZOLE SODIUM 40 MG: 40 TABLET, DELAYED RELEASE ORAL at 20:54

## 2024-07-24 RX ADMIN — MONTELUKAST 10 MG: 10 TABLET, FILM COATED ORAL at 20:54

## 2024-07-24 RX ADMIN — LUBIPROSTONE 24 MCG: 24 CAPSULE, GELATIN COATED ORAL at 08:40

## 2024-07-24 RX ADMIN — AMOXICILLIN AND CLAVULANATE POTASSIUM 1 TABLET: 875; 125 TABLET, FILM COATED ORAL at 08:40

## 2024-07-24 RX ADMIN — IPRATROPIUM BROMIDE AND ALBUTEROL SULFATE 3 ML: .5; 3 SOLUTION RESPIRATORY (INHALATION) at 15:03

## 2024-07-24 RX ADMIN — ATORVASTATIN CALCIUM 10 MG: 10 TABLET, FILM COATED ORAL at 20:54

## 2024-07-24 RX ADMIN — POTASSIUM CHLORIDE 40 MEQ: 1500 TABLET, EXTENDED RELEASE ORAL at 08:40

## 2024-07-24 RX ADMIN — SOTALOL HYDROCHLORIDE 80 MG: 80 TABLET ORAL at 08:40

## 2024-07-24 RX ADMIN — APIXABAN 5 MG: 5 TABLET, FILM COATED ORAL at 08:40

## 2024-07-24 RX ADMIN — FUROSEMIDE 40 MG: 10 INJECTION, SOLUTION INTRAMUSCULAR; INTRAVENOUS at 16:31

## 2024-07-24 NOTE — PROGRESS NOTES
Infectious Diseases Progress Note      LOS: 5 days   Patient Care Team:  Amada Haskins MD as PCP - General  Josr Rodriguez DPM as Consulting Physician (Podiatry)  Rj Dominique MD as Consulting Physician (Cardiology)    Chief Complaint: Unable to complete    Subjective     Patient had no fever during the last 24 hours.  He remained hemodynamically stable.  Patient now on BiPA 3 L of oxygen by nasal cannula..  Is in the chair and appears to be doing much better today.  Case discussed with family at bedside    Review of Systems:   Review of Systems   Unable to perform ROS: Mental status change        Objective     Vital Signs  Temp:  [96.6 °F (35.9 °C)-98 °F (36.7 °C)] 97.1 °F (36.2 °C)  Heart Rate:  [50-68] 68  Resp:  [11-26] 11  BP: (119-170)/(62-97) 119/66    Physical Exam:  Physical Exam  Vitals and nursing note reviewed.   Constitutional:       Appearance: He is ill-appearing.   HENT:      Head: Normocephalic and atraumatic.      Mouth/Throat:      Mouth: Mucous membranes are moist.   Eyes:      Pupils: Pupils are equal, round, and reactive to light.   Cardiovascular:      Rate and Rhythm: Normal rate and regular rhythm.   Pulmonary:      Effort: Pulmonary effort is normal.      Comments: Crackles  Abdominal:      General: Abdomen is flat. Bowel sounds are normal.      Palpations: Abdomen is soft.   Musculoskeletal:      Cervical back: Neck supple.      Right lower leg: No edema.      Left lower leg: No edema.   Neurological:      Mental Status: He is alert. He is disoriented.      Comments: Patient is alert and awake          Results Review:    I have reviewed all clinical data, test, lab, and imaging results.     Radiology  No Radiology Exams Resulted Within Past 24 Hours    Cardiology    Laboratory    Results from last 7 days   Lab Units 07/24/24  0322 07/23/24  1703 07/23/24  0338 07/22/24  0508 07/21/24  0515 07/20/24  0543 07/18/24  2338   WBC 10*3/mm3 9.18 9.63 11.05* 10.09 11.82* 12.66* 14.14*    HEMOGLOBIN g/dL 8.8* 10.5* 9.6* 9.2* 9.1* 10.3* 13.4   HEMATOCRIT % 27.2* 32.5* 30.0* 28.1* 27.5* 31.1* 39.7   PLATELETS 10*3/mm3 165 198 182 168 151 166 226     Results from last 7 days   Lab Units 07/24/24  0322 07/23/24  1703 07/23/24  0338 07/22/24  0508 07/21/24  0515 07/20/24  0543 07/18/24  2338   SODIUM mmol/L 143 139 138 140 135* 135* 128*   POTASSIUM mmol/L 3.3* 3.6 3.8 3.5 3.5 3.3* 5.4*   CHLORIDE mmol/L 103 102 103 104 100 97* 89*   CO2 mmol/L 30.2* 28.6 29.3* 29.4* 29.0 27.6 23.8   BUN mg/dL 10 11 10 11 12 18 15   CREATININE mg/dL 0.76 0.70* 0.68* 0.87 0.79 0.82 1.00   GLUCOSE mg/dL 102* 178* 138* 102* 119* 68 149*   ALBUMIN g/dL 3.0* 3.5 3.4* 3.0* 3.1* 3.3* 4.2   BILIRUBIN mg/dL 1.3* 1.2 1.1 1.4* 1.6* 1.5* 2.1*   ALK PHOS U/L 54 65 58 50 51 58 83   AST (SGOT) U/L 26 30 31 26 26 35 44*   ALT (SGPT) U/L 8 11 10 9 11 12 11   LIPASE U/L  --   --   --   --   --   --  35   CALCIUM mg/dL 8.2* 8.5* 8.3* 8.0* 7.9* 8.1* 9.3                 Microbiology   Microbiology Results (last 10 days)       Procedure Component Value - Date/Time    MRSA Screen, PCR (Inpatient) - Swab, Nares [[id number]]  (Normal) Collected: 07/19/24 0520    Lab Status: Final result Specimen: Swab from Nares Updated: 07/19/24 0634     MRSA PCR No MRSA Detected    Narrative:      The negative predictive value of this diagnostic test is high and should only be used to consider de-escalating anti-MRSA therapy. A positive result may indicate colonization with MRSA and must be correlated clinically.    Blood Culture - Blood, Arm, Right [[id number]]  (Normal) Collected: 07/19/24 0053    Lab Status: Final result Specimen: Blood from Arm, Right Updated: 07/24/24 0100     Blood Culture No growth at 5 days    Narrative:      Less than seven (7) mL's of blood was collected.  Insufficient quantity may yield false negative results.    Blood Culture - Blood, Arm, Left [[id number]]  (Normal) Collected: 07/19/24 0053    Lab Status: Final result Specimen:  Blood from Arm, Left Updated: 07/24/24 0100     Blood Culture No growth at 5 days    Narrative:      Less than seven (7) mL's of blood was collected.  Insufficient quantity may yield false negative results.    Urine Culture - Urine, Straight Cath [[id number]]  (Normal) Collected: 07/19/24 0031    Lab Status: Final result Specimen: Urine from Straight Cath Updated: 07/20/24 1127     Urine Culture No growth    Respiratory Panel PCR w/COVID-19(SARS-CoV-2) [facility] In-House, NP Swab in UTM/VTM, 2 HR TAT - Swab, Nasopharynx [[id number]]  (Normal) Collected: 07/18/24 2346    Lab Status: Final result Specimen: Swab from Nasopharynx Updated: 07/19/24 0100     ADENOVIRUS, PCR Not Detected     Coronavirus 229E Not Detected     Coronavirus HKU1 Not Detected     Coronavirus NL63 Not Detected     Coronavirus OC43 Not Detected     COVID19 Not Detected     Human Metapneumovirus Not Detected     Human Rhinovirus/Enterovirus Not Detected     Influenza A PCR Not Detected     Influenza B PCR Not Detected     Parainfluenza Virus 1 Not Detected     Parainfluenza Virus 2 Not Detected     Parainfluenza Virus 3 Not Detected     Parainfluenza Virus 4 Not Detected     RSV, PCR Not Detected     Bordetella pertussis pcr Not Detected     Bordetella parapertussis PCR Not Detected     Chlamydophila pneumoniae PCR Not Detected     Mycoplasma pneumo by PCR Not Detected    Narrative:      In the setting of a positive respiratory panel with a viral infection PLUS a negative procalcitonin without other underlying concern for bacterial infection, consider observing off antibiotics or discontinuation of antibiotics and continue supportive care. If the respiratory panel is positive for atypical bacterial infection (Bordetella pertussis, Chlamydophila pneumoniae, or Mycoplasma pneumoniae), consider antibiotic de-escalation to target atypical bacterial infection.    Legionella Antigen, Urine - Urine, Urine, Clean Catch [[id number]]  (Normal)  Collected: 07/14/24 1708    Lab Status: Final result Specimen: Urine, Clean Catch Updated: 07/14/24 1733     LEGIONELLA ANTIGEN, URINE Negative    S. Pneumo Ag Urine or CSF - Urine, Urine, Clean Catch [[id number]]  (Normal) Collected: 07/14/24 1708    Lab Status: Final result Specimen: Urine, Clean Catch Updated: 07/14/24 1733     Strep Pneumo Ag Negative            Medication Review:       Schedule Meds  amLODIPine, 2.5 mg, Oral, Daily  amoxicillin-clavulanate, 1 tablet, Oral, Q12H  apixaban, 5 mg, Oral, BID  arformoterol, 15 mcg, Nebulization, BID - RT  atorvastatin, 10 mg, Oral, Nightly  budesonide, 1 mg, Nebulization, BID - RT  furosemide, 40 mg, Intravenous, Q8H  ipratropium-albuterol, 3 mL, Nebulization, 4x Daily - RT  lubiprostone, 24 mcg, Oral, BID  montelukast, 10 mg, Oral, Nightly  pantoprazole, 40 mg, Oral, Nightly  sotalol, 80 mg, Oral, Q12H        Infusion Meds         PRN Meds    senna-docusate sodium **AND** polyethylene glycol **AND** bisacodyl **AND** bisacodyl    Calcium Replacement - Follow Nurse / BPA Driven Protocol    Magnesium Standard Dose Replacement - Follow Nurse / BPA Driven Protocol    Morphine    ondansetron    Phosphorus Replacement - Follow Nurse / BPA Driven Protocol    Potassium Replacement - Follow Nurse / BPA Driven Protocol        Assessment & Plan       Antimicrobial Therapy   1.  IV Zosyn        2.  P.o. Augmentin        3.        4.        5.              Assessment     Possible aspiration pneumonia versus hospital-acquired pneumonia. .  MRSA screen was negative.  Speech therapy saw the patient and cleared him for diet.  Worsening hypoxia today with patient currently on BiPAP.  Imaging study shows worsening pulmonary edema with ABG showing some hypercapnia.  Patient is now back on 3 L of oxygen by nasal cannula and appears to be doing much better today     Developmental and intellectual delay.  Patient lives with his brothers     Recent hospital admission for possible  pneumonia     S/p mitral valve replacement in the past     Plan     Continue p.o. Augmentin 875/125 mg twice daily for 5 days for 7 days of treatment  Continue supportive care  Case discussed with patient and family members at bedside  Not much more to add from infectious disease standpoint-we will sign off at this time-please call with any questions.          Marichuy Cullen, IZAIAH  07/24/24  16:01 EDT    Note is dictated utilizing voice recognition software/Dragon

## 2024-07-24 NOTE — PLAN OF CARE
Goal Outcome Evaluation:      Slept at intervals. BIPAP in use. Sitter at bedside. IV fluids infusing. No c/o discomfort. Will continue to monitor.

## 2024-07-24 NOTE — PROGRESS NOTES
[facility] MEDICINE SERVICE  DAILY PROGRESS NOTE    NAME: Drake Beth  : 1950  MRN: [id number]      LOS: 5 days     PROVIDER OF SERVICE: Giovanna Daley DO    Chief Complaint: Sepsis    Subjective:     Interval History: Patient seen and examined this morning.  Taking over care today.  Working with PT this morning, appears at baseline mental status.  Mentally challenged, answers simple questions and follow simple commands.    Review of Systems: Unable to obtain full review of systems due to patient's underlying mental status.         Objective:     Vital Signs  Temp:  [96.5 °F (35.8 °C)-98 °F (36.7 °C)] 97.1 °F (36.2 °C)  Heart Rate:  [50-67] 58  Resp:  [12-26] 14  BP: (125-170)/(62-97) 136/67  Flow (L/min):  [4-10] 6   Body mass index is 20.49 kg/m².    Physical Exam     General: Awake, alert, NAD  Eyes: PERRL, EOMI, conjunctivae are clear  Cardiovascular: Regular rate and rhythm, no murmurs  Respiratory: Decreased breath sounds bilaterally, no wheezing or rales, unlabored breathing  Abdomen: Soft, nontender, positive bowel sounds, no guarding  Neurologic: Intellectual disability noted, CN grossly intact, moves all extremities spontaneously  Musculoskeletal: Generalized weakness, no other gross deformities  Skin: Warm, dry        Scheduled Meds   amLODIPine, 2.5 mg, Oral, Daily  amoxicillin-clavulanate, 1 tablet, Oral, Q12H  apixaban, 5 mg, Oral, BID  arformoterol, 15 mcg, Nebulization, BID - RT  atorvastatin, 10 mg, Oral, Nightly  budesonide, 1 mg, Nebulization, BID - RT  furosemide, 40 mg, Intravenous, Q8H  ipratropium-albuterol, 3 mL, Nebulization, 4x Daily - RT  lubiprostone, 24 mcg, Oral, BID  montelukast, 10 mg, Oral, Nightly  pantoprazole, 40 mg, Oral, Nightly  sotalol, 80 mg, Oral, Q12H       PRN Meds     senna-docusate sodium **AND** polyethylene glycol **AND** bisacodyl **AND** bisacodyl    Calcium Replacement - Follow Nurse / BPA Driven Protocol    Magnesium Standard Dose Replacement  - Follow Nurse / BPA Driven Protocol    Morphine    ondansetron    Phosphorus Replacement - Follow Nurse / BPA Driven Protocol    Potassium Replacement - Follow Nurse / BPA Driven Protocol   Infusions           Diagnostic Data    Results from last 7 days   Lab Units 07/24/24  0322   WBC 10*3/mm3 9.18   HEMOGLOBIN g/dL 8.8*   HEMATOCRIT % 27.2*   PLATELETS 10*3/mm3 165   GLUCOSE mg/dL 102*   CREATININE mg/dL 0.76   BUN mg/dL 10   SODIUM mmol/L 143   POTASSIUM mmol/L 3.3*   AST (SGOT) U/L 26   ALT (SGPT) U/L 8   ALK PHOS U/L 54   BILIRUBIN mg/dL 1.3*   ANION GAP mmol/L 9.8       XR Chest 1 View    Result Date: 7/23/2024  Impression: Findings are most compatible with CHF with moderately severe pulmonary edema. Small right effusion. Electronically Signed: Mallorie Monge MD  7/23/2024 2:55 PM EDT  Workstation ID: [id number]       I reviewed the patient's new clinical results.    Assessment/Plan:     Active and Resolved Problems  Active Hospital Problems    Diagnosis  POA    **Sepsis [A41.9]  Yes    Acute on chronic diastolic heart failure [I50.33]  Yes    Paroxysmal atrial fibrillation [I48.0]  Yes    Acute respiratory failure with hypoxia and hypercapnia [J96.01, J96.02]  Yes    Hyperkalemia [E87.5]  Yes    Hyponatremia [E87.1]  Yes    Hyperbilirubinemia [E80.6]  Yes    Acute UTI (urinary tract infection) [N39.0]  Yes    Presence of cardiac pacemaker [Z95.0]  Yes    Pneumonia [J18.9]  Yes    Mitral valve replaced [Z95.2]  Not Applicable    Mentally challenged [F79]  Yes    Hypertension [I10]  Yes    Hyperlipidemia [E78.5]  Yes    Coronary artery disease [I25.10]  Yes      Resolved Hospital Problems   No resolved problems to display.       Severe Sepsis without septic shock due to aspiration pneumonia  -CT of the chest and abdomen on admission showed evidence of pneumonia, concerning for aspiration pneumonia  -Initiated on IV antibiotics with Zosyn but now switched to oral antibiotics to complete course of  treatment  -Wean O2 as tolerated  -SLP evaluation recommending mechanical soft diet to reduce risk of aspiration  -ID following     Acute respiratory failure with hypoxia and hypercapnia  Acute on chronic HFpEF  Valvular heart disease  -Respiratory failure multifactorial from CHF and pneumonia  -Antibiotics as above  -Off BiPAP, remains on high flow 6 L currently  -Chest x-ray on 7/23 with worsening congestion and bilateral pleural effusions  -proBNP also significantly elevated from baseline  -Increase IV Lasix to 40 mg every 8 hours, monitor strict I's and O's  -Echo from previous admission showed preserved EF with moderate severe MR and pulmonary hypertension also noted, previously eval by cardiology and recommended medical management  -Continue GDMT as tolerated     Acute UTI  -Urine culture with no growth  -Antibiotics as above     Paroxysmal atrial fibrillation  -Rate controlled  -Continue home sotalol and Eliquis  -Monitor    Abdominal pain with constipation  -Per patient's family, patient has been having issues with constipation for several weeks  -Has been on laxatives without any improvement  -CT of the abdomen did not show any significant improvement in his stool burden  -Plan for bowel purge per GI  -Initiated Amitiza which he will need to continue discharge  -Patient's pain seems more consistent with PUD  -Initiated PPI  -GI consult appreciated     Hyperbilirubinemia  -Likely secondary to sepsis  -Bilirubin improved     Mentally challenged  -Noted cooperative, follows simple commands     Hypertension   -Continue home amlodipine, sotalol  -Monitor     Hyperlipidemia  -On statin    VTE Prophylaxis:  Pharmacologic VTE prophylaxis orders are present.         Code status is   Code Status and Medical Interventions:   Ordered at: 07/19/24 0354     Code Status (Patient has no pulse and is not breathing):    CPR (Attempt to Resuscitate)     Medical Interventions (Patient has pulse or is breathing):    Full  Support       Signature: Electronically signed by Giovanna Daley DO, 07/24/24, 11:14 EDT.  [facility]    Part of this note may be an electronic transcription/translation of spoken language to printed text using the Dragon Dictation System.

## 2024-07-24 NOTE — CASE MANAGEMENT/SOCIAL WORK
Continued Stay Note   Juan     Patient Name: Drake Beth  MRN: [id number]  Today's Date: 7/24/2024    Admit Date: 7/18/2024    Plan: Return home with brother. VS SNF pending PT reeval and son choices.  New 3L O2 set up with [facility]- will need new walk and orders.   Discharge Plan       Row Name 07/24/24 1614       Plan    Plan Return home with brother. VS SNF pending PT reeval and son choices.  New 3L O2 set up with [facility]- will need new walk and orders.    Patient/Family in Agreement with Plan yes    Plan Comments Son Richi requested to speak with this CM in hallway.  Discussed discharge planning.  Richi is concerned if patient will be ready to go home on discharge or if we need rehab first.  SNF list given.  Prior PT notes state home with 24/7 care. Son gave this CM phone number for Medicaid  Lula Cortez ([phone] [email or web address] Permission miladyne to myself and Lula to discuss patient.  Patient is current with medicaid waiver services.  He would be able to have up to 90 days at SNF and retain medicaid waiver.   Discussed return home vs SNF.     Patient on 10 L HF in am, weaned to 3L in afternoon.  Plan for bipap at HS.  CXR worse today.             Expected Discharge Date and Time       Expected Discharge Date Expected Discharge Time    Jul 27, 2024               Kaitlin Freedman RN     Office Phone [phone]  Office Cell [phone]

## 2024-07-24 NOTE — PROGRESS NOTES
LOS: 5 days   Patient Care Team:  Amada Haskins MD as PCP - General  Josr Rodriguez DPM as Consulting Physician (Podiatry)  Rj Dominique MD as Consulting Physician (Cardiology)      Subjective     Interval History:     Subjective: Patient's abdomen is much softer today.  Reportedly had 3 bowel movements overnight following laxative administration.      ROS:   Unobtainable secondary to mental disability.       Medication Review:     Current Facility-Administered Medications:     amLODIPine (NORVASC) tablet 2.5 mg, 2.5 mg, Oral, Daily, Nae Edwards APRN, 2.5 mg at 07/24/24 0840    amoxicillin-clavulanate (AUGMENTIN) 875-125 MG per tablet 1 tablet, 1 tablet, Oral, Q12H, Marichuy Cullen APRN, 1 tablet at 07/24/24 0840    apixaban (ELIQUIS) tablet 5 mg, 5 mg, Oral, BID, Nae Edwards APRN, 5 mg at 07/24/24 0840    arformoterol (BROVANA) nebulizer solution 15 mcg, 15 mcg, Nebulization, BID - RT, Gilberto Whitney MD, 15 mcg at 07/24/24 0709    atorvastatin (LIPITOR) tablet 10 mg, 10 mg, Oral, Nightly, Nae Edwards APRN, 10 mg at 07/23/24 2012    sennosides-docusate (PERICOLACE) 8.6-50 MG per tablet 2 tablet, 2 tablet, Oral, BID PRN **AND** polyethylene glycol (MIRALAX) packet 17 g, 17 g, Oral, Daily PRN, 17 g at 07/21/24 2347 **AND** bisacodyl (DULCOLAX) EC tablet 5 mg, 5 mg, Oral, Daily PRN, 5 mg at 07/21/24 2347 **AND** bisacodyl (DULCOLAX) suppository 10 mg, 10 mg, Rectal, Daily PRN, Marisela Edouard, APRN    budesonide (PULMICORT) nebulizer solution 1 mg, 1 mg, Nebulization, BID - RT, Gilberto Whitney MD, 1 mg at 07/24/24 0714    Calcium Replacement - Follow Nurse / BPA Driven Protocol, , Does not apply, PRN, Heydi Valiente, IZAIAH    furosemide (LASIX) injection 40 mg, 40 mg, Intravenous, Q8H, Giovanna Daley,     ipratropium-albuterol (DUO-NEB) nebulizer solution 3 mL, 3 mL, Nebulization, 4x Daily - RT, Aleta Prescott, IZAIAH, 3 mL at 07/24/24 1503    lubiprostone  (AMITIZA) capsule 24 mcg, 24 mcg, Oral, BID, Nae Edwards APRN, 24 mcg at 07/24/24 0840    Magnesium Standard Dose Replacement - Follow Nurse / BPA Driven Protocol, , Does not apply, PRN, Heydi Valiente APRN    montelukast (SINGULAIR) tablet 10 mg, 10 mg, Oral, Nightly, Nae Edwards APRN, 10 mg at 07/23/24 2012    morphine injection 2 mg, 2 mg, Intravenous, Q4H PRN, Marisela Edouard APRN, 1 mg at 07/23/24 1704    ondansetron (ZOFRAN) injection 4 mg, 4 mg, Intravenous, Q6H PRN, Marisela Edouard APRN, 4 mg at 07/23/24 1632    pantoprazole (PROTONIX) EC tablet 40 mg, 40 mg, Oral, Nightly, Ritchie Garcia MD, 40 mg at 07/23/24 2012    Phosphorus Replacement - Follow Nurse / BPA Driven Protocol, , Does not apply, PRN, Heydi Valiente APRN    Potassium Replacement - Follow Nurse / BPA Driven Protocol, , Does not apply, PRN, Heydi Valiente APRN    sotalol (BETAPACE) tablet 80 mg, 80 mg, Oral, Q12H, Nae Edwards, APRN, 80 mg at 07/24/24 0840      Objective     Vital Signs  Vitals:    07/24/24 1115 07/24/24 1503 07/24/24 1508 07/24/24 1512   BP: 134/69   119/66   BP Location: Left arm   Left arm   Patient Position: Lying   Lying   Pulse: 63 66 65 68   Resp: 11 14 16 11   Temp: 96.6 °F (35.9 °C)   97.1 °F (36.2 °C)   TempSrc: Axillary   Oral   SpO2: 96% 97% 99% 95%   Weight:       Height:           Physical Exam:     General Appearance:    Awake and alert, in no acute distress   Head:    Normocephalic, without obvious abnormality   Eyes:          Conjunctivae normal, anicteric sclera   Throat:   No oral lesions, no thrush, oral mucosa moist   Neck:   No adenopathy, supple, no JVD   Lungs:     respirations regular, even and unlabored   Abdomen:     Soft, non-tender, no rebound or guarding, non-distended   Rectal:     Deferred   Extremities:   No edema, no cyanosis   Skin:   No bruising or rash, no jaundice        Results Review:    CBC    Results from last 7 days   Lab Units  "07/24/24  0322 07/23/24  1703 07/23/24  0338 07/22/24  0508 07/21/24  0515 07/20/24  0543 07/18/24  2338   WBC 10*3/mm3 9.18 9.63 11.05* 10.09 11.82* 12.66* 14.14*   HEMOGLOBIN g/dL 8.8* 10.5* 9.6* 9.2* 9.1* 10.3* 13.4   PLATELETS 10*3/mm3 165 198 182 168 151 166 226     CMP   Results from last 7 days   Lab Units 07/24/24  0322 07/23/24 1703 07/23/24  0338 07/22/24  0508 07/21/24  0515 07/20/24  0543 07/18/24 2338   SODIUM mmol/L 143 139 138 140 135* 135* 128*   POTASSIUM mmol/L 3.3* 3.6 3.8 3.5 3.5 3.3* 5.4*   CHLORIDE mmol/L 103 102 103 104 100 97* 89*   CO2 mmol/L 30.2* 28.6 29.3* 29.4* 29.0 27.6 23.8   BUN mg/dL 10 11 10 11 12 18 15   CREATININE mg/dL 0.76 0.70* 0.68* 0.87 0.79 0.82 1.00   GLUCOSE mg/dL 102* 178* 138* 102* 119* 68 149*   ALBUMIN g/dL 3.0* 3.5 3.4* 3.0* 3.1* 3.3* 4.2   BILIRUBIN mg/dL 1.3* 1.2 1.1 1.4* 1.6* 1.5* 2.1*   ALK PHOS U/L 54 65 58 50 51 58 83   AST (SGOT) U/L 26 30 31 26 26 35 44*   ALT (SGPT) U/L 8 11 10 9 11 12 11   LIPASE U/L  --   --   --   --   --   --  35     Cr Clearance Estimated Creatinine Clearance: 59.2 mL/min (by C-G formula based on SCr of 0.76 mg/dL).  Coag     HbA1C No results found for: \"HGBA1C\"  Results from last 7 days   Lab Units 07/19/24  0330 07/18/24  2338   HSTROP T ng/L 23* 20     Infection   Results from last 7 days   Lab Units 07/23/24  0338 07/19/24  0053 07/19/24  0031   BLOODCX   --  No growth at 5 days  No growth at 5 days  --    URINECX   --   --  No growth   PROCALCITONIN ng/mL 0.22  --   --      UA    Results from last 7 days   Lab Units 07/19/24  0031   NITRITE UA  Negative   WBC UA /HPF 11-20*   BACTERIA UA /HPF Trace*   SQUAM EPITHEL UA /HPF 0-2   URINECX  No growth     Microbiology Results (last 10 days)       Procedure Component Value - Date/Time    MRSA Screen, PCR (Inpatient) - Swab, Nares [[id number]]  (Normal) Collected: 07/19/24 0520    Lab Status: Final result Specimen: Swab from Nares Updated: 07/19/24 0634     MRSA PCR No MRSA Detected    " Narrative:      The negative predictive value of this diagnostic test is high and should only be used to consider de-escalating anti-MRSA therapy. A positive result may indicate colonization with MRSA and must be correlated clinically.    Blood Culture - Blood, Arm, Right [[id number]]  (Normal) Collected: 07/19/24 0053    Lab Status: Final result Specimen: Blood from Arm, Right Updated: 07/24/24 0100     Blood Culture No growth at 5 days    Narrative:      Less than seven (7) mL's of blood was collected.  Insufficient quantity may yield false negative results.    Blood Culture - Blood, Arm, Left [[id number]]  (Normal) Collected: 07/19/24 0053    Lab Status: Final result Specimen: Blood from Arm, Left Updated: 07/24/24 0100     Blood Culture No growth at 5 days    Narrative:      Less than seven (7) mL's of blood was collected.  Insufficient quantity may yield false negative results.    Urine Culture - Urine, Straight Cath [[id number]]  (Normal) Collected: 07/19/24 0031    Lab Status: Final result Specimen: Urine from Straight Cath Updated: 07/20/24 1127     Urine Culture No growth    Respiratory Panel PCR w/COVID-19(SARS-CoV-2) [facility] In-House, NP Swab in UTM/VTM, 2 HR TAT - Swab, Nasopharynx [[id number]]  (Normal) Collected: 07/18/24 2346    Lab Status: Final result Specimen: Swab from Nasopharynx Updated: 07/19/24 0100     ADENOVIRUS, PCR Not Detected     Coronavirus 229E Not Detected     Coronavirus HKU1 Not Detected     Coronavirus NL63 Not Detected     Coronavirus OC43 Not Detected     COVID19 Not Detected     Human Metapneumovirus Not Detected     Human Rhinovirus/Enterovirus Not Detected     Influenza A PCR Not Detected     Influenza B PCR Not Detected     Parainfluenza Virus 1 Not Detected     Parainfluenza Virus 2 Not Detected     Parainfluenza Virus 3 Not Detected     Parainfluenza Virus 4 Not Detected     RSV, PCR Not Detected     Bordetella pertussis pcr Not Detected     Bordetella  parapertussis PCR Not Detected     Chlamydophila pneumoniae PCR Not Detected     Mycoplasma pneumo by PCR Not Detected    Narrative:      In the setting of a positive respiratory panel with a viral infection PLUS a negative procalcitonin without other underlying concern for bacterial infection, consider observing off antibiotics or discontinuation of antibiotics and continue supportive care. If the respiratory panel is positive for atypical bacterial infection (Bordetella pertussis, Chlamydophila pneumoniae, or Mycoplasma pneumoniae), consider antibiotic de-escalation to target atypical bacterial infection.    Legionella Antigen, Urine - Urine, Urine, Clean Catch [[id number]]  (Normal) Collected: 07/14/24 1708    Lab Status: Final result Specimen: Urine, Clean Catch Updated: 07/14/24 1733     LEGIONELLA ANTIGEN, URINE Negative    S. Pneumo Ag Urine or CSF - Urine, Urine, Clean Catch [[id number]]  (Normal) Collected: 07/14/24 1708    Lab Status: Final result Specimen: Urine, Clean Catch Updated: 07/14/24 1733     Strep Pneumo Ag Negative          Imaging Results (Last 72 Hours)       Procedure Component Value Units Date/Time    XR Chest 1 View [[id number]] Collected: 07/23/24 1452     Updated: 07/23/24 1457    Narrative:      XR CHEST 1 VW    Date of Exam: 7/23/2024 2:35 PM EDT    Indication: hypoxia    Comparison: 7/18/2024    Findings:  There has been worsening with new and increasing infiltrates of the bilateral upper and lower lobes. There is moderate cardiomegaly. There is a small right pleural effusion. There may be a minimal left pleural effusion. There are sternal suture wires.      Impression:      Impression:  Findings are most compatible with CHF with moderately severe pulmonary edema. Small right effusion.      Electronically Signed: Mallorie Monge MD    7/23/2024 2:55 PM EDT    Workstation ID: [id number]            Assessment & Plan     ASSESSMENT:  -Abdominal pain  -Constipation  -Mild macrocytic  anemia  -Bilateral pneumonia  -A-fib on Eliquis  -CAD s/p CABG  -CHF  -Mitral valve replacement -tissue valve  -Hypertension  -Hyperlipidemia     PLAN:  Patient is a 73-year-old male with history of mental disability, A-fib on Eliquis, CAD s/p CABG, and tissue mitral valve replacement who presented on 7/18 with complaints of abdominal pain.  Patient recently seen during inpatient admission on 7/12 for similar complaints at which time bowel purge was performed with improvement in symptoms. CT abdomen/pelvis W on admission shows no significant interval change since previous exam.     Patient reportedly had 3 bowel movements overnight following laxative administration.  We will plan further laxatives to completely purge the bowel.  Continue Amitiza twice daily.  This will need to be continued on discharge.  Diet as tolerated.  Continue supportive care.       Electronically signed by IZAIAH Peterson, 07/24/24, 4:17 PM EDT.

## 2024-07-24 NOTE — PLAN OF CARE
Assessment: Drake Beth presents with ADL impairments affecting function including balance, cognition, endurance / activity tolerance, and strength. Pts cues throughout treatment secondary to cognitive deficits. Demonstrated functioning below baseline abilities indicate the need for continued skilled intervention while inpatient. Tolerating session today without incident. Will continue to follow and progress as tolerated.     Plan/Recommendations:   No ongoing therapy recommended post-acute care. No therapy needs.. Pt requires no DME at discharge.

## 2024-07-24 NOTE — PLAN OF CARE
Problem: Adult Inpatient Plan of Care  Goal: Absence of Hospital-Acquired Illness or Injury  Intervention: Identify and Manage Fall Risk  Recent Flowsheet Documentation  Taken 7/24/2024 1606 by Ale Ziegler RN  Safety Promotion/Fall Prevention:   assistive device/personal items within reach   clutter free environment maintained   fall prevention program maintained   lighting adjusted   nonskid shoes/slippers when out of bed   room organization consistent   safety round/check completed  Taken 7/24/2024 1420 by Ale Ziegler RN  Safety Promotion/Fall Prevention:   assistive device/personal items within reach   clutter free environment maintained   fall prevention program maintained   lighting adjusted   nonskid shoes/slippers when out of bed   room organization consistent   safety round/check completed  Taken 7/24/2024 1210 by Ale Ziegler RN  Safety Promotion/Fall Prevention:   assistive device/personal items within reach   clutter free environment maintained   fall prevention program maintained   lighting adjusted   nonskid shoes/slippers when out of bed   room organization consistent   safety round/check completed  Taken 7/24/2024 1012 by Ale Ziegler RN  Safety Promotion/Fall Prevention:   assistive device/personal items within reach   clutter free environment maintained   fall prevention program maintained  Taken 7/24/2024 0840 by Ale Ziegler RN  Safety Promotion/Fall Prevention:   assistive device/personal items within reach   clutter free environment maintained   fall prevention program maintained   lighting adjusted   nonskid shoes/slippers when out of bed   safety round/check completed   room organization consistent  Intervention: Prevent Skin Injury  Recent Flowsheet Documentation  Taken 7/24/2024 1606 by Ale Ziegler RN  Body Position: weight shifting  Taken 7/24/2024 1420 by Ale Ziegler RN  Body Position: weight shifting  Taken 7/24/2024 1210 by Ale Ziegler RN  Body Position: weight  shifting  Taken 7/24/2024 1012 by Ale Ziegler RN  Body Position: weight shifting  Taken 7/24/2024 0840 by Ale Ziegler RN  Body Position: weight shifting  Skin Protection: adhesive use limited  Intervention: Prevent and Manage VTE (Venous Thromboembolism) Risk  Recent Flowsheet Documentation  Taken 7/24/2024 1606 by Ale Ziegler RN  Activity Management: activity encouraged  Taken 7/24/2024 1420 by Ale Ziegler RN  Activity Management: activity encouraged  Taken 7/24/2024 1210 by Ale Ziegler RN  Activity Management: activity encouraged  Taken 7/24/2024 1012 by Ale Ziegler RN  Activity Management: activity encouraged  Taken 7/24/2024 0840 by Ale Ziegler RN  Activity Management: activity encouraged  Range of Motion: active ROM (range of motion) encouraged  Intervention: Prevent Infection  Recent Flowsheet Documentation  Taken 7/24/2024 1606 by Ale Ziegler RN  Infection Prevention:   hand hygiene promoted   personal protective equipment utilized  Taken 7/24/2024 1420 by Ale Ziegler RN  Infection Prevention:   hand hygiene promoted   personal protective equipment utilized  Taken 7/24/2024 1210 by Ale Ziegler RN  Infection Prevention:   hand hygiene promoted   personal protective equipment utilized  Taken 7/24/2024 1012 by Ale Ziegler RN  Infection Prevention:   hand hygiene promoted   personal protective equipment utilized  Taken 7/24/2024 0840 by Ale Ziegler RN  Infection Prevention:   hand hygiene promoted   personal protective equipment utilized  Goal: Optimal Comfort and Wellbeing  Intervention: Provide Person-Centered Care  Recent Flowsheet Documentation  Taken 7/24/2024 0840 by Ale Ziegler RN  Trust Relationship/Rapport: care explained   Goal Outcome Evaluation:      PT up in the chair. Currently on 3L NC. Sats remain in mid 90's. VSS. Sitter at bedside. Bipap intermittent and to be used at bedtime. Plan of care to continue.

## 2024-07-24 NOTE — PROGRESS NOTES
"PULMONARY CRITICAL CARE PROGRESS  NOTE      PATIENT IDENTIFICATION:  Name: Drake Beth  MRN: [id number]  :  1950     Age: 73 y.o.  Sex: male    DATE OF Note:  2024   Referring Physician: Tyrone Raymundo MD                  Subjective:   In bed, on 3 L O2, no SOB, no chest or abd pain, no bowel or bladder issues       Objective:  tMax 24 hrs: Temp (24hrs), Av.4 °F (36.3 °C), Min:96.6 °F (35.9 °C), Max:98 °F (36.7 °C)      Vitals Ranges:   Temp:  [96.6 °F (35.9 °C)-98 °F (36.7 °C)] 97.1 °F (36.2 °C)  Heart Rate:  [50-68] 68  Resp:  [11-25] 11  BP: (119-170)/(66-97) 119/66    Intake and Output Last 3 Shifts:   No intake/output data recorded.    Exam:  /66 (BP Location: Left arm, Patient Position: Lying)   Pulse 68   Temp 97.1 °F (36.2 °C) (Oral)   Resp 11   Ht 152.4 cm (60\")   Wt 47.6 kg (104 lb 15 oz)   SpO2 95%   BMI 20.49 kg/m²     General Appearance:     HEENT:  Normocephalic, without obvious abnormality. Conjunctivae/corneas clear.  Normal external ear canals. Nares normal, no drainage     Neck:  Supple, symmetrical, trachea midline. No JVD.  Lungs /Chest wall:   Bilateral basal rhonchi, respirations unlabored, symmetrical wall movement.     Heart:  Regular rate and rhythm, systolic murmur PMI left sternal border  Abdomen: Soft, nontender, no masses, no organomegaly.    Extremities: Trace edema, no clubbing or cyanosis        Medications:  amLODIPine, 2.5 mg, Oral, Daily  amoxicillin-clavulanate, 1 tablet, Oral, Q12H  apixaban, 5 mg, Oral, BID  arformoterol, 15 mcg, Nebulization, BID - RT  atorvastatin, 10 mg, Oral, Nightly  budesonide, 1 mg, Nebulization, BID - RT  furosemide, 40 mg, Intravenous, Q8H  ipratropium-albuterol, 3 mL, Nebulization, 4x Daily - RT  lubiprostone, 24 mcg, Oral, BID  montelukast, 10 mg, Oral, Nightly  pantoprazole, 40 mg, Oral, Nightly  sotalol, 80 mg, Oral, Q12H        Infusion:          PRN:    senna-docusate sodium **AND** polyethylene glycol " **AND** bisacodyl **AND** bisacodyl    Calcium Replacement - Follow Nurse / BPA Driven Protocol    Magnesium Standard Dose Replacement - Follow Nurse / BPA Driven Protocol    Morphine    ondansetron    Phosphorus Replacement - Follow Nurse / BPA Driven Protocol    Potassium Replacement - Follow Nurse / BPA Driven Protocol  Data Review:  All labs (24hrs):   Recent Results (from the past 24 hour(s))   Comprehensive Metabolic Panel    Collection Time: 07/24/24  3:22 AM    Specimen: Blood   Result Value Ref Range    Glucose 102 (H) 65 - 99 mg/dL    BUN 10 8 - 23 mg/dL    Creatinine 0.76 0.76 - 1.27 mg/dL    Sodium 143 136 - 145 mmol/L    Potassium 3.3 (L) 3.5 - 5.2 mmol/L    Chloride 103 98 - 107 mmol/L    CO2 30.2 (H) 22.0 - 29.0 mmol/L    Calcium 8.2 (L) 8.6 - 10.5 mg/dL    Total Protein 5.9 (L) 6.0 - 8.5 g/dL    Albumin 3.0 (L) 3.5 - 5.2 g/dL    ALT (SGPT) 8 1 - 41 U/L    AST (SGOT) 26 1 - 40 U/L    Alkaline Phosphatase 54 39 - 117 U/L    Total Bilirubin 1.3 (H) 0.0 - 1.2 mg/dL    Globulin 2.9 gm/dL    A/G Ratio 1.0 g/dL    BUN/Creatinine Ratio 13.2 7.0 - 25.0    Anion Gap 9.8 5.0 - 15.0 mmol/L    eGFR 95.5 >60.0 mL/min/1.73   CBC Auto Differential    Collection Time: 07/24/24  3:22 AM    Specimen: Blood   Result Value Ref Range    WBC 9.18 3.40 - 10.80 10*3/mm3    RBC 2.62 (L) 4.14 - 5.80 10*6/mm3    Hemoglobin 8.8 (L) 13.0 - 17.7 g/dL    Hematocrit 27.2 (L) 37.5 - 51.0 %    .8 (H) 79.0 - 97.0 fL    MCH 33.6 (H) 26.6 - 33.0 pg    MCHC 32.4 31.5 - 35.7 g/dL    RDW 14.6 12.3 - 15.4 %    RDW-SD 55.1 (H) 37.0 - 54.0 fl    MPV 9.9 6.0 - 12.0 fL    Platelets 165 140 - 450 10*3/mm3    Neutrophil % 79.7 (H) 42.7 - 76.0 %    Lymphocyte % 10.5 (L) 19.6 - 45.3 %    Monocyte % 8.4 5.0 - 12.0 %    Eosinophil % 0.5 0.3 - 6.2 %    Basophil % 0.2 0.0 - 1.5 %    Immature Grans % 0.7 (H) 0.0 - 0.5 %    Neutrophils, Absolute 7.32 (H) 1.70 - 7.00 10*3/mm3    Lymphocytes, Absolute 0.96 0.70 - 3.10 10*3/mm3    Monocytes, Absolute  0.77 0.10 - 0.90 10*3/mm3    Eosinophils, Absolute 0.05 0.00 - 0.40 10*3/mm3    Basophils, Absolute 0.02 0.00 - 0.20 10*3/mm3    Immature Grans, Absolute 0.06 (H) 0.00 - 0.05 10*3/mm3    nRBC 0.0 0.0 - 0.2 /100 WBC   BNP    Collection Time: 07/24/24  3:22 AM    Specimen: Blood   Result Value Ref Range    proBNP 11,026.0 (H) 0.0 - 900.0 pg/mL   Potassium    Collection Time: 07/24/24  4:30 PM    Specimen: Blood   Result Value Ref Range    Potassium 3.9 3.5 - 5.2 mmol/L        Imaging:  XR Chest 1 View  Narrative: XR CHEST 1 VW    Date of Exam: 7/23/2024 2:35 PM EDT    Indication: hypoxia    Comparison: 7/18/2024    Findings:  There has been worsening with new and increasing infiltrates of the bilateral upper and lower lobes. There is moderate cardiomegaly. There is a small right pleural effusion. There may be a minimal left pleural effusion. There are sternal suture wires.  Impression: Impression:  Findings are most compatible with CHF with moderately severe pulmonary edema. Small right effusion.    Electronically Signed: Mallorie Monge MD    7/23/2024 2:55 PM EDT    Workstation ID: [id number]       ASSESSMENT:  Acute hypoxic/hypercapnic resp failure  Multifocal pneumonia  Sepsis  CAD  HTN  Diastolic CHF  UTI  Hyponatremia  A-fib s/p pacemaker      PLAN:  Encourage to use IS throughout the day   Encourage OOB during the day   PT/OT  Antibiotics  Bronchodilators  Inhaled corticosteroids  Incentive spirometer  Electrolytes/ glycemic control  DVT prophylaxis-Apixaban     Discussed with Dr Devonte Prescott, IZAIAH   7/24/2024  18:32 EDT     I personally have examined  and interviewed the patient. I have reviewed the history, data, problems, assessment and plan with our NP.  Total Critical care time in direct medical management (   ) minutes, This time specifically excludes time spent performing procedures.    Gilberto Whitney MD   7/24/2024  22:17 EDT

## 2024-07-24 NOTE — THERAPY TREATMENT NOTE
Subjective: Pt agreeable to therapeutic plan of care.  Cognition: oriented to Person and Place    Objective:     Bed Mobility: N/A or Not attempted.   Functional Transfers: CGA and with rolling walker     Balance: with UE support and standing CGA  Functional Ambulation: CGA and with rolling walker    Toileting: Dependent  ADL Position: supported standing  ADL Comments: dependent A for myriam care     Upper Body Dressing: Mod-A  ADL Position: unsupported sitting  ADL Comments: mod A for UB dressing     Therapeutic Exercise - 10 Reps Bilaterally, B UE, and B LE AROM unsupported sitting / EOB    Vitals: WNL on 10 L HF with O2 above 92%    Pain: 0 VAS  Location:   Interventions for pain: N/A  Education: Provided education on the importance of mobility in the acute care setting, Verbal/Tactile Cues, ADL training, and Transfer Training      Assessment: Drake Beth presents with ADL impairments affecting function including balance, cognition, endurance / activity tolerance, and strength. Pts cues throughout treatment secondary to cognitive deficits. Demonstrated functioning below baseline abilities indicate the need for continued skilled intervention while inpatient. Tolerating session today without incident. Will continue to follow and progress as tolerated.     Plan/Recommendations:   No ongoing therapy recommended post-acute care. No therapy needs.. Pt requires no DME at discharge.     Pt desires Home with family assist at discharge. Pt cooperative; agreeable to therapeutic recommendations and plan of care.     Modified [place]: N/A = No pre-op stroke/TIA    Post-Tx Position: Up in Chair, Alarms activated, and Call light and personal items within reach  PPE: gloves

## 2024-07-25 ENCOUNTER — APPOINTMENT (OUTPATIENT)
Dept: GENERAL RADIOLOGY | Facility: HOSPITAL | Age: 74
DRG: 871 | End: 2024-07-25
Payer: MEDICARE

## 2024-07-25 ENCOUNTER — INPATIENT HOSPITAL (OUTPATIENT)
Dept: URBAN - METROPOLITAN AREA HOSPITAL 84 | Facility: HOSPITAL | Age: 74
End: 2024-07-25
Payer: MEDICARE

## 2024-07-25 DIAGNOSIS — K59.00 CONSTIPATION, UNSPECIFIED: ICD-10-CM

## 2024-07-25 LAB
ALBUMIN SERPL-MCNC: 3.4 G/DL (ref 3.5–5.2)
ALBUMIN/GLOB SERPL: 1 G/DL
ALP SERPL-CCNC: 61 U/L (ref 39–117)
ALT SERPL W P-5'-P-CCNC: 9 U/L (ref 1–41)
ANION GAP SERPL CALCULATED.3IONS-SCNC: 10.5 MMOL/L (ref 5–15)
AST SERPL-CCNC: 32 U/L (ref 1–40)
BASOPHILS # BLD AUTO: 0.06 10*3/MM3 (ref 0–0.2)
BASOPHILS NFR BLD AUTO: 0.4 % (ref 0–1.5)
BILIRUB SERPL-MCNC: 1.9 MG/DL (ref 0–1.2)
BUN SERPL-MCNC: 10 MG/DL (ref 8–23)
BUN/CREAT SERPL: 12.3 (ref 7–25)
CALCIUM SPEC-SCNC: 8.9 MG/DL (ref 8.6–10.5)
CHLORIDE SERPL-SCNC: 96 MMOL/L (ref 98–107)
CO2 SERPL-SCNC: 37.5 MMOL/L (ref 22–29)
CREAT SERPL-MCNC: 0.81 MG/DL (ref 0.76–1.27)
DEPRECATED RDW RBC AUTO: 57.1 FL (ref 37–54)
EGFRCR SERPLBLD CKD-EPI 2021: 93.7 ML/MIN/1.73
EOSINOPHIL # BLD AUTO: 0.26 10*3/MM3 (ref 0–0.4)
EOSINOPHIL NFR BLD AUTO: 1.9 % (ref 0.3–6.2)
ERYTHROCYTE [DISTWIDTH] IN BLOOD BY AUTOMATED COUNT: 14.7 % (ref 12.3–15.4)
GLOBULIN UR ELPH-MCNC: 3.4 GM/DL
GLUCOSE SERPL-MCNC: 117 MG/DL (ref 65–99)
HCT VFR BLD AUTO: 30.3 % (ref 37.5–51)
HGB BLD-MCNC: 9.7 G/DL (ref 13–17.7)
IMM GRANULOCYTES # BLD AUTO: 0.08 10*3/MM3 (ref 0–0.05)
IMM GRANULOCYTES NFR BLD AUTO: 0.6 % (ref 0–0.5)
LYMPHOCYTES # BLD AUTO: 0.98 10*3/MM3 (ref 0.7–3.1)
LYMPHOCYTES NFR BLD AUTO: 7.1 % (ref 19.6–45.3)
MCH RBC QN AUTO: 34 PG (ref 26.6–33)
MCHC RBC AUTO-ENTMCNC: 32 G/DL (ref 31.5–35.7)
MCV RBC AUTO: 106.3 FL (ref 79–97)
MONOCYTES # BLD AUTO: 1.01 10*3/MM3 (ref 0.1–0.9)
MONOCYTES NFR BLD AUTO: 7.3 % (ref 5–12)
NEUTROPHILS NFR BLD AUTO: 11.43 10*3/MM3 (ref 1.7–7)
NEUTROPHILS NFR BLD AUTO: 82.7 % (ref 42.7–76)
NRBC BLD AUTO-RTO: 0.1 /100 WBC (ref 0–0.2)
PLATELET # BLD AUTO: 183 10*3/MM3 (ref 140–450)
PMV BLD AUTO: 9.6 FL (ref 6–12)
POTASSIUM SERPL-SCNC: 3.4 MMOL/L (ref 3.5–5.2)
POTASSIUM SERPL-SCNC: 3.8 MMOL/L (ref 3.5–5.2)
PROT SERPL-MCNC: 6.8 G/DL (ref 6–8.5)
RBC # BLD AUTO: 2.85 10*6/MM3 (ref 4.14–5.8)
SODIUM SERPL-SCNC: 144 MMOL/L (ref 136–145)
WBC NRBC COR # BLD AUTO: 13.82 10*3/MM3 (ref 3.4–10.8)

## 2024-07-25 PROCEDURE — 94761 N-INVAS EAR/PLS OXIMETRY MLT: CPT

## 2024-07-25 PROCEDURE — 94660 CPAP INITIATION&MGMT: CPT

## 2024-07-25 PROCEDURE — 94799 UNLISTED PULMONARY SVC/PX: CPT

## 2024-07-25 PROCEDURE — 99232 SBSQ HOSP IP/OBS MODERATE 35: CPT | Mod: FS | Performed by: NURSE PRACTITIONER

## 2024-07-25 PROCEDURE — 94664 DEMO&/EVAL PT USE INHALER: CPT

## 2024-07-25 PROCEDURE — 80053 COMPREHEN METABOLIC PANEL: CPT

## 2024-07-25 PROCEDURE — 74018 RADEX ABDOMEN 1 VIEW: CPT

## 2024-07-25 PROCEDURE — 97530 THERAPEUTIC ACTIVITIES: CPT

## 2024-07-25 PROCEDURE — 84132 ASSAY OF SERUM POTASSIUM: CPT | Performed by: INTERNAL MEDICINE

## 2024-07-25 PROCEDURE — 25010000002 FUROSEMIDE PER 20 MG: Performed by: INTERNAL MEDICINE

## 2024-07-25 PROCEDURE — 97116 GAIT TRAINING THERAPY: CPT

## 2024-07-25 PROCEDURE — 85025 COMPLETE CBC W/AUTO DIFF WBC: CPT | Performed by: NURSE PRACTITIONER

## 2024-07-25 RX ORDER — POTASSIUM CHLORIDE 20 MEQ/1
40 TABLET, EXTENDED RELEASE ORAL EVERY 4 HOURS
Status: COMPLETED | OUTPATIENT
Start: 2024-07-25 | End: 2024-07-25

## 2024-07-25 RX ADMIN — FUROSEMIDE 40 MG: 10 INJECTION, SOLUTION INTRAMUSCULAR; INTRAVENOUS at 17:35

## 2024-07-25 RX ADMIN — ATORVASTATIN CALCIUM 10 MG: 10 TABLET, FILM COATED ORAL at 20:26

## 2024-07-25 RX ADMIN — IPRATROPIUM BROMIDE AND ALBUTEROL SULFATE 3 ML: .5; 3 SOLUTION RESPIRATORY (INHALATION) at 14:58

## 2024-07-25 RX ADMIN — BUDESONIDE 1 MG: 0.5 INHALANT RESPIRATORY (INHALATION) at 19:05

## 2024-07-25 RX ADMIN — LUBIPROSTONE 24 MCG: 24 CAPSULE, GELATIN COATED ORAL at 20:26

## 2024-07-25 RX ADMIN — AMOXICILLIN AND CLAVULANATE POTASSIUM 1 TABLET: 875; 125 TABLET, FILM COATED ORAL at 20:26

## 2024-07-25 RX ADMIN — APIXABAN 5 MG: 5 TABLET, FILM COATED ORAL at 09:03

## 2024-07-25 RX ADMIN — ARFORMOTEROL TARTRATE 15 MCG: 15 SOLUTION RESPIRATORY (INHALATION) at 19:10

## 2024-07-25 RX ADMIN — LUBIPROSTONE 24 MCG: 24 CAPSULE, GELATIN COATED ORAL at 09:02

## 2024-07-25 RX ADMIN — ARFORMOTEROL TARTRATE 15 MCG: 15 SOLUTION RESPIRATORY (INHALATION) at 07:40

## 2024-07-25 RX ADMIN — FUROSEMIDE 40 MG: 10 INJECTION, SOLUTION INTRAMUSCULAR; INTRAVENOUS at 09:03

## 2024-07-25 RX ADMIN — PANTOPRAZOLE SODIUM 40 MG: 40 TABLET, DELAYED RELEASE ORAL at 20:26

## 2024-07-25 RX ADMIN — SOTALOL HYDROCHLORIDE 80 MG: 80 TABLET ORAL at 09:02

## 2024-07-25 RX ADMIN — POTASSIUM CHLORIDE 40 MEQ: 1500 TABLET, EXTENDED RELEASE ORAL at 09:02

## 2024-07-25 RX ADMIN — AMLODIPINE BESYLATE 2.5 MG: 5 TABLET ORAL at 09:02

## 2024-07-25 RX ADMIN — APIXABAN 5 MG: 5 TABLET, FILM COATED ORAL at 20:26

## 2024-07-25 RX ADMIN — AMOXICILLIN AND CLAVULANATE POTASSIUM 1 TABLET: 875; 125 TABLET, FILM COATED ORAL at 09:02

## 2024-07-25 RX ADMIN — IPRATROPIUM BROMIDE AND ALBUTEROL SULFATE 3 ML: .5; 3 SOLUTION RESPIRATORY (INHALATION) at 11:09

## 2024-07-25 RX ADMIN — MONTELUKAST 10 MG: 10 TABLET, FILM COATED ORAL at 20:26

## 2024-07-25 RX ADMIN — POTASSIUM CHLORIDE 40 MEQ: 1500 TABLET, EXTENDED RELEASE ORAL at 12:21

## 2024-07-25 RX ADMIN — BUDESONIDE 1 MG: 0.5 INHALANT RESPIRATORY (INHALATION) at 07:44

## 2024-07-25 RX ADMIN — SOTALOL HYDROCHLORIDE 80 MG: 80 TABLET ORAL at 20:26

## 2024-07-25 RX ADMIN — FUROSEMIDE 40 MG: 10 INJECTION, SOLUTION INTRAMUSCULAR; INTRAVENOUS at 00:58

## 2024-07-25 NOTE — PROGRESS NOTES
"PULMONARY CRITICAL CARE PROGRESS  NOTE      PATIENT IDENTIFICATION:  Name: Drake Beth  MRN: [id number]  :  1950     Age: 73 y.o.  Sex: male    DATE OF Note:  2024   Referring Physician: Tyrone Raymundo MD                  Subjective:   Abdominal pain  On 3 L O2, no SOB, no chest or abd pain,  Constipation    Objective:  tMax 24 hrs: Temp (24hrs), Av.4 °F (36.3 °C), Min:97.1 °F (36.2 °C), Max:97.6 °F (36.4 °C)      Vitals Ranges:   Temp:  [97.1 °F (36.2 °C)-97.6 °F (36.4 °C)] 97.6 °F (36.4 °C)  Heart Rate:  [56-68] 63  Resp:  [11-18] 17  BP: (116-153)/(60-79) 116/60    Intake and Output Last 3 Shifts:   I/O last 3 completed shifts:  In: 240 [P.O.:240]  Out: 1470 [Urine:1470]    Exam:  /60 (BP Location: Right arm, Patient Position: Lying)   Pulse 63   Temp 97.6 °F (36.4 °C) (Oral)   Resp 17   Ht 152.4 cm (60\")   Wt 47.6 kg (104 lb 15 oz)   SpO2 100%   BMI 20.49 kg/m²     General Appearance:   Alert awake not in distress  HEENT:  Normocephalic, without obvious abnormality. Conjunctivae/corneas clear.  Normal external ear canals. Nares normal, no drainage     Neck:  Supple, symmetrical, trachea midline. No JVD.  Lungs /Chest wall:   Bilateral basal rhonchi, respirations unlabored, symmetrical wall movement.     Heart:  Regular rate and rhythm, systolic murmur PMI left sternal border  Abdomen: Soft, nontender, no masses, no organomegaly.    Extremities: Trace edema, no clubbing or cyanosis        Medications:  amLODIPine, 2.5 mg, Oral, Daily  amoxicillin-clavulanate, 1 tablet, Oral, Q12H  apixaban, 5 mg, Oral, BID  arformoterol, 15 mcg, Nebulization, BID - RT  atorvastatin, 10 mg, Oral, Nightly  budesonide, 1 mg, Nebulization, BID - RT  furosemide, 40 mg, Intravenous, Q8H  ipratropium-albuterol, 3 mL, Nebulization, 4x Daily - RT  lubiprostone, 24 mcg, Oral, BID  montelukast, 10 mg, Oral, Nightly  pantoprazole, 40 mg, Oral, Nightly  potassium chloride ER, 40 mEq, Oral, Q4H  sotalol, " 80 mg, Oral, Q12H        Infusion:          PRN:    senna-docusate sodium **AND** polyethylene glycol **AND** bisacodyl **AND** bisacodyl    Calcium Replacement - Follow Nurse / BPA Driven Protocol    Magnesium Standard Dose Replacement - Follow Nurse / BPA Driven Protocol    Morphine    ondansetron    Phosphorus Replacement - Follow Nurse / BPA Driven Protocol    Potassium Replacement - Follow Nurse / BPA Driven Protocol  Data Review:  All labs (24hrs):   Recent Results (from the past 24 hour(s))   Potassium    Collection Time: 07/24/24  4:30 PM    Specimen: Blood   Result Value Ref Range    Potassium 3.9 3.5 - 5.2 mmol/L   CBC Auto Differential    Collection Time: 07/25/24  1:05 AM    Specimen: Blood   Result Value Ref Range    WBC 13.82 (H) 3.40 - 10.80 10*3/mm3    RBC 2.85 (L) 4.14 - 5.80 10*6/mm3    Hemoglobin 9.7 (L) 13.0 - 17.7 g/dL    Hematocrit 30.3 (L) 37.5 - 51.0 %    .3 (H) 79.0 - 97.0 fL    MCH 34.0 (H) 26.6 - 33.0 pg    MCHC 32.0 31.5 - 35.7 g/dL    RDW 14.7 12.3 - 15.4 %    RDW-SD 57.1 (H) 37.0 - 54.0 fl    MPV 9.6 6.0 - 12.0 fL    Platelets 183 140 - 450 10*3/mm3    Neutrophil % 82.7 (H) 42.7 - 76.0 %    Lymphocyte % 7.1 (L) 19.6 - 45.3 %    Monocyte % 7.3 5.0 - 12.0 %    Eosinophil % 1.9 0.3 - 6.2 %    Basophil % 0.4 0.0 - 1.5 %    Immature Grans % 0.6 (H) 0.0 - 0.5 %    Neutrophils, Absolute 11.43 (H) 1.70 - 7.00 10*3/mm3    Lymphocytes, Absolute 0.98 0.70 - 3.10 10*3/mm3    Monocytes, Absolute 1.01 (H) 0.10 - 0.90 10*3/mm3    Eosinophils, Absolute 0.26 0.00 - 0.40 10*3/mm3    Basophils, Absolute 0.06 0.00 - 0.20 10*3/mm3    Immature Grans, Absolute 0.08 (H) 0.00 - 0.05 10*3/mm3    nRBC 0.1 0.0 - 0.2 /100 WBC   Comprehensive Metabolic Panel    Collection Time: 07/25/24  4:01 AM    Specimen: Arm, Left; Blood   Result Value Ref Range    Glucose 117 (H) 65 - 99 mg/dL    BUN 10 8 - 23 mg/dL    Creatinine 0.81 0.76 - 1.27 mg/dL    Sodium 144 136 - 145 mmol/L    Potassium 3.4 (L) 3.5 - 5.2 mmol/L     Chloride 96 (L) 98 - 107 mmol/L    CO2 37.5 (H) 22.0 - 29.0 mmol/L    Calcium 8.9 8.6 - 10.5 mg/dL    Total Protein 6.8 6.0 - 8.5 g/dL    Albumin 3.4 (L) 3.5 - 5.2 g/dL    ALT (SGPT) 9 1 - 41 U/L    AST (SGOT) 32 1 - 40 U/L    Alkaline Phosphatase 61 39 - 117 U/L    Total Bilirubin 1.9 (H) 0.0 - 1.2 mg/dL    Globulin 3.4 gm/dL    A/G Ratio 1.0 g/dL    BUN/Creatinine Ratio 12.3 7.0 - 25.0    Anion Gap 10.5 5.0 - 15.0 mmol/L    eGFR 93.7 >60.0 mL/min/1.73        Imaging:  XR Abdomen KUB  Narrative: XR ABDOMEN KUB    Date of Exam: 7/25/2024 10:59 AM EDT    Indication: constipation    Comparison: None available.    Findings:  Mild gaseous distention of the stomach. Grossly nonobstructive bowel gas pattern. Minimal colonic stool burden. Right pleural effusion is noted. There is degenerative change of the spine and right hip.  Impression: Impression:  Minimal colonic stool burden.    Electronically Signed: Nikunj Palacios MD    7/25/2024 11:09 AM EDT    Workstation ID: [id number]       ASSESSMENT:  Acute hypoxic/hypercapnic resp failure  Multifocal pneumonia  Sepsis  CAD  HTN  Diastolic CHF  UTI  Hyponatremia  A-fib s/p pacemaker      PLAN:  GI is following the patient  Watch for vomiting or aspiration  Encourage OOB during the day   PT/OT  Antibiotics  Bronchodilators  Inhaled corticosteroids  Incentive spirometer  Electrolytes/ glycemic control  DVT prophylaxis-Apixaban       Total Critical care time in direct medical management (   ) minutes, This time specifically excludes time spent performing procedures.    Gilberto Whitney MD   7/25/2024  11:59 EDT

## 2024-07-25 NOTE — PLAN OF CARE
Bed mobility - Supervision  Transfers - CGA sit to/from stand and bed to BSC.   Ambulation - 50 feet CGA and with rolling walker  O2 sats stable on 3L O2 today, in the 90s.

## 2024-07-25 NOTE — THERAPY TREATMENT NOTE
"Subjective: Pt agreeable to therapeutic plan of care.    Objective:     Bed mobility - Supervision  Transfers - CGA sit to/from stand and bed to BSC.   Ambulation - 50 feet CGA and with rolling walker    Vitals: WNL    Pain: 0 VAS     Education: Provided education on the importance of mobility in the acute care setting, Verbal/Tactile Cues, Transfer Training, and Gait Training    Assessment: Drake Beth presents with endurance and functional mobility impairments which indicate the need for skilled intervention. Pt will be safe to DC home with family support with use of a walker. Tolerating session today without incident. Will continue to follow and progress as tolerated.     Plan/Recommendations:   If medically appropriate, Low Intensity Therapy recommended post-acute care - This is recommended as therapy feels this patient would require 2-3 visits per week. OP or HH would be the best option depending on patient's home bound status. Consider, if the patient has other  \"skilled\" needs such as wounds, IV antibiotics, etc. Combined with \"low intensity\" could also equate to a SNF. If patient is medically complex, consider LTAC. Pt requires rolling walker at discharge.     Pt desires Home with Home Health at discharge. Pt cooperative; agreeable to therapeutic recommendations and plan of care.         Basic Mobility 6-click:  Rollin = Total, A lot = 2, A little = 3; 4 = None  Supine>Sit:   1 = Total, A lot = 2, A little = 3; 4 = None   Sit>Stand with arms:  1 = Total, A lot = 2, A little = 3; 4 = None  Bed>Chair:   1 = Total, A lot = 2, A little = 3; 4 = None  Ambulate in room:  1 = Total, A lot = 2, A little = 3; 4 = None  3-5 Steps with railin = Total, A lot = 2, A little = 3; 4 = None  Score: 21    Modified Jennings: N/A = No pre-op stroke/TIA    Post-Tx Position: Up in Chair, Staff Present, Alarms activated, and Call light and personal items within reach  PPE: gloves      "

## 2024-07-25 NOTE — PROGRESS NOTES
[facility] MEDICINE SERVICE  DAILY PROGRESS NOTE    NAME: Drake Beth  : 1950  MRN: [id number]      LOS: 6 days     PROVIDER OF SERVICE: Giovanna Daley DO    Chief Complaint: Sepsis    Subjective:     Interval History: Patient seen and examined this morning.  Appears to be doing better, tolerated BiPAP overnight and respiratory status has improved.  Remains on 3 L high flow currently.  Sitter at bedside.    Review of Systems: Unable to obtain full review of systems due to patient's underlying mental status.         Objective:     Vital Signs  Temp:  [96.6 °F (35.9 °C)-97.6 °F (36.4 °C)] 97.4 °F (36.3 °C)  Heart Rate:  [56-68] 56  Resp:  [11-18] 17  BP: (119-153)/(63-79) 129/63  Flow (L/min):  [3-6] 3   Body mass index is 20.49 kg/m².    Physical Exam     General: Awake, alert, NAD  Eyes: PERRL, EOMI, conjunctivae are clear  Cardiovascular: Regular rate and rhythm, no murmurs  Respiratory: Decreased breath sounds bilaterally, no wheezing or rales, unlabored breathing  Abdomen: Soft, nontender, positive bowel sounds, no guarding  Neurologic: Intellectual disability noted, CN grossly intact, moves all extremities spontaneously  Musculoskeletal: Generalized weakness, no other gross deformities  Skin: Warm, dry        Scheduled Meds   amLODIPine, 2.5 mg, Oral, Daily  amoxicillin-clavulanate, 1 tablet, Oral, Q12H  apixaban, 5 mg, Oral, BID  arformoterol, 15 mcg, Nebulization, BID - RT  atorvastatin, 10 mg, Oral, Nightly  budesonide, 1 mg, Nebulization, BID - RT  furosemide, 40 mg, Intravenous, Q8H  ipratropium-albuterol, 3 mL, Nebulization, 4x Daily - RT  lubiprostone, 24 mcg, Oral, BID  montelukast, 10 mg, Oral, Nightly  pantoprazole, 40 mg, Oral, Nightly  potassium chloride ER, 40 mEq, Oral, Q4H  sotalol, 80 mg, Oral, Q12H       PRN Meds     senna-docusate sodium **AND** polyethylene glycol **AND** bisacodyl **AND** bisacodyl    Calcium Replacement - Follow Nurse / BPA Driven Protocol    Magnesium  Standard Dose Replacement - Follow Nurse / BPA Driven Protocol    Morphine    ondansetron    Phosphorus Replacement - Follow Nurse / BPA Driven Protocol    Potassium Replacement - Follow Nurse / BPA Driven Protocol   Infusions           Diagnostic Data    Results from last 7 days   Lab Units 07/25/24  0401 07/25/24  0105   WBC 10*3/mm3  --  13.82*   HEMOGLOBIN g/dL  --  9.7*   HEMATOCRIT %  --  30.3*   PLATELETS 10*3/mm3  --  183   GLUCOSE mg/dL 117*  --    CREATININE mg/dL 0.81  --    BUN mg/dL 10  --    SODIUM mmol/L 144  --    POTASSIUM mmol/L 3.4*  --    AST (SGOT) U/L 32  --    ALT (SGPT) U/L 9  --    ALK PHOS U/L 61  --    BILIRUBIN mg/dL 1.9*  --    ANION GAP mmol/L 10.5  --        XR Chest 1 View    Result Date: 7/23/2024  Impression: Findings are most compatible with CHF with moderately severe pulmonary edema. Small right effusion. Electronically Signed: Mallorie Monge MD  7/23/2024 2:55 PM EDT  Workstation ID: [id number]       I reviewed the patient's new clinical results.    Assessment/Plan:     Active and Resolved Problems  Active Hospital Problems    Diagnosis  POA    **Sepsis [A41.9]  Yes    Acute on chronic diastolic heart failure [I50.33]  Yes    Paroxysmal atrial fibrillation [I48.0]  Yes    Acute respiratory failure with hypoxia and hypercapnia [J96.01, J96.02]  Yes    Hyperkalemia [E87.5]  Yes    Hyponatremia [E87.1]  Yes    Hyperbilirubinemia [E80.6]  Yes    Acute UTI (urinary tract infection) [N39.0]  Yes    Presence of cardiac pacemaker [Z95.0]  Yes    Pneumonia [J18.9]  Yes    Mitral valve replaced [Z95.2]  Not Applicable    Mentally challenged [F79]  Yes    Hypertension [I10]  Yes    Hyperlipidemia [E78.5]  Yes    Coronary artery disease [I25.10]  Yes      Resolved Hospital Problems   No resolved problems to display.       Severe Sepsis without septic shock due to aspiration pneumonia  -CT of the chest and abdomen on admission showed evidence of pneumonia, concerning for aspiration  pneumonia  -Initiated on IV antibiotics with Zosyn but now switched to p.o. Augmentin to complete course of treatment  -Wean O2 as tolerated  -SLP evaluation recommending mechanical soft diet to reduce risk of aspiration  -ID signed off     Acute respiratory failure with hypoxia and hypercapnia  Acute on chronic HFpEF  Valvular heart disease  -Respiratory failure multifactorial from CHF and pneumonia  -Antibiotics as above  -Off BiPAP, remains on high flow 6 L currently  -Chest x-ray on 7/23 with worsening congestion and bilateral pleural effusions  -proBNP also significantly elevated from baseline  -Continue on increased IV Lasix to 40 mg every 8 hours, monitor strict I's and O's  -Echo from previous admission showed preserved EF with moderate severe MR and pulmonary hypertension also noted, previously eval by cardiology and recommended medical management  -Continue GDMT as tolerated     Acute UTI  -Urine culture with no growth  -Antibiotics as above     Paroxysmal atrial fibrillation  -Rate controlled  -Continue home sotalol and Eliquis  -Monitor    Abdominal pain with constipation, improved  -Per patient's family, patient has been having issues with constipation for several weeks  -Has been on laxatives without any improvement  -CT of the abdomen did not show any significant improvement in his stool burden  -Plan for bowel purge per GI  -Initiated Amitiza which he will need to continue discharge  -Patient's pain seems more consistent with PUD  -Initiated PPI  -GI consult appreciated     Hyperbilirubinemia  -Likely secondary to sepsis  -Bilirubin improved     Mentally challenged  -Noted cooperative, follows simple commands     Hypertension   -Continue home amlodipine, sotalol  -Monitor     Hyperlipidemia  -On statin    VTE Prophylaxis:  Pharmacologic VTE prophylaxis orders are present.         Code status is   Code Status and Medical Interventions:   Ordered at: 07/19/24 0354     Code Status (Patient has no pulse  and is not breathing):    CPR (Attempt to Resuscitate)     Medical Interventions (Patient has pulse or is breathing):    Full Support     Dispo: Possible discharge on 7/26 if continues to improve    Signature: Electronically signed by Giovanna Daley DO, 07/25/24, 10:50 EDT.  [facility] Hospitalist Team    Part of this note may be an electronic transcription/translation of spoken language to printed text using the Dragon Dictation System.

## 2024-07-25 NOTE — PLAN OF CARE
Goal Outcome Evaluation:               Pt resting comfortably with no complaints. Alternating between O2 and bipap. Sitter at bedside. Will continue to monitor...

## 2024-07-25 NOTE — PROGRESS NOTES
"Nutrition Services    Patient Name: Drake Beth  YOB: 1950  MRN: [id number]  Admission date: 7/18/2024    PROGRESS NOTE      Encounter Information: Check on for PO intakes.         PO Diet: Diet: Regular/House; Fluid Consistency: Thin (IDDSI 0)   PO Supplements: Boost Plus daily    PO Intake:  0-50%       Current nutrition support:    Nutrition support review:        Labs (reviewed below): Hypokalemia - replaced today        GI Function:  Last documented BM 7/25 (today)       Nutrition Intervention Updates: Continue current diet and encourage good PO intakes       Results from last 7 days   Lab Units 07/25/24  0401 07/24/24  1630 07/24/24  0322 07/23/24  1703   SODIUM mmol/L 144  --  143 139   POTASSIUM mmol/L 3.4* 3.9 3.3* 3.6   CHLORIDE mmol/L 96*  --  103 102   CO2 mmol/L 37.5*  --  30.2* 28.6   BUN mg/dL 10  --  10 11   CREATININE mg/dL 0.81  --  0.76 0.70*   CALCIUM mg/dL 8.9  --  8.2* 8.5*   BILIRUBIN mg/dL 1.9*  --  1.3* 1.2   ALK PHOS U/L 61  --  54 65   ALT (SGPT) U/L 9  --  8 11   AST (SGOT) U/L 32  --  26 30   GLUCOSE mg/dL 117*  --  102* 178*     Results from last 7 days   Lab Units 07/25/24  0105   HEMOGLOBIN g/dL 9.7*   HEMATOCRIT % 30.3*     COVID19   Date Value Ref Range Status   07/18/2024 Not Detected Not Detected - Ref. Range Final     No results found for: \"HGBA1C\"      RD to follow up per protocol.    Electronically signed by:  Kasandra Parson RD  07/25/24 13:03 EDT    "

## 2024-07-25 NOTE — PLAN OF CARE
Goal Outcome Evaluation:         Patient showing no signs of pain or discomfort. Sitter at bedside. Oxygen in place.

## 2024-07-25 NOTE — PROGRESS NOTES
LOS: 6 days   Patient Care Team:  Amada Haskins MD as PCP - General  Josr Rodriguez DPM as Consulting Physician (Podiatry)  Rj Dominique MD as Consulting Physician (Cardiology)      Subjective     Interval History:     Subjective: Patient unable to provide any update on condition.  Multiple bowel movements charted yesterday following sorbitol administration.  Patient appears nontender on abdominal exam.  Sitter present at bedside.      ROS:   Unobtainable secondary to mental disability.       Medication Review:     Current Facility-Administered Medications:     amLODIPine (NORVASC) tablet 2.5 mg, 2.5 mg, Oral, Daily, Nae Edwards APRN, 2.5 mg at 07/25/24 0902    amoxicillin-clavulanate (AUGMENTIN) 875-125 MG per tablet 1 tablet, 1 tablet, Oral, Q12H, Marichuy Cullen APRN, 1 tablet at 07/25/24 0902    apixaban (ELIQUIS) tablet 5 mg, 5 mg, Oral, BID, Nae Edwards APRN, 5 mg at 07/25/24 0903    arformoterol (BROVANA) nebulizer solution 15 mcg, 15 mcg, Nebulization, BID - RT, Gilberto Whitney MD, 15 mcg at 07/25/24 0740    atorvastatin (LIPITOR) tablet 10 mg, 10 mg, Oral, Nightly, Nae Edwards APRN, 10 mg at 07/24/24 2054    sennosides-docusate (PERICOLACE) 8.6-50 MG per tablet 2 tablet, 2 tablet, Oral, BID PRN **AND** polyethylene glycol (MIRALAX) packet 17 g, 17 g, Oral, Daily PRN, 17 g at 07/21/24 2347 **AND** bisacodyl (DULCOLAX) EC tablet 5 mg, 5 mg, Oral, Daily PRN, 5 mg at 07/21/24 2347 **AND** bisacodyl (DULCOLAX) suppository 10 mg, 10 mg, Rectal, Daily PRN, Marisela Edouard, APRN    budesonide (PULMICORT) nebulizer solution 1 mg, 1 mg, Nebulization, BID - RT, Gilberto Whitney MD, 1 mg at 07/25/24 0744    Calcium Replacement - Follow Nurse / BPA Driven Protocol, , Does not apply, PRN, Heydi Valiente APRN    furosemide (LASIX) injection 40 mg, 40 mg, Intravenous, Q8H, Giovanna Daley DO, 40 mg at 07/25/24 0903    ipratropium-albuterol (DUO-NEB) nebulizer  solution 3 mL, 3 mL, Nebulization, 4x Daily - RT, Aleta Prescott APRN, 3 mL at 07/24/24 1949    lubiprostone (AMITIZA) capsule 24 mcg, 24 mcg, Oral, BID, Nae Edwards APRN, 24 mcg at 07/25/24 0902    Magnesium Standard Dose Replacement - Follow Nurse / BPA Driven Protocol, , Does not apply, PRN, Heydi Valiente APRN    montelukast (SINGULAIR) tablet 10 mg, 10 mg, Oral, Nightly, Nae Edwards APRN, 10 mg at 07/24/24 2054    morphine injection 2 mg, 2 mg, Intravenous, Q4H PRN, Marisela Edouard APRN, 1 mg at 07/23/24 1704    ondansetron (ZOFRAN) injection 4 mg, 4 mg, Intravenous, Q6H PRN, Marisela Edouard APRN, 4 mg at 07/23/24 1632    pantoprazole (PROTONIX) EC tablet 40 mg, 40 mg, Oral, Nightly, Ritchie Garcia MD, 40 mg at 07/24/24 2054    Phosphorus Replacement - Follow Nurse / BPA Driven Protocol, , Does not apply, PRN, Heydi Valiente APRN    potassium chloride (KLOR-CON M20) CR tablet 40 mEq, 40 mEq, Oral, Q4H, Giovanna Daley DO, 40 mEq at 07/25/24 0902    Potassium Replacement - Follow Nurse / BPA Driven Protocol, , Does not apply, PRN, Heydi Valiente APRN    sotalol (BETAPACE) tablet 80 mg, 80 mg, Oral, Q12H, Nae Edwards APRN, 80 mg at 07/25/24 0902      Objective     Vital Signs  Vitals:    07/25/24 0743 07/25/24 0744 07/25/24 0749 07/25/24 0800   BP:    129/63   BP Location:    Right arm   Patient Position:    Lying   Pulse: 59 58 58 56   Resp: 18 18 18 17   Temp:    97.4 °F (36.3 °C)   TempSrc:    Axillary   SpO2: 94% 94% 95% 100%   Weight:       Height:           Physical Exam:     General Appearance:    Awake and alert, in no acute distress   Head:    Normocephalic, without obvious abnormality   Eyes:          Conjunctivae normal, anicteric sclera   Throat:   No oral lesions, no thrush, oral mucosa moist   Neck:   No adenopathy, supple, no JVD   Lungs:     respirations regular, even and unlabored   Abdomen:     Soft, non-tender, no rebound or guarding,  "non-distended   Rectal:     Deferred   Extremities:   No edema, no cyanosis   Skin:   No bruising or rash, no jaundice        Results Review:    CBC    Results from last 7 days   Lab Units 07/25/24  0105 07/24/24  0322 07/23/24  1703 07/23/24  0338 07/22/24  0508 07/21/24  0515 07/20/24  0543   WBC 10*3/mm3 13.82* 9.18 9.63 11.05* 10.09 11.82* 12.66*   HEMOGLOBIN g/dL 9.7* 8.8* 10.5* 9.6* 9.2* 9.1* 10.3*   PLATELETS 10*3/mm3 183 165 198 182 168 151 166     CMP   Results from last 7 days   Lab Units 07/25/24  0401 07/24/24  1630 07/24/24  0322 07/23/24  1703 07/23/24  0338 07/22/24  0508 07/21/24  0515 07/20/24  0543 07/18/24  2338   SODIUM mmol/L 144  --  143 139 138 140 135* 135* 128*   POTASSIUM mmol/L 3.4* 3.9 3.3* 3.6 3.8 3.5 3.5 3.3* 5.4*   CHLORIDE mmol/L 96*  --  103 102 103 104 100 97* 89*   CO2 mmol/L 37.5*  --  30.2* 28.6 29.3* 29.4* 29.0 27.6 23.8   BUN mg/dL 10  --  10 11 10 11 12 18 15   CREATININE mg/dL 0.81  --  0.76 0.70* 0.68* 0.87 0.79 0.82 1.00   GLUCOSE mg/dL 117*  --  102* 178* 138* 102* 119* 68 149*   ALBUMIN g/dL 3.4*  --  3.0* 3.5 3.4* 3.0* 3.1* 3.3* 4.2   BILIRUBIN mg/dL 1.9*  --  1.3* 1.2 1.1 1.4* 1.6* 1.5* 2.1*   ALK PHOS U/L 61  --  54 65 58 50 51 58 83   AST (SGOT) U/L 32  --  26 30 31 26 26 35 44*   ALT (SGPT) U/L 9  --  8 11 10 9 11 12 11   LIPASE U/L  --   --   --   --   --   --   --   --  35     Cr Clearance Estimated Creatinine Clearance: 55.5 mL/min (by C-G formula based on SCr of 0.81 mg/dL).  Coag     HbA1C No results found for: \"HGBA1C\"  Results from last 7 days   Lab Units 07/19/24  0330 07/18/24  2338   HSTROP T ng/L 23* 20     Infection   Results from last 7 days   Lab Units 07/23/24  0338 07/19/24  0053 07/19/24  0031   BLOODCX   --  No growth at 5 days  No growth at 5 days  --    URINECX   --   --  No growth   PROCALCITONIN ng/mL 0.22  --   --      UA    Results from last 7 days   Lab Units 07/19/24  0031   NITRITE UA  Negative   WBC UA /HPF 11-20*   BACTERIA UA /HPF " Trace*   SQUAM EPITHEL UA /HPF 0-2   URINECX  No growth     Microbiology Results (last 10 days)       Procedure Component Value - Date/Time    MRSA Screen, PCR (Inpatient) - Swab, Nares [[id number]]  (Normal) Collected: 07/19/24 0520    Lab Status: Final result Specimen: Swab from Nares Updated: 07/19/24 0634     MRSA PCR No MRSA Detected    Narrative:      The negative predictive value of this diagnostic test is high and should only be used to consider de-escalating anti-MRSA therapy. A positive result may indicate colonization with MRSA and must be correlated clinically.    Blood Culture - Blood, Arm, Right [[id number]]  (Normal) Collected: 07/19/24 0053    Lab Status: Final result Specimen: Blood from Arm, Right Updated: 07/24/24 0100     Blood Culture No growth at 5 days    Narrative:      Less than seven (7) mL's of blood was collected.  Insufficient quantity may yield false negative results.    Blood Culture - Blood, Arm, Left [[id number]]  (Normal) Collected: 07/19/24 0053    Lab Status: Final result Specimen: Blood from Arm, Left Updated: 07/24/24 0100     Blood Culture No growth at 5 days    Narrative:      Less than seven (7) mL's of blood was collected.  Insufficient quantity may yield false negative results.    Urine Culture - Urine, Straight Cath [[id number]]  (Normal) Collected: 07/19/24 0031    Lab Status: Final result Specimen: Urine from Straight Cath Updated: 07/20/24 1127     Urine Culture No growth    Respiratory Panel PCR w/COVID-19(SARS-CoV-2) [facility] In-House, NP Swab in UTM/VTM, 2 HR TAT - Swab, Nasopharynx [[id number]]  (Normal) Collected: 07/18/24 2346    Lab Status: Final result Specimen: Swab from Nasopharynx Updated: 07/19/24 0100     ADENOVIRUS, PCR Not Detected     Coronavirus 229E Not Detected     Coronavirus HKU1 Not Detected     Coronavirus NL63 Not Detected     Coronavirus OC43 Not Detected     COVID19 Not Detected     Human Metapneumovirus Not Detected     Human  Rhinovirus/Enterovirus Not Detected     Influenza A PCR Not Detected     Influenza B PCR Not Detected     Parainfluenza Virus 1 Not Detected     Parainfluenza Virus 2 Not Detected     Parainfluenza Virus 3 Not Detected     Parainfluenza Virus 4 Not Detected     RSV, PCR Not Detected     Bordetella pertussis pcr Not Detected     Bordetella parapertussis PCR Not Detected     Chlamydophila pneumoniae PCR Not Detected     Mycoplasma pneumo by PCR Not Detected    Narrative:      In the setting of a positive respiratory panel with a viral infection PLUS a negative procalcitonin without other underlying concern for bacterial infection, consider observing off antibiotics or discontinuation of antibiotics and continue supportive care. If the respiratory panel is positive for atypical bacterial infection (Bordetella pertussis, Chlamydophila pneumoniae, or Mycoplasma pneumoniae), consider antibiotic de-escalation to target atypical bacterial infection.          Imaging Results (Last 72 Hours)       Procedure Component Value Units Date/Time    XR Chest 1 View [[id number]] Collected: 07/23/24 1452     Updated: 07/23/24 1457    Narrative:      XR CHEST 1 VW    Date of Exam: 7/23/2024 2:35 PM EDT    Indication: hypoxia    Comparison: 7/18/2024    Findings:  There has been worsening with new and increasing infiltrates of the bilateral upper and lower lobes. There is moderate cardiomegaly. There is a small right pleural effusion. There may be a minimal left pleural effusion. There are sternal suture wires.      Impression:      Impression:  Findings are most compatible with CHF with moderately severe pulmonary edema. Small right effusion.      Electronically Signed: Mallorie Monge MD    7/23/2024 2:55 PM EDT    Workstation ID: [id number]            Assessment & Plan     ASSESSMENT:  -Abdominal pain  -Constipation  -Mild macrocytic anemia  -Bilateral pneumonia  -A-fib on Eliquis  -CAD s/p CABG  -CHF  -Mitral valve replacement -tissue  valve  -Hypertension  -Hyperlipidemia     PLAN:  Patient is a 73-year-old male with history of mental disability, A-fib on Eliquis, CAD s/p CABG, and tissue mitral valve replacement who presented on 7/18 with complaints of abdominal pain.  Patient recently seen during inpatient admission on 7/12 for similar complaints at which time bowel purge was performed with improvement in symptoms. CT abdomen/pelvis W on admission shows no significant interval change since previous exam.     Patient with multiple bowel movements charted yesterday following repeat sorbitol administration.  KUB this morning shows only minimal colonic stool burden.  Continue Amitiza twice daily.  This will need to be continued on discharge.  Diet as tolerated.  Not much else to add from a GI standpoint as an inpatient at this time.  He can follow-up in our office in 2 to 3 months.  Continue bowel regimen on discharge.  GI will be available as inpatient as needed.    Electronically signed by IZAIAH Peterson, 07/25/24, 9:40 AM EDT.

## 2024-07-26 LAB
ALBUMIN SERPL-MCNC: 3.3 G/DL (ref 3.5–5.2)
ALBUMIN/GLOB SERPL: 1 G/DL
ALP SERPL-CCNC: 57 U/L (ref 39–117)
ALT SERPL W P-5'-P-CCNC: 8 U/L (ref 1–41)
ANION GAP SERPL CALCULATED.3IONS-SCNC: 9.9 MMOL/L (ref 5–15)
AST SERPL-CCNC: 34 U/L (ref 1–40)
BASOPHILS # BLD AUTO: 0.04 10*3/MM3 (ref 0–0.2)
BASOPHILS NFR BLD AUTO: 0.3 % (ref 0–1.5)
BILIRUB SERPL-MCNC: 1.7 MG/DL (ref 0–1.2)
BUN SERPL-MCNC: 11 MG/DL (ref 8–23)
BUN/CREAT SERPL: 12.6 (ref 7–25)
CALCIUM SPEC-SCNC: 9.1 MG/DL (ref 8.6–10.5)
CHLORIDE SERPL-SCNC: 93 MMOL/L (ref 98–107)
CO2 SERPL-SCNC: 39.1 MMOL/L (ref 22–29)
CREAT SERPL-MCNC: 0.87 MG/DL (ref 0.76–1.27)
DEPRECATED RDW RBC AUTO: 55 FL (ref 37–54)
EGFRCR SERPLBLD CKD-EPI 2021: 91.7 ML/MIN/1.73
EOSINOPHIL # BLD AUTO: 0.29 10*3/MM3 (ref 0–0.4)
EOSINOPHIL NFR BLD AUTO: 2.3 % (ref 0.3–6.2)
ERYTHROCYTE [DISTWIDTH] IN BLOOD BY AUTOMATED COUNT: 14.6 % (ref 12.3–15.4)
GLOBULIN UR ELPH-MCNC: 3.4 GM/DL
GLUCOSE SERPL-MCNC: 87 MG/DL (ref 65–99)
HCT VFR BLD AUTO: 30.4 % (ref 37.5–51)
HGB BLD-MCNC: 9.9 G/DL (ref 13–17.7)
IMM GRANULOCYTES # BLD AUTO: 0.08 10*3/MM3 (ref 0–0.05)
IMM GRANULOCYTES NFR BLD AUTO: 0.6 % (ref 0–0.5)
LYMPHOCYTES # BLD AUTO: 1.13 10*3/MM3 (ref 0.7–3.1)
LYMPHOCYTES NFR BLD AUTO: 9 % (ref 19.6–45.3)
MCH RBC QN AUTO: 33.9 PG (ref 26.6–33)
MCHC RBC AUTO-ENTMCNC: 32.6 G/DL (ref 31.5–35.7)
MCV RBC AUTO: 104.1 FL (ref 79–97)
MONOCYTES # BLD AUTO: 0.98 10*3/MM3 (ref 0.1–0.9)
MONOCYTES NFR BLD AUTO: 7.8 % (ref 5–12)
NEUTROPHILS NFR BLD AUTO: 10.06 10*3/MM3 (ref 1.7–7)
NEUTROPHILS NFR BLD AUTO: 80 % (ref 42.7–76)
NRBC BLD AUTO-RTO: 0.2 /100 WBC (ref 0–0.2)
PLATELET # BLD AUTO: 247 10*3/MM3 (ref 140–450)
PMV BLD AUTO: 10.3 FL (ref 6–12)
POTASSIUM SERPL-SCNC: 3.5 MMOL/L (ref 3.5–5.2)
POTASSIUM SERPL-SCNC: 4 MMOL/L (ref 3.5–5.2)
PROT SERPL-MCNC: 6.7 G/DL (ref 6–8.5)
RBC # BLD AUTO: 2.92 10*6/MM3 (ref 4.14–5.8)
SODIUM SERPL-SCNC: 142 MMOL/L (ref 136–145)
WBC NRBC COR # BLD AUTO: 12.58 10*3/MM3 (ref 3.4–10.8)

## 2024-07-26 PROCEDURE — 94799 UNLISTED PULMONARY SVC/PX: CPT

## 2024-07-26 PROCEDURE — 94761 N-INVAS EAR/PLS OXIMETRY MLT: CPT

## 2024-07-26 PROCEDURE — 94660 CPAP INITIATION&MGMT: CPT

## 2024-07-26 PROCEDURE — 94664 DEMO&/EVAL PT USE INHALER: CPT

## 2024-07-26 PROCEDURE — 94618 PULMONARY STRESS TESTING: CPT

## 2024-07-26 PROCEDURE — 97530 THERAPEUTIC ACTIVITIES: CPT

## 2024-07-26 PROCEDURE — 25010000002 FUROSEMIDE PER 20 MG: Performed by: INTERNAL MEDICINE

## 2024-07-26 PROCEDURE — 85025 COMPLETE CBC W/AUTO DIFF WBC: CPT | Performed by: NURSE PRACTITIONER

## 2024-07-26 PROCEDURE — 84132 ASSAY OF SERUM POTASSIUM: CPT | Performed by: INTERNAL MEDICINE

## 2024-07-26 PROCEDURE — 80053 COMPREHEN METABOLIC PANEL: CPT

## 2024-07-26 RX ORDER — LUBIPROSTONE 24 UG/1
24 CAPSULE ORAL 2 TIMES DAILY
Qty: 30 CAPSULE | Refills: 0 | Status: SHIPPED | OUTPATIENT
Start: 2024-07-26 | End: 2024-07-26 | Stop reason: HOSPADM

## 2024-07-26 RX ORDER — MIRTAZAPINE 15 MG/1
15 TABLET, FILM COATED ORAL NIGHTLY
Status: DISCONTINUED | OUTPATIENT
Start: 2024-07-26 | End: 2024-07-27 | Stop reason: HOSPADM

## 2024-07-26 RX ORDER — FUROSEMIDE 40 MG/1
40 TABLET ORAL DAILY
Status: DISCONTINUED | OUTPATIENT
Start: 2024-07-27 | End: 2024-07-27

## 2024-07-26 RX ORDER — PANTOPRAZOLE SODIUM 40 MG/1
40 TABLET, DELAYED RELEASE ORAL NIGHTLY
Qty: 30 TABLET | Refills: 0 | Status: SHIPPED | OUTPATIENT
Start: 2024-07-26 | End: 2024-07-27

## 2024-07-26 RX ORDER — AMOXICILLIN AND CLAVULANATE POTASSIUM 875; 125 MG/1; MG/1
1 TABLET, FILM COATED ORAL EVERY 12 HOURS SCHEDULED
Qty: 1 TABLET | Refills: 0 | Status: SHIPPED | OUTPATIENT
Start: 2024-07-26 | End: 2024-07-27 | Stop reason: HOSPADM

## 2024-07-26 RX ORDER — POTASSIUM CHLORIDE 20 MEQ/1
40 TABLET, EXTENDED RELEASE ORAL EVERY 4 HOURS
Status: COMPLETED | OUTPATIENT
Start: 2024-07-26 | End: 2024-07-26

## 2024-07-26 RX ORDER — FUROSEMIDE 40 MG/1
40 TABLET ORAL DAILY
Qty: 15 TABLET | Refills: 0 | Status: SHIPPED | OUTPATIENT
Start: 2024-07-26 | End: 2024-07-27 | Stop reason: HOSPADM

## 2024-07-26 RX ORDER — POTASSIUM CHLORIDE 20 MEQ/1
20 TABLET, EXTENDED RELEASE ORAL DAILY
Qty: 15 TABLET | Refills: 0 | Status: SHIPPED | OUTPATIENT
Start: 2024-07-26

## 2024-07-26 RX ADMIN — BUDESONIDE 1 MG: 0.5 INHALANT RESPIRATORY (INHALATION) at 19:20

## 2024-07-26 RX ADMIN — BUDESONIDE 1 MG: 0.5 INHALANT RESPIRATORY (INHALATION) at 07:58

## 2024-07-26 RX ADMIN — LUBIPROSTONE 24 MCG: 24 CAPSULE, GELATIN COATED ORAL at 21:14

## 2024-07-26 RX ADMIN — POTASSIUM CHLORIDE 40 MEQ: 1500 TABLET, EXTENDED RELEASE ORAL at 05:55

## 2024-07-26 RX ADMIN — MONTELUKAST 10 MG: 10 TABLET, FILM COATED ORAL at 21:14

## 2024-07-26 RX ADMIN — APIXABAN 5 MG: 5 TABLET, FILM COATED ORAL at 21:14

## 2024-07-26 RX ADMIN — AMOXICILLIN AND CLAVULANATE POTASSIUM 1 TABLET: 875; 125 TABLET, FILM COATED ORAL at 21:14

## 2024-07-26 RX ADMIN — AMOXICILLIN AND CLAVULANATE POTASSIUM 1 TABLET: 875; 125 TABLET, FILM COATED ORAL at 08:53

## 2024-07-26 RX ADMIN — MIRTAZAPINE 15 MG: 15 TABLET, FILM COATED ORAL at 21:14

## 2024-07-26 RX ADMIN — SOTALOL HYDROCHLORIDE 80 MG: 80 TABLET ORAL at 08:54

## 2024-07-26 RX ADMIN — APIXABAN 5 MG: 5 TABLET, FILM COATED ORAL at 08:54

## 2024-07-26 RX ADMIN — NYSTATIN 500000 UNITS: 100000 SUSPENSION ORAL at 21:14

## 2024-07-26 RX ADMIN — LUBIPROSTONE 24 MCG: 24 CAPSULE, GELATIN COATED ORAL at 08:53

## 2024-07-26 RX ADMIN — SOTALOL HYDROCHLORIDE 80 MG: 80 TABLET ORAL at 21:14

## 2024-07-26 RX ADMIN — ARFORMOTEROL TARTRATE 15 MCG: 15 SOLUTION RESPIRATORY (INHALATION) at 08:02

## 2024-07-26 RX ADMIN — ATORVASTATIN CALCIUM 10 MG: 10 TABLET, FILM COATED ORAL at 21:14

## 2024-07-26 RX ADMIN — PANTOPRAZOLE SODIUM 40 MG: 40 TABLET, DELAYED RELEASE ORAL at 21:14

## 2024-07-26 RX ADMIN — ARFORMOTEROL TARTRATE 15 MCG: 15 SOLUTION RESPIRATORY (INHALATION) at 19:25

## 2024-07-26 RX ADMIN — FUROSEMIDE 40 MG: 10 INJECTION, SOLUTION INTRAMUSCULAR; INTRAVENOUS at 00:26

## 2024-07-26 RX ADMIN — POTASSIUM CHLORIDE 40 MEQ: 1500 TABLET, EXTENDED RELEASE ORAL at 08:54

## 2024-07-26 RX ADMIN — FUROSEMIDE 40 MG: 10 INJECTION, SOLUTION INTRAMUSCULAR; INTRAVENOUS at 08:54

## 2024-07-26 RX ADMIN — NYSTATIN 500000 UNITS: 100000 SUSPENSION ORAL at 17:39

## 2024-07-26 NOTE — PROGRESS NOTES
Exercise Oximetry    Patient Name:Drake Beth   MRN: [id number]   Date: 07/26/24             ROOM AIR BASELINE   SpO2% 86 on room air   Heart Rate    Blood Pressure      EXERCISE ON ROOM AIR SpO2% EXERCISE ON O2 @ 2 LPM SpO2%   1 MINUTE  1 MINUTE      2 l/m nc  93   2 MINUTES  2 MINUTES    96   3 MINUTES  3 MINUTES    97   4 MINUTES  4 MINUTES    96   5 MINUTES  5 MINUTES    97   6 MINUTES  6 MINUTES    96              Distance Walked   Distance Walked   Dyspnea (Nishant Scale)   Dyspnea (Nishant Scale)   Fatigue (Nishant Scale)   Fatigue (Nishant Scale)   SpO2% Post Exercise   SpO2% Post Exercise   HR Post Exercise   HR Post Exercise   Time to Recovery   Time to Recovery     Comments: Pt sat 86% on room air after using bedside commode, pt placed on o2 at 2 l/m nc and walked at bedside without further desaturation.

## 2024-07-26 NOTE — PLAN OF CARE
Bed mobility - Supervision w/bedrail and HOB elevated.   Transfers - supervision sit to/from stand and ambulatory transfer ~20' around bed to recliner, with walker and no balance or safety concerns noted.   Ambulation - pt defers today but is agreeable to OOB to chair.

## 2024-07-26 NOTE — THERAPY TREATMENT NOTE
"Subjective: Pt agreeable to therapeutic plan of care.    Objective:     Bed mobility - Supervision w/bedrail and HOB elevated.   Transfers - supervision sit to/from stand and ambulatory transfer ~20' around bed to recliner, with walker and no balance or safety concerns noted.   Ambulation - pt defers today but is agreeable to OOB to chair.     Vitals: WNL 3L O2.     Pain: 0 FACES     Education: Provided education on the importance of mobility in the acute care setting and Transfer Training    Assessment: Drake Beth presents with endurance and functional mobility impairments which indicate the need for skilled intervention. Tolerating session today without incident. Pt is pleasant and agreeable to transfer in room but doesn't want to walk in the [place]. Will continue to follow and progress as tolerated.     Plan/Recommendations:   If medically appropriate, Low Intensity Therapy recommended post-acute care - This is recommended as therapy feels this patient would require 2-3 visits per week. OP or HH would be the best option depending on patient's home bound status. Consider, if the patient has other  \"skilled\" needs such as wounds, IV antibiotics, etc. Combined with \"low intensity\" could also equate to a SNF. If patient is medically complex, consider LTAC. Pt requires no DME at discharge.     Pt desires Home with Home Health and Home with family assist at discharge. However, brother that pt lives with reports concerns about his ability to care for patient at home at this time. Family prefer SNF placement for short term rehab.  Pt cooperative; agreeable to therapeutic recommendations and plan of care.         Basic Mobility 6-click:  Rollin = Total, A lot = 2, A little = 3; 4 = None  Supine>Sit:   1 = Total, A lot = 2, A little = 3; 4 = None   Sit>Stand with arms:  1 = Total, A lot = 2, A little = 3; 4 = None  Bed>Chair:   1 = Total, A lot = 2, A little = 3; 4 = None  Ambulate in room:  1 = Total, A lot " = 2, A little = 3; 4 = None  3-5 Steps with railin = Total, A lot = 2, A little = 3; 4 = None  Score: 23    Modified [place]: N/A = No pre-op stroke/TIA    Post-Tx Position: Up in Chair, Staff Present, Alarms activated, and Call light and personal items within reach  PPE: gloves

## 2024-07-26 NOTE — SIGNIFICANT NOTE
07/26/24 0950   OTHER   Discipline occupational therapist   Rehab Time/Intention   Session Not Performed other (see comments)  (pt has current dc orders, will follow up if pt remains admitted)   Therapy Assessment/Plan (PT)   Criteria for Skilled Interventions Met (PT) yes;meets criteria;skilled treatment is necessary   Recommendation   OT - Next Appointment 07/27/24

## 2024-07-26 NOTE — PROGRESS NOTES
[facility] MEDICINE SERVICE  DAILY PROGRESS NOTE    NAME: Drake Beth  : 1950  MRN: [id number]      LOS: 7 days     PROVIDER OF SERVICE: Giovanna Daley DO    Chief Complaint: Sepsis    Subjective:     Interval History: Patient seen and examined this morning.  Doing stable, sitter at bedside.  Overall improved.      Review of Systems: Unable to obtain full review of systems due to patient's underlying mental status.         Objective:     Vital Signs  Temp:  [97.1 °F (36.2 °C)-97.4 °F (36.3 °C)] 97.2 °F (36.2 °C)  Heart Rate:  [53-68] 63  Resp:  [14-19] 18  BP: (106-120)/(52-62) 106/52  Flow (L/min):  [2-3] 2   Body mass index is 20.49 kg/m².    Physical Exam     General: Awake, alert, NAD  Eyes: PERRL, EOMI, conjunctivae are clear  Cardiovascular: Regular rate and rhythm, no murmurs  Respiratory: Decreased breath sounds bilaterally, no wheezing or rales, unlabored breathing  Abdomen: Soft, nontender, positive bowel sounds, no guarding  Neurologic: Intellectual disability noted, CN grossly intact, moves all extremities spontaneously  Musculoskeletal: Generalized weakness, no other gross deformities  Skin: Warm, dry        Scheduled Meds   amLODIPine, 2.5 mg, Oral, Daily  amoxicillin-clavulanate, 1 tablet, Oral, Q12H  apixaban, 5 mg, Oral, BID  arformoterol, 15 mcg, Nebulization, BID - RT  atorvastatin, 10 mg, Oral, Nightly  budesonide, 1 mg, Nebulization, BID - RT  furosemide, 40 mg, Intravenous, Q8H  ipratropium-albuterol, 3 mL, Nebulization, 4x Daily - RT  lubiprostone, 24 mcg, Oral, BID  montelukast, 10 mg, Oral, Nightly  pantoprazole, 40 mg, Oral, Nightly  sotalol, 80 mg, Oral, Q12H       PRN Meds     senna-docusate sodium **AND** polyethylene glycol **AND** bisacodyl **AND** bisacodyl    Calcium Replacement - Follow Nurse / BPA Driven Protocol    Magnesium Standard Dose Replacement - Follow Nurse / BPA Driven Protocol    ondansetron    Phosphorus Replacement - Follow Nurse / BPA Driven  Protocol    Potassium Replacement - Follow Nurse / BPA Driven Protocol   Infusions           Diagnostic Data    Results from last 7 days   Lab Units 07/26/24  0032   WBC 10*3/mm3 12.58*   HEMOGLOBIN g/dL 9.9*   HEMATOCRIT % 30.4*   PLATELETS 10*3/mm3 247   GLUCOSE mg/dL 87   CREATININE mg/dL 0.87   BUN mg/dL 11   SODIUM mmol/L 142   POTASSIUM mmol/L 3.5   AST (SGOT) U/L 34   ALT (SGPT) U/L 8   ALK PHOS U/L 57   BILIRUBIN mg/dL 1.7*   ANION GAP mmol/L 9.9       XR Abdomen KUB    Result Date: 7/25/2024  Impression: Minimal colonic stool burden. Electronically Signed: Nikunj Palacios MD  7/25/2024 11:09 AM EDT  Workstation ID: [id number]       I reviewed the patient's new clinical results.    Assessment/Plan:     Active and Resolved Problems  Active Hospital Problems    Diagnosis  POA    **Sepsis [A41.9]  Yes    Acute on chronic diastolic heart failure [I50.33]  Yes    Paroxysmal atrial fibrillation [I48.0]  Yes    Acute respiratory failure with hypoxia and hypercapnia [J96.01, J96.02]  Yes    Hyperkalemia [E87.5]  Yes    Hyponatremia [E87.1]  Yes    Hyperbilirubinemia [E80.6]  Yes    Acute UTI (urinary tract infection) [N39.0]  Yes    Presence of cardiac pacemaker [Z95.0]  Yes    Pneumonia [J18.9]  Yes    Mitral valve replaced [Z95.2]  Not Applicable    Mentally challenged [F79]  Yes    Hypertension [I10]  Yes    Hyperlipidemia [E78.5]  Yes    Coronary artery disease [I25.10]  Yes      Resolved Hospital Problems   No resolved problems to display.       Severe Sepsis without septic shock due to aspiration pneumonia  -CT of the chest and abdomen on admission showed evidence of pneumonia, concerning for aspiration pneumonia  -Initiated on IV antibiotics with Zosyn but now switched to p.o. Augmentin to complete course of treatment  -Wean O2 as tolerated  -SLP evaluation recommending mechanical soft diet to reduce risk of aspiration  -ID signed off  -Continue outpatient follow-up with pulmonology     Acute respiratory failure  with hypoxia and hypercapnia  Acute on chronic HFpEF  Valvular heart disease  -Respiratory failure multifactorial from CHF and pneumonia  -Antibiotics as above  -Off BiPAP, remains on high flow 6 L currently  -Chest x-ray on 7/23 with worsening congestion and bilateral pleural effusions  -proBNP also significantly elevated from baseline  -Diuresed with IV Lasix with improvement, continue p.o. Lasix on discharge for few more days  -Echo from previous admission showed preserved EF with moderate severe MR and pulmonary hypertension also noted, previously eval by cardiology and recommended medical management  -Continue GDMT as tolerated  -Continue outpatient follow-up with cardiology and pulmonology     Acute UTI  -Urine culture with no growth  -Antibiotics as above     Paroxysmal atrial fibrillation  -Rate controlled  -Continue home sotalol and Eliquis  -Monitor     Abdominal pain with constipation, improved  -Per patient's family, patient has been having issues with constipation for several weeks  -Has been on laxatives without any improvement  -CT of the abdomen did not show any significant improvement in his stool burden  -Plan for bowel purge per GI  -Initiated Amitiza which he will need to continue discharge however not covered by patient's insurance, continue Linzess on discharge  -Patient's pain seems more consistent with PUD  -Initiated PPI  -GI consult appreciated     Hyperbilirubinemia  -Likely secondary to sepsis  -Bilirubin improved     Mentally challenged  -Noted cooperative, follows simple commands     Hypertension   -Continue home amlodipine, sotalol  -Monitor     Hyperlipidemia  -On statin    VTE Prophylaxis:  Pharmacologic VTE prophylaxis orders are present.    Patient will require less than 30 days of rehab.    Code status is   Code Status and Medical Interventions:   Ordered at: 07/19/24 0354     Code Status (Patient has no pulse and is not breathing):    CPR (Attempt to Resuscitate)     Medical  Interventions (Patient has pulse or is breathing):    Full Support     Dispo: Medically stable for discharge.  Family now wanting possible SNF, case management following.    Signature: Electronically signed by Giovanna Daley DO, 07/26/24, 11:27 EDT.  [facility] Hospitalist Team    Part of this note may be an electronic transcription/translation of spoken language to printed text using the Dragon Dictation System.

## 2024-07-26 NOTE — CASE MANAGEMENT/SOCIAL WORK
Continued Stay Note  [facility]     Patient Name: Drake Beth  MRN: [id number]  Today's Date: 7/26/2024    Admit Date: 7/18/2024    Plan: [facility] Accepted.   Bed ready.   No precert needed.  PASRR Level 1 and LOC approved. [facility]- placed on will call list for 7/27   Discharge Plan       Row Name 07/26/24 1650       Plan    Plan [facility] Accepted.   Bed ready.   No precert needed.  PASRR Level 1 and LOC approved. [facility]- placed on will call list for 7/27    Patient/Family in Agreement with Plan yes    Plan Comments Spoke to Brother Richi on Phone who would like patient to go to SNF on DC. Brother Richi also had questions regarding medical conditions, forwarded concerns to MD and RN, MD called brother.  Discussed SNF Choices.  Choices: 1. [facility], 2. [facility]. 3 [facility].  Sent to [facility] who accepted, bed available today and tomorrow.  PASRR Level 1 and LOC approved.   Notified MD that bed ready, PASRR approved. Per MD  Plan to discharge 7/27, as brother has concerns regarding appetite and patient has thrush, medicaions added.   Initally submitted request for [facility], notified EMS coordator plan for DC tomorrow, moved to will call list for 7/27.   Had discussed Potential dc with son Richi.  Potentially either [facility] or [facility] could transport, but patient would need O2 tank for transport.   Patient does not have home O2 set up.             Expected Discharge Date and Time       Expected Discharge Date Expected Discharge Time    Jul 27, 2024           Kaitlin Freedman RN     Office Phone [phone]  Office Cell [phone]

## 2024-07-26 NOTE — PROGRESS NOTES
"PULMONARY CRITICAL CARE PROGRESS  NOTE      PATIENT IDENTIFICATION:  Name: Drake Beth  MRN: [id number]  :  1950     Age: 73 y.o.  Sex: male    DATE OF Note:  2024   Referring Physician: Tyrone Raymundo MD                  Subjective:   In bed, on 3 L O2, no SOB, no chest or abd pain today, several large BM's since yesterday, no bladder issues     Objective:  tMax 24 hrs: Temp (24hrs), Av.3 °F (36.3 °C), Min:97.1 °F (36.2 °C), Max:97.6 °F (36.4 °C)      Vitals Ranges:   Temp:  [97.1 °F (36.2 °C)-97.6 °F (36.4 °C)] 97.2 °F (36.2 °C)  Heart Rate:  [53-68] 63  Resp:  [14-19] 18  BP: (106-120)/(52-62) 106/52    Intake and Output Last 3 Shifts:   I/O last 3 completed shifts:  In: 480 [P.O.:480]  Out: 1950 [Urine:1950]    Exam:  /52 (BP Location: Right arm, Patient Position: Lying)   Pulse 63   Temp 97.2 °F (36.2 °C) (Oral)   Resp 18   Ht 152.4 cm (60\")   Wt 47.6 kg (104 lb 15 oz)   SpO2 100%   BMI 20.49 kg/m²     General Appearance:   Alert awake not in distress  HEENT:  Normocephalic, without obvious abnormality. Conjunctivae/corneas clear.  Normal external ear canals. Nares normal, no drainage     Neck:  Supple, symmetrical, trachea midline. No JVD.  Lungs /Chest wall:   Bilateral basal rhonchi, respirations unlabored, symmetrical wall movement.     Heart:  Regular rate and rhythm, systolic murmur PMI left sternal border  Abdomen: Soft, nontender, no masses, no organomegaly.    Extremities: Trace edema, no clubbing or cyanosis        Medications:  amLODIPine, 2.5 mg, Oral, Daily  amoxicillin-clavulanate, 1 tablet, Oral, Q12H  apixaban, 5 mg, Oral, BID  arformoterol, 15 mcg, Nebulization, BID - RT  atorvastatin, 10 mg, Oral, Nightly  budesonide, 1 mg, Nebulization, BID - RT  furosemide, 40 mg, Intravenous, Q8H  ipratropium-albuterol, 3 mL, Nebulization, 4x Daily - RT  lubiprostone, 24 mcg, Oral, BID  montelukast, 10 mg, Oral, Nightly  pantoprazole, 40 mg, Oral, Nightly  sotalol, 80 " mg, Oral, Q12H        Infusion:          PRN:    senna-docusate sodium **AND** polyethylene glycol **AND** bisacodyl **AND** bisacodyl    Calcium Replacement - Follow Nurse / BPA Driven Protocol    Magnesium Standard Dose Replacement - Follow Nurse / BPA Driven Protocol    ondansetron    Phosphorus Replacement - Follow Nurse / BPA Driven Protocol    Potassium Replacement - Follow Nurse / BPA Driven Protocol  Data Review:  All labs (24hrs):   Recent Results (from the past 24 hour(s))   Potassium    Collection Time: 07/25/24  3:51 PM    Specimen: Blood   Result Value Ref Range    Potassium 3.8 3.5 - 5.2 mmol/L   Comprehensive Metabolic Panel    Collection Time: 07/26/24 12:32 AM    Specimen: Blood   Result Value Ref Range    Glucose 87 65 - 99 mg/dL    BUN 11 8 - 23 mg/dL    Creatinine 0.87 0.76 - 1.27 mg/dL    Sodium 142 136 - 145 mmol/L    Potassium 3.5 3.5 - 5.2 mmol/L    Chloride 93 (L) 98 - 107 mmol/L    CO2 39.1 (H) 22.0 - 29.0 mmol/L    Calcium 9.1 8.6 - 10.5 mg/dL    Total Protein 6.7 6.0 - 8.5 g/dL    Albumin 3.3 (L) 3.5 - 5.2 g/dL    ALT (SGPT) 8 1 - 41 U/L    AST (SGOT) 34 1 - 40 U/L    Alkaline Phosphatase 57 39 - 117 U/L    Total Bilirubin 1.7 (H) 0.0 - 1.2 mg/dL    Globulin 3.4 gm/dL    A/G Ratio 1.0 g/dL    BUN/Creatinine Ratio 12.6 7.0 - 25.0    Anion Gap 9.9 5.0 - 15.0 mmol/L    eGFR 91.7 >60.0 mL/min/1.73   CBC Auto Differential    Collection Time: 07/26/24 12:32 AM    Specimen: Blood   Result Value Ref Range    WBC 12.58 (H) 3.40 - 10.80 10*3/mm3    RBC 2.92 (L) 4.14 - 5.80 10*6/mm3    Hemoglobin 9.9 (L) 13.0 - 17.7 g/dL    Hematocrit 30.4 (L) 37.5 - 51.0 %    .1 (H) 79.0 - 97.0 fL    MCH 33.9 (H) 26.6 - 33.0 pg    MCHC 32.6 31.5 - 35.7 g/dL    RDW 14.6 12.3 - 15.4 %    RDW-SD 55.0 (H) 37.0 - 54.0 fl    MPV 10.3 6.0 - 12.0 fL    Platelets 247 140 - 450 10*3/mm3    Neutrophil % 80.0 (H) 42.7 - 76.0 %    Lymphocyte % 9.0 (L) 19.6 - 45.3 %    Monocyte % 7.8 5.0 - 12.0 %    Eosinophil % 2.3 0.3 -  6.2 %    Basophil % 0.3 0.0 - 1.5 %    Immature Grans % 0.6 (H) 0.0 - 0.5 %    Neutrophils, Absolute 10.06 (H) 1.70 - 7.00 10*3/mm3    Lymphocytes, Absolute 1.13 0.70 - 3.10 10*3/mm3    Monocytes, Absolute 0.98 (H) 0.10 - 0.90 10*3/mm3    Eosinophils, Absolute 0.29 0.00 - 0.40 10*3/mm3    Basophils, Absolute 0.04 0.00 - 0.20 10*3/mm3    Immature Grans, Absolute 0.08 (H) 0.00 - 0.05 10*3/mm3    nRBC 0.2 0.0 - 0.2 /100 WBC        Imaging:  XR Abdomen KUB  Narrative: XR ABDOMEN KUB    Date of Exam: 7/25/2024 10:59 AM EDT    Indication: constipation    Comparison: None available.    Findings:  Mild gaseous distention of the stomach. Grossly nonobstructive bowel gas pattern. Minimal colonic stool burden. Right pleural effusion is noted. There is degenerative change of the spine and right hip.  Impression: Impression:  Minimal colonic stool burden.    Electronically Signed: Nikunj Palacios MD    7/25/2024 11:09 AM EDT    Workstation ID: [id number]       ASSESSMENT:  Acute hypoxic/hypercapnic resp failure  Multifocal pneumonia  Sepsis  CAD  HTN  Diastolic CHF  UTI  Hyponatremia  A-fib s/p pacemaker      PLAN:  OOB to chair during day   Watch for vomiting or aspiration  PT/OT  Antibiotics  Bronchodilators  Inhaled corticosteroids  Incentive spirometer  Electrolytes/ glycemic control  DVT prophylaxis-Apixaban       Discussed with Dr Devonte Prescott, IZAIAH   7/26/2024  09:46 EDT         I personally have examined  and interviewed the patient. I have reviewed the history, data, problems, assessment and plan with our NP.  Total Critical care time in direct medical management (   ) minutes, This time specifically excludes time spent performing procedures.    Gilberto Whitney MD   7/26/2024  09:49 EDT

## 2024-07-26 NOTE — DISCHARGE PLACEMENT REQUEST
"Marilee Fleming (73 y.o. Male)       Date of Birth   1950    Social Security Number       Address   [address] [place] [address]    Home Phone   [phone]    MRN   [id number]       Hinduism   None    Marital Status   Single                            Admission Date   7/18/24    Admission Type   Emergency    Admitting Provider   Tyrone Raymundo MD    Attending Provider   Giovanna Daley DO    Department, Room/Bed   [facility] 2D, 269/1       Discharge Date       Discharge Disposition   Home-Health Care [facility] [place]    Discharge Destination                                 Attending Provider: Giovanna Daley DO    Allergies: No Known Allergies    Isolation: None   Infection: None   Code Status: CPR    Ht: 152.4 cm (60\")   Wt: 47.6 kg (104 lb 15 oz)    Admission Cmt: None   Principal Problem: Sepsis [A41.9]                   Active Insurance as of 7/18/2024       Primary Coverage       Payor Plan Insurance Group Employer/Plan Group    MEDICARE MEDICARE A & B        Payor Plan Address Payor Plan Phone Number Payor Plan Fax Number Effective Dates    [address] [phone]  7/1/1973 - None Entered    [facility] [id number]         Subscriber Name Subscriber Birth Date Member ID       MARILEE FLEMING 1950 [id number]               Secondary Coverage       Payor Plan Insurance Group Employer/Plan Group    [place]        Payor Plan Address Payor Plan Phone Number Payor Plan Fax Number Effective Dates    [address]   1/1/2000 - None Entered    [place] [address]         Subscriber Name Subscriber Birth Date Member ID       MARILEE FLEMING 1950 [id number]                     Emergency Contacts        (Rel.) Home Phone Work Phone Mobile Phone    LONNIEPETEY (Brother) [phone] -- [phone]    Richi Fleming (Brother) [phone] -- [phone]                 History & Physical        Essing-Nae Singh APRN at 07/19/24 0358       " Attestation signed by Tyrone Raymundo MD at 24 0502    I have reviewed this documentation and agree.                      [facility]  History & Physical    Patient Name: Drake Beth  : 1950  MRN: [id number]  Primary Care Physician:  Amada Haskins MD  Date of admission: 2024  Date and Time of Service: 24 at 0400    Subjective      Chief Complaint: moaning     History of Present Illness: Drake Beth is a 73 y.o. male with a CMH of being mentally challenged patient nonverbal at baseline, presence of a cardiac pacemaker, paroxysmal atrial fibrillation, bioprosthetic mitral valve replacement, coronary artery disease status post CABG, hypertension, hyperlipidemia who presented to [facility] on 2024 with reports from family that he was moaning and grabbing at his chest and abdomen.  Review of records shows he was just admitted here 2024 to 7/15/2024 for likely bilateral pneumonia possible acute on chronic diastolic congestive heart failure paroxysmal atrial fibrillation and abdominal pain.  He was seen by both cardiology and gastroenterology.  CT at that time showed a large stool burden consistent with constipation he was treated with laxatives.  Family apparently refused a colonoscopy per note.  Patient is awake and alert.  He is cooperative and follows simple commands.  He could tell me he needed to use the bathroom.  Minimal information can be obtained from patient due to intellectual disability and nonverbal at baseline.  Family at bedside.  He continues to moan but does not appear to be in pain.  Initial ABG showed a pH of 7.308 pCO2 52.2 pO2 69.8.  He is currently tolerating 15 L high flow nasal cannula oxygen.  Troponin is 20 proBNP 3812, sodium 128 potassium 5.4 chloride 89 glucose 149 AST 44 bilirubin 2.1 lactate was initially 3.5 now 2.0 WBC 14.4 chest x-ray per radiology showed no significant change stable cardiomegaly with  probable mild pulmonary edema pattern with subsegmental atelectatic changes and scarring.  With regard to no significant change previous EKG dated 7/11/2024 showed findings most compatible with bilateral pneumonia.  CT abdomen and pelvis per radiology today shows increase in size of bilateral pleural effusions otherwise no significant interval changes identified since prior exam.  CTA chest per radiology today showed no PE multifocal pneumonia with patchy airspace disease present most pronounced within the left upper lobe mild to moderate underlying pulmonary edema pattern suspected versus groundglass airspace disease with small to moderate bilateral pleural effusions.  EKG today shows sinus rhythm heart rate 71.  Temperature was 99.3.  He was actually hypertensive in the emergency department.  He was given a DuoNeb treatment emergency department, 80 mg p.o. sotalol 40 mg IV Lasix.  Since he triggered sepsis he was started on IV cefepime and IV vancomycin with blood cultures urine ulcers pending. No  IV fluid bolus was given in emergency department due to hypertension and elevated proBNP.  Urinalysis today does show 3+ blood trace leukocytes 11-20 WBCs and trace bacteria.  Given his recent admission here for pneumonia and now triggering sepsis infectious disease has been consulted and he will be admitted for further evaluation and treatment.      Review of Systems   Unable to perform ROS: Patient nonverbal       Personal History     Past Medical History:   Diagnosis Date    Allergic rhinitis     Anemia     Atrial fibrillation     Chesty cough     CHF (congestive heart failure)     Coronary artery disease     Cough     Fever     Hyperlipidemia     Hypertension     Mentally challenged     MVP (mitral valve prolapse)        Past Surgical History:   Procedure Laterality Date    CARDIAC ELECTROPHYSIOLOGY PROCEDURE N/A 4/26/2022    Procedure: PACEMAKER EXTRACTION;  Surgeon: Rj Dominique MD;  Location: [facility]  INVASIVE LOCATION;  Service: Cardiology;  Laterality: N/A;    COLONOSCOPY      He has never had the test done.  His family is refusing any form of colon cancer screening for him.    CORONARY ARTERY BYPASS GRAFT  2008    DR SWEAT    MITRAL VALVE REPLACEMENT  04/2008    WITH BIOPROSTHETIC TISSUE VALVE       Family History: family history includes Coronary artery disease (age of onset: 64) in his mother; Hyperlipidemia in his sister; Hypertension in his brother; Liver disease in his sister; Lung cancer in his sister; Other in his brother; Rheum arthritis in his brother and sister. Otherwise pertinent FHx was reviewed and not pertinent to current issue.    Social History:  reports that he has never smoked. He has never used smokeless tobacco. He reports that he does not drink alcohol and does not use drugs.    Home Medications:  Prior to Admission Medications       Prescriptions Last Dose Informant Patient Reported? Taking?    amLODIPine (NORVASC) 2.5 MG tablet   No No    TAKE 1 TABLET BY MOUTH DAILY    apixaban (Eliquis) 5 MG tablet tablet   No No    TAKE 1 TABLET BY MOUTH TWICE DAILY    atorvastatin (LIPITOR) 10 MG tablet   No No    TAKE 1 TABLET BY MOUTH DAILY    colestipol (COLESTID) 1 g tablet   No No    TAKE 1 TABLET BY MOUTH FOUR TIMES DAILY    linaclotide (LINZESS) 72 MCG capsule capsule   Yes No    Take 1 capsule by mouth Every Morning Before Breakfast.    montelukast (SINGULAIR) 10 MG tablet   No No    TAKE 1 TABLET BY MOUTH DAILY    sotalol (BETAPACE AF) 80 MG tablet tablet   No No    TAKE 1 TABLET BY MOUTH TWICE DAILY              Allergies:  No Known Allergies    Objective      Vitals:   Temp:  [97.6 °F (36.4 °C)-99.3 °F (37.4 °C)] 99.3 °F (37.4 °C)  Heart Rate:  [70-86] 78  Resp:  [16-20] 20  BP: (105-210)/() 169/95  Body mass index is 19.93 kg/m².  Physical Exam  Vitals reviewed.   Constitutional:       Appearance: Normal appearance.   HENT:      Head: Normocephalic and atraumatic.      Right Ear:  External ear normal.      Left Ear: External ear normal.      Nose: Nose normal.      Mouth/Throat:      Mouth: Mucous membranes are moist.   Eyes:      Extraocular Movements: Extraocular movements intact.   Cardiovascular:      Rate and Rhythm: Normal rate and regular rhythm.      Pulses: Normal pulses.      Heart sounds: Normal heart sounds.   Pulmonary:      Breath sounds: Wheezing present.      Comments: On 15 L non rebreather   Abdominal:      Palpations: Abdomen is soft.   Genitourinary:     Penis: Normal.    Musculoskeletal:         General: Normal range of motion.      Cervical back: Normal range of motion and neck supple.   Skin:     General: Skin is warm and dry.   Neurological:      Mental Status: He is alert. Mental status is at baseline.   Psychiatric:      Comments: Intellectual disability, cooperative, follows simple  commands          Diagnostic Data:  Lab Results (last 24 hours)       Procedure Component Value Units Date/Time    Blood Gas, Venous - [[id number]]  (Abnormal) Collected: 07/19/24 0339    Specimen: Venous Blood Updated: 07/19/24 0342     Site Left Radial     pH, Venous 7.313 pH Units      pCO2, Venous 51.3 mm Hg      pO2, Venous 143.5 mm Hg      HCO3, Venous 26.0 mmol/L      Base Excess, Venous -0.8 mmol/L      Comment: Serial Number: [id number]:  [id number]        O2 Saturation, Venous 99.0 %      CO2 Content 27.6 mmol/L      Barometric Pressure for Blood Gas --     Comment: N/A        Modality NRB     FIO2 80 %     High Sensitivity Troponin T 2Hr [[id number]] Collected: 07/19/24 0330    Specimen: Blood Updated: 07/19/24 0333    STAT Lactic Acid, Reflex [[id number]] Collected: 07/19/24 0330    Specimen: Blood Updated: 07/19/24 0333    Urinalysis, Microscopic Only - Straight Cath [[id number]]  (Abnormal) Collected: 07/19/24 0031    Specimen: Urine from Straight Cath Updated: 07/19/24 0129     RBC, UA Too Numerous to Count /HPF      WBC, UA 11-20 /HPF      Bacteria, UA Trace /HPF      Squamous  Epithelial Cells, UA 0-2 /HPF      Hyaline Casts, UA 21-30 /LPF      Methodology Manual Light Microscopy    Urine Culture - Urine, Straight Cath [[id number]] Collected: 07/19/24 0031    Specimen: Urine from Straight Cath Updated: 07/19/24 0129    Urinalysis With Culture If Indicated - Straight Cath [[id number]]  (Abnormal) Collected: 07/19/24 0031    Specimen: Urine from Straight Cath Updated: 07/19/24 0101     Color, UA Orange     Comment: Any Substance that causes an abnormal urine color can alter the accuracy of the chemical reactions.        Appearance, UA Turbid     pH, UA <=5.0     Specific Gravity, UA 1.024     Glucose, UA Negative     Ketones, UA Trace     Bilirubin, UA Negative     Blood, UA Large (3+)     Protein, UA >=300 mg/dL (3+)     Leuk Esterase, UA Trace     Nitrite, UA Negative     Urobilinogen, UA 1.0 E.U./dL    Narrative:      In absence of clinical symptoms, the presence of pyuria, bacteria, and/or nitrites on the urinalysis result does not correlate with infection.    Respiratory Panel PCR w/COVID-19(SARS-CoV-2) DIDI/ANJUM/[facility]/PAD/COR/YING In-House, NP Swab in UTM/VTM, 2 HR TAT - Swab, Nasopharynx [[id number]]  (Normal) Collected: 07/18/24 2346    Specimen: Swab from Nasopharynx Updated: 07/19/24 0100     ADENOVIRUS, PCR Not Detected     Coronavirus 229E Not Detected     Coronavirus HKU1 Not Detected     Coronavirus NL63 Not Detected     Coronavirus OC43 Not Detected     COVID19 Not Detected     Human Metapneumovirus Not Detected     Human Rhinovirus/Enterovirus Not Detected     Influenza A PCR Not Detected     Influenza B PCR Not Detected     Parainfluenza Virus 1 Not Detected     Parainfluenza Virus 2 Not Detected     Parainfluenza Virus 3 Not Detected     Parainfluenza Virus 4 Not Detected     RSV, PCR Not Detected     Bordetella pertussis pcr Not Detected     Bordetella parapertussis PCR Not Detected     Chlamydophila pneumoniae PCR Not Detected     Mycoplasma pneumo by PCR Not Detected     Narrative:      In the setting of a positive respiratory panel with a viral infection PLUS a negative procalcitonin without other underlying concern for bacterial infection, consider observing off antibiotics or discontinuation of antibiotics and continue supportive care. If the respiratory panel is positive for atypical bacterial infection (Bordetella pertussis, Chlamydophila pneumoniae, or Mycoplasma pneumoniae), consider antibiotic de-escalation to target atypical bacterial infection.    Comprehensive Metabolic Panel [[id number]]  (Abnormal) Collected: 07/18/24 2338    Specimen: Blood Updated: 07/19/24 0059     Glucose 149 mg/dL      BUN 15 mg/dL      Creatinine 1.00 mg/dL      Sodium 128 mmol/L      Potassium 5.4 mmol/L      Comment: Slight hemolysis detected by analyzer. Result may be falsely elevated.        Chloride 89 mmol/L      CO2 23.8 mmol/L      Calcium 9.3 mg/dL      Total Protein 7.9 g/dL      Albumin 4.2 g/dL      ALT (SGPT) 11 U/L      AST (SGOT) 44 U/L      Comment: Slight hemolysis detected by analyzer. Result may be falsely elevated.        Alkaline Phosphatase 83 U/L      Total Bilirubin 2.1 mg/dL      Globulin 3.7 gm/dL      A/G Ratio 1.1 g/dL      BUN/Creatinine Ratio 15.0     Anion Gap 15.2 mmol/L      eGFR 80.0 mL/min/1.73     Narrative:      GFR Normal >60  Chronic Kidney Disease <60  Kidney Failure <15    The GFR formula is only valid for adults with stable renal function between ages 18 and 70.    Blood Culture - Blood, Arm, Right [[id number]] Collected: 07/19/24 0053    Specimen: Blood from Arm, Right Updated: 07/19/24 0059    Blood Culture - Blood, Arm, Left [[id number]] Collected: 07/19/24 0053    Specimen: Blood from Arm, Left Updated: 07/19/24 0059    BNP [[id number]]  (Abnormal) Collected: 07/18/24 2338    Specimen: Blood Updated: 07/19/24 0051     proBNP 3,812.0 pg/mL     Narrative:      This assay is used as an aid in the diagnosis of individuals suspected of having heart failure. It  can be used as an aid in the diagnosis of acute decompensated heart failure (ADHF) in patients presenting with signs and symptoms of ADHF to the emergency department (ED). In addition, NT-proBNP of <300 pg/mL indicates ADHF is not likely.    Age Range Result Interpretation  NT-proBNP Concentration (pg/mL:      <50             Positive            >450                   Gray                 300-450                    Negative             <300    50-75           Positive            >900                  Gray                300-900                  Negative            <300      >75             Positive            >1800                  Gray                300-1800                  Negative            <300    High Sensitivity Troponin T [[id number]]  (Normal) Collected: 07/18/24 2338    Specimen: Blood Updated: 07/19/24 0051     HS Troponin T 20 ng/L     Narrative:      High Sensitive Troponin T Reference Range:  <14.0 ng/L- Negative Female for AMI  <22.0 ng/L- Negative Male for AMI  >=14 - Abnormal Female indicating possible myocardial injury.  >=22 - Abnormal Male indicating possible myocardial injury.   Clinicians would have to utilize clinical acumen, EKG, Troponin, and serial changes to determine if it is an Acute Myocardial Infarction or myocardial injury due to an underlying chronic condition.         Lipase [[id number]]  (Normal) Collected: 07/18/24 2338    Specimen: Blood Updated: 07/19/24 0051     Lipase 35 U/L     Blood Gas, Arterial - [[id number]]  (Abnormal) Collected: 07/18/24 2351    Specimen: Arterial Blood Updated: 07/18/24 2357     Site Right Radial     Ankur's Test Positive     pH, Arterial 7.308 pH units      pCO2, Arterial 52.2 mm Hg      pO2, Arterial 69.8 mm Hg      HCO3, Arterial 26.2 mmol/L      Base Excess, Arterial -0.9 mmol/L      Comment: Serial Number: [id number]:  [id number]        O2 Saturation, Arterial 91.7 %      CO2 Content 27.8 mmol/L      Barometric Pressure for Blood Gas --     Comment:  N/A        Modality Cannula     FIO2 40 %      Hemodilution No     PO2/FIO2 175    POC Lactate [[id number]]  (Abnormal) Collected: 07/18/24 2351    Specimen: Blood Updated: 07/18/24 2353     Lactate 3.5 mmol/L      Comment: Serial Number: [id number]:  [id number]       CBC & Differential [[id number]]  (Abnormal) Collected: 07/18/24 2338    Specimen: Blood Updated: 07/18/24 2352    Narrative:      The following orders were created for panel order CBC & Differential.  Procedure                               Abnormality         Status                     ---------                               -----------         ------                     CBC Auto Differential[[id number]]        Abnormal            Final result                 Please view results for these tests on the individual orders.    CBC Auto Differential [[id number]]  (Abnormal) Collected: 07/18/24 2338    Specimen: Blood Updated: 07/18/24 2352     WBC 14.14 10*3/mm3      RBC 3.92 10*6/mm3      Hemoglobin 13.4 g/dL      Hematocrit 39.7 %      .3 fL      MCH 34.2 pg      MCHC 33.8 g/dL      RDW 13.6 %      RDW-SD 50.8 fl      MPV 9.3 fL      Platelets 226 10*3/mm3      Neutrophil % 85.1 %      Lymphocyte % 7.4 %      Monocyte % 4.7 %      Eosinophil % 0.6 %      Basophil % 0.4 %      Immature Grans % 1.8 %      Neutrophils, Absolute 12.03 10*3/mm3      Lymphocytes, Absolute 1.05 10*3/mm3      Monocytes, Absolute 0.66 10*3/mm3      Eosinophils, Absolute 0.09 10*3/mm3      Basophils, Absolute 0.06 10*3/mm3      Immature Grans, Absolute 0.25 10*3/mm3      nRBC 0.1 /100 WBC     Elizabeth Draw [[id number]] Collected: 07/18/24 2338    Specimen: Blood Updated: 07/18/24 2346    Narrative:      The following orders were created for panel order Elizabeth Draw.  Procedure                               Abnormality         Status                     ---------                               -----------         ------                     Green Top (Gel)[[id number]]                                   Final result               Lavender Top[[id number]]                                     Final result               Gold Top - SST[[id number]]                                   Final result               Light Blue Top[[id number]]                                   Final result                 Please view results for these tests on the individual orders.    Green Top (Gel) [[id number]] Collected: 07/18/24 2338    Specimen: Blood Updated: 07/18/24 2346     Extra Tube Hold for add-ons.     Comment: Auto resulted.       Lavender Top [[id number]] Collected: 07/18/24 2338    Specimen: Blood Updated: 07/18/24 2346     Extra Tube hold for add-on     Comment: Auto resulted       Gold Top - SST [[id number]] Collected: 07/18/24 2338    Specimen: Blood Updated: 07/18/24 2346     Extra Tube Hold for add-ons.     Comment: Auto resulted.       Light Blue Top [[id number]] Collected: 07/18/24 2338    Specimen: Blood Updated: 07/18/24 2346     Extra Tube Hold for add-ons.     Comment: Auto resulted                Imaging Results (Last 24 Hours)       Procedure Component Value Units Date/Time    CT Abdomen Pelvis With Contrast [[id number]] Collected: 07/19/24 0308     Updated: 07/19/24 0314    Narrative:         DATE OF EXAM:  7/19/2024, 2:31 A.M.     PROCEDURE:  CT ABDOMEN PELVIS W CONTRAST-     INDICATIONS:   abdominal pain, not otherwise specified     COMPARISON:   7/11/2024.     TECHNIQUE:  Routine transaxial slices were obtained through the abdomen and pelvis  after the intravenous administration of Isovue 370. Reconstructed  coronal and sagittal images were also obtained. Automated exposure  control and iterative construction methods were used. No oral contrast  agent was administered for the exam.     FINDINGS:  Interval increase in size of the bilateral pleural effusions since the  prior 7/11/2024 study. There is motion artifact on the study, especially  of scarring in the upper abdomen. There is gaseous greater  distention of  the stomach. The right kidney is thought to be surgically absent. No  mechanical bowel obstruction. No pneumoperitoneum or pneumatosis. No  acute colitis or diverticulitis. No acute appendicitis is suggested.  Otherwise, no significant interval change is seen since the prior  7/11/2024 CT exam with additional findings as described in the prior  exam report.       Impression:      Increase in size of bilateral pleural effusions. Otherwise, no  significant interval change is identified since the prior exam.            Please note that portions of this note were completed with a voice  recognition program.                       Electronically Signed By-Moe Bourne MD On:7/19/2024 3:12 AM       CT Angiogram Chest Pulmonary Embolism [[id number]] Collected: 07/19/24 0308     Updated: 07/19/24 0312    Narrative:      CT ANGIOGRAM CHEST PULMONARY EMBOLISM-     Date of Exam: 7/19/2024 2:31 AM     Indication: hypoxia, chest pain.     Comparison: None available.     Technique: Serial and axial CT images of the chest were obtained  following the uneventful intravenous administration of 100 cc contrast.  Reconstructions in the coronal and sagittal planes were also performed.  In addition, a 3 D volume rendered image was obtained after post  processing. Automated exposure control and iterative reconstruction  methods were used.     FINDINGS:     Pulmonary Arteries: Excellent contrast opacification of the pulmonary  arteries.  No intraluminal filling defects to suggest pulmonary embolus.   The pulmonary arteries are normal in caliber.     Hilum and Mediastinum: No pathologically enlarged lymph nodes. The heart  appears enlarged..  No significant coronary artery atherosclerotic  disease. Unremarkable thoracic aorta.   No pericardial effusion.     Lung Parenchyma and Pleura: Small to moderate-sized bilateral pleural  effusions are present. Intralobular septal thickening is present. Patchy  groundglass changes are  present throughout the lungs bilaterally. Patchy  areas of airspace disease present within the left upper lobe and the  bilateral lower lobes..     Upper Abdomen: Unremarkable.     Soft tissues: Unremarkable.     Osseous structures: No aggressive focal lytic or sclerotic osseous  lesions.       Impression:         1. No evidence of pulmonary embolus.  2. Multifocal pneumonia with patchy airspace disease present, most  pronounced within the left upper lobe. Mild to moderate underlying  pulmonary edema pattern suspected versus groundglass airspace disease  with small to moderate-sized bilateral pleural effusions.                    Electronically Signed By-Lili Zaldivar MD On:7/19/2024 3:10 AM       XR Chest 1 View [[id number]] Collected: 07/19/24 0021     Updated: 07/19/24 0024    Narrative:      XR CHEST 1 VW    Date of Exam: 7/18/2024 11:53 PM EDT    Indication: dyspnea    Comparison: 7/11/2024.    Findings:  There are no airspace consolidations. No pleural fluid. No pneumothorax. The pulmonary vasculature appears indistinct. The heart appears enlarged. Subsegmental atelectatic changes and scarring present.. No acute osseous abnormality identified.      Impression:      Impression:  No significant change. Stable cardiomegaly with probable mild pulmonary edema pattern with subsegmental atelectatic changes and scarring.        Electronically Signed: Lili Zaldivar MD    7/19/2024 12:22 AM EDT    Workstation ID: [id number]              Assessment & Plan        This is a 73 y.o. male with:    Active and Resolved Problems  Active Hospital Problems    Diagnosis  POA    **Sepsis [A41.9]  Yes     Priority: High    Acute respiratory failure with hypoxia and hypercapnia [J96.01, J96.02]  Yes     Priority: High    Acute on chronic diastolic heart failure [I50.33]  Yes     Priority: Medium    Acute UTI (urinary tract infection) [N39.0]  Yes     Priority: Medium    Pneumonia [J18.9]  Yes     Priority: Medium    Paroxysmal atrial  fibrillation [I48.0]  Yes    Hyperkalemia [E87.5]  Yes    Hyponatremia [E87.1]  Yes    Hyperbilirubinemia [E80.6]  Yes    Presence of cardiac pacemaker [Z95.0]  Yes    Mitral valve replaced [Z95.2]  Not Applicable    Mentally challenged [F79]  Yes    Hypertension [I10]  Yes    Hyperlipidemia [E78.5]  Yes    Coronary artery disease [I25.10]  Yes      Resolved Hospital Problems   No resolved problems to display.     Sepsis, initial lactic 3.5 now 2.0 elevated WBC, likely secondary to continued bilateral pneumonia per CT and possible UTI, not hypotensive no fluid bolus given hypertension and CHF exacerbation normal saline at 50 cc/h, continue IV cefepime and IV vancomycin pharmacy to dose infectious disease consulted    Acute respiratory failure with hypoxia and hypercapnia, likely secondary to pneumonia and acute CHF, tolerating 15 L oxygen nonrebreather attempt to wean, albuterol as needed    Acute on chronic diastolic heart failure proBNP elevated, one-time dose 40 mg IV Lasix, shortness of air likely more secondary to pneumonia but consider cardiology consult if indicated    Acute UTI, trace bacteria see plan above acute urine cultures pending    Continued bilateral pneumonia, see plan above    Paroxysmal atrial fibrillation, EKG shows sinus rhythm, on home sotalol and Eliquis reordered continuous cardiac monitoring    Hyperkalemia potassium 5.4 one-time dose Lokelma    Hyponatremia sodium 128, normal saline at 50 cc/h repeat BMP    Hyperbilirubinemia, likely secondary to sepsis, CT abdomen unchanged from previous exam last admission, seen by gastroenterology diagnosed with constipation on Linzess    Presence of a cardiac pacemaker, noted infectious disease consulted    Mitral valve replacement noted    Mentally challenged, noted cooperative follows simple commands    Hypertension was hypertensive in ED was given home dose of sotalol reordered Norvasc and home sotalol    Hyper lipidemia, on statin    Coronary  "artery disease status post CABG, troponin not elevated EKG no ischemic changes, continuous cardiac monitoring      VTE Prophylaxis:  No VTE prophylaxis order currently exists.        The patient desires to be as follows:    CODE STATUS:    Code Status (Patient has no pulse and is not breathing): CPR (Attempt to Resuscitate)  Medical Interventions (Patient has pulse or is breathing): Full Support            Admission Status:  I believe this patient meets inpatient  status.    Expected Length of Stay: pending clinical course     PDMP and Medication Dispenses via Sidebar reviewed and consistent with patient reported medications.    I discussed the patient's findings and my recommendations with patient and nursing staff.      Signature:     This document has been electronically signed by IZAIAH Sethi on 2024 03:58 EDT   [facility]    Electronically signed by Tyrone Raymundo MD at 24 0502          Physician Progress Notes (last 24 hours)        Gilberto Whitney MD at 24 0946          PULMONARY CRITICAL CARE PROGRESS  NOTE      PATIENT IDENTIFICATION:  Name: Drake Beth  MRN: [id number]  :  1950     Age: 73 y.o.  Sex: male    DATE OF Note:  2024   Referring Physician: Tyrone Raymundo MD                  Subjective:   In bed, on 3 L O2, no SOB, no chest or abd pain today, several large BM's since yesterday, no bladder issues     Objective:  tMax 24 hrs: Temp (24hrs), Av.3 °F (36.3 °C), Min:97.1 °F (36.2 °C), Max:97.6 °F (36.4 °C)      Vitals Ranges:   Temp:  [97.1 °F (36.2 °C)-97.6 °F (36.4 °C)] 97.2 °F (36.2 °C)  Heart Rate:  [53-68] 63  Resp:  [14-19] 18  BP: (106-120)/(52-62) 106/52    Intake and Output Last 3 Shifts:   I/O last 3 completed shifts:  In: 480 [P.O.:480]  Out: 1950 [Urine:1950]    Exam:  /52 (BP Location: Right arm, Patient Position: Lying)   Pulse 63   Temp 97.2 °F (36.2 °C) (Oral)   Resp 18   Ht 152.4 cm (60\")   " Wt 47.6 kg (104 lb 15 oz)   SpO2 100%   BMI 20.49 kg/m²     General Appearance:   Alert awake not in distress  HEENT:  Normocephalic, without obvious abnormality. Conjunctivae/corneas clear.  Normal external ear canals. Nares normal, no drainage     Neck:  Supple, symmetrical, trachea midline. No JVD.  Lungs /Chest wall:   Bilateral basal rhonchi, respirations unlabored, symmetrical wall movement.     Heart:  Regular rate and rhythm, systolic murmur PMI left sternal border  Abdomen: Soft, nontender, no masses, no organomegaly.    Extremities: Trace edema, no clubbing or cyanosis        Medications:  amLODIPine, 2.5 mg, Oral, Daily  amoxicillin-clavulanate, 1 tablet, Oral, Q12H  apixaban, 5 mg, Oral, BID  arformoterol, 15 mcg, Nebulization, BID - RT  atorvastatin, 10 mg, Oral, Nightly  budesonide, 1 mg, Nebulization, BID - RT  furosemide, 40 mg, Intravenous, Q8H  ipratropium-albuterol, 3 mL, Nebulization, 4x Daily - RT  lubiprostone, 24 mcg, Oral, BID  montelukast, 10 mg, Oral, Nightly  pantoprazole, 40 mg, Oral, Nightly  sotalol, 80 mg, Oral, Q12H        Infusion:          PRN:    senna-docusate sodium **AND** polyethylene glycol **AND** bisacodyl **AND** bisacodyl    Calcium Replacement - Follow Nurse / BPA Driven Protocol    Magnesium Standard Dose Replacement - Follow Nurse / BPA Driven Protocol    ondansetron    Phosphorus Replacement - Follow Nurse / BPA Driven Protocol    Potassium Replacement - Follow Nurse / BPA Driven Protocol  Data Review:  All labs (24hrs):   Recent Results (from the past 24 hour(s))   Potassium    Collection Time: 07/25/24  3:51 PM    Specimen: Blood   Result Value Ref Range    Potassium 3.8 3.5 - 5.2 mmol/L   Comprehensive Metabolic Panel    Collection Time: 07/26/24 12:32 AM    Specimen: Blood   Result Value Ref Range    Glucose 87 65 - 99 mg/dL    BUN 11 8 - 23 mg/dL    Creatinine 0.87 0.76 - 1.27 mg/dL    Sodium 142 136 - 145 mmol/L    Potassium 3.5 3.5 - 5.2 mmol/L    Chloride 93  (L) 98 - 107 mmol/L    CO2 39.1 (H) 22.0 - 29.0 mmol/L    Calcium 9.1 8.6 - 10.5 mg/dL    Total Protein 6.7 6.0 - 8.5 g/dL    Albumin 3.3 (L) 3.5 - 5.2 g/dL    ALT (SGPT) 8 1 - 41 U/L    AST (SGOT) 34 1 - 40 U/L    Alkaline Phosphatase 57 39 - 117 U/L    Total Bilirubin 1.7 (H) 0.0 - 1.2 mg/dL    Globulin 3.4 gm/dL    A/G Ratio 1.0 g/dL    BUN/Creatinine Ratio 12.6 7.0 - 25.0    Anion Gap 9.9 5.0 - 15.0 mmol/L    eGFR 91.7 >60.0 mL/min/1.73   CBC Auto Differential    Collection Time: 07/26/24 12:32 AM    Specimen: Blood   Result Value Ref Range    WBC 12.58 (H) 3.40 - 10.80 10*3/mm3    RBC 2.92 (L) 4.14 - 5.80 10*6/mm3    Hemoglobin 9.9 (L) 13.0 - 17.7 g/dL    Hematocrit 30.4 (L) 37.5 - 51.0 %    .1 (H) 79.0 - 97.0 fL    MCH 33.9 (H) 26.6 - 33.0 pg    MCHC 32.6 31.5 - 35.7 g/dL    RDW 14.6 12.3 - 15.4 %    RDW-SD 55.0 (H) 37.0 - 54.0 fl    MPV 10.3 6.0 - 12.0 fL    Platelets 247 140 - 450 10*3/mm3    Neutrophil % 80.0 (H) 42.7 - 76.0 %    Lymphocyte % 9.0 (L) 19.6 - 45.3 %    Monocyte % 7.8 5.0 - 12.0 %    Eosinophil % 2.3 0.3 - 6.2 %    Basophil % 0.3 0.0 - 1.5 %    Immature Grans % 0.6 (H) 0.0 - 0.5 %    Neutrophils, Absolute 10.06 (H) 1.70 - 7.00 10*3/mm3    Lymphocytes, Absolute 1.13 0.70 - 3.10 10*3/mm3    Monocytes, Absolute 0.98 (H) 0.10 - 0.90 10*3/mm3    Eosinophils, Absolute 0.29 0.00 - 0.40 10*3/mm3    Basophils, Absolute 0.04 0.00 - 0.20 10*3/mm3    Immature Grans, Absolute 0.08 (H) 0.00 - 0.05 10*3/mm3    nRBC 0.2 0.0 - 0.2 /100 WBC        Imaging:  XR Abdomen KUB  Narrative: XR ABDOMEN KUB    Date of Exam: 7/25/2024 10:59 AM EDT    Indication: constipation    Comparison: None available.    Findings:  Mild gaseous distention of the stomach. Grossly nonobstructive bowel gas pattern. Minimal colonic stool burden. Right pleural effusion is noted. There is degenerative change of the spine and right hip.  Impression: Impression:  Minimal colonic stool burden.    Electronically Signed: Nikunj Palacios MD  "   2024 11:09 AM EDT    Workstation ID: [id number]       ASSESSMENT:  Acute hypoxic/hypercapnic resp failure  Multifocal pneumonia  Sepsis  CAD  HTN  Diastolic CHF  UTI  Hyponatremia  A-fib s/p pacemaker      PLAN:  OOB to chair during day   Watch for vomiting or aspiration  PT/OT  Antibiotics  Bronchodilators  Inhaled corticosteroids  Incentive spirometer  Electrolytes/ glycemic control  DVT prophylaxis-Apixaban       Discussed with Dr Devonte Prescott, IZAIAH   2024  09:46 EDT         I personally have examined  and interviewed the patient. I have reviewed the history, data, problems, assessment and plan with our NP.  Total Critical care time in direct medical management (   ) minutes, This time specifically excludes time spent performing procedures.    Gilberto Whitney MD   2024  09:49 EDT         Electronically signed by Gilberto Whitney MD at 24 0949       Gilberto Whitney MD at 24 1159          PULMONARY CRITICAL CARE PROGRESS  NOTE      PATIENT IDENTIFICATION:  Name: Drake Beth  MRN: [id number]  :  1950     Age: 73 y.o.  Sex: male    DATE OF Note:  2024   Referring Physician: Tyrone Raymundo MD                  Subjective:   Abdominal pain  On 3 L O2, no SOB, no chest or abd pain,  Constipation    Objective:  tMax 24 hrs: Temp (24hrs), Av.4 °F (36.3 °C), Min:97.1 °F (36.2 °C), Max:97.6 °F (36.4 °C)      Vitals Ranges:   Temp:  [97.1 °F (36.2 °C)-97.6 °F (36.4 °C)] 97.6 °F (36.4 °C)  Heart Rate:  [56-68] 63  Resp:  [11-18] 17  BP: (116-153)/(60-79) 116/60    Intake and Output Last 3 Shifts:   I/O last 3 completed shifts:  In: 240 [P.O.:240]  Out: 1470 [Urine:1470]    Exam:  /60 (BP Location: Right arm, Patient Position: Lying)   Pulse 63   Temp 97.6 °F (36.4 °C) (Oral)   Resp 17   Ht 152.4 cm (60\")   Wt 47.6 kg (104 lb 15 oz)   SpO2 100%   BMI 20.49 kg/m²     General Appearance:   Alert awake not in distress  HEENT:  Normocephalic, without " obvious abnormality. Conjunctivae/corneas clear.  Normal external ear canals. Nares normal, no drainage     Neck:  Supple, symmetrical, trachea midline. No JVD.  Lungs /Chest wall:   Bilateral basal rhonchi, respirations unlabored, symmetrical wall movement.     Heart:  Regular rate and rhythm, systolic murmur PMI left sternal border  Abdomen: Soft, nontender, no masses, no organomegaly.    Extremities: Trace edema, no clubbing or cyanosis        Medications:  amLODIPine, 2.5 mg, Oral, Daily  amoxicillin-clavulanate, 1 tablet, Oral, Q12H  apixaban, 5 mg, Oral, BID  arformoterol, 15 mcg, Nebulization, BID - RT  atorvastatin, 10 mg, Oral, Nightly  budesonide, 1 mg, Nebulization, BID - RT  furosemide, 40 mg, Intravenous, Q8H  ipratropium-albuterol, 3 mL, Nebulization, 4x Daily - RT  lubiprostone, 24 mcg, Oral, BID  montelukast, 10 mg, Oral, Nightly  pantoprazole, 40 mg, Oral, Nightly  potassium chloride ER, 40 mEq, Oral, Q4H  sotalol, 80 mg, Oral, Q12H        Infusion:          PRN:    senna-docusate sodium **AND** polyethylene glycol **AND** bisacodyl **AND** bisacodyl    Calcium Replacement - Follow Nurse / BPA Driven Protocol    Magnesium Standard Dose Replacement - Follow Nurse / BPA Driven Protocol    Morphine    ondansetron    Phosphorus Replacement - Follow Nurse / BPA Driven Protocol    Potassium Replacement - Follow Nurse / BPA Driven Protocol  Data Review:  All labs (24hrs):   Recent Results (from the past 24 hour(s))   Potassium    Collection Time: 07/24/24  4:30 PM    Specimen: Blood   Result Value Ref Range    Potassium 3.9 3.5 - 5.2 mmol/L   CBC Auto Differential    Collection Time: 07/25/24  1:05 AM    Specimen: Blood   Result Value Ref Range    WBC 13.82 (H) 3.40 - 10.80 10*3/mm3    RBC 2.85 (L) 4.14 - 5.80 10*6/mm3    Hemoglobin 9.7 (L) 13.0 - 17.7 g/dL    Hematocrit 30.3 (L) 37.5 - 51.0 %    .3 (H) 79.0 - 97.0 fL    MCH 34.0 (H) 26.6 - 33.0 pg    MCHC 32.0 31.5 - 35.7 g/dL    RDW 14.7 12.3 -  15.4 %    RDW-SD 57.1 (H) 37.0 - 54.0 fl    MPV 9.6 6.0 - 12.0 fL    Platelets 183 140 - 450 10*3/mm3    Neutrophil % 82.7 (H) 42.7 - 76.0 %    Lymphocyte % 7.1 (L) 19.6 - 45.3 %    Monocyte % 7.3 5.0 - 12.0 %    Eosinophil % 1.9 0.3 - 6.2 %    Basophil % 0.4 0.0 - 1.5 %    Immature Grans % 0.6 (H) 0.0 - 0.5 %    Neutrophils, Absolute 11.43 (H) 1.70 - 7.00 10*3/mm3    Lymphocytes, Absolute 0.98 0.70 - 3.10 10*3/mm3    Monocytes, Absolute 1.01 (H) 0.10 - 0.90 10*3/mm3    Eosinophils, Absolute 0.26 0.00 - 0.40 10*3/mm3    Basophils, Absolute 0.06 0.00 - 0.20 10*3/mm3    Immature Grans, Absolute 0.08 (H) 0.00 - 0.05 10*3/mm3    nRBC 0.1 0.0 - 0.2 /100 WBC   Comprehensive Metabolic Panel    Collection Time: 07/25/24  4:01 AM    Specimen: Arm, Left; Blood   Result Value Ref Range    Glucose 117 (H) 65 - 99 mg/dL    BUN 10 8 - 23 mg/dL    Creatinine 0.81 0.76 - 1.27 mg/dL    Sodium 144 136 - 145 mmol/L    Potassium 3.4 (L) 3.5 - 5.2 mmol/L    Chloride 96 (L) 98 - 107 mmol/L    CO2 37.5 (H) 22.0 - 29.0 mmol/L    Calcium 8.9 8.6 - 10.5 mg/dL    Total Protein 6.8 6.0 - 8.5 g/dL    Albumin 3.4 (L) 3.5 - 5.2 g/dL    ALT (SGPT) 9 1 - 41 U/L    AST (SGOT) 32 1 - 40 U/L    Alkaline Phosphatase 61 39 - 117 U/L    Total Bilirubin 1.9 (H) 0.0 - 1.2 mg/dL    Globulin 3.4 gm/dL    A/G Ratio 1.0 g/dL    BUN/Creatinine Ratio 12.3 7.0 - 25.0    Anion Gap 10.5 5.0 - 15.0 mmol/L    eGFR 93.7 >60.0 mL/min/1.73        Imaging:  XR Abdomen KUB  Narrative: XR ABDOMEN KUB    Date of Exam: 7/25/2024 10:59 AM EDT    Indication: constipation    Comparison: None available.    Findings:  Mild gaseous distention of the stomach. Grossly nonobstructive bowel gas pattern. Minimal colonic stool burden. Right pleural effusion is noted. There is degenerative change of the spine and right hip.  Impression: Impression:  Minimal colonic stool burden.    Electronically Signed: Nikunj Palacios MD    7/25/2024 11:09 AM EDT    Workstation ID: [id number]    "    ASSESSMENT:  Acute hypoxic/hypercapnic resp failure  Multifocal pneumonia  Sepsis  CAD  HTN  Diastolic CHF  UTI  Hyponatremia  A-fib s/p pacemaker      PLAN:  GI is following the patient  Watch for vomiting or aspiration  Encourage OOB during the day   PT/OT  Antibiotics  Bronchodilators  Inhaled corticosteroids  Incentive spirometer  Electrolytes/ glycemic control  DVT prophylaxis-Apixaban       Total Critical care time in direct medical management (   ) minutes, This time specifically excludes time spent performing procedures.    Gilberto Whitney MD   7/25/2024  11:59 EDT         Electronically signed by Gilberto Whitney MD at 07/25/24 1202       Consult Notes (last 24 hours)  Notes from 07/25/24 1121 through 07/26/24 1121   No notes of this type exist for this encounter.          Physical Therapy Notes (last 24 hours)        Adwoa Pryor PT at 07/25/24 1700  Version 1 of 1         Subjective: Pt agreeable to therapeutic plan of care.    Objective:     Bed mobility - Supervision  Transfers - CGA sit to/from stand and bed to BSC.   Ambulation - 50 feet CGA and with rolling walker    Vitals: WNL    Pain: 0 VAS     Education: Provided education on the importance of mobility in the acute care setting, Verbal/Tactile Cues, Transfer Training, and Gait Training    Assessment: Drake Beth presents with endurance and functional mobility impairments which indicate the need for skilled intervention. Pt will be safe to DC home with family support with use of a walker. Tolerating session today without incident. Will continue to follow and progress as tolerated.     Plan/Recommendations:   If medically appropriate, Low Intensity Therapy recommended post-acute care - This is recommended as therapy feels this patient would require 2-3 visits per week. OP or HH would be the best option depending on patient's home bound status. Consider, if the patient has other  \"skilled\" needs such as wounds, IV antibiotics, etc. Combined " "with \"low intensity\" could also equate to a SNF. If patient is medically complex, consider LTAC. Pt requires rolling walker at discharge.     Pt desires Home with Home Health at discharge. Pt cooperative; agreeable to therapeutic recommendations and plan of care.         Basic Mobility 6-click:  Rollin = Total, A lot = 2, A little = 3; 4 = None  Supine>Sit:   1 = Total, A lot = 2, A little = 3; 4 = None   Sit>Stand with arms:  1 = Total, A lot = 2, A little = 3; 4 = None  Bed>Chair:   1 = Total, A lot = 2, A little = 3; 4 = None  Ambulate in room:  1 = Total, A lot = 2, A little = 3; 4 = None  3-5 Steps with railin = Total, A lot = 2, A little = 3; 4 = None  Score: 21    Modified Gallia: N/A = No pre-op stroke/TIA    Post-Tx Position: Up in Chair, Staff Present, Alarms activated, and Call light and personal items within reach  PPE: gloves        Electronically signed by Adwoa Pryor, PT at 24 170       Adwoa Pryor, PT at 24 170  Version 1 of 1         Bed mobility - Supervision  Transfers - CGA sit to/from stand and bed to BSC.   Ambulation - 50 feet CGA and with rolling walker  O2 sats stable on 3L O2 today, in the 90s.          Electronically signed by Adwoa Pryor PT at 24 1704          Occupational Therapy Notes (last 24 hours)        Carlie Carpenter OT at 24 0951             24 0950   OTHER   Discipline occupational therapist   Rehab Time/Intention   Session Not Performed other (see comments)  (pt has current dc orders, will follow up if pt remains admitted)   Therapy Assessment/Plan (PT)   Criteria for Skilled Interventions Met (PT) yes;meets criteria;skilled treatment is necessary   Recommendation   OT - Next Appointment 24         Electronically signed by Carlie Carpenter OT at 24 0928       "

## 2024-07-26 NOTE — PLAN OF CARE
Goal Outcome Evaluation:               Pt resting comfortably with no complaints. Pt alternating between bipap and 3L O2. Diuresing with IV Lasix. Pt has had several bowel movements over night. Sitter at bedside. Pt will possibly discharge home today. Will continue to monitor...

## 2024-07-26 NOTE — DISCHARGE SUMMARY
Hospitalist Service   DISCHARGE SUMMARY    Patient Name: Drake Beth  : 1950  MRN: [id number]    Date of Admission: 2024  Date of Discharge:  24    Primary Care Physician: Amada Haskins MD      Discharge Diagnosis:   Severe sepsis secondary to aspiration pneumonia, unspecified organism  Acute respiratory failure with hypoxia and hypercapnia due to pneumonia  Acute on chronic HFpEF  Acute UTI  Paroxysmal A-fib  Hyperbilirubinemia  Hypertension  Mentally challenged  Dyslipidemia        Hospital Course     Hospital Course:  Drake Beth is a 73 y.o. male with a CMH of being mentally challenged patient nonverbal at baseline, presence of a cardiac pacemaker, paroxysmal atrial fibrillation, bioprosthetic mitral valve replacement, coronary artery disease status post CABG, hypertension, hyperlipidemia who presented to [facility] on 2024 with reports from family that he was moaning and grabbing at his chest and abdomen.  Review of records shows he was just admitted here 2024 to 7/15/2024 for likely bilateral pneumonia possible acute on chronic diastolic congestive heart failure paroxysmal atrial fibrillation and abdominal pain.  He was seen by both cardiology and gastroenterology.  CT at that time showed a large stool burden consistent with constipation he was treated with laxatives.  Further details as noted below.  Patient has overall improved and cleared to discharge per all specialties.  Patient is medically stable to discharge home with family with follow-up with PCP, cardiology, pulmonology, and GI as an outpatient.      A/P:    Severe Sepsis without septic shock due to aspiration pneumonia  -CT of the chest and abdomen on admission showed evidence of pneumonia, concerning for aspiration pneumonia  -Initiated on IV antibiotics with Zosyn but now switched to p.o. Augmentin to complete course of treatment  -Wean O2 as tolerated  -SLP evaluation recommending  mechanical soft diet to reduce risk of aspiration  -ID signed off  -Continue outpatient follow-up with pulmonology     Acute respiratory failure with hypoxia and hypercapnia  Acute on chronic HFpEF  Valvular heart disease  -Respiratory failure multifactorial from CHF and pneumonia  -Antibiotics as above  -Off BiPAP, remains on high flow 6 L currently  -Chest x-ray on 7/23 with worsening congestion and bilateral pleural effusions  -proBNP also significantly elevated from baseline  -Diuresed with IV Lasix with improvement, continue p.o. Lasix on discharge for few more days  -Echo from previous admission showed preserved EF with moderate severe MR and pulmonary hypertension also noted, previously eval by cardiology and recommended medical management  -Continue GDMT as tolerated  -Continue outpatient follow-up with cardiology and pulmonology     Acute UTI  -Urine culture with no growth  -Antibiotics as above     Paroxysmal atrial fibrillation  -Rate controlled  -Continue home sotalol and Eliquis  -Monitor     Abdominal pain with constipation, improved  -Per patient's family, patient has been having issues with constipation for several weeks  -Has been on laxatives without any improvement  -CT of the abdomen did not show any significant improvement in his stool burden  -Plan for bowel purge per GI  -Initiated Amitiza which he will need to continue discharge however not covered by patient's insurance, continue Linzess on discharge  -Patient's pain seems more consistent with PUD  -Initiated PPI  -GI consult appreciated     Hyperbilirubinemia  -Likely secondary to sepsis  -Bilirubin improved     Mentally challenged  -Noted cooperative, follows simple commands     Hypertension   -Continue home amlodipine, sotalol  -Monitor     Hyperlipidemia  -On statin    DISCHARGE Follow Up Recommendations for labs and diagnostics:     Follow-up with PCP, pulmonology, GI    Reasons For Change In Medications and Indications for New  Medications:  As noted below    Day of Discharge     Vital Signs:  Temp:  [97.1 °F (36.2 °C)-97.6 °F (36.4 °C)] 97.2 °F (36.2 °C)  Heart Rate:  [53-68] 63  Resp:  [14-19] 18  BP: (106-120)/(52-62) 106/52  Flow (L/min):  [2-3] 2    Physical Exam:  General: Awake, alert, NAD  Eyes: PERRL, EOMI, conjunctivae are clear  Cardiovascular: Regular rate and rhythm, no murmurs  Respiratory: Decreased breath sounds bilaterally, improved, no wheezing or rales, unlabored breathing  Abdomen: Soft, nontender, positive bowel sounds, no guarding  Neurologic: Intellectual disability noted, CN grossly intact, moves all extremities spontaneously  Musculoskeletal: Generalized weakness, no other gross deformities  Skin: Warm, dry        Pertinent  and/or Most Recent Results     LAB RESULTS:      Lab 07/26/24  0032 07/25/24  0105 07/24/24  0322 07/23/24  1703 07/23/24  0338 07/22/24  0508 07/21/24  1311   WBC 12.58* 13.82* 9.18 9.63 11.05*   < >  --    HEMOGLOBIN 9.9* 9.7* 8.8* 10.5* 9.6*   < >  --    HEMATOCRIT 30.4* 30.3* 27.2* 32.5* 30.0*   < >  --    PLATELETS 247 183 165 198 182   < >  --    NEUTROS ABS 10.06* 11.43* 7.32* 7.76* 9.29*   < >  --    IMMATURE GRANS (ABS) 0.08* 0.08* 0.06* 0.16* 0.07*   < >  --    LYMPHS ABS 1.13 0.98 0.96 0.90 0.70   < >  --    MONOS ABS 0.98* 1.01* 0.77 0.73 0.84   < >  --    EOS ABS 0.29 0.26 0.05 0.05 0.12   < >  --    .1* 106.3* 103.8* 105.2* 104.9*   < >  --    PROCALCITONIN  --   --   --   --  0.22  --   --    LACTATE  --   --   --  2.4*  --   --  1.7    < > = values in this interval not displayed.         Lab 07/26/24  0032 07/25/24  1551 07/25/24  0401 07/24/24  1630 07/24/24  0322 07/23/24  1703 07/23/24  0338   SODIUM 142  --  144  --  143 139 138   POTASSIUM 3.5 3.8 3.4* 3.9 3.3* 3.6 3.8   CHLORIDE 93*  --  96*  --  103 102 103   CO2 39.1*  --  37.5*  --  30.2* 28.6 29.3*   ANION GAP 9.9  --  10.5  --  9.8 8.4 5.7   BUN 11  --  10  --  10 11 10   CREATININE 0.87  --  0.81  --  0.76  0.70* 0.68*   EGFR 91.7  --  93.7  --  95.5 97.9 98.8   GLUCOSE 87  --  117*  --  102* 178* 138*   CALCIUM 9.1  --  8.9  --  8.2* 8.5* 8.3*         Lab 07/26/24  0032 07/25/24  0401 07/24/24  0322 07/23/24  1703 07/23/24  0338   TOTAL PROTEIN 6.7 6.8 5.9* 6.7 6.2   ALBUMIN 3.3* 3.4* 3.0* 3.5 3.4*   GLOBULIN 3.4 3.4 2.9 3.2 2.8   ALT (SGPT) 8 9 8 11 10   AST (SGOT) 34 32 26 30 31   BILIRUBIN 1.7* 1.9* 1.3* 1.2 1.1   ALK PHOS 57 61 54 65 58         Lab 07/24/24  0322   PROBNP 11,026.0*                 Lab 07/23/24  1522 07/23/24  1107   PH, ARTERIAL 7.398 7.281*   PCO2, ARTERIAL 49.4* 61.9*   PO2 ART 56.5* 53.2*   O2 SATURATION ART 88.4* 81.7*   FIO2 50 37   HCO3 ART 30.4* 29.1*   BASE EXCESS ART 4.8* 1.7     Brief Urine Lab Results  (Last result in the past 365 days)        Color   Clarity   Blood   Leuk Est   Nitrite   Protein   CREAT   Urine HCG        07/19/24 0031 Orange  Comment: Any Substance that causes an abnormal urine color can alter the accuracy of the chemical reactions.   Turbid   Large (3+)   Trace   Negative   >=300 mg/dL (3+)                 Microbiology Results (last 10 days)       Procedure Component Value - Date/Time    MRSA Screen, PCR (Inpatient) - Swab, Nares [[id number]]  (Normal) Collected: 07/19/24 0520    Lab Status: Final result Specimen: Swab from Nares Updated: 07/19/24 0634     MRSA PCR No MRSA Detected    Narrative:      The negative predictive value of this diagnostic test is high and should only be used to consider de-escalating anti-MRSA therapy. A positive result may indicate colonization with MRSA and must be correlated clinically.    Blood Culture - Blood, Arm, Right [[id number]]  (Normal) Collected: 07/19/24 0053    Lab Status: Final result Specimen: Blood from Arm, Right Updated: 07/24/24 0100     Blood Culture No growth at 5 days    Narrative:      Less than seven (7) mL's of blood was collected.  Insufficient quantity may yield false negative results.    Blood Culture - Blood,  Arm, Left [[id number]]  (Normal) Collected: 07/19/24 0053    Lab Status: Final result Specimen: Blood from Arm, Left Updated: 07/24/24 0100     Blood Culture No growth at 5 days    Narrative:      Less than seven (7) mL's of blood was collected.  Insufficient quantity may yield false negative results.    Urine Culture - Urine, Straight Cath [[id number]]  (Normal) Collected: 07/19/24 0031    Lab Status: Final result Specimen: Urine from Straight Cath Updated: 07/20/24 1127     Urine Culture No growth    Respiratory Panel PCR w/COVID-19(SARS-CoV-2) [facility] In-House, NP Swab in UTM/VTM, 2 HR TAT - Swab, Nasopharynx [[id number]]  (Normal) Collected: 07/18/24 2346    Lab Status: Final result Specimen: Swab from Nasopharynx Updated: 07/19/24 0100     ADENOVIRUS, PCR Not Detected     Coronavirus 229E Not Detected     Coronavirus HKU1 Not Detected     Coronavirus NL63 Not Detected     Coronavirus OC43 Not Detected     COVID19 Not Detected     Human Metapneumovirus Not Detected     Human Rhinovirus/Enterovirus Not Detected     Influenza A PCR Not Detected     Influenza B PCR Not Detected     Parainfluenza Virus 1 Not Detected     Parainfluenza Virus 2 Not Detected     Parainfluenza Virus 3 Not Detected     Parainfluenza Virus 4 Not Detected     RSV, PCR Not Detected     Bordetella pertussis pcr Not Detected     Bordetella parapertussis PCR Not Detected     Chlamydophila pneumoniae PCR Not Detected     Mycoplasma pneumo by PCR Not Detected    Narrative:      In the setting of a positive respiratory panel with a viral infection PLUS a negative procalcitonin without other underlying concern for bacterial infection, consider observing off antibiotics or discontinuation of antibiotics and continue supportive care. If the respiratory panel is positive for atypical bacterial infection (Bordetella pertussis, Chlamydophila pneumoniae, or Mycoplasma pneumoniae), consider antibiotic de-escalation to target atypical  bacterial infection.            XR Abdomen KUB    Result Date: 7/25/2024  Impression: Impression: Minimal colonic stool burden. Electronically Signed: Nikunj Palacios MD  7/25/2024 11:09 AM EDT  Workstation ID: [id number]    XR Chest 1 View    Result Date: 7/23/2024  Impression: Impression: Findings are most compatible with CHF with moderately severe pulmonary edema. Small right effusion. Electronically Signed: Mallorie Monge MD  7/23/2024 2:55 PM EDT  Workstation ID: [id number]    CT Abdomen Pelvis With Contrast    Result Date: 7/19/2024  Impression: Increase in size of bilateral pleural effusions. Otherwise, no significant interval change is identified since the prior exam.    Please note that portions of this note were completed with a voice recognition program.        Electronically Signed By-Moe Bourne MD On:7/19/2024 3:12 AM      CT Angiogram Chest Pulmonary Embolism    Result Date: 7/19/2024  Impression:  1. No evidence of pulmonary embolus. 2. Multifocal pneumonia with patchy airspace disease present, most pronounced within the left upper lobe. Mild to moderate underlying pulmonary edema pattern suspected versus groundglass airspace disease with small to moderate-sized bilateral pleural effusions.       Electronically Signed By-Lili Zaldivar MD On:7/19/2024 3:10 AM      XR Chest 1 View    Result Date: 7/19/2024  Impression: Impression: No significant change. Stable cardiomegaly with probable mild pulmonary edema pattern with subsegmental atelectatic changes and scarring. Electronically Signed: Lili Zaldivar MD  7/19/2024 12:22 AM EDT  Workstation ID: [id number]    XR Chest 1 View    Result Date: 7/11/2024  Impression: Impression: Findings are most compatible with bilateral pneumonia. Electronically Signed: Mallorie Monge MD  7/11/2024 4:17 PM EDT  Workstation ID: [id number]    CT Abdomen Pelvis With Contrast    Result Date: 7/11/2024  Impression: Impression: 1.No acute abnormality identified within the abdomen or  pelvis. 2.Colonic diverticulosis. 3.Absent right kidney. 4.Cardiomegaly with probable pulmonary vascular congestion and small bilateral pleural effusions noted. Atypical infiltrates within the lung bases may have a similar appearance. Please correlate clinically. Electronically Signed: Shane Vauhgn MD  7/11/2024 3:09 PM EDT  Workstation ID: [id number]             Results for orders placed during the hospital encounter of 07/11/24    Adult Transthoracic Echo Complete w/ Color, Spectral and Contrast if Necessary Per Protocol    Interpretation Summary    Left ventricular systolic function is normal. Left ventricular ejection fraction appears to be 51 - 55%.    Left ventricular diastolic function was indeterminate.    The left atrial cavity is moderately dilated.    Left atrial volume is moderately increased.    There is mild calcification of the aortic valve.    A mitral valve mass is present.    Moderate to severe mitral valve regurgitation is present.    There is a bioprosthetic mitral valve present. Transvalvular regurgitation is present in the prosthetic mitral valve.    Moderate tricuspid valve regurgitation is present.    Estimated right ventricular systolic pressure from tricuspid regurgitation is markedly elevated (>55 mmHg).    Moderate to severe pulmonary hypertension is present.    Abnormal study  Recommend transesophageal echo imaging for evaluation of bioprosthetic mitral valve which appears severely regurgitant .,  See documented other abnormalities and findings      Labs Pending at Discharge:      Procedures Performed           Consults:   Consults       Date and Time Order Name Status Description    7/22/2024  5:26 PM Inpatient Gastroenterology Consult      7/19/2024  4:56 PM Inpatient Pulmonology Consult Completed     7/19/2024  3:57 AM Inpatient Infectious Diseases Consult Completed     7/19/2024  3:23 AM Hospitalist (on-call MD unless specified)      7/13/2024 10:18 AM Inpatient Hospitalist Consult  Completed     7/11/2024 11:53 PM Inpatient Gastroenterology Consult Completed               Discharge Details        Discharge Medications        New Medications        Instructions Start Date   amoxicillin-clavulanate 875-125 MG per tablet  Commonly known as: AUGMENTIN   1 tablet, Oral, Every 12 Hours Scheduled      furosemide 40 MG tablet  Commonly known as: Lasix   40 mg, Oral, Daily      pantoprazole 40 MG EC tablet  Commonly known as: PROTONIX   40 mg, Oral, Nightly      potassium chloride 20 MEQ CR tablet  Commonly known as: KLOR-CON M20   20 mEq, Oral, Daily             Continue These Medications        Instructions Start Date   amLODIPine 2.5 MG tablet  Commonly known as: NORVASC   TAKE 1 TABLET BY MOUTH DAILY      atorvastatin 10 MG tablet  Commonly known as: LIPITOR   10 mg, Oral, Daily      Eliquis 5 MG tablet tablet  Generic drug: apixaban   5 mg, Oral, 2 Times Daily      lactulose 10 GM/15ML solution  Commonly known as: CHRONULAC   15 mL, Oral, 2 Times Daily PRN      linaclotide 72 MCG capsule capsule  Commonly known as: LINZESS   72 mcg, Oral, Every Morning Before Breakfast      montelukast 10 MG tablet  Commonly known as: SINGULAIR   10 mg, Oral, Daily      multivitamin with minerals tablet tablet   1 tablet, Oral, Daily      sotalol 80 MG tablet tablet  Commonly known as: BETAPACE AF   TAKE 1 TABLET BY MOUTH TWICE DAILY               No Known Allergies      Discharge Disposition:  Home-Health Care [facility]    Diet:  Hospital:  Diet Order   Procedures    Diet: Regular/House; Fluid Consistency: Thin (IDDSI 0)         Discharge Activity:         CODE STATUS:  Code Status and Medical Interventions:   Ordered at: 07/19/24 0354     Code Status (Patient has no pulse and is not breathing):    CPR (Attempt to Resuscitate)     Medical Interventions (Patient has pulse or is breathing):    Full Support         No future appointments.    Additional Instructions for the Follow-ups that You Need to Schedule        Discharge Follow-up with PCP   As directed       Currently Documented PCP:    Amada Haskins MD    PCP Phone Number:    [phone]     Follow Up Details: Follow-up with your PCP in 1 week.                Time spent on Discharge including face to face service:  44 minutes    Signature:    Electronically signed by Giovanna Daley DO, 07/26/24, 10:17 AM EDT.      Part of this note may be an electronic transcription/translation of spoken language to printed text using the Dragon Dictation System.

## 2024-07-26 NOTE — PLAN OF CARE
Goal Outcome Evaluation:         Patient alert. Resting comfortably throughout shift. Up to chair for meals. Appetite poor. Assessment of oral cavity revealed thrush. Swish and swallow started. Plan to dc to [facility] tomorrow.

## 2024-07-26 NOTE — SIGNIFICANT NOTE
07/26/24 1625   PASRR   PASRR Status Approved/Complete  ([place] [facility] (Exempted Hospital Discharge 1 and 2) & LOC Short Assessment ([facility] - Short Term) - Approved)

## 2024-07-27 VITALS
HEIGHT: 60 IN | DIASTOLIC BLOOD PRESSURE: 60 MMHG | RESPIRATION RATE: 16 BRPM | OXYGEN SATURATION: 99 % | SYSTOLIC BLOOD PRESSURE: 90 MMHG | HEART RATE: 103 BPM | TEMPERATURE: 98.2 F | WEIGHT: 104.94 LBS | BODY MASS INDEX: 20.6 KG/M2

## 2024-07-27 LAB
ALBUMIN SERPL-MCNC: 3 G/DL (ref 3.5–5.2)
ALBUMIN/GLOB SERPL: 0.9 G/DL
ALP SERPL-CCNC: 55 U/L (ref 39–117)
ALT SERPL W P-5'-P-CCNC: <5 U/L (ref 1–41)
ANION GAP SERPL CALCULATED.3IONS-SCNC: 10.6 MMOL/L (ref 5–15)
AST SERPL-CCNC: 25 U/L (ref 1–40)
BASOPHILS # BLD AUTO: 0.04 10*3/MM3 (ref 0–0.2)
BASOPHILS NFR BLD AUTO: 0.4 % (ref 0–1.5)
BILIRUB SERPL-MCNC: 1.3 MG/DL (ref 0–1.2)
BUN SERPL-MCNC: 16 MG/DL (ref 8–23)
BUN/CREAT SERPL: 16.3 (ref 7–25)
CALCIUM SPEC-SCNC: 9.1 MG/DL (ref 8.6–10.5)
CHLORIDE SERPL-SCNC: 97 MMOL/L (ref 98–107)
CO2 SERPL-SCNC: 33.4 MMOL/L (ref 22–29)
CREAT SERPL-MCNC: 0.98 MG/DL (ref 0.76–1.27)
DEPRECATED RDW RBC AUTO: 54.7 FL (ref 37–54)
EGFRCR SERPLBLD CKD-EPI 2021: 81.9 ML/MIN/1.73
EOSINOPHIL # BLD AUTO: 0.24 10*3/MM3 (ref 0–0.4)
EOSINOPHIL NFR BLD AUTO: 2.5 % (ref 0.3–6.2)
ERYTHROCYTE [DISTWIDTH] IN BLOOD BY AUTOMATED COUNT: 14.6 % (ref 12.3–15.4)
GLOBULIN UR ELPH-MCNC: 3.2 GM/DL
GLUCOSE SERPL-MCNC: 79 MG/DL (ref 65–99)
HCT VFR BLD AUTO: 28.9 % (ref 37.5–51)
HGB BLD-MCNC: 9.5 G/DL (ref 13–17.7)
IMM GRANULOCYTES # BLD AUTO: 0.06 10*3/MM3 (ref 0–0.05)
IMM GRANULOCYTES NFR BLD AUTO: 0.6 % (ref 0–0.5)
LYMPHOCYTES # BLD AUTO: 1.74 10*3/MM3 (ref 0.7–3.1)
LYMPHOCYTES NFR BLD AUTO: 17.8 % (ref 19.6–45.3)
MCH RBC QN AUTO: 34.3 PG (ref 26.6–33)
MCHC RBC AUTO-ENTMCNC: 32.9 G/DL (ref 31.5–35.7)
MCV RBC AUTO: 104.3 FL (ref 79–97)
MONOCYTES # BLD AUTO: 0.77 10*3/MM3 (ref 0.1–0.9)
MONOCYTES NFR BLD AUTO: 7.9 % (ref 5–12)
NEUTROPHILS NFR BLD AUTO: 6.94 10*3/MM3 (ref 1.7–7)
NEUTROPHILS NFR BLD AUTO: 70.8 % (ref 42.7–76)
NRBC BLD AUTO-RTO: 0 /100 WBC (ref 0–0.2)
PLATELET # BLD AUTO: 232 10*3/MM3 (ref 140–450)
PMV BLD AUTO: 9.9 FL (ref 6–12)
POTASSIUM SERPL-SCNC: 3.8 MMOL/L (ref 3.5–5.2)
PROT SERPL-MCNC: 6.2 G/DL (ref 6–8.5)
RBC # BLD AUTO: 2.77 10*6/MM3 (ref 4.14–5.8)
SODIUM SERPL-SCNC: 141 MMOL/L (ref 136–145)
WBC NRBC COR # BLD AUTO: 9.79 10*3/MM3 (ref 3.4–10.8)

## 2024-07-27 PROCEDURE — 93010 ELECTROCARDIOGRAM REPORT: CPT | Performed by: INTERNAL MEDICINE

## 2024-07-27 PROCEDURE — 94664 DEMO&/EVAL PT USE INHALER: CPT

## 2024-07-27 PROCEDURE — 94799 UNLISTED PULMONARY SVC/PX: CPT

## 2024-07-27 PROCEDURE — 25810000003 SODIUM CHLORIDE 0.9 % SOLUTION: Performed by: STUDENT IN AN ORGANIZED HEALTH CARE EDUCATION/TRAINING PROGRAM

## 2024-07-27 PROCEDURE — 80053 COMPREHEN METABOLIC PANEL: CPT

## 2024-07-27 PROCEDURE — 85025 COMPLETE CBC W/AUTO DIFF WBC: CPT | Performed by: NURSE PRACTITIONER

## 2024-07-27 PROCEDURE — 93005 ELECTROCARDIOGRAM TRACING: CPT | Performed by: INTERNAL MEDICINE

## 2024-07-27 PROCEDURE — 94761 N-INVAS EAR/PLS OXIMETRY MLT: CPT

## 2024-07-27 PROCEDURE — 94660 CPAP INITIATION&MGMT: CPT

## 2024-07-27 RX ORDER — MIRTAZAPINE 15 MG/1
15 TABLET, FILM COATED ORAL NIGHTLY
Qty: 30 TABLET | Refills: 0 | Status: SHIPPED | OUTPATIENT
Start: 2024-07-27

## 2024-07-27 RX ORDER — PANTOPRAZOLE SODIUM 40 MG/1
40 TABLET, DELAYED RELEASE ORAL NIGHTLY
Qty: 30 TABLET | Refills: 0 | Status: SHIPPED | OUTPATIENT
Start: 2024-07-27

## 2024-07-27 RX ORDER — MIDODRINE HYDROCHLORIDE 5 MG/1
5 TABLET ORAL ONCE
Status: COMPLETED | OUTPATIENT
Start: 2024-07-27 | End: 2024-07-27

## 2024-07-27 RX ADMIN — BUDESONIDE 1 MG: 0.5 INHALANT RESPIRATORY (INHALATION) at 07:24

## 2024-07-27 RX ADMIN — APIXABAN 5 MG: 5 TABLET, FILM COATED ORAL at 08:08

## 2024-07-27 RX ADMIN — FUROSEMIDE 40 MG: 40 TABLET ORAL at 08:09

## 2024-07-27 RX ADMIN — LUBIPROSTONE 24 MCG: 24 CAPSULE, GELATIN COATED ORAL at 08:08

## 2024-07-27 RX ADMIN — NYSTATIN 500000 UNITS: 100000 SUSPENSION ORAL at 08:08

## 2024-07-27 RX ADMIN — AMLODIPINE BESYLATE 2.5 MG: 5 TABLET ORAL at 08:09

## 2024-07-27 RX ADMIN — SOTALOL HYDROCHLORIDE 80 MG: 80 TABLET ORAL at 08:07

## 2024-07-27 RX ADMIN — SODIUM CHLORIDE 250 ML: 9 INJECTION, SOLUTION INTRAVENOUS at 05:15

## 2024-07-27 RX ADMIN — ARFORMOTEROL TARTRATE 15 MCG: 15 SOLUTION RESPIRATORY (INHALATION) at 07:29

## 2024-07-27 RX ADMIN — MIDODRINE HYDROCHLORIDE 5 MG: 5 TABLET ORAL at 08:09

## 2024-07-27 NOTE — DISCHARGE SUMMARY
Hospitalist Service   DISCHARGE SUMMARY    Patient Name: Drake Beth  : 1950  MRN: [id number]    Date of Admission: 2024  Date of Discharge:  24    Primary Care Physician: Amada Haskins MD      Discharge Diagnosis:   Severe sepsis secondary to aspiration pneumonia, unspecified organism  Acute respiratory failure with hypoxia and hypercapnia due to pneumonia  Acute on chronic HFpEF  Acute UTI  Paroxysmal A-fib  Hyperbilirubinemia  Hypertension  Mentally challenged  Dyslipidemia        Hospital Course     Hospital Course:  Drake Beth is a 73 y.o. male with a CMH of being mentally challenged patient nonverbal at baseline, presence of a cardiac pacemaker, paroxysmal atrial fibrillation, bioprosthetic mitral valve replacement, coronary artery disease status post CABG, hypertension, hyperlipidemia who presented to [facility] on 2024 with reports from family that he was moaning and grabbing at his chest and abdomen.  Review of records shows he was just admitted here 2024 to 7/15/2024 for likely bilateral pneumonia possible acute on chronic diastolic congestive heart failure paroxysmal atrial fibrillation and abdominal pain.  He was seen by both cardiology and gastroenterology.  CT at that time showed a large stool burden consistent with constipation he was treated with laxatives.  Further details as noted below.  Patient has overall improved and cleared to discharge per all specialties.  Patient is medically stable to discharge to SNF with follow-up with PCP, cardiology, pulmonology, and GI as an outpatient.      A/P:    Severe Sepsis without septic shock due to aspiration pneumonia  -CT of the chest and abdomen on admission showed evidence of pneumonia, concerning for aspiration pneumonia  -Initiated on IV antibiotics with Zosyn but now switched to p.o. Augmentin to complete course of treatment  -Wean O2 as tolerated  -SLP evaluation recommending mechanical soft  diet to reduce risk of aspiration  -ID signed off  -Continue outpatient follow-up with pulmonology     Acute respiratory failure with hypoxia and hypercapnia  Acute on chronic HFpEF  Valvular heart disease  -Respiratory failure multifactorial from CHF and pneumonia  -Antibiotics as above  -Off BiPAP, remains on high flow 6 L currently  -Chest x-ray on 7/23 with worsening congestion and bilateral pleural effusions  -proBNP also significantly elevated from baseline  -Diuresed with IV Lasix with improvement, overall improved and euvolemic now, DC Lasix on discharge  -Echo from previous admission showed preserved EF with moderate severe MR and pulmonary hypertension also noted, previously eval by cardiology and recommended medical management  -Continue GDMT as tolerated  -Continue outpatient follow-up with cardiology and pulmonology     Acute UTI  -Urine culture with no growth  -Antibiotics as above     Paroxysmal atrial fibrillation  -Rate controlled  -Continue home sotalol and Eliquis  -Monitor     Abdominal pain with constipation, improved  -Per patient's family, patient has been having issues with constipation for several weeks  -Has been on laxatives without any improvement  -CT of the abdomen did not show any significant improvement in his stool burden  -Plan for bowel purge per GI  -Initiated Amitiza which he will need to continue discharge however not covered by patient's insurance, continue Linzess on discharge  -Patient's pain seems more consistent with PUD  -Initiated PPI  -GI consult appreciated     Hyperbilirubinemia  -Likely secondary to sepsis  -Bilirubin improved     Mentally challenged  -Noted cooperative, follows simple commands     Hypertension   -Continue home amlodipine, sotalol  -Monitor     Hyperlipidemia  -On statin    DISCHARGE Follow Up Recommendations for labs and diagnostics:     Follow-up with PCP, pulmonology, GI    Reasons For Change In Medications and Indications for New Medications:  As  noted below    Day of Discharge     Vital Signs:  Temp:  [97.3 °F (36.3 °C)-99.2 °F (37.3 °C)] 97.3 °F (36.3 °C)  Heart Rate:  [] 105  Resp:  [12-26] 22  BP: ()/(46-65) 109/61  Flow (L/min):  [1-2] 1    Physical Exam:  General: Awake, alert, NAD  Eyes: PERRL, EOMI, conjunctivae are clear  Cardiovascular: Regular rate and rhythm, no murmurs  Respiratory: Decreased breath sounds bilaterally, improved, no wheezing or rales, unlabored breathing  Abdomen: Soft, nontender, positive bowel sounds, no guarding  Neurologic: Intellectual disability noted, CN grossly intact, moves all extremities spontaneously  Musculoskeletal: Generalized weakness, no other gross deformities  Skin: Warm, dry        Pertinent  and/or Most Recent Results     LAB RESULTS:      Lab 07/27/24  0415 07/26/24  0032 07/25/24  0105 07/24/24  0322 07/23/24  1703 07/23/24  0338 07/22/24  0508 07/21/24  1311   WBC 9.79 12.58* 13.82* 9.18 9.63 11.05*   < >  --    HEMOGLOBIN 9.5* 9.9* 9.7* 8.8* 10.5* 9.6*   < >  --    HEMATOCRIT 28.9* 30.4* 30.3* 27.2* 32.5* 30.0*   < >  --    PLATELETS 232 247 183 165 198 182   < >  --    NEUTROS ABS 6.94 10.06* 11.43* 7.32* 7.76* 9.29*   < >  --    IMMATURE GRANS (ABS) 0.06* 0.08* 0.08* 0.06* 0.16* 0.07*   < >  --    LYMPHS ABS 1.74 1.13 0.98 0.96 0.90 0.70   < >  --    MONOS ABS 0.77 0.98* 1.01* 0.77 0.73 0.84   < >  --    EOS ABS 0.24 0.29 0.26 0.05 0.05 0.12   < >  --    .3* 104.1* 106.3* 103.8* 105.2* 104.9*   < >  --    PROCALCITONIN  --   --   --   --   --  0.22  --   --    LACTATE  --   --   --   --  2.4*  --   --  1.7    < > = values in this interval not displayed.         Lab 07/27/24  0415 07/26/24  1541 07/26/24  0032 07/25/24  1551 07/25/24  0401 07/24/24  1630 07/24/24  0322 07/23/24  1703   SODIUM 141  --  142  --  144  --  143 139   POTASSIUM 3.8 4.0 3.5 3.8 3.4*   < > 3.3* 3.6   CHLORIDE 97*  --  93*  --  96*  --  103 102   CO2 33.4*  --  39.1*  --  37.5*  --  30.2* 28.6   ANION GAP 10.6   --  9.9  --  10.5  --  9.8 8.4   BUN 16  --  11  --  10  --  10 11   CREATININE 0.98  --  0.87  --  0.81  --  0.76 0.70*   EGFR 81.9  --  91.7  --  93.7  --  95.5 97.9   GLUCOSE 79  --  87  --  117*  --  102* 178*   CALCIUM 9.1  --  9.1  --  8.9  --  8.2* 8.5*    < > = values in this interval not displayed.         Lab 07/27/24  0415 07/26/24  0032 07/25/24  0401 07/24/24  0322 07/23/24  1703   TOTAL PROTEIN 6.2 6.7 6.8 5.9* 6.7   ALBUMIN 3.0* 3.3* 3.4* 3.0* 3.5   GLOBULIN 3.2 3.4 3.4 2.9 3.2   ALT (SGPT) <5 8 9 8 11   AST (SGOT) 25 34 32 26 30   BILIRUBIN 1.3* 1.7* 1.9* 1.3* 1.2   ALK PHOS 55 57 61 54 65         Lab 07/24/24  0322   PROBNP 11,026.0*                 Lab 07/23/24  1522 07/23/24  1107   PH, ARTERIAL 7.398 7.281*   PCO2, ARTERIAL 49.4* 61.9*   PO2 ART 56.5* 53.2*   O2 SATURATION ART 88.4* 81.7*   FIO2 50 37   HCO3 ART 30.4* 29.1*   BASE EXCESS ART 4.8* 1.7     Brief Urine Lab Results  (Last result in the past 365 days)        Color   Clarity   Blood   Leuk Est   Nitrite   Protein   CREAT   Urine HCG        07/19/24 0031 Orange  Comment: Any Substance that causes an abnormal urine color can alter the accuracy of the chemical reactions.   Turbid   Large (3+)   Trace   Negative   >=300 mg/dL (3+)                 Microbiology Results (last 10 days)       Procedure Component Value - Date/Time    MRSA Screen, PCR (Inpatient) - Swab, Nares [[id number]]  (Normal) Collected: 07/19/24 0520    Lab Status: Final result Specimen: Swab from Nares Updated: 07/19/24 0634     MRSA PCR No MRSA Detected    Narrative:      The negative predictive value of this diagnostic test is high and should only be used to consider de-escalating anti-MRSA therapy. A positive result may indicate colonization with MRSA and must be correlated clinically.    Blood Culture - Blood, Arm, Right [[id number]]  (Normal) Collected: 07/19/24 0053    Lab Status: Final result Specimen: Blood from Arm, Right Updated: 07/24/24 0100     Blood Culture No  growth at 5 days    Narrative:      Less than seven (7) mL's of blood was collected.  Insufficient quantity may yield false negative results.    Blood Culture - Blood, Arm, Left [[id number]]  (Normal) Collected: 07/19/24 0053    Lab Status: Final result Specimen: Blood from Arm, Left Updated: 07/24/24 0100     Blood Culture No growth at 5 days    Narrative:      Less than seven (7) mL's of blood was collected.  Insufficient quantity may yield false negative results.    Urine Culture - Urine, Straight Cath [[id number]]  (Normal) Collected: 07/19/24 0031    Lab Status: Final result Specimen: Urine from Straight Cath Updated: 07/20/24 1127     Urine Culture No growth    Respiratory Panel PCR w/COVID-19(SARS-CoV-2) [facility]/YING In-House, NP Swab in UTM/VTM, 2 HR TAT - Swab, Nasopharynx [[id number]]  (Normal) Collected: 07/18/24 2346    Lab Status: Final result Specimen: Swab from Nasopharynx Updated: 07/19/24 0100     ADENOVIRUS, PCR Not Detected     Coronavirus 229E Not Detected     Coronavirus HKU1 Not Detected     Coronavirus NL63 Not Detected     Coronavirus OC43 Not Detected     COVID19 Not Detected     Human Metapneumovirus Not Detected     Human Rhinovirus/Enterovirus Not Detected     Influenza A PCR Not Detected     Influenza B PCR Not Detected     Parainfluenza Virus 1 Not Detected     Parainfluenza Virus 2 Not Detected     Parainfluenza Virus 3 Not Detected     Parainfluenza Virus 4 Not Detected     RSV, PCR Not Detected     Bordetella pertussis pcr Not Detected     Bordetella parapertussis PCR Not Detected     Chlamydophila pneumoniae PCR Not Detected     Mycoplasma pneumo by PCR Not Detected    Narrative:      In the setting of a positive respiratory panel with a viral infection PLUS a negative procalcitonin without other underlying concern for bacterial infection, consider observing off antibiotics or discontinuation of antibiotics and continue supportive care. If the respiratory panel is positive  for atypical bacterial infection (Bordetella pertussis, Chlamydophila pneumoniae, or Mycoplasma pneumoniae), consider antibiotic de-escalation to target atypical bacterial infection.            XR Abdomen KUB    Result Date: 7/25/2024  Impression: Impression: Minimal colonic stool burden. Electronically Signed: Nikunj Palacios MD  7/25/2024 11:09 AM EDT  Workstation ID: [id number]    XR Chest 1 View    Result Date: 7/23/2024  Impression: Impression: Findings are most compatible with CHF with moderately severe pulmonary edema. Small right effusion. Electronically Signed: Mallorie Monge MD  7/23/2024 2:55 PM EDT  Workstation ID: [id number]    CT Abdomen Pelvis With Contrast    Result Date: 7/19/2024  Impression: Increase in size of bilateral pleural effusions. Otherwise, no significant interval change is identified since the prior exam.    Please note that portions of this note were completed with a voice recognition program.        Electronically Signed By-Moe Bourne MD On:7/19/2024 3:12 AM      CT Angiogram Chest Pulmonary Embolism    Result Date: 7/19/2024  Impression:  1. No evidence of pulmonary embolus. 2. Multifocal pneumonia with patchy airspace disease present, most pronounced within the left upper lobe. Mild to moderate underlying pulmonary edema pattern suspected versus groundglass airspace disease with small to moderate-sized bilateral pleural effusions.       Electronically Signed By-Lili Zaldivar MD On:7/19/2024 3:10 AM      XR Chest 1 View    Result Date: 7/19/2024  Impression: Impression: No significant change. Stable cardiomegaly with probable mild pulmonary edema pattern with subsegmental atelectatic changes and scarring. Electronically Signed: Lili Zaldivar MD  7/19/2024 12:22 AM EDT  Workstation ID: [id number]    XR Chest 1 View    Result Date: 7/11/2024  Impression: Impression: Findings are most compatible with bilateral pneumonia. Electronically Signed: Mallorie Monge MD  7/11/2024 4:17 PM EDT   Workstation ID: [id number]    CT Abdomen Pelvis With Contrast    Result Date: 7/11/2024  Impression: Impression: 1.No acute abnormality identified within the abdomen or pelvis. 2.Colonic diverticulosis. 3.Absent right kidney. 4.Cardiomegaly with probable pulmonary vascular congestion and small bilateral pleural effusions noted. Atypical infiltrates within the lung bases may have a similar appearance. Please correlate clinically. Electronically Signed: Shane Vaughn MD  7/11/2024 3:09 PM EDT  Workstation ID: [id number]             Results for orders placed during the hospital encounter of 07/11/24    Adult Transthoracic Echo Complete w/ Color, Spectral and Contrast if Necessary Per Protocol    Interpretation Summary    Left ventricular systolic function is normal. Left ventricular ejection fraction appears to be 51 - 55%.    Left ventricular diastolic function was indeterminate.    The left atrial cavity is moderately dilated.    Left atrial volume is moderately increased.    There is mild calcification of the aortic valve.    A mitral valve mass is present.    Moderate to severe mitral valve regurgitation is present.    There is a bioprosthetic mitral valve present. Transvalvular regurgitation is present in the prosthetic mitral valve.    Moderate tricuspid valve regurgitation is present.    Estimated right ventricular systolic pressure from tricuspid regurgitation is markedly elevated (>55 mmHg).    Moderate to severe pulmonary hypertension is present.    Abnormal study  Recommend transesophageal echo imaging for evaluation of bioprosthetic mitral valve which appears severely regurgitant .,  See documented other abnormalities and findings      Labs Pending at Discharge:      Procedures Performed           Consults:   Consults       Date and Time Order Name Status Description    7/22/2024  5:26 PM Inpatient Gastroenterology Consult      7/19/2024  4:56 PM Inpatient Pulmonology Consult Completed     7/19/2024  3:57 AM  Inpatient Infectious Diseases Consult Completed     7/19/2024  3:23 AM Hospitalist (on-call MD unless specified)      7/13/2024 10:18 AM Inpatient Hospitalist Consult Completed     7/11/2024 11:53 PM Inpatient Gastroenterology Consult Completed               Discharge Details        Discharge Medications        New Medications        Instructions Start Date   mirtazapine 15 MG tablet  Commonly known as: REMERON   15 mg, Oral, Nightly      nystatin 100,000 unit/mL suspension  Commonly known as: MYCOSTATIN   500,000 Units, Swish & Swallow, 4 Times Daily      pantoprazole 40 MG EC tablet  Commonly known as: PROTONIX   40 mg, Oral, Nightly      potassium chloride 20 MEQ CR tablet  Commonly known as: KLOR-CON M20   20 mEq, Oral, Daily             Continue These Medications        Instructions Start Date   amLODIPine 2.5 MG tablet  Commonly known as: NORVASC   TAKE 1 TABLET BY MOUTH DAILY      atorvastatin 10 MG tablet  Commonly known as: LIPITOR   10 mg, Oral, Daily      Eliquis 5 MG tablet tablet  Generic drug: apixaban   5 mg, Oral, 2 Times Daily      lactulose 10 GM/15ML solution  Commonly known as: CHRONULAC   15 mL, Oral, 2 Times Daily PRN      linaclotide 72 MCG capsule capsule  Commonly known as: LINZESS   72 mcg, Oral, Every Morning Before Breakfast      montelukast 10 MG tablet  Commonly known as: SINGULAIR   10 mg, Oral, Daily      multivitamin with minerals tablet tablet   1 tablet, Oral, Daily      sotalol 80 MG tablet tablet  Commonly known as: BETAPACE AF   TAKE 1 TABLET BY MOUTH TWICE DAILY               No Known Allergies      Discharge Disposition:  Skilled Nursing Facility (DC - External)    Diet:  Hospital:  Diet Order   Procedures    Diet: Regular/House; Fluid Consistency: Thin (IDDSI 0)         Discharge Activity:         CODE STATUS:  Code Status and Medical Interventions:   Ordered at: 07/19/24 0354     Code Status (Patient has no pulse and is not breathing):    CPR (Attempt to Resuscitate)      Medical Interventions (Patient has pulse or is breathing):    Full Support         No future appointments.    Additional Instructions for the Follow-ups that You Need to Schedule       Discharge Follow-up with PCP   As directed       Currently Documented PCP:    Amada Haskins MD    PCP Phone Number:    [phone]     Follow Up Details: Follow-up with your PCP in 1 week.                Time spent on Discharge including face to face service:  44 minutes    Signature:    Electronically signed by Giovanna Daley DO, 07/27/24, 10:35 AM EDT.        Part of this note may be an electronic transcription/translation of spoken language to printed text using the Dragon Dictation System.     cardiac cath

## 2024-07-27 NOTE — PLAN OF CARE
Goal Outcome Evaluation:              Outcome Evaluation: Patient slept well during the night. BP noted to be low this morning.  Provider put in for patient to receive a bolus but patient refused to swallow midodrine.  Patient remained on O2.  BP improved.  Pt stable at this time.

## 2024-07-27 NOTE — NURSING NOTE
After patient bolus his BP improved.  Noted HR was elevated in the 102-115.  Stat EKG placed and patient noted to be in Afib.  Patient has history of Afib.  Provider messaged regarding EKG and patient HR.  Provider wants to hold on anything at this time due to patient being hypotensive.  Day shift nurse notified.

## 2024-07-27 NOTE — NURSING NOTE
I called report to Danny. I spoke to [place] nurse receiving patient and gave her report on patient. I answered all questions and gave last set of vitals. I gave the unit number incase she has any additional questions.

## 2024-07-27 NOTE — PROGRESS NOTES
"PULMONARY CRITICAL CARE PROGRESS  NOTE      PATIENT IDENTIFICATION:  Name: Drake Beth  MRN: [id number]  :  1950     Age: 73 y.o.  Sex: male    DATE OF Note:  2024   Referring Physician: Tyrone Raymundo MD                  Subjective:   In bed, decreased to 1 L O2, no SOB, no chest or abd pain today, no bowel or bladder issues     Objective:  tMax 24 hrs: Temp (24hrs), Av °F (36.7 °C), Min:97.3 °F (36.3 °C), Max:99.2 °F (37.3 °C)      Vitals Ranges:   Temp:  [97.3 °F (36.3 °C)-99.2 °F (37.3 °C)] 97.3 °F (36.3 °C)  Heart Rate:  [] 105  Resp:  [12-26] 22  BP: ()/(46-65) 109/61    Intake and Output Last 3 Shifts:   I/O last 3 completed shifts:  In: 440 [P.O.:440]  Out: 1200 [Urine:1200]    Exam:  /61 (BP Location: Right arm, Patient Position: Lying)   Pulse 105   Temp 97.3 °F (36.3 °C) (Axillary)   Resp 22   Ht 152.4 cm (60\")   Wt 47.6 kg (104 lb 15 oz)   SpO2 97%   BMI 20.49 kg/m²     General Appearance:   Alert awake not in distress  HEENT:  Normocephalic, without obvious abnormality. Conjunctivae/corneas clear.  Normal external ear canals. Nares normal, no drainage     Neck:  Supple, symmetrical, trachea midline. No JVD.  Lungs /Chest wall:   Bilateral basal rhonchi, respirations unlabored, symmetrical wall movement.     Heart:  Regular rate and rhythm, systolic murmur PMI left sternal border  Abdomen: Soft, nontender, no masses, no organomegaly.    Extremities: Trace edema, no clubbing or cyanosis        Medications:  amLODIPine, 2.5 mg, Oral, Daily  apixaban, 5 mg, Oral, BID  arformoterol, 15 mcg, Nebulization, BID - RT  atorvastatin, 10 mg, Oral, Nightly  budesonide, 1 mg, Nebulization, BID - RT  ipratropium-albuterol, 3 mL, Nebulization, 4x Daily - RT  lubiprostone, 24 mcg, Oral, BID  mirtazapine, 15 mg, Oral, Nightly  montelukast, 10 mg, Oral, Nightly  nystatin, 5 mL, Swish & Swallow, 4x Daily  pantoprazole, 40 mg, Oral, Nightly  sotalol, 80 mg, Oral, " Q12H        Infusion:          PRN:    senna-docusate sodium **AND** polyethylene glycol **AND** bisacodyl **AND** bisacodyl    Calcium Replacement - Follow Nurse / BPA Driven Protocol    Magnesium Standard Dose Replacement - Follow Nurse / BPA Driven Protocol    ondansetron    Phosphorus Replacement - Follow Nurse / BPA Driven Protocol    Potassium Replacement - Follow Nurse / BPA Driven Protocol  Data Review:  All labs (24hrs):   Recent Results (from the past 24 hour(s))   Potassium    Collection Time: 07/26/24  3:41 PM    Specimen: Blood   Result Value Ref Range    Potassium 4.0 3.5 - 5.2 mmol/L   Comprehensive Metabolic Panel    Collection Time: 07/27/24  4:15 AM    Specimen: Blood   Result Value Ref Range    Glucose 79 65 - 99 mg/dL    BUN 16 8 - 23 mg/dL    Creatinine 0.98 0.76 - 1.27 mg/dL    Sodium 141 136 - 145 mmol/L    Potassium 3.8 3.5 - 5.2 mmol/L    Chloride 97 (L) 98 - 107 mmol/L    CO2 33.4 (H) 22.0 - 29.0 mmol/L    Calcium 9.1 8.6 - 10.5 mg/dL    Total Protein 6.2 6.0 - 8.5 g/dL    Albumin 3.0 (L) 3.5 - 5.2 g/dL    ALT (SGPT) <5 1 - 41 U/L    AST (SGOT) 25 1 - 40 U/L    Alkaline Phosphatase 55 39 - 117 U/L    Total Bilirubin 1.3 (H) 0.0 - 1.2 mg/dL    Globulin 3.2 gm/dL    A/G Ratio 0.9 g/dL    BUN/Creatinine Ratio 16.3 7.0 - 25.0    Anion Gap 10.6 5.0 - 15.0 mmol/L    eGFR 81.9 >60.0 mL/min/1.73   CBC Auto Differential    Collection Time: 07/27/24  4:15 AM    Specimen: Blood   Result Value Ref Range    WBC 9.79 3.40 - 10.80 10*3/mm3    RBC 2.77 (L) 4.14 - 5.80 10*6/mm3    Hemoglobin 9.5 (L) 13.0 - 17.7 g/dL    Hematocrit 28.9 (L) 37.5 - 51.0 %    .3 (H) 79.0 - 97.0 fL    MCH 34.3 (H) 26.6 - 33.0 pg    MCHC 32.9 31.5 - 35.7 g/dL    RDW 14.6 12.3 - 15.4 %    RDW-SD 54.7 (H) 37.0 - 54.0 fl    MPV 9.9 6.0 - 12.0 fL    Platelets 232 140 - 450 10*3/mm3    Neutrophil % 70.8 42.7 - 76.0 %    Lymphocyte % 17.8 (L) 19.6 - 45.3 %    Monocyte % 7.9 5.0 - 12.0 %    Eosinophil % 2.5 0.3 - 6.2 %     Basophil % 0.4 0.0 - 1.5 %    Immature Grans % 0.6 (H) 0.0 - 0.5 %    Neutrophils, Absolute 6.94 1.70 - 7.00 10*3/mm3    Lymphocytes, Absolute 1.74 0.70 - 3.10 10*3/mm3    Monocytes, Absolute 0.77 0.10 - 0.90 10*3/mm3    Eosinophils, Absolute 0.24 0.00 - 0.40 10*3/mm3    Basophils, Absolute 0.04 0.00 - 0.20 10*3/mm3    Immature Grans, Absolute 0.06 (H) 0.00 - 0.05 10*3/mm3    nRBC 0.0 0.0 - 0.2 /100 WBC   ECG 12 Lead Rhythm Change    Collection Time: 07/27/24  6:33 AM   Result Value Ref Range    QT Interval 368 ms    QTC Interval 520 ms        Imaging:  XR Abdomen KUB  Narrative: XR ABDOMEN KUB    Date of Exam: 7/25/2024 10:59 AM EDT    Indication: constipation    Comparison: None available.    Findings:  Mild gaseous distention of the stomach. Grossly nonobstructive bowel gas pattern. Minimal colonic stool burden. Right pleural effusion is noted. There is degenerative change of the spine and right hip.  Impression: Impression:  Minimal colonic stool burden.    Electronically Signed: Nikunj Palacios MD    7/25/2024 11:09 AM EDT    Workstation ID: [id number]       ASSESSMENT:  Acute hypoxic/hypercapnic resp failure  Multifocal pneumonia  Sepsis  CAD  HTN  Diastolic CHF  UTI  Hyponatremia  A-fib s/p pacemaker      PLAN:  Encourage to use IS more during day   Watch for vomiting or aspiration  PT/OT  Antibiotics  Bronchodilators  Inhaled corticosteroids  Incentive spirometer  Electrolytes/ glycemic control  DVT prophylaxis-Apixaban       Discussed with Dr Devonte Prescott, IZAIAH   7/27/2024  09:41 EDT     I personally have examined  and interviewed the patient. I have reviewed the history, data, problems, assessment and plan with our NP.  Total Critical care time in direct medical management (   ) minutes, This time specifically excludes time spent performing procedures.    Gilberto Whitney MD   7/27/2024  09:44 EDT

## 2024-07-27 NOTE — CASE MANAGEMENT/SOCIAL WORK
Case Management Discharge Note      Final Note: [facility]       Selected Continued Care - Discharged on 7/27/2024 Admission date: 7/18/2024 - Discharge disposition: Skilled Nursing Facility (DC - External)      Destination Coordination complete.      Service Provider Selected Services Address Phone Fax Patient Preferred    [facility] [address] [place] [id number] [phone] --               Transportation Services  Ambulance: [facility]    Final Discharge Disposition Code: 03 - skilled nursing facility (SNF)

## 2024-07-28 LAB
QT INTERVAL: 368 MS
QTC INTERVAL: 520 MS

## 2024-08-08 ENCOUNTER — PATIENT OUTREACH (OUTPATIENT)
Dept: CASE MANAGEMENT | Facility: OTHER | Age: 74
End: 2024-08-08
Payer: MEDICARE

## 2024-08-08 NOTE — OUTREACH NOTE
AMBULATORY CASE MANAGEMENT NOTE    Names and Relationships of Patient/Support Persons: Contact: [facility]; Relationship:  -     SNF Follow-up    Questions/Answers      Flowsheet Row Responses   Acute Facility Discharged From [facility]   Acute Discharge Date 07/27/24   Name of the Skilled Nursing Facility? [facility]   Purpose of SNF Admission PT, OT, SN   Estimated length of stay for the patient? TBD   Who is the insurance provider or payor of patient stay? Medicare   Progression of Patient? daily therapies continue          RN-ACM outreach call made to [facility]. Staff reports pt is still there.  dept unavailable for additional information. RN-ACM will continue to monitor for discharge.       Millicent WIGGINS  Ambulatory Case Management    8/8/2024, 11:27 EDT

## 2024-08-15 ENCOUNTER — PATIENT OUTREACH (OUTPATIENT)
Dept: CASE MANAGEMENT | Facility: OTHER | Age: 74
End: 2024-08-15
Payer: MEDICARE

## 2024-08-15 NOTE — OUTREACH NOTE
AMBULATORY CASE MANAGEMENT NOTE    Names and Relationships of Patient/Support Persons: Contact: [facility]; Relationship:  -     SNF Follow-up    Questions/Answers      Flowsheet Row Responses   Acute Facility Discharged From [facility]   Acute Discharge Date 07/27/24   Name of the Skilled Nursing Facility? [facility]   Purpose of SNF Admission PT, OT, SN   Estimated length of stay for the patient? TBD   Who is the insurance provider or payor of patient stay? Medicare   Progression of Patient? daily therapies continue          RN-ACM outreach call made to [facility]. Staff reports pt is still there.  dept unavailable for additional information. RN-ACM will continue to monitor for discharge.       Millicent WIGGINS  Ambulatory Case Management    8/15/2024, 10:21 EDT

## 2024-08-16 ENCOUNTER — TELEPHONE (OUTPATIENT)
Dept: FAMILY MEDICINE CLINIC | Facility: CLINIC | Age: 74
End: 2024-08-16

## 2024-08-22 ENCOUNTER — PATIENT OUTREACH (OUTPATIENT)
Dept: CASE MANAGEMENT | Facility: OTHER | Age: 74
End: 2024-08-22
Payer: MEDICARE

## 2024-08-22 NOTE — OUTREACH NOTE
AMBULATORY CASE MANAGEMENT NOTE    Names and Relationships of Patient/Support Persons: Contact: [facility]; Relationship:  -     SNF Follow-up    Questions/Answers      Flowsheet Row Responses   Acute Facility Discharged From [facility]   Acute Discharge Date 07/27/24   Name of the Skilled Nursing Facility? [facility]   Purpose of SNF Admission PT, OT, SN   Who is the insurance provider or payor of patient stay? Medicare   Skilled Nursing Discharge Date? 08/15/24          RN-ACM outreach call made to [facility]. Staff reports pt discharged to home on 8/15/24.  dept unavailable for additional information. RN-ACM outreach call to pt scheduled.       Millicent WIGGINS  Ambulatory Case Management    8/22/2024, 11:08 EDT

## 2024-08-22 NOTE — OUTREACH NOTE
AMBULATORY CASE MANAGEMENT NOTE    Names and Relationships of Patient/Support Persons: Contact: KEYLA FLEMINGER; Relationship: Emergency Contact -     Patient Outreach    Pt discharged from [facility] to home on 8/15/24. RN-ACM outreach call made to pt, spoke with pt's brother Petey. Explained role of RN-ACM and reason for call. Petey reports pt is doing well, states he is back to normal. He reports they declined home health at SNF discharge, states pt doesn't need it. He denies medication questions. Reports pt has no difficulty with ADL's. Denies need for DME. Pt has PCP follow up appt scheduled. Reviewed SDOH. Petey denies any pt needs, states pt has good family support. No questions per Petey. Advised him to call with any needs. Follow up outreach prn.     Send Education  Questions/Answers      Flowsheet Row Most Recent Value   Annual Wellness Visit:  Patient Has Completed   Other Patient Education/Resources  24/7 [facility] Nurse Call Line   24/7 Nurse Call Line Education Method Verbal          SDOH updated and reviewed with the patient during this program:  Financial Resource Strain: Low Risk  (8/22/2024)    Overall Financial Resource Strain (CARDIA)     Difficulty of Paying Living Expenses: Not very hard      --     Food Insecurity: No Food Insecurity (8/22/2024)    Hunger Vital Sign     Worried About Running Out of Food in the Last Year: Never true     Ran Out of Food in the Last Year: Never true      --     Transportation Needs: No Transportation Needs (8/22/2024)    PRAPARE - Transportation     Lack of Transportation (Medical): No     Lack of Transportation (Non-Medical): No         Millicent WIGGINS  Ambulatory Case Management    8/22/2024, 14:21 EDT

## 2024-08-28 ENCOUNTER — OFFICE VISIT (OUTPATIENT)
Dept: FAMILY MEDICINE CLINIC | Facility: CLINIC | Age: 74
End: 2024-08-28
Payer: MEDICARE

## 2024-08-28 ENCOUNTER — LAB (OUTPATIENT)
Dept: FAMILY MEDICINE CLINIC | Facility: CLINIC | Age: 74
End: 2024-08-28
Payer: MEDICARE

## 2024-08-28 VITALS
WEIGHT: 94.5 LBS | DIASTOLIC BLOOD PRESSURE: 78 MMHG | TEMPERATURE: 97.7 F | HEART RATE: 92 BPM | BODY MASS INDEX: 18.55 KG/M2 | RESPIRATION RATE: 16 BRPM | OXYGEN SATURATION: 96 % | HEIGHT: 60 IN | SYSTOLIC BLOOD PRESSURE: 111 MMHG

## 2024-08-28 DIAGNOSIS — K59.09 CHRONIC CONSTIPATION: Primary | ICD-10-CM

## 2024-08-28 DIAGNOSIS — D64.9 ANEMIA, UNSPECIFIED TYPE: ICD-10-CM

## 2024-08-28 PROBLEM — K59.00 CONSTIPATION: Status: ACTIVE | Noted: 2024-08-28

## 2024-08-28 LAB
ALBUMIN SERPL-MCNC: 4.1 G/DL (ref 3.5–5.2)
ALBUMIN/GLOB SERPL: 1.2 G/DL
ALP SERPL-CCNC: 129 U/L (ref 39–117)
ALT SERPL W P-5'-P-CCNC: 13 U/L (ref 1–41)
ANION GAP SERPL CALCULATED.3IONS-SCNC: 11 MMOL/L (ref 5–15)
AST SERPL-CCNC: 41 U/L (ref 1–40)
BASOPHILS # BLD AUTO: 0.04 10*3/MM3 (ref 0–0.2)
BASOPHILS NFR BLD AUTO: 0.5 % (ref 0–1.5)
BILIRUB SERPL-MCNC: 0.5 MG/DL (ref 0–1.2)
BUN SERPL-MCNC: 14 MG/DL (ref 8–23)
BUN/CREAT SERPL: 11.4 (ref 7–25)
CALCIUM SPEC-SCNC: 9.6 MG/DL (ref 8.6–10.5)
CHLORIDE SERPL-SCNC: 95 MMOL/L (ref 98–107)
CO2 SERPL-SCNC: 30 MMOL/L (ref 22–29)
CREAT SERPL-MCNC: 1.23 MG/DL (ref 0.76–1.27)
DEPRECATED RDW RBC AUTO: 46.4 FL (ref 37–54)
EGFRCR SERPLBLD CKD-EPI 2021: 62.4 ML/MIN/1.73
EOSINOPHIL # BLD AUTO: 0.43 10*3/MM3 (ref 0–0.4)
EOSINOPHIL NFR BLD AUTO: 5.3 % (ref 0.3–6.2)
ERYTHROCYTE [DISTWIDTH] IN BLOOD BY AUTOMATED COUNT: 12.6 % (ref 12.3–15.4)
GLOBULIN UR ELPH-MCNC: 3.4 GM/DL
GLUCOSE SERPL-MCNC: 93 MG/DL (ref 65–99)
HCT VFR BLD AUTO: 34.9 % (ref 37.5–51)
HGB BLD-MCNC: 11.6 G/DL (ref 13–17.7)
IMM GRANULOCYTES # BLD AUTO: 0.02 10*3/MM3 (ref 0–0.05)
IMM GRANULOCYTES NFR BLD AUTO: 0.2 % (ref 0–0.5)
LYMPHOCYTES # BLD AUTO: 1.69 10*3/MM3 (ref 0.7–3.1)
LYMPHOCYTES NFR BLD AUTO: 20.7 % (ref 19.6–45.3)
MCH RBC QN AUTO: 33.8 PG (ref 26.6–33)
MCHC RBC AUTO-ENTMCNC: 33.2 G/DL (ref 31.5–35.7)
MCV RBC AUTO: 101.7 FL (ref 79–97)
MONOCYTES # BLD AUTO: 0.88 10*3/MM3 (ref 0.1–0.9)
MONOCYTES NFR BLD AUTO: 10.8 % (ref 5–12)
NEUTROPHILS NFR BLD AUTO: 5.09 10*3/MM3 (ref 1.7–7)
NEUTROPHILS NFR BLD AUTO: 62.5 % (ref 42.7–76)
NRBC BLD AUTO-RTO: 0 /100 WBC (ref 0–0.2)
PLATELET # BLD AUTO: 205 10*3/MM3 (ref 140–450)
PMV BLD AUTO: 10.1 FL (ref 6–12)
POTASSIUM SERPL-SCNC: 4.3 MMOL/L (ref 3.5–5.2)
PROT SERPL-MCNC: 7.5 G/DL (ref 6–8.5)
RBC # BLD AUTO: 3.43 10*6/MM3 (ref 4.14–5.8)
SODIUM SERPL-SCNC: 136 MMOL/L (ref 136–145)
WBC NRBC COR # BLD AUTO: 8.15 10*3/MM3 (ref 3.4–10.8)

## 2024-08-28 PROCEDURE — 80053 COMPREHEN METABOLIC PANEL: CPT | Performed by: FAMILY MEDICINE

## 2024-08-28 PROCEDURE — G2211 COMPLEX E/M VISIT ADD ON: HCPCS | Performed by: FAMILY MEDICINE

## 2024-08-28 PROCEDURE — 1126F AMNT PAIN NOTED NONE PRSNT: CPT | Performed by: FAMILY MEDICINE

## 2024-08-28 PROCEDURE — 36415 COLL VENOUS BLD VENIPUNCTURE: CPT | Performed by: FAMILY MEDICINE

## 2024-08-28 PROCEDURE — 3078F DIAST BP <80 MM HG: CPT | Performed by: FAMILY MEDICINE

## 2024-08-28 PROCEDURE — 85025 COMPLETE CBC W/AUTO DIFF WBC: CPT | Performed by: FAMILY MEDICINE

## 2024-08-28 PROCEDURE — 3074F SYST BP LT 130 MM HG: CPT | Performed by: FAMILY MEDICINE

## 2024-08-28 PROCEDURE — 99214 OFFICE O/P EST MOD 30 MIN: CPT | Performed by: FAMILY MEDICINE

## 2024-08-28 RX ORDER — LACTULOSE 10 G/15ML
15 SOLUTION ORAL 2 TIMES DAILY PRN
Qty: 473 ML | Refills: 0 | Status: SHIPPED | OUTPATIENT
Start: 2024-08-28

## 2024-08-28 RX ORDER — FUROSEMIDE 40 MG
40 TABLET ORAL DAILY
COMMUNITY

## 2024-08-28 NOTE — PROGRESS NOTES
Subjective   Chief Complaint   Patient presents with    Hospital Follow Up Visit    Abdominal Pain    Constipation     Drake Beth is a 73 y.o. male.     Patient Care Team:  Amada Haskins MD as PCP - General  Josr Rodriguez DPM as Consulting Physician (Podiatry)  Rj Dominique MD as Consulting Physician (Cardiology)  Millicent Guerrero, APOLONIA as Ambulatory  ([facility])    History of Present Illness  He is coming in today with his brother to follow-up on his recent hospital admission and subsequent to rehab admission.  Patient was admitted to [facility] due to abdominal pain, he has been dealing with bowel movement issues on and off for quite some time and has tried several different medications over the years.  Most recently he has been more so complained about constipation even though in the past it was more so diarrhea.  Patient is not very good historian due to his developmental delay, he does have a great support from his family and lives with his brother.  He is now on lactulose which she takes once a day at bedtime, he typically has several small bowel movements throughout the day.  His appetite is good and he denies any abdominal pain.  His brother is concerned that patient does not drink enough water and this is not anything new for him.  No fever or chills reported.  No urinary symptoms.       The following portions of the patient's history were reviewed and updated as appropriate: allergies, current medications, past family history, past medical history, past social history, past surgical history, and problem list.  Past Medical History:   Diagnosis Date    Allergic rhinitis     Anemia     Atrial fibrillation     Chesty cough     CHF (congestive heart failure)     Coronary artery disease     Cough     Fever     Hyperlipidemia     Hypertension     Mentally challenged     MVP (mitral valve prolapse)      Past Surgical History:   Procedure Laterality Date    CARDIAC  "ELECTROPHYSIOLOGY PROCEDURE N/A 4/26/2022    Procedure: PACEMAKER EXTRACTION;  Surgeon: Rj Dominique MD;  Location: [facility] CATH INVASIVE LOCATION;  Service: Cardiology;  Laterality: N/A;    COLONOSCOPY      He has never had the test done.  His family is refusing any form of colon cancer screening for him.    CORONARY ARTERY BYPASS GRAFT  2008    DR SWEAT    MITRAL VALVE REPLACEMENT  04/2008    WITH BIOPROSTHETIC TISSUE VALVE     The patient has a family history of  Family History   Problem Relation Age of Onset    Coronary artery disease Mother 64    Hyperlipidemia Sister     Liver disease Sister         FATTY LIVER    Rheum arthritis Sister     Lung cancer Sister     Hypertension Brother     Other Brother     Rheum arthritis Brother      Social History     Socioeconomic History    Marital status: Single   Tobacco Use    Smoking status: Never    Smokeless tobacco: Never   Vaping Use    Vaping status: Never Used   Substance and Sexual Activity    Alcohol use: Never    Drug use: Never    Sexual activity: Never       Review of Systems   Constitutional:  Negative for activity change, fatigue and fever.   Respiratory:  Negative for shortness of breath and wheezing.    Cardiovascular:  Negative for chest pain, palpitations and leg swelling.   Gastrointestinal:  Positive for constipation. Negative for abdominal pain, diarrhea, nausea and vomiting.   Musculoskeletal:  Negative for arthralgias and back pain.   Skin:  Negative for rash.   Neurological:  Negative for tremors and headache.     Visit Vitals  /78 (BP Location: Left arm, Patient Position: Sitting, Cuff Size: Adult)   Pulse 92   Temp 97.7 °F (36.5 °C) (Infrared)   Resp 16   Ht 152.4 cm (60\")   Wt 42.9 kg (94 lb 8 oz)   SpO2 96%   BMI 18.46 kg/m²       BMI is below normal parameters (malnutrition). Recommendations: treating the underlying disease process      Current Outpatient Medications:     lactulose (CHRONULAC) 10 GM/15ML solution, Take 15 mL by " mouth 2 (Two) Times a Day As Needed (Constipation)., Disp: 473 mL, Rfl: 0    amLODIPine (NORVASC) 2.5 MG tablet, TAKE 1 TABLET BY MOUTH DAILY, Disp: 90 tablet, Rfl: 3    apixaban (Eliquis) 5 MG tablet tablet, TAKE 1 TABLET BY MOUTH TWICE DAILY, Disp: 180 tablet, Rfl: 3    atorvastatin (LIPITOR) 10 MG tablet, TAKE 1 TABLET BY MOUTH DAILY, Disp: 90 tablet, Rfl: 3    furosemide (LASIX) 40 MG tablet, Take 1 tablet by mouth Daily., Disp: , Rfl:     mirtazapine (REMERON) 15 MG tablet, Take 1 tablet by mouth Every Night., Disp: 30 tablet, Rfl: 0    montelukast (SINGULAIR) 10 MG tablet, TAKE 1 TABLET BY MOUTH DAILY, Disp: 90 tablet, Rfl: 1    multivitamin with minerals tablet tablet, Take 1 tablet by mouth Daily., Disp: , Rfl:     pantoprazole (PROTONIX) 40 MG EC tablet, Take 1 tablet by mouth Every Night., Disp: 30 tablet, Rfl: 0    potassium chloride (KLOR-CON M20) 20 MEQ CR tablet, Take 1 tablet by mouth Daily., Disp: 15 tablet, Rfl: 0    sotalol (BETAPACE AF) 80 MG tablet tablet, TAKE 1 TABLET BY MOUTH TWICE DAILY, Disp: 180 tablet, Rfl: 1    Objective   Physical Exam  Vitals and nursing note reviewed.   Constitutional:       General: He is not in acute distress.     Appearance: Normal appearance. He is well-developed. He is not ill-appearing.   HENT:      Head: Normocephalic and atraumatic.   Cardiovascular:      Rate and Rhythm: Normal rate and regular rhythm.      Heart sounds: Normal heart sounds. No murmur heard.     No gallop.   Pulmonary:      Effort: Pulmonary effort is normal. No respiratory distress.      Breath sounds: Normal breath sounds. No wheezing, rhonchi or rales.   Chest:      Chest wall: No tenderness.   Musculoskeletal:      Cervical back: Normal range of motion and neck supple.   Neurological:      General: No focal deficit present.      Mental Status: He is alert and oriented to person, place, and time. Mental status is at baseline.   Psychiatric:         Mood and Affect: Mood normal.          FOLLOWING LABS WERE REVIEWED TODAY:  CMP          7/25/2024    04:01 7/25/2024    15:51 7/26/2024    00:32 7/26/2024    15:41 7/27/2024    04:15   CMP   Glucose 117   87   79    BUN 10   11   16    Creatinine 0.81   0.87   0.98    EGFR 93.7   91.7   81.9    Sodium 144   142   141    Potassium 3.4  3.8  3.5  4.0  3.8    Chloride 96   93   97    Calcium 8.9   9.1   9.1    Total Protein 6.8   6.7   6.2    Albumin 3.4   3.3   3.0    Globulin 3.4   3.4   3.2    Total Bilirubin 1.9   1.7   1.3    Alkaline Phosphatase 61   57   55    AST (SGOT) 32   34   25    ALT (SGPT) 9   8   <5    Albumin/Globulin Ratio 1.0   1.0   0.9    BUN/Creatinine Ratio 12.3   12.6   16.3    Anion Gap 10.5   9.9   10.6      CBC          7/25/2024    01:05 7/26/2024    00:32 7/27/2024    04:15   CBC   WBC 13.82  12.58  9.79    RBC 2.85  2.92  2.77    Hemoglobin 9.7  9.9  9.5    Hematocrit 30.3  30.4  28.9    .3  104.1  104.3    MCH 34.0  33.9  34.3    MCHC 32.0  32.6  32.9    RDW 14.7  14.6  14.6    Platelets 183  247  232            Assessment & Plan   Diagnoses and all orders for this visit:    1. Chronic constipation (Primary)  -     Comprehensive Metabolic Panel  -     lactulose (CHRONULAC) 10 GM/15ML solution; Take 15 mL by mouth 2 (Two) Times a Day As Needed (Constipation).  Dispense: 473 mL; Refill: 0    2. Anemia, unspecified type  -     CBC Auto Differential        I reviewed his available hospital records including imaging and labs.  I also reviewed his medications in details.  Patient has struggled with some GI issues on and off for quite some time and most recently it has been more constipation.  He is currently on lactulose and may continue that.  I also discussed and stressed with the patient importance of increase p.o. water intake as he has not been drinking enough liquids.  Patient will need some guidance from his family due to his cognitive problems.  He has a great support from the family.  He will follow-up with  us down the road for further management of his chronic medical problems.      Return in about 6 months (around 2/28/2025) for Medicare Wellness.    Requested Prescriptions     Signed Prescriptions Disp Refills    lactulose (CHRONULAC) 10 GM/15ML solution 473 mL 0     Sig: Take 15 mL by mouth 2 (Two) Times a Day As Needed (Constipation).       Amada Haskins MD  08/28/2024

## 2024-09-17 ENCOUNTER — TELEPHONE (OUTPATIENT)
Dept: FAMILY MEDICINE CLINIC | Facility: CLINIC | Age: 74
End: 2024-09-17
Payer: MEDICARE

## 2024-09-18 RX ORDER — POTASSIUM CHLORIDE 1500 MG/1
20 TABLET, EXTENDED RELEASE ORAL DAILY
Qty: 30 TABLET | Refills: 1 | Status: SHIPPED | OUTPATIENT
Start: 2024-09-18 | End: 2024-09-18

## 2024-09-18 RX ORDER — POTASSIUM CHLORIDE 1500 MG/1
20 TABLET, EXTENDED RELEASE ORAL DAILY
Qty: 90 TABLET | Refills: 0 | Status: ON HOLD | OUTPATIENT
Start: 2024-09-18

## 2024-09-18 RX ORDER — MEGESTROL ACETATE 40 MG/1
40 TABLET ORAL DAILY
Qty: 30 TABLET | Refills: 0 | Status: ON HOLD | OUTPATIENT
Start: 2024-09-18

## 2024-09-19 ENCOUNTER — HOSPITAL ENCOUNTER (INPATIENT)
Facility: HOSPITAL | Age: 74
LOS: 5 days | Discharge: HOME | End: 2024-09-24
Attending: EMERGENCY MEDICINE | Admitting: INTERNAL MEDICINE
Payer: MEDICARE

## 2024-09-19 ENCOUNTER — APPOINTMENT (OUTPATIENT)
Dept: GENERAL RADIOLOGY | Facility: HOSPITAL | Age: 74
End: 2024-09-19
Payer: MEDICARE

## 2024-09-19 DIAGNOSIS — J18.9 PNEUMONIA OF RIGHT LOWER LOBE DUE TO INFECTIOUS ORGANISM: ICD-10-CM

## 2024-09-19 DIAGNOSIS — R41.82 ALTERED MENTAL STATUS, UNSPECIFIED ALTERED MENTAL STATUS TYPE: Primary | ICD-10-CM

## 2024-09-19 LAB
ALBUMIN SERPL-MCNC: 4 G/DL (ref 3.5–5.2)
ALBUMIN/GLOB SERPL: 1.1 G/DL
ALP SERPL-CCNC: 83 U/L (ref 39–117)
ALT SERPL W P-5'-P-CCNC: 7 U/L (ref 1–41)
ANION GAP SERPL CALCULATED.3IONS-SCNC: 11 MMOL/L (ref 5–15)
ANION GAP SERPL CALCULATED.3IONS-SCNC: 9.1 MMOL/L (ref 5–15)
ANISOCYTOSIS BLD QL: ABNORMAL
AST SERPL-CCNC: 33 U/L (ref 1–40)
B PARAPERT DNA SPEC QL NAA+PROBE: NOT DETECTED
B PERT DNA SPEC QL NAA+PROBE: NOT DETECTED
BASOPHILS # BLD AUTO: 0.04 10*3/MM3 (ref 0–0.2)
BASOPHILS NFR BLD AUTO: 0.4 % (ref 0–1.5)
BILIRUB SERPL-MCNC: 0.9 MG/DL (ref 0–1.2)
BUN SERPL-MCNC: 27 MG/DL (ref 8–23)
BUN SERPL-MCNC: 28 MG/DL (ref 8–23)
BUN/CREAT SERPL: 18.2 (ref 7–25)
BUN/CREAT SERPL: 23.7 (ref 7–25)
C PNEUM DNA NPH QL NAA+NON-PROBE: NOT DETECTED
CALCIUM SPEC-SCNC: 8.9 MG/DL (ref 8.6–10.5)
CALCIUM SPEC-SCNC: 9.3 MG/DL (ref 8.6–10.5)
CHLORIDE SERPL-SCNC: 96 MMOL/L (ref 98–107)
CHLORIDE SERPL-SCNC: 98 MMOL/L (ref 98–107)
CO2 SERPL-SCNC: 30 MMOL/L (ref 22–29)
CO2 SERPL-SCNC: 31.9 MMOL/L (ref 22–29)
CREAT SERPL-MCNC: 1.14 MG/DL (ref 0.76–1.27)
CREAT SERPL-MCNC: 1.54 MG/DL (ref 0.76–1.27)
CRP SERPL-MCNC: 0.79 MG/DL (ref 0–0.5)
D-LACTATE SERPL-SCNC: 0.8 MMOL/L (ref 0.3–2)
DEPRECATED RDW RBC AUTO: 52 FL (ref 37–54)
DEPRECATED RDW RBC AUTO: 53.1 FL (ref 37–54)
EGFRCR SERPLBLD CKD-EPI 2021: 47.6 ML/MIN/1.73
EGFRCR SERPLBLD CKD-EPI 2021: 68.3 ML/MIN/1.73
EOSINOPHIL # BLD AUTO: 0.41 10*3/MM3 (ref 0–0.4)
EOSINOPHIL NFR BLD AUTO: 3.7 % (ref 0.3–6.2)
ERYTHROCYTE [DISTWIDTH] IN BLOOD BY AUTOMATED COUNT: 14.3 % (ref 12.3–15.4)
ERYTHROCYTE [DISTWIDTH] IN BLOOD BY AUTOMATED COUNT: 14.3 % (ref 12.3–15.4)
FLUAV SUBTYP SPEC NAA+PROBE: NOT DETECTED
FLUBV RNA ISLT QL NAA+PROBE: NOT DETECTED
GLOBULIN UR ELPH-MCNC: 3.6 GM/DL
GLUCOSE SERPL-MCNC: 276 MG/DL (ref 65–99)
GLUCOSE SERPL-MCNC: 98 MG/DL (ref 65–99)
HADV DNA SPEC NAA+PROBE: NOT DETECTED
HCOV 229E RNA SPEC QL NAA+PROBE: NOT DETECTED
HCOV HKU1 RNA SPEC QL NAA+PROBE: NOT DETECTED
HCOV NL63 RNA SPEC QL NAA+PROBE: NOT DETECTED
HCOV OC43 RNA SPEC QL NAA+PROBE: NOT DETECTED
HCT VFR BLD AUTO: 32.7 % (ref 37.5–51)
HCT VFR BLD AUTO: 34.7 % (ref 37.5–51)
HGB BLD-MCNC: 10.9 G/DL (ref 13–17.7)
HGB BLD-MCNC: 11.4 G/DL (ref 13–17.7)
HMPV RNA NPH QL NAA+NON-PROBE: NOT DETECTED
HOLD SPECIMEN: NORMAL
HOLD SPECIMEN: NORMAL
HPIV1 RNA ISLT QL NAA+PROBE: NOT DETECTED
HPIV2 RNA SPEC QL NAA+PROBE: NOT DETECTED
HPIV3 RNA NPH QL NAA+PROBE: NOT DETECTED
HPIV4 P GENE NPH QL NAA+PROBE: NOT DETECTED
IMM GRANULOCYTES # BLD AUTO: 0.05 10*3/MM3 (ref 0–0.05)
IMM GRANULOCYTES NFR BLD AUTO: 0.5 % (ref 0–0.5)
LARGE PLATELETS: ABNORMAL
LYMPHOCYTES # BLD AUTO: 1.32 10*3/MM3 (ref 0.7–3.1)
LYMPHOCYTES # BLD MANUAL: 0.6 10*3/MM3 (ref 0.7–3.1)
LYMPHOCYTES NFR BLD AUTO: 11.9 % (ref 19.6–45.3)
LYMPHOCYTES NFR BLD MANUAL: 2 % (ref 5–12)
M PNEUMO IGG SER IA-ACNC: NOT DETECTED
MACROCYTES BLD QL SMEAR: ABNORMAL
MAGNESIUM SERPL-MCNC: 2.2 MG/DL (ref 1.6–2.4)
MCH RBC QN AUTO: 33.6 PG (ref 26.6–33)
MCH RBC QN AUTO: 33.9 PG (ref 26.6–33)
MCHC RBC AUTO-ENTMCNC: 32.9 G/DL (ref 31.5–35.7)
MCHC RBC AUTO-ENTMCNC: 33.3 G/DL (ref 31.5–35.7)
MCV RBC AUTO: 101.6 FL (ref 79–97)
MCV RBC AUTO: 102.4 FL (ref 79–97)
MONOCYTES # BLD AUTO: 0.75 10*3/MM3 (ref 0.1–0.9)
MONOCYTES # BLD: 0.11 10*3/MM3 (ref 0.1–0.9)
MONOCYTES NFR BLD AUTO: 6.8 % (ref 5–12)
NEUTROPHILS # BLD AUTO: 4.72 10*3/MM3 (ref 1.7–7)
NEUTROPHILS NFR BLD AUTO: 76.7 % (ref 42.7–76)
NEUTROPHILS NFR BLD AUTO: 8.51 10*3/MM3 (ref 1.7–7)
NEUTROPHILS NFR BLD MANUAL: 83 % (ref 42.7–76)
NEUTS BAND NFR BLD MANUAL: 4 % (ref 0–5)
NRBC BLD AUTO-RTO: 0 /100 WBC (ref 0–0.2)
PHOSPHATE SERPL-MCNC: 2.5 MG/DL (ref 2.5–4.5)
PLATELET # BLD AUTO: 193 10*3/MM3 (ref 140–450)
PLATELET # BLD AUTO: 217 10*3/MM3 (ref 140–450)
PMV BLD AUTO: 9.4 FL (ref 6–12)
PMV BLD AUTO: 9.5 FL (ref 6–12)
POIKILOCYTOSIS BLD QL SMEAR: ABNORMAL
POTASSIUM SERPL-SCNC: 4.1 MMOL/L (ref 3.5–5.2)
POTASSIUM SERPL-SCNC: 4.3 MMOL/L (ref 3.5–5.2)
PROCALCITONIN SERPL-MCNC: 0.04 NG/ML (ref 0–0.25)
PROT SERPL-MCNC: 7.6 G/DL (ref 6–8.5)
RBC # BLD AUTO: 3.22 10*6/MM3 (ref 4.14–5.8)
RBC # BLD AUTO: 3.39 10*6/MM3 (ref 4.14–5.8)
RHINOVIRUS RNA SPEC NAA+PROBE: NOT DETECTED
RSV RNA NPH QL NAA+NON-PROBE: NOT DETECTED
SARS-COV-2 RNA NPH QL NAA+NON-PROBE: NOT DETECTED
SCAN SLIDE: NORMAL
SODIUM SERPL-SCNC: 137 MMOL/L (ref 136–145)
SODIUM SERPL-SCNC: 139 MMOL/L (ref 136–145)
TOXIC GRANULATION: ABNORMAL
VARIANT LYMPHS NFR BLD MANUAL: 1 % (ref 0–5)
VARIANT LYMPHS NFR BLD MANUAL: 10 % (ref 19.6–45.3)
WBC NRBC COR # BLD AUTO: 11.08 10*3/MM3 (ref 3.4–10.8)
WBC NRBC COR # BLD AUTO: 5.42 10*3/MM3 (ref 3.4–10.8)
WHOLE BLOOD HOLD COAG: NORMAL
WHOLE BLOOD HOLD SPECIMEN: NORMAL

## 2024-09-19 PROCEDURE — G0378 HOSPITAL OBSERVATION PER HR: HCPCS

## 2024-09-19 PROCEDURE — 87040 BLOOD CULTURE FOR BACTERIA: CPT | Performed by: EMERGENCY MEDICINE

## 2024-09-19 PROCEDURE — 93005 ELECTROCARDIOGRAM TRACING: CPT | Performed by: EMERGENCY MEDICINE

## 2024-09-19 PROCEDURE — 85007 BL SMEAR W/DIFF WBC COUNT: CPT | Performed by: INTERNAL MEDICINE

## 2024-09-19 PROCEDURE — 99285 EMERGENCY DEPT VISIT HI MDM: CPT

## 2024-09-19 PROCEDURE — 85025 COMPLETE CBC W/AUTO DIFF WBC: CPT | Performed by: EMERGENCY MEDICINE

## 2024-09-19 PROCEDURE — 83735 ASSAY OF MAGNESIUM: CPT | Performed by: INTERNAL MEDICINE

## 2024-09-19 PROCEDURE — 94664 DEMO&/EVAL PT USE INHALER: CPT

## 2024-09-19 PROCEDURE — 94799 UNLISTED PULMONARY SVC/PX: CPT

## 2024-09-19 PROCEDURE — 74018 RADEX ABDOMEN 1 VIEW: CPT

## 2024-09-19 PROCEDURE — 25010000002 METHYLPREDNISOLONE PER 125 MG: Performed by: EMERGENCY MEDICINE

## 2024-09-19 PROCEDURE — 93005 ELECTROCARDIOGRAM TRACING: CPT

## 2024-09-19 PROCEDURE — 84145 PROCALCITONIN (PCT): CPT | Performed by: INTERNAL MEDICINE

## 2024-09-19 PROCEDURE — 80053 COMPREHEN METABOLIC PANEL: CPT | Performed by: EMERGENCY MEDICINE

## 2024-09-19 PROCEDURE — 85025 COMPLETE CBC W/AUTO DIFF WBC: CPT | Performed by: INTERNAL MEDICINE

## 2024-09-19 PROCEDURE — 83605 ASSAY OF LACTIC ACID: CPT

## 2024-09-19 PROCEDURE — 36415 COLL VENOUS BLD VENIPUNCTURE: CPT

## 2024-09-19 PROCEDURE — 94761 N-INVAS EAR/PLS OXIMETRY MLT: CPT

## 2024-09-19 PROCEDURE — 84100 ASSAY OF PHOSPHORUS: CPT | Performed by: INTERNAL MEDICINE

## 2024-09-19 PROCEDURE — 25010000002 CEFTRIAXONE PER 250 MG: Performed by: EMERGENCY MEDICINE

## 2024-09-19 PROCEDURE — 86140 C-REACTIVE PROTEIN: CPT | Performed by: INTERNAL MEDICINE

## 2024-09-19 PROCEDURE — 0202U NFCT DS 22 TRGT SARS-COV-2: CPT | Performed by: EMERGENCY MEDICINE

## 2024-09-19 PROCEDURE — 87641 MR-STAPH DNA AMP PROBE: CPT | Performed by: INTERNAL MEDICINE

## 2024-09-19 PROCEDURE — 94640 AIRWAY INHALATION TREATMENT: CPT

## 2024-09-19 PROCEDURE — 71045 X-RAY EXAM CHEST 1 VIEW: CPT

## 2024-09-19 RX ORDER — ACETAMINOPHEN 325 MG/1
650 TABLET ORAL EVERY 4 HOURS PRN
Status: DISCONTINUED | OUTPATIENT
Start: 2024-09-19 | End: 2024-09-24 | Stop reason: HOSPADM

## 2024-09-19 RX ORDER — BUDESONIDE 0.5 MG/2ML
0.5 INHALANT ORAL
Status: DISCONTINUED | OUTPATIENT
Start: 2024-09-19 | End: 2024-09-24 | Stop reason: HOSPADM

## 2024-09-19 RX ORDER — SODIUM CHLORIDE 0.9 % (FLUSH) 0.9 %
10 SYRINGE (ML) INJECTION EVERY 12 HOURS SCHEDULED
Status: DISCONTINUED | OUTPATIENT
Start: 2024-09-19 | End: 2024-09-24 | Stop reason: HOSPADM

## 2024-09-19 RX ORDER — BISACODYL 10 MG
10 SUPPOSITORY, RECTAL RECTAL DAILY PRN
Status: DISCONTINUED | OUTPATIENT
Start: 2024-09-19 | End: 2024-09-24 | Stop reason: HOSPADM

## 2024-09-19 RX ORDER — ACETAMINOPHEN 650 MG/1
650 SUPPOSITORY RECTAL EVERY 4 HOURS PRN
Status: DISCONTINUED | OUTPATIENT
Start: 2024-09-19 | End: 2024-09-23

## 2024-09-19 RX ORDER — SODIUM CHLORIDE 9 MG/ML
40 INJECTION, SOLUTION INTRAVENOUS AS NEEDED
Status: DISCONTINUED | OUTPATIENT
Start: 2024-09-19 | End: 2024-09-24 | Stop reason: HOSPADM

## 2024-09-19 RX ORDER — ONDANSETRON 2 MG/ML
4 INJECTION INTRAMUSCULAR; INTRAVENOUS EVERY 6 HOURS PRN
Status: DISCONTINUED | OUTPATIENT
Start: 2024-09-19 | End: 2024-09-24 | Stop reason: HOSPADM

## 2024-09-19 RX ORDER — METHYLPREDNISOLONE SODIUM SUCCINATE 125 MG/2ML
80 INJECTION, POWDER, LYOPHILIZED, FOR SOLUTION INTRAMUSCULAR; INTRAVENOUS ONCE
Status: COMPLETED | OUTPATIENT
Start: 2024-09-19 | End: 2024-09-19

## 2024-09-19 RX ORDER — NITROGLYCERIN 0.4 MG/1
0.4 TABLET SUBLINGUAL
Status: DISCONTINUED | OUTPATIENT
Start: 2024-09-19 | End: 2024-09-23

## 2024-09-19 RX ORDER — IPRATROPIUM BROMIDE AND ALBUTEROL SULFATE 2.5; .5 MG/3ML; MG/3ML
3 SOLUTION RESPIRATORY (INHALATION)
Status: DISCONTINUED | OUTPATIENT
Start: 2024-09-19 | End: 2024-09-24 | Stop reason: HOSPADM

## 2024-09-19 RX ORDER — AMOXICILLIN 250 MG
2 CAPSULE ORAL 2 TIMES DAILY PRN
Status: DISCONTINUED | OUTPATIENT
Start: 2024-09-19 | End: 2024-09-24 | Stop reason: HOSPADM

## 2024-09-19 RX ORDER — SODIUM CHLORIDE 0.9 % (FLUSH) 0.9 %
10 SYRINGE (ML) INJECTION AS NEEDED
Status: DISCONTINUED | OUTPATIENT
Start: 2024-09-19 | End: 2024-09-24 | Stop reason: HOSPADM

## 2024-09-19 RX ORDER — POLYETHYLENE GLYCOL 3350 17 G/17G
17 POWDER, FOR SOLUTION ORAL DAILY PRN
Status: DISCONTINUED | OUTPATIENT
Start: 2024-09-19 | End: 2024-09-24 | Stop reason: HOSPADM

## 2024-09-19 RX ORDER — BISACODYL 5 MG/1
5 TABLET, DELAYED RELEASE ORAL DAILY PRN
Status: DISCONTINUED | OUTPATIENT
Start: 2024-09-19 | End: 2024-09-24 | Stop reason: HOSPADM

## 2024-09-19 RX ORDER — ALUMINA, MAGNESIA, AND SIMETHICONE 2400; 2400; 240 MG/30ML; MG/30ML; MG/30ML
15 SUSPENSION ORAL EVERY 6 HOURS PRN
Status: CANCELLED | OUTPATIENT
Start: 2024-09-19

## 2024-09-19 RX ORDER — GUAIFENESIN 200 MG/10ML
200 LIQUID ORAL EVERY 4 HOURS PRN
Status: DISCONTINUED | OUTPATIENT
Start: 2024-09-19 | End: 2024-09-24 | Stop reason: HOSPADM

## 2024-09-19 RX ORDER — IPRATROPIUM BROMIDE AND ALBUTEROL SULFATE 2.5; .5 MG/3ML; MG/3ML
3 SOLUTION RESPIRATORY (INHALATION) ONCE
Status: COMPLETED | OUTPATIENT
Start: 2024-09-19 | End: 2024-09-19

## 2024-09-19 RX ORDER — ACETAMINOPHEN 160 MG/5ML
650 SOLUTION ORAL EVERY 4 HOURS PRN
Status: DISCONTINUED | OUTPATIENT
Start: 2024-09-19 | End: 2024-09-23

## 2024-09-19 RX ORDER — ONDANSETRON 4 MG/1
4 TABLET, ORALLY DISINTEGRATING ORAL EVERY 6 HOURS PRN
Status: DISCONTINUED | OUTPATIENT
Start: 2024-09-19 | End: 2024-09-24 | Stop reason: HOSPADM

## 2024-09-19 RX ADMIN — METHYLPREDNISOLONE SODIUM SUCCINATE 80 MG: 125 INJECTION, POWDER, FOR SOLUTION INTRAMUSCULAR; INTRAVENOUS at 15:12

## 2024-09-19 RX ADMIN — Medication 10 ML: at 22:22

## 2024-09-19 RX ADMIN — IPRATROPIUM BROMIDE AND ALBUTEROL SULFATE 3 ML: .5; 3 SOLUTION RESPIRATORY (INHALATION) at 14:15

## 2024-09-19 RX ADMIN — CEFTRIAXONE 2000 MG: 2 INJECTION, POWDER, FOR SOLUTION INTRAMUSCULAR; INTRAVENOUS at 15:13

## 2024-09-19 RX ADMIN — DOXYCYCLINE 100 MG: 100 INJECTION, POWDER, LYOPHILIZED, FOR SOLUTION INTRAVENOUS at 15:52

## 2024-09-19 RX ADMIN — IPRATROPIUM BROMIDE AND ALBUTEROL SULFATE 3 ML: .5; 3 SOLUTION RESPIRATORY (INHALATION) at 20:23

## 2024-09-19 RX ADMIN — BUDESONIDE 0.5 MG: 0.5 INHALANT RESPIRATORY (INHALATION) at 20:23

## 2024-09-19 NOTE — Clinical Note
Level of Care: Telemetry [5]   Diagnosis: Pneumonia [282039]   Admitting Physician: KAREN DUNN [[id number]]   Attending Physician: KAREN DUNN [[id number]]   Certification: I Certify That Inpatient Hospital Services Are Medically Necessary For Greater Than 2 Midnights

## 2024-09-19 NOTE — H&P
History and Physical   Drake Beth : 1950 MRN:[id number] LOS:0     Reason for admission: Pneumonia     Assessment / Plan     #Right lower lobe pneumonia   -pt is awake and alert but cannot answer much of the questions ( no family member at the bedside )   -WBC of 11  -RVP negative   -CXR with right lower lobe airspace disease.   -IV Rocephin and IV doxycycline.  Oxygen supplement.  Breathing treatment.  Legionella and strep pneumoniae MRSA pending    #Abdo pain   -KUB pending   -denied on exam     #HTN  #CAD  #A fib   -Resume home medication once verified by pharmacy and clinically appropriate      Code Status (Patient has no pulse and is not breathing): CPR (Attempt to Resuscitate)  Medical Interventions (Patient has pulse or is breathing): Full Support       Nutrition: NPO Diet NPO Type: Sips with Meds     DVT Prophylaxis: Active VTE Prophylaxis  Mechanical:        Start        24 1633  Maintain Sequential Compression Device  Continuous                          Select Pharmacologic VTE Prophylaxis if Desired & Appropriate      History of Present illness     A 73 y.o. old male patient with PMH of atrial fibrillation heart failure CAD hypertension hyperlipidemia presents to the hospital with complaints of altered mental status.  Patient has abdominal pain and issues with constipation as well per the report review.   Patient has leukocytosis of 11.  Lactic acid normal.  RVP negative.  Chest x-ray with right lower lobe airspace disease.  CRP procal pending.  KUB pending.      Patient will be admitted for pneumonia management will need inpatient stay.      Subjective / Review of systems     Review of Systems   cough    Past Medical/Surgical/Social/Family History & Allergies     Past Medical History:   Diagnosis Date    Allergic rhinitis     Anemia     Atrial fibrillation     Chesty cough     CHF (congestive heart failure)     Coronary artery disease     Cough     Fever     Hyperlipidemia      Hypertension     Mentally challenged     MVP (mitral valve prolapse)       Past Surgical History:   Procedure Laterality Date    CARDIAC ELECTROPHYSIOLOGY PROCEDURE N/A 4/26/2022    Procedure: PACEMAKER EXTRACTION;  Surgeon: Rj Dominique MD;  Location: [facility] INVASIVE LOCATION;  Service: Cardiology;  Laterality: N/A;    COLONOSCOPY      He has never had the test done.  His family is refusing any form of colon cancer screening for him.    CORONARY ARTERY BYPASS GRAFT  2008    DR SWEAT    MITRAL VALVE REPLACEMENT  04/2008    WITH BIOPROSTHETIC TISSUE VALVE      Social History     Socioeconomic History    Marital status: Single   Tobacco Use    Smoking status: Never     Passive exposure: Never    Smokeless tobacco: Never   Vaping Use    Vaping status: Never Used   Substance and Sexual Activity    Alcohol use: Never    Drug use: Never    Sexual activity: Never      Family History   Problem Relation Age of Onset    Coronary artery disease Mother 64    Hyperlipidemia Sister     Liver disease Sister         FATTY LIVER    Rheum arthritis Sister     Lung cancer Sister     Hypertension Brother     Other Brother     Rheum arthritis Brother       No Known Allergies     Home Medications     Prior to Admission medications    Medication Sig Start Date End Date Taking? Authorizing Provider   amLODIPine (NORVASC) 2.5 MG tablet TAKE 1 TABLET BY MOUTH DAILY 7/12/24   Rj Dominique MD   apixaban (Eliquis) 5 MG tablet tablet TAKE 1 TABLET BY MOUTH TWICE DAILY 1/25/24   Rj Dominique MD   atorvastatin (LIPITOR) 10 MG tablet TAKE 1 TABLET BY MOUTH DAILY 6/7/24   Ansley Tomlin APRN   furosemide (LASIX) 40 MG tablet Take 1 tablet by mouth Daily.    Provider, MD Mary   lactulose (CHRONULAC) 10 GM/15ML solution Take 15 mL by mouth 2 (Two) Times a Day As Needed (Constipation). 8/28/24   Amada Haskins MD   megestrol (MEGACE) 40 MG tablet Take 1 tablet by mouth Daily. 9/18/24   Amada Haskins MD    mirtazapine (REMERON) 15 MG tablet Take 1 tablet by mouth Every Night. 7/27/24   Giovanna Daley DO   montelukast (SINGULAIR) 10 MG tablet TAKE 1 TABLET BY MOUTH DAILY 6/7/24   Amada Haskins MD   multivitamin with minerals tablet tablet Take 1 tablet by mouth Daily.    Provider, MD Mary   pantoprazole (PROTONIX) 40 MG EC tablet Take 1 tablet by mouth Every Night. 7/27/24   Giovanna Daley DO   potassium chloride (KLOR-CON M20) 20 MEQ CR tablet TAKE 1 TABLET BY MOUTH DAILY 9/18/24   Amada Haskins MD   sotalol (BETAPACE AF) 80 MG tablet tablet TAKE 1 TABLET BY MOUTH TWICE DAILY 5/13/24   ChemchirianRj MD      Objective / Physical Exam   Vital signs:  Temp: 98.6 °F (37 °C)  BP: 146/75  Heart Rate: 59  Resp: 12  SpO2: 90 %  Weight: 42.2 kg (93 lb 0.6 oz)    Admission Weight: Weight: 42.2 kg (93 lb 0.6 oz)    Physical Exam   Physical Exam  HENT:      Head: Normocephalic and atraumatic.      Nose: Nose normal.   Eyes:      Extraocular Movements: Extraocular movements intact.      Conjunctivae/sclera: Conjunctivae normal.      Pupils: Pupils are equal, round, and reactive to light.   Cardiovascular:      Rate and Rhythm: normal       Pulses: Normal pulses.      Heart sounds: Normal heart sounds.   Pulmonary:      Effort: normal      Breath sounds: normal   Abdominal:      General: Abdomen is flat. Bowel sounds are normal.      Palpations: Abdomen is soft.   Musculoskeletal:         General: Normal range of motion.      Cervical back: Normal range of motion and neck supple.   Skin:     General: Skin is dry.   Neurological:      General: No focal deficit present.      Mental Status: alert.   Psychiatric:         Mood and Affect: Mood normal.        Labs     Results from last 7 days   Lab Units 09/19/24  1353   WBC 10*3/mm3 11.08*   HEMATOCRIT % 34.7*   PLATELETS 10*3/mm3 217      Results from last 7 days   Lab Units 09/19/24  1353   SODIUM mmol/L 139   POTASSIUM mmol/L 4.1   CHLORIDE mmol/L 98   CO2  mmol/L 31.9*   BUN mg/dL 27*   CREATININE mg/dL 1.14        Current Medications   Scheduled Meds:[START ON 9/20/2024] cefTRIAXone, 2,000 mg, Intravenous, Q24H  [START ON 9/20/2024] doxycycline, 100 mg, Intravenous, Q12H  ipratropium-albuterol, 3 mL, Nebulization, 4x Daily - RT  sodium chloride, 10 mL, Intravenous, Q12H         Continuous Infusions:      Zabrina Gomez MD  [facility]   09/19/24   16:35 EDT

## 2024-09-19 NOTE — ED PROVIDER NOTES
Subjective   History of Present Illness  73-year-old male complaining of weakness and cough for several days.  Family states he has had altered mental status and decline in function over the last 3 days.  The patient has had subjective fever and chills over this timeframe.  The patient has had no vomiting or diarrhea.  The patient reports no melena hematemesis or hematochezia.  The patient denies night sweats or hemoptysis.  The family states that he has had some intermittent confusion in the last 3 days and has has a decreased activity level somewhat longer than that.  The patient has had no new focal neurologic defects or lateralizing neurologic signs    Patient has had some pleuritic chest discomfort  Review of Systems   Unable to perform ROS: Mental status change   Constitutional:  Positive for activity change, chills, fatigue and fever.   HENT:  Negative for trouble swallowing.    Respiratory:  Positive for cough, chest tightness, shortness of breath and wheezing.    Cardiovascular:  Positive for chest pain and leg swelling. Negative for palpitations.   Hematological:  Does not bruise/bleed easily.   All other systems reviewed and are negative.      Past Medical History:   Diagnosis Date    Allergic rhinitis     Anemia     Atrial fibrillation     Chesty cough     CHF (congestive heart failure)     Coronary artery disease     Cough     Fever     Hyperlipidemia     Hypertension     Mentally challenged     MVP (mitral valve prolapse)        No Known Allergies    Past Surgical History:   Procedure Laterality Date    CARDIAC ELECTROPHYSIOLOGY PROCEDURE N/A 4/26/2022    Procedure: PACEMAKER EXTRACTION;  Surgeon: Rj Dominique MD;  Location: [facility] INVASIVE LOCATION;  Service: Cardiology;  Laterality: N/A;    COLONOSCOPY      He has never had the test done.  His family is refusing any form of colon cancer screening for him.    CORONARY ARTERY BYPASS GRAFT  2008    DR SWEAT    MITRAL VALVE REPLACEMENT   04/2008    WITH BIOPROSTHETIC TISSUE VALVE       Family History   Problem Relation Age of Onset    Coronary artery disease Mother 64    Hyperlipidemia Sister     Liver disease Sister         FATTY LIVER    Rheum arthritis Sister     Lung cancer Sister     Hypertension Brother     Other Brother     Rheum arthritis Brother        Social History     Socioeconomic History    Marital status: Single   Tobacco Use    Smoking status: Never     Passive exposure: Never    Smokeless tobacco: Never   Vaping Use    Vaping status: Never Used   Substance and Sexual Activity    Alcohol use: Never    Drug use: Never    Sexual activity: Never     No reported safety issues or episodes of changes in food intake or choking      Objective   Physical Exam  Alert [place] Coma Scale 14   HEENT: Pupils equal and reactive to light. Conjunctivae are not injected. Normal tympanic membranes. Oropharynx and nares are normal.   Neck: Supple. Midline trachea. No JVD. No goiter.   Chest: Scattered rhonchi bilaterally with right basilar Rales and equal breath sounds bilaterally, regular rate and rhythm without murmur or rub.   Abdomen: Positive bowel sounds, nontender, nondistended. No rebound or peritoneal signs. No CVA tenderness.   Extremities no clubbing. cyanosis or edema. Motor sensory exam is normal. The full range of motion is intact   Skin: Warm and dry, no rashes or petechia.   Lymphatic: No regional lymphadenopathy. No calf pain, swelling or Homans sign    Procedures           ED Course  ED Course as of 09/19/24 1643   Thu Sep 19, 2024   1620 ED overflow/overcrowding condition [TH]      ED Course User Index  [TH] Pedro Luis Pop MD      Labs Reviewed   COMPREHENSIVE METABOLIC PANEL - Abnormal; Notable for the following components:       Result Value    BUN 27 (*)     CO2 31.9 (*)     All other components within normal limits    Narrative:     GFR Normal >60  Chronic Kidney Disease <60  Kidney Failure <15    The GFR formula is only valid  for adults with stable renal function between ages 18 and 70.   CBC WITH AUTO DIFFERENTIAL - Abnormal; Notable for the following components:    WBC 11.08 (*)     RBC 3.39 (*)     Hemoglobin 11.4 (*)     Hematocrit 34.7 (*)     .4 (*)     MCH 33.6 (*)     Neutrophil % 76.7 (*)     Lymphocyte % 11.9 (*)     Neutrophils, Absolute 8.51 (*)     Eosinophils, Absolute 0.41 (*)     All other components within normal limits   RESPIRATORY PANEL PCR W/ COVID-19 (SARS-COV-2), NP SWAB IN UTM/VTP, 2 HR TAT - Normal    Narrative:     In the setting of a positive respiratory panel with a viral infection PLUS a negative procalcitonin without other underlying concern for bacterial infection, consider observing off antibiotics or discontinuation of antibiotics and continue supportive care. If the respiratory panel is positive for atypical bacterial infection (Bordetella pertussis, Chlamydophila pneumoniae, or Mycoplasma pneumoniae), consider antibiotic de-escalation to target atypical bacterial infection.   POC LACTATE - Normal   BLOOD CULTURE   BLOOD CULTURE   STREP PNEUMO AG, URINE OR CSF   LEGIONELLA ANTIGEN, URINE   MRSA SCREEN, PCR   RAINBOW DRAW    Narrative:     The following orders were created for panel order [place] Draw.  Procedure                               Abnormality         Status                     ---------                               -----------         ------                     Green Top (Gel)[[id number]]                                  Final result               Lavender Top[[id number]]                                     Final result               Gold Top - SST[[id number]]                                   Final result               Light Blue Top[[id number]]                                   Final result                 Please view results for these tests on the individual orders.   URINALYSIS W/ MICROSCOPIC IF INDICATED (NO CULTURE)   C-REACTIVE PROTEIN   PROCALCITONIN   POC LACTATE   GREEN TOP    LAVENDER TOP   GOLD TOP - [facility]   LIGHT BLUE TOP   CBC AND DIFFERENTIAL    Narrative:     The following orders were created for panel order CBC & Differential.  Procedure                               Abnormality         Status                     ---------                               -----------         ------                     CBC Auto Differential[[id number]]        Abnormal            Final result                 Please view results for these tests on the individual orders.     Medications   sodium chloride 0.9 % flush 10 mL (has no administration in time range)   sodium chloride 0.9 % flush 10 mL (has no administration in time range)   sodium chloride 0.9 % flush 10 mL (has no administration in time range)   sodium chloride 0.9 % infusion 40 mL (has no administration in time range)   nitroglycerin (NITROSTAT) SL tablet 0.4 mg (has no administration in time range)   Potassium Replacement - Follow Nurse / BPA Driven Protocol (has no administration in time range)   Magnesium Cardiology Dose Replacement - Follow Nurse / BPA Driven Protocol (has no administration in time range)   Phosphorus Replacement - Follow Nurse / BPA Driven Protocol (has no administration in time range)   Calcium Replacement - Follow Nurse / BPA Driven Protocol (has no administration in time range)   acetaminophen (TYLENOL) tablet 650 mg (has no administration in time range)     Or   acetaminophen (TYLENOL) 160 MG/5ML oral solution 650 mg (has no administration in time range)     Or   acetaminophen (TYLENOL) suppository 650 mg (has no administration in time range)   sennosides-docusate (PERICOLACE) 8.6-50 MG per tablet 2 tablet (has no administration in time range)     And   polyethylene glycol (MIRALAX) packet 17 g (has no administration in time range)     And   bisacodyl (DULCOLAX) EC tablet 5 mg (has no administration in time range)     And   bisacodyl (DULCOLAX) suppository 10 mg (has no administration in time range)   melatonin tablet  5 mg (has no administration in time range)   ondansetron ODT (ZOFRAN-ODT) disintegrating tablet 4 mg (has no administration in time range)     Or   ondansetron (ZOFRAN) injection 4 mg (has no administration in time range)   cefTRIAXone (ROCEPHIN) 2,000 mg in sodium chloride 0.9 % 100 mL MBP (has no administration in time range)   ipratropium-albuterol (DUO-NEB) nebulizer solution 3 mL (has no administration in time range)   guaifenesin (ROBITUSSIN) 100 MG/5ML liquid 200 mg (has no administration in time range)   budesonide (PULMICORT) nebulizer solution 0.5 mg (has no administration in time range)   ipratropium-albuterol (DUO-NEB) nebulizer solution 3 mL (3 mL Nebulization Given 9/19/24 1415)   methylPREDNISolone sodium succinate (SOLU-Medrol) injection 80 mg (80 mg Intravenous Given 9/19/24 1512)     XR Chest 1 View    Result Date: 9/19/2024  Impression: Right perihilar right lower lobe airspace disease. Correlate for pneumonia. Small right basilar pleural effusion. Electronically Signed: Tamie Ivy MD  9/19/2024 2:28 PM EDT  Workstation ID: [id number]                                          Medical Decision Making  Patient had cultures obtained emergency department started on IV antibiotics.  The patient will be admitted.  The case discussed the hospitalist.  The patient and family are agreeable this plan of treatment    Amount and/or Complexity of Data Reviewed  Labs: ordered.  Radiology: ordered.  ECG/medicine tests: ordered.    Risk  Prescription drug management.  Decision regarding hospitalization.        Final diagnoses:   Altered mental status, unspecified altered mental status type   Pneumonia of right lower lobe due to infectious organism       ED Disposition  ED Disposition       ED Disposition   Decision to Admit    Condition   --    Comment   Level of Care: Telemetry [5]   Diagnosis: Altered mental status [780.97.ICD-9-CM]   Admitting Physician: KAREN DUNN [[id number]]   Attending Physician: KAREN DUNN  [[id number]]                 No follow-up provider specified.       Medication List      No changes were made to your prescriptions during this visit.            Pedro Luis Pop MD  09/19/24 5719

## 2024-09-20 LAB
MRSA DNA SPEC QL NAA+PROBE: NORMAL
QT INTERVAL: 680 MS
QTC INTERVAL: 615 MS

## 2024-09-20 PROCEDURE — 94799 UNLISTED PULMONARY SVC/PX: CPT

## 2024-09-20 PROCEDURE — 25010000002 CEFTRIAXONE PER 250 MG: Performed by: INTERNAL MEDICINE

## 2024-09-20 PROCEDURE — 97166 OT EVAL MOD COMPLEX 45 MIN: CPT

## 2024-09-20 PROCEDURE — 94761 N-INVAS EAR/PLS OXIMETRY MLT: CPT

## 2024-09-20 PROCEDURE — 94664 DEMO&/EVAL PT USE INHALER: CPT

## 2024-09-20 PROCEDURE — 97162 PT EVAL MOD COMPLEX 30 MIN: CPT

## 2024-09-20 PROCEDURE — 25010000002 AZITHROMYCIN PER 500 MG: Performed by: INTERNAL MEDICINE

## 2024-09-20 PROCEDURE — 25810000003 SODIUM CHLORIDE 0.9 % SOLUTION 250 ML FLEX CONT: Performed by: INTERNAL MEDICINE

## 2024-09-20 RX ORDER — MEGESTROL ACETATE 40 MG/1
40 TABLET ORAL DAILY
Status: DISCONTINUED | OUTPATIENT
Start: 2024-09-21 | End: 2024-09-24 | Stop reason: HOSPADM

## 2024-09-20 RX ORDER — ATORVASTATIN CALCIUM 10 MG/1
10 TABLET, FILM COATED ORAL DAILY
Status: DISCONTINUED | OUTPATIENT
Start: 2024-09-21 | End: 2024-09-24 | Stop reason: HOSPADM

## 2024-09-20 RX ORDER — PANTOPRAZOLE SODIUM 40 MG/1
40 TABLET, DELAYED RELEASE ORAL NIGHTLY
Status: DISCONTINUED | OUTPATIENT
Start: 2024-09-21 | End: 2024-09-24 | Stop reason: HOSPADM

## 2024-09-20 RX ORDER — MONTELUKAST SODIUM 10 MG/1
10 TABLET ORAL DAILY
Status: DISCONTINUED | OUTPATIENT
Start: 2024-09-21 | End: 2024-09-24 | Stop reason: HOSPADM

## 2024-09-20 RX ORDER — LACTULOSE 10 G/15ML
15 SOLUTION ORAL 2 TIMES DAILY PRN
Status: DISCONTINUED | OUTPATIENT
Start: 2024-09-20 | End: 2024-09-24 | Stop reason: HOSPADM

## 2024-09-20 RX ORDER — MULTIPLE VITAMINS W/ MINERALS TAB 9MG-400MCG
1 TAB ORAL DAILY
Status: DISCONTINUED | OUTPATIENT
Start: 2024-09-21 | End: 2024-09-24 | Stop reason: HOSPADM

## 2024-09-20 RX ORDER — SOTALOL HYDROCHLORIDE 80 MG/1
80 TABLET ORAL EVERY 12 HOURS SCHEDULED
Status: DISCONTINUED | OUTPATIENT
Start: 2024-09-21 | End: 2024-09-24 | Stop reason: HOSPADM

## 2024-09-20 RX ORDER — MIRTAZAPINE 15 MG/1
15 TABLET, FILM COATED ORAL NIGHTLY
Status: DISCONTINUED | OUTPATIENT
Start: 2024-09-21 | End: 2024-09-24 | Stop reason: HOSPADM

## 2024-09-20 RX ADMIN — Medication 10 ML: at 09:04

## 2024-09-20 RX ADMIN — IPRATROPIUM BROMIDE AND ALBUTEROL SULFATE 3 ML: .5; 3 SOLUTION RESPIRATORY (INHALATION) at 19:18

## 2024-09-20 RX ADMIN — Medication 10 ML: at 20:55

## 2024-09-20 RX ADMIN — IPRATROPIUM BROMIDE AND ALBUTEROL SULFATE 3 ML: .5; 3 SOLUTION RESPIRATORY (INHALATION) at 11:28

## 2024-09-20 RX ADMIN — IPRATROPIUM BROMIDE AND ALBUTEROL SULFATE 3 ML: .5; 3 SOLUTION RESPIRATORY (INHALATION) at 07:46

## 2024-09-20 RX ADMIN — AZITHROMYCIN MONOHYDRATE 500 MG: 500 INJECTION, POWDER, LYOPHILIZED, FOR SOLUTION INTRAVENOUS at 13:25

## 2024-09-20 RX ADMIN — BUDESONIDE 0.5 MG: 0.5 INHALANT RESPIRATORY (INHALATION) at 07:42

## 2024-09-20 RX ADMIN — CEFTRIAXONE 2000 MG: 2 INJECTION, POWDER, FOR SOLUTION INTRAMUSCULAR; INTRAVENOUS at 14:52

## 2024-09-20 RX ADMIN — IPRATROPIUM BROMIDE AND ALBUTEROL SULFATE 3 ML: .5; 3 SOLUTION RESPIRATORY (INHALATION) at 15:42

## 2024-09-20 RX ADMIN — BUDESONIDE 0.5 MG: 0.5 INHALANT RESPIRATORY (INHALATION) at 19:18

## 2024-09-20 NOTE — PLAN OF CARE
Goal Outcome Evaluation:      Pt admitted from ED. Breathing txs ordered and given. Admission completed. Pt states he lives with his brothers. Listed on EC list.

## 2024-09-20 NOTE — PLAN OF CARE
Goal Outcome Evaluation:              Outcome Evaluation: Pt is a 74 y/o male with c/o AMS, abdominal pain, constipation, dry cough,weakness, and SOB x 2 days.  Pt recently hospitalized with PNA on 7/18.  Chest X-ray: PNA.  Pt reports he lives with his brother in a 1 story home with no steps to enter into home.  Pt ambulated with RW prior to hospitalization.  Did not get ahold of family, though RN reports pt is at cognitive baseline.  This date pt completed toileting with supervision.  He mobilizes using rwx with superivison.  Anticipate he is near baseline level of function.  Recommend home with assist.  Will continue to follow to monitor progress and continue to facilitate independence with ADLs.      Anticipated Discharge Disposition (OT): home with 24/7 care

## 2024-09-20 NOTE — THERAPY EVALUATION
Patient Name: Drake Beth  : 1950    MRN: [id number]                              Today's Date: 2024       Admit Date: 2024    Visit Dx:     ICD-10-CM ICD-9-CM   1. Altered mental status, unspecified altered mental status type  R41.82 780.97   2. Pneumonia of right lower lobe due to infectious organism  J18.9 486     Patient Active Problem List   Diagnosis    Allergic rhinitis    Anemia    Atrial fibrillation    Congestive heart failure    Coronary artery disease    Subacute cough    Hyperlipidemia    Hypertension    Medicare annual wellness visit, subsequent    Mentally challenged    Prostate cancer screening    Pacemaker infection    Mitral valve replaced    Gingivitis, chronic    Acute URI    Atrial flutter with controlled response    Pneumonia    Sepsis    Acute on chronic diastolic heart failure    Paroxysmal atrial fibrillation    Acute respiratory failure with hypoxia and hypercapnia    Hyperkalemia    Hyponatremia    Hyperbilirubinemia    Acute UTI (urinary tract infection)    Presence of cardiac pacemaker    Constipation    Altered mental status     Past Medical History:   Diagnosis Date    Allergic rhinitis     Anemia     Atrial fibrillation     Chesty cough     CHF (congestive heart failure)     Coronary artery disease     Cough     Fever     Hyperlipidemia     Hypertension     Mentally challenged     MVP (mitral valve prolapse)      Past Surgical History:   Procedure Laterality Date    CARDIAC ELECTROPHYSIOLOGY PROCEDURE N/A 2022    Procedure: PACEMAKER EXTRACTION;  Surgeon: Rj Dominique MD;  Location: [facility] INVASIVE LOCATION;  Service: Cardiology;  Laterality: N/A;    COLONOSCOPY      He has never had the test done.  His family is refusing any form of colon cancer screening for him.    CORONARY ARTERY BYPASS GRAFT      DR SWEAT    MITRAL VALVE REPLACEMENT  2008    WITH BIOPROSTHETIC TISSUE VALVE      General Information       Row Name 24 2219           OT Time and Intention    Document Type evaluation  -SR     Mode of Treatment occupational therapy  -SR       Row Name 09/20/24 1631          Occupational Profile    Reason for Services/Referral (Occupational Profile) Pt is a 72 y/o male with c/o AMS, abdominal pain, constipation, dry cough,weakness, and SOB x 2 days.  Pt recently hospitalized with PNA on 7/18.  Chest X-ray: PNA.  Pt reports he lives with his brother in a 1 story home with no steps to enter into home.  Pt ambulated with RW prior to hospitalization.  Did not get ahold of family, though RN reports pt is at cognitive baseline.  -SR       Row Name 09/20/24 1631          Cognition    Orientation Status (Cognition) --  Pt oriented to name, though unable to provide birthdate.  Increased difficulty with cognitive testing due to severe [place]  -SR               User Key  (r) = Recorded By, (t) = Taken By, (c) = Cosigned By      Initials Name Provider Type    SR Donna French OT Occupational Therapist                     Mobility/ADL's       Row Name 09/20/24 1636          Bed Mobility    Bed Mobility sit-supine  -SR     Sit-Supine [place] (Bed Mobility) supervision  -SR       Row Name 09/20/24 1636          Sit-Stand Transfer    Sit-Stand [place] (Transfers) 1 person assist;supervision  -SR       Row Name 09/20/24 1636          Functional Mobility    Functional Mobility- Ind. Level supervision required  -SR     Functional Mobility- Device walker, front-wheeled  -SR       Row Name 09/20/24 1636          Activities of Daily Living    BADL Assessment/Intervention toileting  -SR       Row Name 09/20/24 1636          Toileting Assessment/Training    [place] Level (Toileting) supervision;verbal cues  -SR               User Key  (r) = Recorded By, (t) = Taken By, (c) = Cosigned By      Initials Name Provider Type    SR Donna French OT Occupational Therapist                   Obj/Interventions       Row Name 09/20/24 1637           Range of Motion Comprehensive    General Range of Motion no range of motion deficits identified  -SR       [facility] Name 09/20/24 1637          Strength Comprehensive (MMT)    Comment, General Manual Muscle Testing (MMT) Assessment Unable to formally assess due to difficulty following commands for MMT.  Functionally WFL.  -SR       [facility] Name 09/20/24 1637          Balance    Balance Interventions sitting;standing;sit to stand;static;dynamic;minimal challenge;supported  -SR               User Key  (r) = Recorded By, (t) = Taken By, (c) = Cosigned By      Initials Name Provider Type    SR Donna French, OT Occupational Therapist                   Goals/Plan       [facility] Name 09/20/24 1640          Bathing Goal 1 (OT)    Activity/Device (Bathing Goal 1, OT) bathing skills, all  -SR     [place] Level/Cues Needed (Bathing Goal 1, OT) modified independence  -SR     Time Frame (Bathing Goal 1, OT) 2 weeks  -SR       Row Name 09/20/24 1640          Toileting Goal 1 (OT)    Activity/Device (Toileting Goal 1, OT) toileting skills, all  -SR     [place] Level/Cues Needed (Toileting Goal 1, OT) modified independence  -SR     Time Frame (Toileting Goal 1, OT) 2 weeks  -SR       [facility] Name 09/20/24 1640          Therapy Assessment/Plan (OT)    Planned Therapy Interventions (OT) activity tolerance training;BADL retraining;IADL retraining;functional balance retraining;neuromuscular control/coordination retraining;ROM/therapeutic exercise;transfer/mobility retraining;strengthening exercise;occupation/activity based interventions  -SR               User Key  (r) = Recorded By, (t) = Taken By, (c) = Cosigned By      Initials Name Provider Type    SR Donna French, OT Occupational Therapist                   Clinical Impression       Row Name 09/20/24 1638          Pain Assessment    Pretreatment Pain Rating 0/10 - no pain  -SR     Posttreatment Pain Rating 0/10 - no pain  -SR       [facility] Name 09/20/24 1638          Plan  of Care Review    Outcome Evaluation Pt is a 72 y/o male with c/o AMS, abdominal pain, constipation, dry cough,weakness, and SOB x 2 days.  Pt recently hospitalized with PNA on 7/18.  Chest X-ray: PNA.  Pt reports he lives with his brother in a 1 story home with no steps to enter into home.  Pt ambulated with RW prior to hospitalization.  Did not get ahold of family, though RN reports pt is at cognitive baseline.  This date pt completed toileting with supervision.  He mobilizes using rwx with superivison.  Anticipate he is near baseline level of function.  Recommend home with assist.  Will continue to follow to monitor progress and continue to facilitate independence with ADLs.  -SR       Row Name 09/20/24 1638          Therapy Assessment/Plan (OT)    Rehab Potential (OT) good, to achieve stated therapy goals  -SR     Criteria for Skilled Therapeutic Interventions Met (OT) yes  -SR     Therapy Frequency (OT) 3 times/wk  -SR     Predicted Duration of Therapy Intervention (OT) Until discharge  -SR       Row Name 09/20/24 1638          Therapy Plan Review/Discharge Plan (OT)    Anticipated Discharge Disposition (OT) home with 24/7 care  -SR       Row Name 09/20/24 1638          Positioning and Restraints    Pre-Treatment Position bathroom  -SR     Post Treatment Position bed  -SR     In Bed call light within reach;encouraged to call for assist;exit alarm on  -SR               User Key  (r) = Recorded By, (t) = Taken By, (c) = Cosigned By      Initials Name Provider Type    SR Donna French, OT Occupational Therapist                   Outcome Measures       Row Name 09/20/24 1401 09/20/24 0728       How much help from another person do you currently need...    Turning from your back to your side while in flat bed without using bedrails? 4  -AM 4  -AH    Moving from lying on back to sitting on the side of a flat bed without bedrails? 3  -AM 4  -AH    Moving to and from a bed to a chair (including a wheelchair)?  3  -AM 4  -AH    Standing up from a chair using your arms (e.g., wheelchair, bedside chair)? 3  -AM 4  -AH    Climbing 3-5 steps with a railing? 2  -AM 3  -AH    To walk in hospital room? 3  -AM 3  -AH    AM-PAC 6 Clicks Score (PT) 18  -AM 22  -    Highest Level of Mobility Goal 6 --> Walk 10 steps or more  -AM 7 --> Walk 25 feet or more  -      Row Name 09/20/24 1401          Functional Assessment    Outcome Measure Options AM-PAC 6 Clicks Basic Mobility (PT)  -AM               User Key  (r) = Recorded By, (t) = Taken By, (c) = Cosigned By      Initials Name Provider Type     Sylvie Pop LPN Licensed Nurse    Aly Esparza PT Physical Therapist                    Occupational Therapy Education       Title: PT OT SLP Therapies (Done)       Topic: Occupational Therapy (Done)       Point: ADL training (Done)       Description:   Instruct learner(s) on proper safety adaptation and remediation techniques during self care or transfers.   Instruct in proper use of assistive devices.                  Learning Progress Summary             Patient Acceptance, E,TB, VU by  at 9/20/2024 1641                                         User Key       Initials Effective Dates Name Provider Type [facility] 06/16/21 -  Donna French, OT Occupational Therapist OT                  OT Recommendation and Plan  Planned Therapy Interventions (OT): activity tolerance training, BADL retraining, IADL retraining, functional balance retraining, neuromuscular control/coordination retraining, ROM/therapeutic exercise, transfer/mobility retraining, strengthening exercise, occupation/activity based interventions  Therapy Frequency (OT): 3 times/wk  Plan of Care Review  Outcome Evaluation: Pt is a 74 y/o male with c/o AMS, abdominal pain, constipation, dry cough,weakness, and SOB x 2 days.  Pt recently hospitalized with PNA on 7/18.  Chest X-ray: PNA.  Pt reports he lives with his brother in a 1 story home with no  steps to enter into home.  Pt ambulated with RW prior to hospitalization.  Did not get ahold of family, though RN reports pt is at cognitive baseline.  This date pt completed toileting with supervision.  He mobilizes using rwx with superivison.  Anticipate he is near baseline level of function.  Recommend home with assist.  Will continue to follow to monitor progress and continue to facilitate independence with ADLs.     Time Calculation:         Time Calculation- OT       Row Name 09/20/24 1641             Time Calculation- OT    OT Start Time 1425  -SR      OT Stop Time 1445  -SR      OT Time Calculation (min) 20 min  -SR      Total Timed Code Minutes- OT 0 minute(s)  -SR      OT Received On 09/20/24  -SR      OT - Next Appointment 09/23/24  -SR      OT Goal Re-Cert Due Date 10/04/24  -SR                User Key  (r) = Recorded By, (t) = Taken By, (c) = Cosigned By      Initials Name Provider Type    SR Donna French OT Occupational Therapist                  Therapy Charges for Today       Code Description Service Date Service Provider Modifiers Qty    [id number]  OT EVAL MOD COMPLEXITY 4 9/20/2024 Donna French OT GO 1                 Donna French OT  9/20/2024

## 2024-09-20 NOTE — PLAN OF CARE
Problem: Adult Inpatient Plan of Care  Goal: Plan of Care Review  Outcome: Ongoing, Progressing  Flowsheets (Taken 9/20/2024 1610)  Progress: improving  Plan of Care Reviewed With:   patient   sibling  Outcome Evaluation: Patient is stable and no complaints of pain. Patient has been on room air this shift. Iv abx continued. awaiting urine sample. Safety measures in place. call light within reach. Patient able to make needs known. Plan of care ongoing.  Goal: Patient-Specific Goal (Individualized)  Outcome: Ongoing, Progressing  Goal: Absence of Hospital-Acquired Illness or Injury  Outcome: Ongoing, Progressing  Intervention: Identify and Manage Fall Risk  Recent Flowsheet Documentation  Taken 9/20/2024 1602 by Sylvie Pop LPN  Safety Promotion/Fall Prevention:   activity supervised   assistive device/personal items within reach   clutter free environment maintained   fall prevention program maintained   nonskid shoes/slippers when out of bed   room organization consistent   safety round/check completed  Taken 9/20/2024 1403 by Sylvie Pop LPN  Safety Promotion/Fall Prevention:   activity supervised   assistive device/personal items within reach   clutter free environment maintained   nonskid shoes/slippers when out of bed   room organization consistent   safety round/check completed   fall prevention program maintained  Taken 9/20/2024 1225 by Sylvie Pop LPN  Safety Promotion/Fall Prevention:   activity supervised   assistive device/personal items within reach   clutter free environment maintained   nonskid shoes/slippers when out of bed   room organization consistent   safety round/check completed   fall prevention program maintained  Taken 9/20/2024 1008 by Sylvie Pop LPN  Safety Promotion/Fall Prevention:   activity supervised   assistive device/personal items within reach   clutter free environment maintained   fall prevention program maintained   nonskid shoes/slippers when out of bed    safety round/check completed   room organization consistent  Taken 9/20/2024 0816 by Sylvie Pop LPN  Safety Promotion/Fall Prevention:   activity supervised   assistive device/personal items within reach   clutter free environment maintained   nonskid shoes/slippers when out of bed   room organization consistent   safety round/check completed   fall prevention program maintained  Taken 9/20/2024 0728 by Sylvie Pop LPN  Safety Promotion/Fall Prevention:   activity supervised   assistive device/personal items within reach   clutter free environment maintained   nonskid shoes/slippers when out of bed   room organization consistent   safety round/check completed   fall prevention program maintained  Intervention: Prevent Skin Injury  Recent Flowsheet Documentation  Taken 9/20/2024 0728 by Sylvie Pop LPN  Body Position: position changed independently  Intervention: Prevent and Manage VTE (Venous Thromboembolism) Risk  Recent Flowsheet Documentation  Taken 9/20/2024 1602 by Sylvie Pop LPN  Activity Management: activity encouraged  Taken 9/20/2024 1225 by Sylvie Pop LPN  Activity Management: up in chair  Taken 9/20/2024 1008 by Sylvie Pop LPN  Activity Management: up in chair  Taken 9/20/2024 0728 by Sylvie Pop LPN  Activity Management: ambulated to bathroom  VTE Prevention/Management:   bilateral   sequential compression devices on  Intervention: Prevent Infection  Recent Flowsheet Documentation  Taken 9/20/2024 1602 by Sylvie Pop LPN  Infection Prevention:   hand hygiene promoted   personal protective equipment utilized   single patient room provided  Taken 9/20/2024 1225 by Sylvie Pop LPN  Infection Prevention:   hand hygiene promoted   personal protective equipment utilized   single patient room provided  Taken 9/20/2024 1008 by Sylvie Pop LPN  Infection Prevention:   hand hygiene promoted   personal protective equipment utilized   single patient room provided  Taken  9/20/2024 0816 by Sylvie Pop LPN  Infection Prevention:   hand hygiene promoted   personal protective equipment utilized   single patient room provided  Taken 9/20/2024 0728 by Sylvie Pop LPN  Infection Prevention:   hand hygiene promoted   personal protective equipment utilized   single patient room provided  Goal: Optimal Comfort and Wellbeing  Outcome: Ongoing, Progressing  Intervention: Provide Person-Centered Care  Recent Flowsheet Documentation  Taken 9/20/2024 0728 by Sylvie Pop LPN  Trust Relationship/Rapport:   care explained   questions answered   reassurance provided   questions encouraged   thoughts/feelings acknowledged  Goal: Readiness for Transition of Care  Outcome: Ongoing, Progressing     Problem: Fluid Imbalance (Pneumonia)  Goal: Fluid Balance  Outcome: Ongoing, Progressing     Problem: Infection (Pneumonia)  Goal: Resolution of Infection Signs and Symptoms  Outcome: Ongoing, Progressing     Problem: Respiratory Compromise (Pneumonia)  Goal: Effective Oxygenation and Ventilation  Outcome: Ongoing, Progressing  Intervention: Promote Airway Secretion Clearance  Recent Flowsheet Documentation  Taken 9/20/2024 0728 by Sylvie Pop LPN  Cough And Deep Breathing: done independently per patient     Problem: Heart Failure Comorbidity  Goal: Maintenance of Heart Failure Symptom Control  Outcome: Ongoing, Progressing  Intervention: Maintain Heart Failure-Management  Recent Flowsheet Documentation  Taken 9/20/2024 1602 by Sylvie Pop LPN  Medication Review/Management: medications reviewed  Taken 9/20/2024 1403 by Sylvie Pop LPN  Medication Review/Management: medications reviewed  Taken 9/20/2024 1225 by Sylvie Pop LPN  Medication Review/Management: medications reviewed  Taken 9/20/2024 1008 by Sylvie Pop LPN  Medication Review/Management: medications reviewed  Taken 9/20/2024 0816 by Sylvie Pop LPN  Medication Review/Management: medications reviewed  Taken 9/20/2024  0728 by Sylvie Pop LPN  Medication Review/Management: medications reviewed     Problem: Hypertension Comorbidity  Goal: Blood Pressure in Desired Range  Outcome: Ongoing, Progressing  Intervention: Maintain Blood Pressure Management  Recent Flowsheet Documentation  Taken 9/20/2024 1602 by Sylvie Pop LPN  Medication Review/Management: medications reviewed  Taken 9/20/2024 1403 by Sylvie Pop LPN  Medication Review/Management: medications reviewed  Taken 9/20/2024 1225 by Sylvie Pop LPN  Medication Review/Management: medications reviewed  Taken 9/20/2024 1008 by Sylvie Pop LPN  Medication Review/Management: medications reviewed  Taken 9/20/2024 0816 by Sylvie Pop LPN  Medication Review/Management: medications reviewed  Taken 9/20/2024 0728 by Sylvie Pop LPN  Medication Review/Management: medications reviewed   Goal Outcome Evaluation:  Plan of Care Reviewed With: patient, sibling        Progress: improving  Outcome Evaluation: Patient is stable and no complaints of pain. Patient has been on room air this shift. Iv abx continued. awaiting urine sample. Safety measures in place. call light within reach. Patient able to make needs known. Plan of care ongoing.

## 2024-09-20 NOTE — CASE MANAGEMENT/SOCIAL WORK
Discharge Planning Assessment   Juan     Patient Name: Drake Beth  MRN: [id number]  Today's Date: 9/20/2024    Admit Date: 9/19/2024    Plan: From home with brothers   Discharge Needs Assessment       Row Name 09/20/24 1424       Living Environment    People in Home sibling(s)    Name(s) of People in Home Goes between his siblings - Jv and Richi    Current Living Arrangements home    Potentially Unsafe Housing Conditions none    In the past 12 months has the electric, gas, oil, or water company threatened to shut off services in your home? No    Primary Care Provided by self    Provides Primary Care For no one    Family Caregiver if Needed sibling(s)    Family Caregiver Names Brothers Jv and Richi    Quality of Family Relationships helpful;involved;supportive    Able to Return to Prior Arrangements yes       Resource/Environmental Concerns    Resource/Environmental Concerns none    Transportation Concerns none       Transportation Needs    In the past 12 months, has lack of transportation kept you from medical appointments or from getting medications? no    In the past 12 months, has lack of transportation kept you from meetings, work, or from getting things needed for daily living? No       Food Insecurity    Within the past 12 months, you worried that your food would run out before you got the money to buy more. Never true    Within the past 12 months, the food you bought just didn't last and you didn't have money to get more. Never true       Transition Planning    Patient/Family Anticipates Transition to home with family    Patient/Family Anticipated Services at Transition none    Transportation Anticipated family or friend will provide       Discharge Needs Assessment    Readmission Within the Last 30 Days no previous admission in last 30 days    Equipment Currently Used at Home bp cuff    Concerns to be Addressed no discharge needs identified;denies needs/concerns at this time    Anticipated  Changes Related to Illness none    Equipment Needed After Discharge none    Provided Post Acute Provider List? N/A    Provided Post Acute Provider Quality & Resource List? N/A                   Discharge Plan       Row Name 09/20/24 1422       Plan    Plan From home with brothers    Patient/Family in Agreement with Plan yes    Plan Comments CM met with patient at the bedside to review discharge planning however patient was unable to provide any information. CM contacted brother Richi and he was able to provide info on patient. Patient lives at home - he goes between his brother Richi's and Jv's homes, is somewhat IADLs but needs help with household items. Richi or Jv to transport patient at d/c. Confirmed PCP, insurance, and pharmacy. Patient accepts [facility]. Patient denies any difficulty affording food, utilities, or medications. Patient is not current with any HHC/OPPT/OT services. DC Barriers: IV abx, blood cultures pending, elevated BUN/Cr                  Continued Care and Services - Admitted Since 9/19/2024    No active coordination exists for this encounter.       Expected Discharge Date and Time       Expected Discharge Date Expected Discharge Time    Sep 20, 2024            Demographic Summary       Row Name 09/20/24 1420       General Information    Admission Type observation    Arrived From emergency department    Required Notices Provided Observation Status Notice    Referral Source admission list    Reason for Consult discharge planning    Preferred Language English       Contact Information    Permission Granted to Share Info With     Contact Information Obtained for                    Functional Status       Row Name 09/20/24 142       Functional Status    Usual Activity Tolerance good    Current Activity Tolerance good       Functional Status, IADL    Medications assistive person    Meal Preparation assistive person    Housekeeping assistive person    Laundry assistive  person    Shopping assistive person       Mental Status    General Appearance WDL WDL       Mental Status Summary    Recent Changes in Mental Status/Cognitive Functioning no changes               Josefa Pascual RN     [phone]  [email or web address]

## 2024-09-20 NOTE — THERAPY EVALUATION
Patient Name: Drake Beth  : 1950    MRN: [id number]                              Today's Date: 2024       Admit Date: 2024    Visit Dx:     ICD-10-CM ICD-9-CM   1. Altered mental status, unspecified altered mental status type  R41.82 780.97   2. Pneumonia of right lower lobe due to infectious organism  J18.9 486     Patient Active Problem List   Diagnosis    Allergic rhinitis    Anemia    Atrial fibrillation    Congestive heart failure    Coronary artery disease    Subacute cough    Hyperlipidemia    Hypertension    Medicare annual wellness visit, subsequent    Mentally challenged    Prostate cancer screening    Pacemaker infection    Mitral valve replaced    Gingivitis, chronic    Acute URI    Atrial flutter with controlled response    Pneumonia    Sepsis    Acute on chronic diastolic heart failure    Paroxysmal atrial fibrillation    Acute respiratory failure with hypoxia and hypercapnia    Hyperkalemia    Hyponatremia    Hyperbilirubinemia    Acute UTI (urinary tract infection)    Presence of cardiac pacemaker    Constipation    Altered mental status     Past Medical History:   Diagnosis Date    Allergic rhinitis     Anemia     Atrial fibrillation     Chesty cough     CHF (congestive heart failure)     Coronary artery disease     Cough     Fever     Hyperlipidemia     Hypertension     Mentally challenged     MVP (mitral valve prolapse)      Past Surgical History:   Procedure Laterality Date    CARDIAC ELECTROPHYSIOLOGY PROCEDURE N/A 2022    Procedure: PACEMAKER EXTRACTION;  Surgeon: Rj Dominique MD;  Location: [facility] INVASIVE LOCATION;  Service: Cardiology;  Laterality: N/A;    COLONOSCOPY      He has never had the test done.  His family is refusing any form of colon cancer screening for him.    CORONARY ARTERY BYPASS GRAFT      DR SWEAT    MITRAL VALVE REPLACEMENT  2008    WITH BIOPROSTHETIC TISSUE VALVE      General Information       Row Name 24 7334           Physical Therapy Time and Intention    Document Type evaluation  -AM     Mode of Treatment physical therapy  -AM       Row Name 09/20/24 1356          General Information    Patient Profile Reviewed yes  -AM     Prior Level of Function independent:;all household mobility;community mobility;gait;transfer;bed mobility  ambulates with RW  -AM     Existing Precautions/Restrictions fall  -AM     Barriers to Rehab medically complex  -AM       Row Name 09/20/24 1356          Living Environment    People in Home sibling(s)  -AM       Row Name 09/20/24 1356          Home Main Entrance    Number of Stairs, Main Entrance none  -AM       Row Name 09/20/24 1356          Stairs Within Home, Primary    Number of Stairs, Within Home, Primary none  -AM       Row Name 09/20/24 1356          Cognition    Orientation Status (Cognition) oriented x 3  -AM       Row Name 09/20/24 1356          Safety Issues, Functional Mobility    Impairments Affecting Function (Mobility) balance;endurance/activity tolerance;strength  -AM     Comment, Safety Issues/Impairments (Mobility) gait belt utilized  -AM               User Key  (r) = Recorded By, (t) = Taken By, (c) = Cosigned By      Initials Name Provider Type    AM Aly Gonsalez, PT Physical Therapist                   Mobility       Row Name 09/20/24 1356          Bed Mobility    Bed Mobility supine-sit  -AM     Supine-Sit [place] (Bed Mobility) contact guard;1 person assist  -AM     Assistive Device (Bed Mobility) bed rails  -AM       Row Name 09/20/24 1356          Sit-Stand Transfer    Sit-Stand [place] (Transfers) contact guard;1 person assist  -AM     Assistive Device (Sit-Stand Transfers) walker, front-wheeled  -AM       [facility] Name 09/20/24 1356          Gait/Stairs (Locomotion)    [place] Level (Gait) contact guard;1 person assist  -AM     Assistive Device (Gait) walker, front-wheeled  -AM     Distance in Feet (Gait) 50  -AM     Deviations/Abnormal Patterns (Gait) nancy  decreased;gait speed decreased  -AM     Bilateral Gait Deviations forward flexed posture  -AM               User Key  (r) = Recorded By, (t) = Taken By, (c) = Cosigned By      Initials Name Provider Type    AM Aly Gonsalez PT Physical Therapist                   Obj/Interventions       Row Name 09/20/24 1357          Range of Motion Comprehensive    General Range of Motion no range of motion deficits identified  -AM       Row Name 09/20/24 1357          Strength Comprehensive (MMT)    Comment, General Manual Muscle Testing (MMT) Assessment Defer BUE ROM to OT.  BLEs: 4/5  -AM       Row Name 09/20/24 1357          Balance    Balance Assessment sitting static balance;sitting dynamic balance;sit to stand dynamic balance;standing static balance;standing dynamic balance  -AM     Static Sitting Balance supervision  -AM     Dynamic Sitting Balance supervision  -AM     Position, Sitting Balance unsupported;sitting edge of bed  -AM     Sit to Stand Dynamic Balance contact guard  -AM     Static Standing Balance contact guard  -AM     Dynamic Standing Balance contact guard  -AM     Position/Device Used, Standing Balance walker, front-wheeled;supported  -AM       Row Name 09/20/24 1357          Sensory Assessment (Somatosensory)    Sensory Assessment (Somatosensory) sensation intact  -AM               User Key  (r) = Recorded By, (t) = Taken By, (c) = Cosigned By      Initials Name Provider Type    AM Aly Gonsalez, WILLY Physical Therapist                   Goals/Plan       Row Name 09/20/24 1401          Bed Mobility Goal 1 (PT)    Activity/Assistive Device (Bed Mobility Goal 1, PT) bed mobility activities, all  -AM     [place] Level/Cues Needed (Bed Mobility Goal 1, PT) modified independence  -AM     Time Frame (Bed Mobility Goal 1, PT) long term goal (LTG)  -AM       Row Name 09/20/24 1401          Transfer Goal 1 (PT)    Activity/Assistive Device (Transfer Goal 1, PT) transfers, all;walker, rolling  -AM      [place] Level/Cues Needed (Transfer Goal 1, PT) modified independence  -AM     Time Frame (Transfer Goal 1, PT) long term goal (LTG)  -AM       Row Name 09/20/24 1401          Gait Training Goal 1 (PT)    Activity/Assistive Device (Gait Training Goal 1, PT) gait (walking locomotion);walker, rolling  -AM     [place] Level (Gait Training Goal 1, PT) modified independence  -AM     Distance (Gait Training Goal 1, PT) 150'  -AM     Time Frame (Gait Training Goal 1, PT) long term goal (LTG)  -AM       Row Name 09/20/24 1401          Therapy Assessment/Plan (PT)    Planned Therapy Interventions (PT) balance training;bed mobility training;gait training;strengthening;patient/family education;transfer training  -AM               User Key  (r) = Recorded By, (t) = Taken By, (c) = Cosigned By      Initials Name Provider Type    AM Aly Gonsalez, PT Physical Therapist                   Clinical Impression       Row Name 09/20/24 1354          Pain    Pretreatment Pain Rating 0/10 - no pain  -AM     Posttreatment Pain Rating 0/10 - no pain  -AM       Row Name 09/20/24 135          Plan of Care Review    Plan of Care Reviewed With patient  -AM     Outcome Evaluation Pt is a 72 y/o male with c/o AMS, abdominal pain, constipation, dry cough,weakness, and SOB x 2 days.  Pt recently hospitalized with PNA on 7/18.  Chest X-ray: PNA.  Pt reports he lives with his brother in a 1 story home with no steps to enter into home.  Pt ambulated with RW prior to hospitalization.  Pt on room air and telemetry this date.  Pt required CGA for bed mobility, CGA for transfers with RW and ambulated 50' with RW with CGA.  Pt became fixated on arrival of breakfast tray and refused additional ambulation distance.  Recommend home with family assist. and HHPT.  -AM       Row Name 09/20/24 7338          Therapy Assessment/Plan (PT)    Patient/Family Therapy Goals Statement (PT) To go home  -AM     Rehab Potential (PT) good, to achieve stated  therapy goals  -AM     Criteria for Skilled Interventions Met (PT) yes  -AM     Therapy Frequency (PT) 3 times/wk  -AM     Predicted Duration of Therapy Intervention (PT) until d/c  -AM       Row Name 09/20/24 1357          Vital Signs    O2 Delivery Pre Treatment room air  -AM     O2 Delivery Intra Treatment room air  -AM     O2 Delivery Post Treatment room air  -AM     Pre Patient Position Supine  -AM     Intra Patient Position Standing  -AM     Post Patient Position Sitting  -AM       Row Name 09/20/24 1357          Positioning and Restraints    Pre-Treatment Position in bed  -AM     Post Treatment Position chair  -AM     In Chair reclined;notified nsg;call light within reach;encouraged to call for assist;exit alarm on  -AM               User Key  (r) = Recorded By, (t) = Taken By, (c) = Cosigned By      Initials Name Provider Type    Aly Esparza, PT Physical Therapist                   Outcome Measures       Row Name 09/20/24 1401 09/20/24 0728       How much help from another person do you currently need...    Turning from your back to your side while in flat bed without using bedrails? 4  -AM 4  -AH    Moving from lying on back to sitting on the side of a flat bed without bedrails? 3  -AM 4  -AH    Moving to and from a bed to a chair (including a wheelchair)? 3  -AM 4  -AH    Standing up from a chair using your arms (e.g., wheelchair, bedside chair)? 3  -AM 4  -AH    Climbing 3-5 steps with a railing? 2  -AM 3  -AH    To walk in hospital room? 3  -AM 3  -AH    AM-PAC 6 Clicks Score (PT) 18  -AM 22  -AH    Highest Level of Mobility Goal 6 --> Walk 10 steps or more  -AM 7 --> Walk 25 feet or more  -      Row Name 09/20/24 1401          Functional Assessment    Outcome Measure Options AM-PAC 6 Clicks Basic Mobility (PT)  -AM               User Key  (r) = Recorded By, (t) = Taken By, (c) = Cosigned By      Initials Name Provider Type     Sylvie Pop LPN Licensed Nurse    Aly Esparza, PT  Physical Therapist                                 Physical Therapy Education       Title: PT OT SLP Therapies (Done)       Topic: Physical Therapy (Done)       Point: Mobility training (Done)       Learning Progress Summary             Patient Acceptance, E,TB, VU by AM at 9/20/2024 1402                         Point: Body mechanics (Done)       Learning Progress Summary             Patient Acceptance, E,TB, VU by AM at 9/20/2024 1402                         Point: Precautions (Done)       Learning Progress Summary             Patient Acceptance, E,TB, VU by AM at 9/20/2024 1402                                         User Key       Initials Effective Dates Name Provider Type Discipline    AM 05/10/21 -  Aly Gonsalez, PT Physical Therapist PT                  PT Recommendation and Plan  Planned Therapy Interventions (PT): balance training, bed mobility training, gait training, strengthening, patient/family education, transfer training  Plan of Care Reviewed With: patient  Outcome Evaluation: Pt is a 72 y/o male with c/o AMS, abdominal pain, constipation, dry cough,weakness, and SOB x 2 days.  Pt recently hospitalized with PNA on 7/18.  Chest X-ray: PNA.  Pt reports he lives with his brother in a 1 story home with no steps to enter into home.  Pt ambulated with RW prior to hospitalization.  Pt on room air and telemetry this date.  Pt required CGA for bed mobility, CGA for transfers with RW and ambulated 50' with RW with CGA.  Pt became fixated on arrival of breakfast tray and refused additional ambulation distance.  Recommend home with family assist. and HHPT.     Time Calculation:         PT Charges       Row Name 09/20/24 1402             Time Calculation    Start Time 0958  -AM      Stop Time 1016  -AM      Time Calculation (min) 18 min  -AM      PT Received On 09/20/24  -AM      PT - Next Appointment 09/22/24  -AM      PT Goal Re-Cert Due Date 10/04/24  -AM                User Key  (r) = Recorded By, (t) =  Taken By, (c) = Cosigned By      Initials Name Provider Type    Aly Esparza, PT Physical Therapist                  Therapy Charges for Today       Code Description Service Date Service Provider Modifiers Qty    [id number] HC PT EVAL MOD COMPLEXITY 3 9/20/2024 Aly Gonsalez, PT GP 1            PT G-Codes  Outcome Measure Options: AM-PAC 6 Clicks Basic Mobility (PT)  AM-PAC 6 Clicks Score (PT): 18  PT Discharge Summary  Anticipated Discharge Disposition (PT): home with assist, home with home health    Aly Gonsalez, PT  9/20/2024

## 2024-09-20 NOTE — CONSULTS
Nutrition Services    Patient Name: Drake Beth  YOB: 1950  MRN: [id number]  Admission date: 9/19/2024    Comment:    Addition of Boost Glucose Control BID (Provides 380 kcals, 32 g protein if consumed) -chocolate.  NFPE completed and not consistent with nutrition diagnosis of malnutrition at this time using AND/ASPEN criteria     CLINICAL NUTRITION ASSESSMENT      Reason for Assessment 9/20: BMI < 19      H&P      Past Medical History:   Diagnosis Date    Allergic rhinitis     Anemia     Atrial fibrillation     Chesty cough     CHF (congestive heart failure)     Coronary artery disease     Cough     Fever     Hyperlipidemia     Hypertension     Mentally challenged     MVP (mitral valve prolapse)        Past Surgical History:   Procedure Laterality Date    CARDIAC ELECTROPHYSIOLOGY PROCEDURE N/A 4/26/2022    Procedure: PACEMAKER EXTRACTION;  Surgeon: Rj Dominique MD;  Location: [facility] CATH INVASIVE LOCATION;  Service: Cardiology;  Laterality: N/A;    COLONOSCOPY      He has never had the test done.  His family is refusing any form of colon cancer screening for him.    CORONARY ARTERY BYPASS GRAFT  2008    DR SWEAT    MITRAL VALVE REPLACEMENT  04/2008    WITH BIOPROSTHETIC TISSUE VALVE        Current Problems   R lower lobe PNA  -abx    Abdominal pain  -KUB pending     HTN  CAD  Heart failure  A fib    Intellectual disability       Encounter Information        Trending Narrative     9/20: Pt admitted to [facility] with AMS. Admitting dx of PNA. Pt's family reported pt with a decline in function over the last 3 days. RD visited pt at bedside. Pt reported he is eating well this admission. Pt reported he had a poor appetite at home but he was unable to elaborate. RD asked pt if he had any food allergies and he was not sure how to answer this - no known allergies per EMR. Pt denied difficulty chewing/swallowing. Pt is not sure what an ONS (Boost or Ensure) is,but RD described it as a shake and the pt  "agreed. NFPE completed and not consistent with nutrition diagnosis of malnutrition at this time using AND/ASPEN criteria. Moderate temporal wasting.      Anthropometrics        Current Height, Weight Height: 152.4 cm (60\")  Weight: 40.5 kg (89 lb 4.6 oz) (09/19/24 2100)       Usual Body Weight (UBW) Unknown        Trending Weight Hx     This admission: 9/20: 89# - scale             PTA: 14.4% wt loss x 2 months per wt review     Wt Readings from Last 30 Encounters:   09/19/24 2100 40.5 kg (89 lb 4.6 oz)   09/19/24 1335 42.2 kg (93 lb 0.6 oz)   09/19/24 1234 42.2 kg (93 lb)   08/28/24 1136 42.9 kg (94 lb 8 oz)   07/24/24 0243 47.6 kg (104 lb 15 oz)   07/19/24 2339 47 kg (103 lb 9.9 oz)   07/18/24 2328 46.3 kg (102 lb 1.2 oz)   07/13/24 1041 46.3 kg (102 lb)   07/11/24 1848 46.3 kg (102 lb 1.2 oz)   07/11/24 1259 46.3 kg (102 lb 1.2 oz)   04/09/24 1407 46.3 kg (102 lb)   02/05/24 1408 45.8 kg (101 lb)   01/23/24 1539 46.4 kg (102 lb 6.4 oz)   09/06/23 1143 44.5 kg (98 lb)   09/06/23 1237 45.6 kg (100 lb 8 oz)   07/25/23 1420 44.7 kg (98 lb 9.6 oz)   12/27/22 1517 44.9 kg (99 lb)   12/27/22 1053 45 kg (99 lb 3.2 oz)   12/08/22 1414 45.3 kg (99 lb 12.8 oz)   11/29/22 1603 47.6 kg (105 lb)   06/07/22 1325 46.8 kg (103 lb 4 oz)   05/12/22 1329 47.5 kg (104 lb 12.8 oz)   04/26/22 1319 46.7 kg (102 lb 15.3 oz)   04/22/22 1317 47.2 kg (104 lb)   02/17/22 1507 48.3 kg (106 lb 6.4 oz)   02/05/21 1344 49 kg (108 lb)   12/14/20 1340 49.9 kg (110 lb)   02/04/20 1110 49 kg (108 lb)   04/18/19 0621 49.4 kg (109 lb)   01/18/19 1132 49 kg (108 lb)   12/12/18 0703 49.7 kg (109 lb 9.6 oz)   08/22/18 0714 48.5 kg (107 lb)   12/13/17 1422 49.4 kg (109 lb)   12/21/16 1254 51.7 kg (114 lb)   01/27/16 1026 50.3 kg (111 lb)      BMI kg/m2 Body mass index is 17.44 kg/m².       Labs        Pertinent Labs Reviewed, management per attending    Results from last 7 days   Lab Units 09/19/24  2557 09/19/24  1353   SODIUM mmol/L 137 139   POTASSIUM " "mmol/L 4.3 4.1   CHLORIDE mmol/L 96* 98   CO2 mmol/L 30.0* 31.9*   BUN mg/dL 28* 27*   CREATININE mg/dL 1.54* 1.14   CALCIUM mg/dL 8.9 9.3   BILIRUBIN mg/dL  --  0.9   ALK PHOS U/L  --  83   ALT (SGPT) U/L  --  7   AST (SGOT) U/L  --  33   GLUCOSE mg/dL 276* 98     Results from last 7 days   Lab Units 09/19/24  2247   MAGNESIUM mg/dL 2.2   PHOSPHORUS mg/dL 2.5   HEMOGLOBIN g/dL 10.9*   HEMATOCRIT % 32.7*     No results found for: \"HGBA1C\"     Medications    Scheduled Medications budesonide, 0.5 mg, Nebulization, BID - RT  cefTRIAXone, 2,000 mg, Intravenous, Q24H  ipratropium-albuterol, 3 mL, Nebulization, 4x Daily - RT  sodium chloride, 10 mL, Intravenous, Q12H        Infusions      PRN Medications   acetaminophen **OR** acetaminophen **OR** acetaminophen    senna-docusate sodium **AND** polyethylene glycol **AND** bisacodyl **AND** bisacodyl    Calcium Replacement - Follow Nurse / BPA Driven Protocol    guaifenesin    Magnesium Cardiology Dose Replacement - Follow Nurse / BPA Driven Protocol    melatonin    nitroglycerin    ondansetron ODT **OR** ondansetron    Phosphorus Replacement - Follow Nurse / BPA Driven Protocol    Potassium Replacement - Follow Nurse / BPA Driven Protocol    sodium chloride    sodium chloride    sodium chloride     Physical Findings        Trending Physical   Appearance, NFPE 9/20: NFPE completed and not consistent with nutrition diagnosis of malnutrition at this time using AND/ASPEN criteria      --  Edema  None documented      Bowel Function + BM on 9/20     Tubes None      Chewing/Swallowing No reported difficulty      Skin Intact      --  Current Nutrition Orders & Evaluation of Intake       Oral Nutrition     Food Allergies NKFA   Current PO Diet Diet: Regular/House; Fluid Consistency: Thin (IDDSI 0)   Supplement -   PO Evaluation     Trending % PO Intake 9/20: no intake recorded    --  Nutritional Risk Screening        NRS-2002 Score          Nutrition Diagnosis         Nutrition Dx " Problem 1 Inadequate energy intake related to reported decreased appetite as evidenced by reported poor PO intake PTA.      Nutrition Dx Problem 2        Intervention Goal         Intervention Goal(s) PO intake >= 75%  ONS acceptance     Nutrition Intervention        RD Action NFPE completed  Addition of ONS     Nutrition Prescription          Diet Prescription Regular    Supplement Prescription Boost Glucose Control BID (Provides 380 kcals, 32 g protein if consumed)      --  Monitor/Evaluation        Monitor Per protocol, PO intake, Supplement intake, Pertinent labs, Weight       Electronically signed by:  Lana Dominguez RD  09/20/24 10:23 EDT

## 2024-09-20 NOTE — PLAN OF CARE
Goal Outcome Evaluation:  Plan of Care Reviewed With: patient           Outcome Evaluation: Pt is a 72 y/o male with c/o AMS, abdominal pain, constipation, dry cough,weakness, and SOB x 2 days.  Pt recently hospitalized with PNA on 7/18.  Chest X-ray: PNA.  Pt reports he lives with his brother in a 1 story home with no steps to enter into home.  Pt ambulated with RW prior to hospitalization.  Pt on room air and telemetry this date.  Pt required CGA for bed mobility, CGA for transfers with RW and ambulated 50' with RW with CGA.  Pt became fixated on arrival of breakfast tray and refused additional ambulation distance.  Recommend home with family assist. and HHPT.      Anticipated Discharge Disposition (PT): home with assist, home with home health

## 2024-09-21 LAB
ANION GAP SERPL CALCULATED.3IONS-SCNC: 6.9 MMOL/L (ref 5–15)
ANION GAP SERPL CALCULATED.3IONS-SCNC: 8.4 MMOL/L (ref 5–15)
BASOPHILS # BLD AUTO: 0.03 10*3/MM3 (ref 0–0.2)
BASOPHILS # BLD AUTO: 0.05 10*3/MM3 (ref 0–0.2)
BASOPHILS NFR BLD AUTO: 0.3 % (ref 0–1.5)
BASOPHILS NFR BLD AUTO: 0.4 % (ref 0–1.5)
BUN SERPL-MCNC: 31 MG/DL (ref 8–23)
BUN SERPL-MCNC: 34 MG/DL (ref 8–23)
BUN/CREAT SERPL: 32.7 (ref 7–25)
BUN/CREAT SERPL: 34.1 (ref 7–25)
CALCIUM SPEC-SCNC: 8.6 MG/DL (ref 8.6–10.5)
CALCIUM SPEC-SCNC: 8.7 MG/DL (ref 8.6–10.5)
CHLORIDE SERPL-SCNC: 102 MMOL/L (ref 98–107)
CHLORIDE SERPL-SCNC: 105 MMOL/L (ref 98–107)
CO2 SERPL-SCNC: 28.1 MMOL/L (ref 22–29)
CO2 SERPL-SCNC: 30.6 MMOL/L (ref 22–29)
CREAT SERPL-MCNC: 0.91 MG/DL (ref 0.76–1.27)
CREAT SERPL-MCNC: 1.04 MG/DL (ref 0.76–1.27)
DEPRECATED RDW RBC AUTO: 55.6 FL (ref 37–54)
DEPRECATED RDW RBC AUTO: 58 FL (ref 37–54)
EGFRCR SERPLBLD CKD-EPI 2021: 76.3 ML/MIN/1.73
EGFRCR SERPLBLD CKD-EPI 2021: 89.5 ML/MIN/1.73
EOSINOPHIL # BLD AUTO: 0.14 10*3/MM3 (ref 0–0.4)
EOSINOPHIL # BLD AUTO: 0.42 10*3/MM3 (ref 0–0.4)
EOSINOPHIL NFR BLD AUTO: 1.2 % (ref 0.3–6.2)
EOSINOPHIL NFR BLD AUTO: 3.2 % (ref 0.3–6.2)
ERYTHROCYTE [DISTWIDTH] IN BLOOD BY AUTOMATED COUNT: 14.7 % (ref 12.3–15.4)
ERYTHROCYTE [DISTWIDTH] IN BLOOD BY AUTOMATED COUNT: 15.1 % (ref 12.3–15.4)
GLUCOSE SERPL-MCNC: 113 MG/DL (ref 65–99)
GLUCOSE SERPL-MCNC: 113 MG/DL (ref 65–99)
HCT VFR BLD AUTO: 29 % (ref 37.5–51)
HCT VFR BLD AUTO: 30.9 % (ref 37.5–51)
HGB BLD-MCNC: 10 G/DL (ref 13–17.7)
HGB BLD-MCNC: 9.7 G/DL (ref 13–17.7)
IMM GRANULOCYTES # BLD AUTO: 0.05 10*3/MM3 (ref 0–0.05)
IMM GRANULOCYTES # BLD AUTO: 0.06 10*3/MM3 (ref 0–0.05)
IMM GRANULOCYTES NFR BLD AUTO: 0.4 % (ref 0–0.5)
IMM GRANULOCYTES NFR BLD AUTO: 0.5 % (ref 0–0.5)
LYMPHOCYTES # BLD AUTO: 0.93 10*3/MM3 (ref 0.7–3.1)
LYMPHOCYTES # BLD AUTO: 1.57 10*3/MM3 (ref 0.7–3.1)
LYMPHOCYTES NFR BLD AUTO: 13.5 % (ref 19.6–45.3)
LYMPHOCYTES NFR BLD AUTO: 7.1 % (ref 19.6–45.3)
MAGNESIUM SERPL-MCNC: 2.2 MG/DL (ref 1.6–2.4)
MAGNESIUM SERPL-MCNC: 2.3 MG/DL (ref 1.6–2.4)
MCH RBC QN AUTO: 34.2 PG (ref 26.6–33)
MCH RBC QN AUTO: 34.6 PG (ref 26.6–33)
MCHC RBC AUTO-ENTMCNC: 32.4 G/DL (ref 31.5–35.7)
MCHC RBC AUTO-ENTMCNC: 33.4 G/DL (ref 31.5–35.7)
MCV RBC AUTO: 103.6 FL (ref 79–97)
MCV RBC AUTO: 105.8 FL (ref 79–97)
MONOCYTES # BLD AUTO: 0.76 10*3/MM3 (ref 0.1–0.9)
MONOCYTES # BLD AUTO: 0.91 10*3/MM3 (ref 0.1–0.9)
MONOCYTES NFR BLD AUTO: 5.8 % (ref 5–12)
MONOCYTES NFR BLD AUTO: 7.8 % (ref 5–12)
NEUTROPHILS NFR BLD AUTO: 10.83 10*3/MM3 (ref 1.7–7)
NEUTROPHILS NFR BLD AUTO: 76.8 % (ref 42.7–76)
NEUTROPHILS NFR BLD AUTO: 8.95 10*3/MM3 (ref 1.7–7)
NEUTROPHILS NFR BLD AUTO: 83 % (ref 42.7–76)
NRBC BLD AUTO-RTO: 0 /100 WBC (ref 0–0.2)
NRBC BLD AUTO-RTO: 0 /100 WBC (ref 0–0.2)
PHOSPHATE SERPL-MCNC: 3.5 MG/DL (ref 2.5–4.5)
PHOSPHATE SERPL-MCNC: 3.6 MG/DL (ref 2.5–4.5)
PLATELET # BLD AUTO: 180 10*3/MM3 (ref 140–450)
PLATELET # BLD AUTO: 182 10*3/MM3 (ref 140–450)
PMV BLD AUTO: 9.2 FL (ref 6–12)
PMV BLD AUTO: 9.5 FL (ref 6–12)
POTASSIUM SERPL-SCNC: 4.3 MMOL/L (ref 3.5–5.2)
POTASSIUM SERPL-SCNC: 4.3 MMOL/L (ref 3.5–5.2)
QT INTERVAL: 513 MS
QTC INTERVAL: 491 MS
RBC # BLD AUTO: 2.8 10*6/MM3 (ref 4.14–5.8)
RBC # BLD AUTO: 2.92 10*6/MM3 (ref 4.14–5.8)
SODIUM SERPL-SCNC: 140 MMOL/L (ref 136–145)
SODIUM SERPL-SCNC: 141 MMOL/L (ref 136–145)
WBC NRBC COR # BLD AUTO: 11.65 10*3/MM3 (ref 3.4–10.8)
WBC NRBC COR # BLD AUTO: 13.05 10*3/MM3 (ref 3.4–10.8)

## 2024-09-21 PROCEDURE — 83735 ASSAY OF MAGNESIUM: CPT | Performed by: INTERNAL MEDICINE

## 2024-09-21 PROCEDURE — 84100 ASSAY OF PHOSPHORUS: CPT | Performed by: INTERNAL MEDICINE

## 2024-09-21 PROCEDURE — 25010000002 CEFTRIAXONE PER 250 MG: Performed by: INTERNAL MEDICINE

## 2024-09-21 PROCEDURE — 25810000003 SODIUM CHLORIDE 0.9 % SOLUTION: Performed by: INTERNAL MEDICINE

## 2024-09-21 PROCEDURE — 93010 ELECTROCARDIOGRAM REPORT: CPT | Performed by: INTERNAL MEDICINE

## 2024-09-21 PROCEDURE — 94761 N-INVAS EAR/PLS OXIMETRY MLT: CPT

## 2024-09-21 PROCEDURE — 94664 DEMO&/EVAL PT USE INHALER: CPT

## 2024-09-21 PROCEDURE — 85025 COMPLETE CBC W/AUTO DIFF WBC: CPT | Performed by: INTERNAL MEDICINE

## 2024-09-21 PROCEDURE — 87481 CANDIDA DNA AMP PROBE: CPT | Performed by: INTERNAL MEDICINE

## 2024-09-21 PROCEDURE — 93005 ELECTROCARDIOGRAM TRACING: CPT | Performed by: INTERNAL MEDICINE

## 2024-09-21 PROCEDURE — 94799 UNLISTED PULMONARY SVC/PX: CPT

## 2024-09-21 PROCEDURE — 80048 BASIC METABOLIC PNL TOTAL CA: CPT | Performed by: INTERNAL MEDICINE

## 2024-09-21 RX ORDER — SODIUM CHLORIDE 9 MG/ML
100 INJECTION, SOLUTION INTRAVENOUS CONTINUOUS
Status: DISCONTINUED | OUTPATIENT
Start: 2024-09-21 | End: 2024-09-22

## 2024-09-21 RX ADMIN — APIXABAN 5 MG: 5 TABLET, FILM COATED ORAL at 09:41

## 2024-09-21 RX ADMIN — MIRTAZAPINE 15 MG: 15 TABLET, FILM COATED ORAL at 00:53

## 2024-09-21 RX ADMIN — PANTOPRAZOLE SODIUM 40 MG: 40 TABLET, DELAYED RELEASE ORAL at 00:53

## 2024-09-21 RX ADMIN — SODIUM CHLORIDE 100 ML/HR: 9 INJECTION, SOLUTION INTRAVENOUS at 17:24

## 2024-09-21 RX ADMIN — ACETAMINOPHEN 650 MG: 325 TABLET, FILM COATED ORAL at 19:30

## 2024-09-21 RX ADMIN — MEGESTROL ACETATE 40 MG: 40 TABLET ORAL at 09:28

## 2024-09-21 RX ADMIN — IPRATROPIUM BROMIDE AND ALBUTEROL SULFATE 3 ML: .5; 3 SOLUTION RESPIRATORY (INHALATION) at 14:34

## 2024-09-21 RX ADMIN — APIXABAN 5 MG: 5 TABLET, FILM COATED ORAL at 22:39

## 2024-09-21 RX ADMIN — CEFTRIAXONE 2000 MG: 2 INJECTION, POWDER, FOR SOLUTION INTRAMUSCULAR; INTRAVENOUS at 13:52

## 2024-09-21 RX ADMIN — ATORVASTATIN CALCIUM 10 MG: 10 TABLET, FILM COATED ORAL at 09:32

## 2024-09-21 RX ADMIN — SOTALOL HYDROCHLORIDE 80 MG: 80 TABLET ORAL at 22:39

## 2024-09-21 RX ADMIN — Medication 1 TABLET: at 09:32

## 2024-09-21 RX ADMIN — BUDESONIDE 0.5 MG: 0.5 INHALANT RESPIRATORY (INHALATION) at 20:02

## 2024-09-21 RX ADMIN — Medication 10 ML: at 09:42

## 2024-09-21 RX ADMIN — MONTELUKAST 10 MG: 10 TABLET, FILM COATED ORAL at 09:32

## 2024-09-21 RX ADMIN — SOTALOL HYDROCHLORIDE 80 MG: 80 TABLET ORAL at 09:41

## 2024-09-21 RX ADMIN — SOTALOL HYDROCHLORIDE 80 MG: 80 TABLET ORAL at 00:53

## 2024-09-21 RX ADMIN — APIXABAN 5 MG: 5 TABLET, FILM COATED ORAL at 00:53

## 2024-09-21 RX ADMIN — MIRTAZAPINE 15 MG: 15 TABLET, FILM COATED ORAL at 22:39

## 2024-09-21 RX ADMIN — PANTOPRAZOLE SODIUM 40 MG: 40 TABLET, DELAYED RELEASE ORAL at 22:39

## 2024-09-21 RX ADMIN — IPRATROPIUM BROMIDE AND ALBUTEROL SULFATE 3 ML: .5; 3 SOLUTION RESPIRATORY (INHALATION) at 20:07

## 2024-09-21 NOTE — PLAN OF CARE
Goal Outcome Evaluation:      Patient alert, disoriented to time, and situation. Patient on room air. Patient has not voided this shift. Bladder scanned, 266 urine noted. Patient states he doesn't need to pee. Telemetry monitored. Bed in low position, call light in reach, bed alarm set, room near nursing station.

## 2024-09-21 NOTE — CONSULTS
FIRST UROLOGY CONSULT      Patient Identification:  NAME:  Drake Beth  Age:  73 y.o.   Sex:  male   :  1950   MRN:  [id number]     Chief complaint: RN not able to place banuelos    History of present illness:      Pt admitted for pneumonia.    No UOP x 24 hrs    RN not able to place banuelos    Urology consulted for banuelos.    Past medical history:  Past Medical History:   Diagnosis Date    Allergic rhinitis     Anemia     Atrial fibrillation     Chesty cough     CHF (congestive heart failure)     Coronary artery disease     Cough     Fever     Hyperlipidemia     Hypertension     Mentally challenged     MVP (mitral valve prolapse)        Past surgical history:  Past Surgical History:   Procedure Laterality Date    CARDIAC ELECTROPHYSIOLOGY PROCEDURE N/A 2022    Procedure: PACEMAKER EXTRACTION;  Surgeon: Rj Dominique MD;  Location: [facility] CATH INVASIVE LOCATION;  Service: Cardiology;  Laterality: N/A;    COLONOSCOPY      He has never had the test done.  His family is refusing any form of colon cancer screening for him.    CORONARY ARTERY BYPASS GRAFT      DR SWEAT    MITRAL VALVE REPLACEMENT  2008    WITH BIOPROSTHETIC TISSUE VALVE       Allergies:  Patient has no known allergies.    Home medications:  Medications Prior to Admission   Medication Sig Dispense Refill Last Dose    amLODIPine (NORVASC) 2.5 MG tablet TAKE 1 TABLET BY MOUTH DAILY 90 tablet 3     apixaban (Eliquis) 5 MG tablet tablet TAKE 1 TABLET BY MOUTH TWICE DAILY 180 tablet 3     atorvastatin (LIPITOR) 10 MG tablet TAKE 1 TABLET BY MOUTH DAILY 90 tablet 3     furosemide (LASIX) 40 MG tablet Take 1 tablet by mouth Daily.       lactulose (CHRONULAC) 10 GM/15ML solution Take 15 mL by mouth 2 (Two) Times a Day As Needed (Constipation). 473 mL 0     megestrol (MEGACE) 40 MG tablet Take 1 tablet by mouth Daily. 30 tablet 0     mirtazapine (REMERON) 15 MG tablet Take 1 tablet by mouth Every Night. 30 tablet 0     montelukast  (SINGULAIR) 10 MG tablet TAKE 1 TABLET BY MOUTH DAILY 90 tablet 1     multivitamin with minerals tablet tablet Take 1 tablet by mouth Daily.       pantoprazole (PROTONIX) 40 MG EC tablet Take 1 tablet by mouth Every Night. 30 tablet 0     potassium chloride (KLOR-CON M20) 20 MEQ CR tablet TAKE 1 TABLET BY MOUTH DAILY 90 tablet 0     sotalol (BETAPACE AF) 80 MG tablet tablet TAKE 1 TABLET BY MOUTH TWICE DAILY 180 tablet 1         Hospital medications:  apixaban, 5 mg, Oral, Q12H  atorvastatin, 10 mg, Oral, Daily  budesonide, 0.5 mg, Nebulization, BID - RT  cefTRIAXone, 2,000 mg, Intravenous, Q24H  ipratropium-albuterol, 3 mL, Nebulization, 4x Daily - RT  megestrol, 40 mg, Oral, Daily  mirtazapine, 15 mg, Oral, Nightly  montelukast, 10 mg, Oral, Daily  multivitamin with minerals, 1 tablet, Oral, Daily  pantoprazole, 40 mg, Oral, Nightly  sodium chloride, 10 mL, Intravenous, Q12H  sotalol, 80 mg, Oral, Q12H           acetaminophen **OR** acetaminophen **OR** acetaminophen    senna-docusate sodium **AND** polyethylene glycol **AND** bisacodyl **AND** bisacodyl    Calcium Replacement - Follow Nurse / BPA Driven Protocol    guaifenesin    lactulose    Magnesium Cardiology Dose Replacement - Follow Nurse / BPA Driven Protocol    melatonin    nitroglycerin    ondansetron ODT **OR** ondansetron    Phosphorus Replacement - Follow Nurse / BPA Driven Protocol    Potassium Replacement - Follow Nurse / BPA Driven Protocol    sodium chloride    sodium chloride    sodium chloride    Family history:  Family History   Problem Relation Age of Onset    Coronary artery disease Mother 64    Hyperlipidemia Sister     Liver disease Sister         FATTY LIVER    Rheum arthritis Sister     Lung cancer Sister     Hypertension Brother     Other Brother     Rheum arthritis Brother        Social history:  Social History     Tobacco Use    Smoking status: Never     Passive exposure: Never    Smokeless tobacco: Never   Vaping Use    Vaping status:  Never Used   Substance Use Topics    Alcohol use: Never    Drug use: Never       Review of systems:      Positive for:  nothing  Negative for:  chest pain, cough, sob, o/w neg    Objective:  TMax 24 hours:   Temp (24hrs), Av.5 °F (36.4 °C), Min:97.4 °F (36.3 °C), Max:97.7 °F (36.5 °C)      Vitals Ranges:   Temp:  [97.4 °F (36.3 °C)-97.7 °F (36.5 °C)] 97.5 °F (36.4 °C)  Heart Rate:  [54-68] 54  Resp:  [13-21] 15  BP: (102-128)/(57-82) 128/78    Intake/Output Last 3 shifts:  I/O last 3 completed shifts:  In: 920 [P.O.:920]  Out: -      Physical Exam:    General Appearance:    Alert, cooperative, NAD   Back:     No CVA tenderness   Lungs:     Respirations unlabored, no wheezing    Heart:    RRR, intact peripheral pulses   Abdomen:     Soft, NDNT, no masses, no guarding   :   16 fr banuelos placed by MD.  <200 cc clear urine out       Results review:   I reviewed the patient's new clinical results.    Data review:  Lab Results (last 24 hours)       Procedure Component Value Units Date/Time    CANDIDA AURIS PCR - Swab, Axilla Right, Axilla Left and Groin [[id number]] Collected: 2432    Specimen: Swab from Axilla Right, Axilla Left and Groin Updated: 24 0939    Phosphorus [[id number]]  (Normal) Collected: 24    Specimen: Blood from Arm, Right Updated: 24     Phosphorus 3.6 mg/dL     Magnesium [[id number]]  (Normal) Collected: 24    Specimen: Blood from Arm, Right Updated: 24     Magnesium 2.3 mg/dL     Basic Metabolic Panel [[id number]]  (Abnormal) Collected: 24    Specimen: Blood from Arm, Right Updated: 24     Glucose 113 mg/dL      BUN 34 mg/dL      Creatinine 1.04 mg/dL      Sodium 141 mmol/L      Potassium 4.3 mmol/L      Chloride 102 mmol/L      CO2 30.6 mmol/L      Calcium 8.7 mg/dL      BUN/Creatinine Ratio 32.7     Anion Gap 8.4 mmol/L      eGFR 76.3 mL/min/1.73     Narrative:      GFR Normal >60  Chronic Kidney Disease <60  Kidney  Failure <15    The GFR formula is only valid for adults with stable renal function between ages 18 and 70.    CBC & Differential [[id number]]  (Abnormal) Collected: 09/21/24 0226    Specimen: Blood from Arm, Right Updated: 09/21/24 0242    Narrative:      The following orders were created for panel order CBC & Differential.  Procedure                               Abnormality         Status                     ---------                               -----------         ------                     CBC Auto Differential[[id number]]        Abnormal            Final result                 Please view results for these tests on the individual orders.    CBC Auto Differential [[id number]]  (Abnormal) Collected: 09/21/24 0226    Specimen: Blood from Arm, Right Updated: 09/21/24 0242     WBC 11.65 10*3/mm3      RBC 2.80 10*6/mm3      Hemoglobin 9.7 g/dL      Hematocrit 29.0 %      .6 fL      MCH 34.6 pg      MCHC 33.4 g/dL      RDW 14.7 %      RDW-SD 55.6 fl      MPV 9.2 fL      Platelets 180 10*3/mm3      Neutrophil % 76.8 %      Lymphocyte % 13.5 %      Monocyte % 7.8 %      Eosinophil % 1.2 %      Basophil % 0.3 %      Immature Grans % 0.4 %      Neutrophils, Absolute 8.95 10*3/mm3      Lymphocytes, Absolute 1.57 10*3/mm3      Monocytes, Absolute 0.91 10*3/mm3      Eosinophils, Absolute 0.14 10*3/mm3      Basophils, Absolute 0.03 10*3/mm3      Immature Grans, Absolute 0.05 10*3/mm3      nRBC 0.0 /100 WBC     Blood Culture - Blood, Arm, Left [[id number]]  (Normal) Collected: 09/19/24 1501    Specimen: Blood from Arm, Left Updated: 09/20/24 1515     Blood Culture No growth at 24 hours    Narrative:      Less than seven (7) mL's of blood was collected.  Insufficient quantity may yield false negative results.    Blood Culture - Blood, Arm, Right [[id number]]  (Normal) Collected: 09/19/24 1426    Specimen: Blood from Arm, Right Updated: 09/20/24 1430     Blood Culture No growth at 24 hours             Imaging:  Imaging  Results (Last 24 Hours)       ** No results found for the last 24 hours. **               Assessment:     Low UOP  RN not able to place banuelos    Plan:     Banuelos placed easily by MD  Minimal Urine in bladder <200cc  Needs hydration    Will sign off    Call with questions    Ananda Horan MD  09/21/24  11:28 EDT

## 2024-09-21 NOTE — PLAN OF CARE
Goal Outcome Evaluation:               Pt is aox4 able to make his needs known and discuss care with nursing staff. Pt had not voided and straight cath were unsuccessfully place by nursing staff . MD Horan placed banuelos cath at bedside. Urine output for the day has been very minimal. Nursing staff encouraging Pt to drink fluids. Last measured output for the day is 34 ml total,call to MD placed. Pt has no C/O of pain. Family at bedside , call light within reach care plan ongoing.

## 2024-09-21 NOTE — PROGRESS NOTES
[facility] MEDICINE SERVICE  DAILY PROGRESS NOTE    NAME: Drake Beth  : 1950  MRN: [id number]      LOS: 0 days     PROVIDER OF SERVICE: Nava Burt MD    Chief Complaint: Pneumonia    Subjective:     Interval History:  History taken from: patient    No new complaint        Review of Systems:   Review of Systems   All other systems reviewed and are negative.      Objective:     Vital Signs  Temp:  [97.4 °F (36.3 °C)-98.1 °F (36.7 °C)] 97.5 °F (36.4 °C)  Heart Rate:  [55-68] 68  Resp:  [13-23] 13  BP: (102-118)/(57-71) 109/66  Flow (L/min):  [2] 2   Body mass index is 17.44 kg/m².    Physical Exam  Physical Exam  Constitutional:       Appearance: Normal appearance.   HENT:      Head: Normocephalic and atraumatic.      Nose: Nose normal.      Mouth/Throat:      Mouth: Mucous membranes are moist.   Eyes:      Extraocular Movements: Extraocular movements intact.      Conjunctiva/sclera: Conjunctivae normal.      Pupils: Pupils are equal, round, and reactive to light.   Cardiovascular:      Rate and Rhythm: Normal rate and regular rhythm.      Pulses: Normal pulses.      Heart sounds: Normal heart sounds.   Pulmonary:      Effort: Pulmonary effort is normal.      Breath sounds: Normal breath sounds.   Abdominal:      General: Abdomen is flat. Bowel sounds are normal.      Palpations: Abdomen is soft.   Musculoskeletal:         General: Normal range of motion.      Cervical back: Normal range of motion and neck supple.   Skin:     General: Skin is warm and dry.      Capillary Refill: Capillary refill takes less than 2 seconds.   Neurological:      General: No focal deficit present.      Mental Status: He is alert and oriented to person, place, and time.   Psychiatric:         Mood and Affect: Mood normal.         Behavior: Behavior normal.         Thought Content: Thought content normal.         Judgment: Judgment normal.         Current Medications:  Scheduled Meds:azithromycin, 500 mg,  Intravenous, Q24H  budesonide, 0.5 mg, Nebulization, BID - RT  cefTRIAXone, 2,000 mg, Intravenous, Q24H  ipratropium-albuterol, 3 mL, Nebulization, 4x Daily - RT  sodium chloride, 10 mL, Intravenous, Q12H      Continuous Infusions:   PRN Meds:.  acetaminophen **OR** acetaminophen **OR** acetaminophen    senna-docusate sodium **AND** polyethylene glycol **AND** bisacodyl **AND** bisacodyl    Calcium Replacement - Follow Nurse / BPA Driven Protocol    guaifenesin    Magnesium Cardiology Dose Replacement - Follow Nurse / BPA Driven Protocol    melatonin    nitroglycerin    ondansetron ODT **OR** ondansetron    Phosphorus Replacement - Follow Nurse / BPA Driven Protocol    Potassium Replacement - Follow Nurse / BPA Driven Protocol    sodium chloride    sodium chloride    sodium chloride       Diagnostic Data    Results from last 7 days   Lab Units 09/19/24  2247 09/19/24  1353   WBC 10*3/mm3 5.42 11.08*   HEMOGLOBIN g/dL 10.9* 11.4*   HEMATOCRIT % 32.7* 34.7*   PLATELETS 10*3/mm3 193 217   GLUCOSE mg/dL 276* 98   CREATININE mg/dL 1.54* 1.14   BUN mg/dL 28* 27*   SODIUM mmol/L 137 139   POTASSIUM mmol/L 4.3 4.1   AST (SGOT) U/L  --  33   ALT (SGPT) U/L  --  7   ALK PHOS U/L  --  83   BILIRUBIN mg/dL  --  0.9   ANION GAP mmol/L 11.0 9.1       XR Abdomen KUB    Result Date: 9/19/2024  Impression:  No acute findings in the abdomen. Nonspecific but nonobstructive bowel gas pattern. Electronically Signed: Tamie Ivy MD  9/19/2024 4:48 PM EDT  Workstation ID: [id number]    XR Chest 1 View    Result Date: 9/19/2024  Impression: Right perihilar right lower lobe airspace disease. Correlate for pneumonia. Small right basilar pleural effusion. Electronically Signed: Tamie Ivy MD  9/19/2024 2:28 PM EDT  Workstation ID: [id number]       I reviewed the patient's new clinical results.    Assessment/Plan:     Active and Resolved Problems  Active Hospital Problems    Diagnosis  POA    **Pneumonia [J18.9]  Yes    Altered mental  status [R41.82]  Yes      Resolved Hospital Problems   No resolved problems to display.       1.  Pneumonia  - Continue IV ceftriaxone and follow-up on cultures      2.  Acute kidney injury  - Hold home dose of Lasix and monitor renal function    3.  Hypertension  - BP controlled.  Blood pressure low normal.  I would not restart home Norvasc.      4.  A-fib  - Rate controlled  Restart home dose of sotalol and Eliquis    VTE Prophylaxis:  Mechanical VTE prophylaxis orders are present.             Disposition Planning:     Barriers to Discharge: Pending clinical improvement  Anticipated Date of Discharge: 09/23/2024  Place of Discharge: Home      Code Status and Medical Interventions: CPR (Attempt to Resuscitate); Full Support   Ordered at: 09/19/24 1632     Code Status (Patient has no pulse and is not breathing):    CPR (Attempt to Resuscitate)     Medical Interventions (Patient has pulse or is breathing):    Full Support       Signature: Electronically signed by Nava Burt MD, 09/20/24, 23:19 EDT.  [facility] Hospitalist Team

## 2024-09-21 NOTE — CASE MANAGEMENT/SOCIAL WORK
Continued Stay Note   Juan     Patient Name: Drake Beth  MRN: [id number]  Today's Date: 9/21/2024    Admit Date: 9/19/2024    Plan: home with brothers.  Declined    Discharge Plan       Row Name 09/21/24 1651       Plan    Plan home with brothers.  Declined     Plan Comments Met with pt's brother Demetrio at bedside.  discussed recommendations  of HH- Brother declined services at this time.  IMM delivered and signed.                      Expected Discharge Date and Time       Expected Discharge Date Expected Discharge Time    Sep 22, 2024               LISA Thomas RN  Case Management  [phone] office  [phone] fax

## 2024-09-21 NOTE — PROGRESS NOTES
[facility] MEDICINE SERVICE  DAILY PROGRESS NOTE    NAME: Drake Beth  : 1950  MRN: [id number]      LOS: 1 day     PROVIDER OF SERVICE: Nava Burt MD    Chief Complaint: Pneumonia    Subjective:     Interval History:  History taken from: patient    No new complaint        Review of Systems:   Review of Systems   All other systems reviewed and are negative.      Objective:     Vital Signs  Temp:  [97.4 °F (36.3 °C)-97.7 °F (36.5 °C)] 97.5 °F (36.4 °C)  Heart Rate:  [54-68] 54  Resp:  [13-21] 15  BP: (102-128)/(57-82) 128/78  Flow (L/min):  [2] 2   Body mass index is 17.44 kg/m².    Physical Exam  Physical Exam  Constitutional:       Appearance: Normal appearance.   HENT:      Head: Normocephalic and atraumatic.      Nose: Nose normal.      Mouth/Throat:      Mouth: Mucous membranes are moist.   Eyes:      Extraocular Movements: Extraocular movements intact.      Conjunctiva/sclera: Conjunctivae normal.      Pupils: Pupils are equal, round, and reactive to light.   Cardiovascular:      Rate and Rhythm: Normal rate and regular rhythm.      Pulses: Normal pulses.      Heart sounds: Normal heart sounds.   Pulmonary:      Effort: Pulmonary effort is normal.      Breath sounds: Normal breath sounds.   Abdominal:      General: Abdomen is flat. Bowel sounds are normal.      Palpations: Abdomen is soft.   Musculoskeletal:         General: Normal range of motion.      Cervical back: Normal range of motion and neck supple.   Skin:     General: Skin is warm and dry.      Capillary Refill: Capillary refill takes less than 2 seconds.   Neurological:      General: No focal deficit present.      Mental Status: He is alert and oriented to person, place, and time.   Psychiatric:         Mood and Affect: Mood normal.         Behavior: Behavior normal.         Thought Content: Thought content normal.         Judgment: Judgment normal.         Current Medications:  Scheduled Meds:apixaban, 5 mg, Oral,  Q12H  atorvastatin, 10 mg, Oral, Daily  budesonide, 0.5 mg, Nebulization, BID - RT  cefTRIAXone, 2,000 mg, Intravenous, Q24H  ipratropium-albuterol, 3 mL, Nebulization, 4x Daily - RT  megestrol, 40 mg, Oral, Daily  mirtazapine, 15 mg, Oral, Nightly  montelukast, 10 mg, Oral, Daily  multivitamin with minerals, 1 tablet, Oral, Daily  pantoprazole, 40 mg, Oral, Nightly  sodium chloride, 10 mL, Intravenous, Q12H  sotalol, 80 mg, Oral, Q12H      Continuous Infusions:   PRN Meds:.  acetaminophen **OR** acetaminophen **OR** acetaminophen    senna-docusate sodium **AND** polyethylene glycol **AND** bisacodyl **AND** bisacodyl    Calcium Replacement - Follow Nurse / BPA Driven Protocol    guaifenesin    lactulose    Magnesium Cardiology Dose Replacement - Follow Nurse / BPA Driven Protocol    melatonin    nitroglycerin    ondansetron ODT **OR** ondansetron    Phosphorus Replacement - Follow Nurse / BPA Driven Protocol    Potassium Replacement - Follow Nurse / BPA Driven Protocol    sodium chloride    sodium chloride    sodium chloride       Diagnostic Data    Results from last 7 days   Lab Units 09/21/24  0226 09/19/24  2247 09/19/24  1353   WBC 10*3/mm3 11.65*   < > 11.08*   HEMOGLOBIN g/dL 9.7*   < > 11.4*   HEMATOCRIT % 29.0*   < > 34.7*   PLATELETS 10*3/mm3 180   < > 217   GLUCOSE mg/dL 113*   < > 98   CREATININE mg/dL 1.04   < > 1.14   BUN mg/dL 34*   < > 27*   SODIUM mmol/L 141   < > 139   POTASSIUM mmol/L 4.3   < > 4.1   AST (SGOT) U/L  --   --  33   ALT (SGPT) U/L  --   --  7   ALK PHOS U/L  --   --  83   BILIRUBIN mg/dL  --   --  0.9   ANION GAP mmol/L 8.4   < > 9.1    < > = values in this interval not displayed.       XR Abdomen KUB    Result Date: 9/19/2024  Impression:  No acute findings in the abdomen. Nonspecific but nonobstructive bowel gas pattern. Electronically Signed: Tamie Ivy MD  9/19/2024 4:48 PM EDT  Workstation ID: [id number]    XR Chest 1 View    Result Date: 9/19/2024  Impression: Right perihilar  right lower lobe airspace disease. Correlate for pneumonia. Small right basilar pleural effusion. Electronically Signed: Tamie Ivy MD  9/19/2024 2:28 PM EDT  Workstation ID: [id number]       I reviewed the patient's new clinical results.    Assessment/Plan:     Active and Resolved Problems  Active Hospital Problems    Diagnosis  POA    **Pneumonia [J18.9]  Yes    Altered mental status [R41.82]  Yes      Resolved Hospital Problems   No resolved problems to display.       1.  Pneumonia  - Continue IV ceftriaxone and follow-up on cultures      2.  Acute kidney injury  - Hold home dose of Lasix and monitor renal function    3.  Hypertension  - BP controlled.  Blood pressure low normal.  I would not restart home Norvasc.      4.  A-fib  - Rate controlled  Restart home dose of sotalol and Eliquis    VTE Prophylaxis:  Pharmacologic & mechanical VTE prophylaxis orders are present.             Disposition Planning:     Barriers to Discharge: Pending clinical improvement  Anticipated Date of Discharge: 09/23/2024  Place of Discharge: Home      Code Status and Medical Interventions: CPR (Attempt to Resuscitate); Full Support   Ordered at: 09/19/24 1632     Code Status (Patient has no pulse and is not breathing):    CPR (Attempt to Resuscitate)     Medical Interventions (Patient has pulse or is breathing):    Full Support       Signature: Electronically signed by Nava Burt MD, 09/21/24, 11:36 EDT.  [facility] Hospitalist Team

## 2024-09-21 NOTE — NURSING NOTE
Straight cath attempted twice on pt with no success, Provider notified at 0745 am. Orders placed for urology consult. Urology consulted at 0911AM.

## 2024-09-22 LAB
BACTERIA UR QL AUTO: ABNORMAL /HPF
BILIRUB UR QL STRIP: NEGATIVE
C AURIS DNA SPEC QL NAA+NON-PROBE: NOT DETECTED
CLARITY UR: ABNORMAL
COLOR UR: ABNORMAL
GLUCOSE UR STRIP-MCNC: NEGATIVE MG/DL
HGB UR QL STRIP.AUTO: ABNORMAL
HYALINE CASTS UR QL AUTO: ABNORMAL /LPF
KETONES UR QL STRIP: NEGATIVE
L PNEUMO1 AG UR QL IA: NEGATIVE
LEUKOCYTE ESTERASE UR QL STRIP.AUTO: ABNORMAL
NITRITE UR QL STRIP: NEGATIVE
PH UR STRIP.AUTO: 6.5 [PH] (ref 5–8)
PROT UR QL STRIP: ABNORMAL
RBC # UR STRIP: ABNORMAL /HPF
REF LAB TEST METHOD: ABNORMAL
S PNEUM AG SPEC QL LA: NEGATIVE
SP GR UR STRIP: 1.02 (ref 1–1.03)
SQUAMOUS #/AREA URNS HPF: ABNORMAL /HPF
UROBILINOGEN UR QL STRIP: ABNORMAL
WBC # UR STRIP: ABNORMAL /HPF

## 2024-09-22 PROCEDURE — 87899 AGENT NOS ASSAY W/OPTIC: CPT | Performed by: INTERNAL MEDICINE

## 2024-09-22 PROCEDURE — 93010 ELECTROCARDIOGRAM REPORT: CPT | Performed by: INTERNAL MEDICINE

## 2024-09-22 PROCEDURE — 94664 DEMO&/EVAL PT USE INHALER: CPT

## 2024-09-22 PROCEDURE — 93005 ELECTROCARDIOGRAM TRACING: CPT | Performed by: INTERNAL MEDICINE

## 2024-09-22 PROCEDURE — 97112 NEUROMUSCULAR REEDUCATION: CPT

## 2024-09-22 PROCEDURE — 97530 THERAPEUTIC ACTIVITIES: CPT

## 2024-09-22 PROCEDURE — 25010000002 CEFTRIAXONE PER 250 MG: Performed by: INTERNAL MEDICINE

## 2024-09-22 PROCEDURE — 94761 N-INVAS EAR/PLS OXIMETRY MLT: CPT

## 2024-09-22 PROCEDURE — 94799 UNLISTED PULMONARY SVC/PX: CPT

## 2024-09-22 PROCEDURE — 87449 NOS EACH ORGANISM AG IA: CPT | Performed by: INTERNAL MEDICINE

## 2024-09-22 PROCEDURE — 81001 URINALYSIS AUTO W/SCOPE: CPT | Performed by: INTERNAL MEDICINE

## 2024-09-22 RX ORDER — FUROSEMIDE 40 MG
40 TABLET ORAL
Status: DISCONTINUED | OUTPATIENT
Start: 2024-09-22 | End: 2024-09-24 | Stop reason: HOSPADM

## 2024-09-22 RX ADMIN — Medication 1 TABLET: at 08:20

## 2024-09-22 RX ADMIN — SOTALOL HYDROCHLORIDE 80 MG: 80 TABLET ORAL at 08:43

## 2024-09-22 RX ADMIN — Medication 10 ML: at 08:20

## 2024-09-22 RX ADMIN — BUDESONIDE 0.5 MG: 0.5 INHALANT RESPIRATORY (INHALATION) at 07:40

## 2024-09-22 RX ADMIN — ATORVASTATIN CALCIUM 10 MG: 10 TABLET, FILM COATED ORAL at 08:20

## 2024-09-22 RX ADMIN — IPRATROPIUM BROMIDE AND ALBUTEROL SULFATE 3 ML: .5; 3 SOLUTION RESPIRATORY (INHALATION) at 10:36

## 2024-09-22 RX ADMIN — MIRTAZAPINE 15 MG: 15 TABLET, FILM COATED ORAL at 20:36

## 2024-09-22 RX ADMIN — MEGESTROL ACETATE 40 MG: 40 TABLET ORAL at 08:20

## 2024-09-22 RX ADMIN — APIXABAN 5 MG: 5 TABLET, FILM COATED ORAL at 20:36

## 2024-09-22 RX ADMIN — BUDESONIDE 0.5 MG: 0.5 INHALANT RESPIRATORY (INHALATION) at 20:19

## 2024-09-22 RX ADMIN — Medication 10 ML: at 20:36

## 2024-09-22 RX ADMIN — FUROSEMIDE 40 MG: 40 TABLET ORAL at 17:42

## 2024-09-22 RX ADMIN — IPRATROPIUM BROMIDE AND ALBUTEROL SULFATE 3 ML: .5; 3 SOLUTION RESPIRATORY (INHALATION) at 16:20

## 2024-09-22 RX ADMIN — IPRATROPIUM BROMIDE AND ALBUTEROL SULFATE 3 ML: .5; 3 SOLUTION RESPIRATORY (INHALATION) at 20:14

## 2024-09-22 RX ADMIN — IPRATROPIUM BROMIDE AND ALBUTEROL SULFATE 3 ML: .5; 3 SOLUTION RESPIRATORY (INHALATION) at 07:44

## 2024-09-22 RX ADMIN — MONTELUKAST 10 MG: 10 TABLET, FILM COATED ORAL at 08:20

## 2024-09-22 RX ADMIN — SOTALOL HYDROCHLORIDE 80 MG: 80 TABLET ORAL at 20:35

## 2024-09-22 RX ADMIN — APIXABAN 5 MG: 5 TABLET, FILM COATED ORAL at 08:20

## 2024-09-22 RX ADMIN — PANTOPRAZOLE SODIUM 40 MG: 40 TABLET, DELAYED RELEASE ORAL at 20:36

## 2024-09-22 RX ADMIN — CEFTRIAXONE 2000 MG: 2 INJECTION, POWDER, FOR SOLUTION INTRAMUSCULAR; INTRAVENOUS at 14:37

## 2024-09-22 NOTE — PROGRESS NOTES
[facility] MEDICINE SERVICE  DAILY PROGRESS NOTE    NAME: Drake Beth  : 1950  MRN: [id number]      LOS: 2 days     PROVIDER OF SERVICE: Nava Burt MD    Chief Complaint: Pneumonia    Subjective:     Interval History:  History taken from: patient    No new complaint        Review of Systems:   Review of Systems   All other systems reviewed and are negative.      Objective:     Vital Signs  Temp:  [97.3 °F (36.3 °C)-97.5 °F (36.4 °C)] 97.3 °F (36.3 °C)  Heart Rate:  [53-66] 60  Resp:  [15-20] 15  BP: (113-153)/(68-86) 147/86  Flow (L/min):  [2-5] 5   Body mass index is 17.44 kg/m².    Physical Exam  Physical Exam  Constitutional:       Appearance: Normal appearance.   HENT:      Head: Normocephalic and atraumatic.      Nose: Nose normal.      Mouth/Throat:      Mouth: Mucous membranes are moist.   Eyes:      Extraocular Movements: Extraocular movements intact.      Conjunctiva/sclera: Conjunctivae normal.      Pupils: Pupils are equal, round, and reactive to light.   Cardiovascular:      Rate and Rhythm: Normal rate and regular rhythm.      Pulses: Normal pulses.      Heart sounds: Normal heart sounds.   Pulmonary:      Effort: Pulmonary effort is normal.      Breath sounds: Normal breath sounds.   Abdominal:      General: Abdomen is flat. Bowel sounds are normal.      Palpations: Abdomen is soft.   Musculoskeletal:         General: Normal range of motion.      Cervical back: Normal range of motion and neck supple.   Skin:     General: Skin is warm and dry.      Capillary Refill: Capillary refill takes less than 2 seconds.   Neurological:      General: No focal deficit present.      Mental Status: He is alert and oriented to person, place, and time.   Psychiatric:         Mood and Affect: Mood normal.         Behavior: Behavior normal.         Thought Content: Thought content normal.         Judgment: Judgment normal.         Current Medications:  Scheduled Meds:apixaban, 5 mg, Oral,  Q12H  atorvastatin, 10 mg, Oral, Daily  budesonide, 0.5 mg, Nebulization, BID - RT  cefTRIAXone, 2,000 mg, Intravenous, Q24H  ipratropium-albuterol, 3 mL, Nebulization, 4x Daily - RT  megestrol, 40 mg, Oral, Daily  mirtazapine, 15 mg, Oral, Nightly  montelukast, 10 mg, Oral, Daily  multivitamin with minerals, 1 tablet, Oral, Daily  pantoprazole, 40 mg, Oral, Nightly  sodium chloride, 10 mL, Intravenous, Q12H  sotalol, 80 mg, Oral, Q12H      Continuous Infusions:sodium chloride, 100 mL/hr, Last Rate: 100 mL/hr (09/21/24 1724)      PRN Meds:.  acetaminophen **OR** acetaminophen **OR** acetaminophen    senna-docusate sodium **AND** polyethylene glycol **AND** bisacodyl **AND** bisacodyl    Calcium Replacement - Follow Nurse / BPA Driven Protocol    guaifenesin    lactulose    Magnesium Cardiology Dose Replacement - Follow Nurse / BPA Driven Protocol    melatonin    nitroglycerin    ondansetron ODT **OR** ondansetron    Phosphorus Replacement - Follow Nurse / BPA Driven Protocol    Potassium Replacement - Follow Nurse / BPA Driven Protocol    sodium chloride    sodium chloride    sodium chloride       Diagnostic Data    Results from last 7 days   Lab Units 09/21/24  2250 09/19/24  2247 09/19/24  1353   WBC 10*3/mm3 13.05*   < > 11.08*   HEMOGLOBIN g/dL 10.0*   < > 11.4*   HEMATOCRIT % 30.9*   < > 34.7*   PLATELETS 10*3/mm3 182   < > 217   GLUCOSE mg/dL 113*   < > 98   CREATININE mg/dL 0.91   < > 1.14   BUN mg/dL 31*   < > 27*   SODIUM mmol/L 140   < > 139   POTASSIUM mmol/L 4.3   < > 4.1   AST (SGOT) U/L  --   --  33   ALT (SGPT) U/L  --   --  7   ALK PHOS U/L  --   --  83   BILIRUBIN mg/dL  --   --  0.9   ANION GAP mmol/L 6.9   < > 9.1    < > = values in this interval not displayed.       No radiology results for the last day      I reviewed the patient's new clinical results.    Assessment/Plan:     Active and Resolved Problems  Active Hospital Problems    Diagnosis  POA    **Pneumonia [J18.9]  Yes    Altered mental  status [R41.82]  Yes      Resolved Hospital Problems   No resolved problems to display.       1.  Pneumonia  - Continue IV ceftriaxone and follow-up on cultures      2.  Acute kidney injury  - resolved. Restart home dose of lasix    2b. Oliguria   -I was informed by the patient's nurse that patient was not making urine.  I had started IV fluids which have discontinued for now as urine output has improved.  Patient's home dose of Lasix has been restarted.    3.  Hypertension  - BP controlled.  Blood pressure low normal.  I would not restart home Norvasc.      4.  A-fib  - Rate controlled  Restart home dose of sotalol and Eliquis    VTE Prophylaxis:  Pharmacologic & mechanical VTE prophylaxis orders are present.        Disposition Planning:     Barriers to Discharge: Pending clinical improvement  Anticipated Date of Discharge: 09/23/2024  Place of Discharge: Home      Code Status and Medical Interventions: CPR (Attempt to Resuscitate); Full Support   Ordered at: 09/19/24 1632     Code Status (Patient has no pulse and is not breathing):    CPR (Attempt to Resuscitate)     Medical Interventions (Patient has pulse or is breathing):    Full Support       Signature: Electronically signed by Nava Burt MD, 09/22/24, 15:14 EDT.  [facility] Hospitalist Team

## 2024-09-22 NOTE — THERAPY TREATMENT NOTE
"Subjective: Pt agreeable to therapeutic plan of care. Pt only agreed to get in chair    Objective:     Precautions - falls, contact, O2    Bed mobility - CGA  Transfers - CGA and with rolling walker  Ambulation -  feet N/A or Not attempted.    Vitals: Desaturates was on 2 L O2 bu tRN incr to 3L due to sats dropping to 87%     Pain: 0 VAS       Education: Provided education on the importance of mobility in the acute care setting, Verbal/Tactile Cues, and Transfer Training    Assessment: Drake Beth presents with functional mobility impairments which indicate the need for skilled intervention. Tolerating session today without incident. Pt easily anxious and SOA, req rest breaks to work on PLB and incr in O2 just for transfer. Pt refusing HH and plans on dc home with brother.Will continue to follow and progress as tolerated.     Plan/Recommendations:   If medically appropriate, Low Intensity Therapy recommended post-acute care - This is recommended as therapy feels this patient would require 2-3 visits per week. OP or HH would be the best option depending on patient's home bound status. Consider, if the patient has other  \"skilled\" needs such as wounds, IV antibiotics, etc. Combined with \"low intensity\" could also equate to a SNF. If patient is medically complex, consider LTAC. Pt requires no DME at discharge.     Pt desires Home with family assist at discharge. Pt cooperative; agreeable to therapeutic recommendations and plan of care.         Basic Mobility 6-click:  Rollin = Total, A lot = 2, A little = 3; 4 = None  Supine>Sit:   1 = Total, A lot = 2, A little = 3; 4 = None   Sit>Stand with arms:  1 = Total, A lot = 2, A little = 3; 4 = None  Bed>Chair:   1 = Total, A lot = 2, A little = 3; 4 = None  Ambulate in room:  1 = Total, A lot = 2, A little = 3; 4 = None  3-5 Steps with railin = Total, A lot = 2, A little = 3; 4 = None  Score: 18    Post-Tx Position: Up in Chair, Alarms activated, and " Call light and personal items within reach  PPE: gloves and gown

## 2024-09-22 NOTE — PLAN OF CARE
Assessment: Drake Beth presents with functional mobility impairments which indicate the need for skilled intervention. Tolerating session today without incident. Pt easily anxious and SOA, req rest breaks to work on PLB and incr in O2 just for transfer. Pt refusing HH and plans on dc home with brother.Will continue to follow and progress as tolerated.

## 2024-09-22 NOTE — PLAN OF CARE
Problem: Adult Inpatient Plan of Care  Goal: Plan of Care Review  Outcome: Ongoing, Not Progressing  Flowsheets (Taken 9/22/2024 1537)  Plan of Care Reviewed With: patient  Outcome Evaluation: Patient is stable. Becomes winded with ambulation. Oxygen demand required. Patient's appetite has decreased. Safety measures in place. Call light within reach. Plan of care ongoing.  Goal: Patient-Specific Goal (Individualized)  Outcome: Ongoing, Not Progressing  Goal: Absence of Hospital-Acquired Illness or Injury  Outcome: Ongoing, Not Progressing  Intervention: Identify and Manage Fall Risk  Recent Flowsheet Documentation  Taken 9/22/2024 1413 by Sylvie Pop LPN  Safety Promotion/Fall Prevention:   activity supervised   assistive device/personal items within reach   clutter free environment maintained   nonskid shoes/slippers when out of bed   room organization consistent   safety round/check completed   fall prevention program maintained   gait belt  Intervention: Prevent Infection  Recent Flowsheet Documentation  Taken 9/22/2024 1413 by Sylvie Pop LPN  Infection Prevention:   hand hygiene promoted   personal protective equipment utilized   single patient room provided  Goal: Optimal Comfort and Wellbeing  Outcome: Ongoing, Not Progressing  Goal: Readiness for Transition of Care  Outcome: Ongoing, Not Progressing     Problem: Fluid Imbalance (Pneumonia)  Goal: Fluid Balance  Outcome: Ongoing, Not Progressing     Problem: Infection (Pneumonia)  Goal: Resolution of Infection Signs and Symptoms  Outcome: Ongoing, Not Progressing     Problem: Respiratory Compromise (Pneumonia)  Goal: Effective Oxygenation and Ventilation  Outcome: Ongoing, Not Progressing     Problem: Heart Failure Comorbidity  Goal: Maintenance of Heart Failure Symptom Control  Outcome: Ongoing, Not Progressing  Intervention: Maintain Heart Failure-Management  Recent Flowsheet Documentation  Taken 9/22/2024 1413 by Sylvie Pop LPN  Medication  Review/Management: medications reviewed     Problem: Hypertension Comorbidity  Goal: Blood Pressure in Desired Range  Outcome: Ongoing, Not Progressing  Intervention: Maintain Blood Pressure Management  Recent Flowsheet Documentation  Taken 9/22/2024 1413 by Sylvie Pop LPN  Medication Review/Management: medications reviewed     Problem: Fall Injury Risk  Goal: Absence of Fall and Fall-Related Injury  Outcome: Ongoing, Not Progressing  Intervention: Identify and Manage Contributors  Recent Flowsheet Documentation  Taken 9/22/2024 1413 by Sylvie Pop LPN  Medication Review/Management: medications reviewed  Intervention: Promote Injury-Free Environment  Recent Flowsheet Documentation  Taken 9/22/2024 1413 by Sylvie Pop LPN  Safety Promotion/Fall Prevention:   activity supervised   assistive device/personal items within reach   clutter free environment maintained   nonskid shoes/slippers when out of bed   room organization consistent   safety round/check completed   fall prevention program maintained   gait belt   Goal Outcome Evaluation:  Plan of Care Reviewed With: patient        Progress: improving  Outcome Evaluation: Patient is stable. Becomes winded with ambulation. Oxygen demand required. Patient's appetite has decreased. Safety measures in place. Call light within reach. Plan of care ongoing.

## 2024-09-22 NOTE — PLAN OF CARE
"Goal Outcome Evaluation:         Patient alert disoriented to time and situation. Patient had banuelos catheter placed by urology urine bloody and patient complained \"it hurts\" po Tylenol given. Patient on oxygen 2L/NC. Normal saline administering at 100ml/hr. Bed in low position, call light in reach, bed alarm set, room near nursing station.                                       "

## 2024-09-23 LAB
ANION GAP SERPL CALCULATED.3IONS-SCNC: 14.9 MMOL/L (ref 5–15)
BUN SERPL-MCNC: 26 MG/DL (ref 8–23)
BUN/CREAT SERPL: 25.2 (ref 7–25)
CALCIUM SPEC-SCNC: 8.8 MG/DL (ref 8.6–10.5)
CHLORIDE SERPL-SCNC: 107 MMOL/L (ref 98–107)
CO2 SERPL-SCNC: 24.1 MMOL/L (ref 22–29)
CREAT SERPL-MCNC: 1.03 MG/DL (ref 0.76–1.27)
DEPRECATED RDW RBC AUTO: 58.9 FL (ref 37–54)
EGFRCR SERPLBLD CKD-EPI 2021: 77.2 ML/MIN/1.73
ERYTHROCYTE [DISTWIDTH] IN BLOOD BY AUTOMATED COUNT: 15.2 % (ref 12.3–15.4)
GLUCOSE SERPL-MCNC: 65 MG/DL (ref 65–99)
HCT VFR BLD AUTO: 31.4 % (ref 37.5–51)
HGB BLD-MCNC: 10.2 G/DL (ref 13–17.7)
MCH RBC QN AUTO: 34.5 PG (ref 26.6–33)
MCHC RBC AUTO-ENTMCNC: 32.5 G/DL (ref 31.5–35.7)
MCV RBC AUTO: 106.1 FL (ref 79–97)
PLATELET # BLD AUTO: 167 10*3/MM3 (ref 140–450)
PMV BLD AUTO: 9.4 FL (ref 6–12)
POTASSIUM SERPL-SCNC: 3.9 MMOL/L (ref 3.5–5.2)
RBC # BLD AUTO: 2.96 10*6/MM3 (ref 4.14–5.8)
SODIUM SERPL-SCNC: 146 MMOL/L (ref 136–145)
WBC NRBC COR # BLD AUTO: 10.35 10*3/MM3 (ref 3.4–10.8)

## 2024-09-23 PROCEDURE — 25010000002 CEFTRIAXONE PER 250 MG: Performed by: INTERNAL MEDICINE

## 2024-09-23 PROCEDURE — 94799 UNLISTED PULMONARY SVC/PX: CPT

## 2024-09-23 PROCEDURE — 97116 GAIT TRAINING THERAPY: CPT

## 2024-09-23 PROCEDURE — 94664 DEMO&/EVAL PT USE INHALER: CPT

## 2024-09-23 PROCEDURE — 97110 THERAPEUTIC EXERCISES: CPT

## 2024-09-23 PROCEDURE — 80048 BASIC METABOLIC PNL TOTAL CA: CPT | Performed by: STUDENT IN AN ORGANIZED HEALTH CARE EDUCATION/TRAINING PROGRAM

## 2024-09-23 PROCEDURE — 94761 N-INVAS EAR/PLS OXIMETRY MLT: CPT

## 2024-09-23 PROCEDURE — 85027 COMPLETE CBC AUTOMATED: CPT | Performed by: STUDENT IN AN ORGANIZED HEALTH CARE EDUCATION/TRAINING PROGRAM

## 2024-09-23 RX ADMIN — CEFTRIAXONE 2000 MG: 2 INJECTION, POWDER, FOR SOLUTION INTRAMUSCULAR; INTRAVENOUS at 14:43

## 2024-09-23 RX ADMIN — BUDESONIDE 0.5 MG: 0.5 INHALANT RESPIRATORY (INHALATION) at 08:03

## 2024-09-23 RX ADMIN — MEGESTROL ACETATE 40 MG: 40 TABLET ORAL at 08:53

## 2024-09-23 RX ADMIN — APIXABAN 5 MG: 5 TABLET, FILM COATED ORAL at 08:53

## 2024-09-23 RX ADMIN — FUROSEMIDE 40 MG: 40 TABLET ORAL at 08:54

## 2024-09-23 RX ADMIN — Medication 10 ML: at 20:40

## 2024-09-23 RX ADMIN — FUROSEMIDE 40 MG: 40 TABLET ORAL at 17:16

## 2024-09-23 RX ADMIN — BUDESONIDE 0.5 MG: 0.5 INHALANT RESPIRATORY (INHALATION) at 21:58

## 2024-09-23 RX ADMIN — ATORVASTATIN CALCIUM 10 MG: 10 TABLET, FILM COATED ORAL at 08:53

## 2024-09-23 RX ADMIN — MIRTAZAPINE 15 MG: 15 TABLET, FILM COATED ORAL at 20:40

## 2024-09-23 RX ADMIN — IPRATROPIUM BROMIDE AND ALBUTEROL SULFATE 3 ML: .5; 3 SOLUTION RESPIRATORY (INHALATION) at 08:03

## 2024-09-23 RX ADMIN — IPRATROPIUM BROMIDE AND ALBUTEROL SULFATE 3 ML: .5; 3 SOLUTION RESPIRATORY (INHALATION) at 15:49

## 2024-09-23 RX ADMIN — IPRATROPIUM BROMIDE AND ALBUTEROL SULFATE 3 ML: .5; 3 SOLUTION RESPIRATORY (INHALATION) at 12:39

## 2024-09-23 RX ADMIN — MONTELUKAST 10 MG: 10 TABLET, FILM COATED ORAL at 08:53

## 2024-09-23 RX ADMIN — SOTALOL HYDROCHLORIDE 80 MG: 80 TABLET ORAL at 20:40

## 2024-09-23 RX ADMIN — SOTALOL HYDROCHLORIDE 80 MG: 80 TABLET ORAL at 08:54

## 2024-09-23 RX ADMIN — PANTOPRAZOLE SODIUM 40 MG: 40 TABLET, DELAYED RELEASE ORAL at 20:40

## 2024-09-23 RX ADMIN — APIXABAN 5 MG: 5 TABLET, FILM COATED ORAL at 20:40

## 2024-09-23 RX ADMIN — Medication 10 ML: at 08:58

## 2024-09-23 RX ADMIN — Medication 1 TABLET: at 08:54

## 2024-09-23 RX ADMIN — IPRATROPIUM BROMIDE AND ALBUTEROL SULFATE 3 ML: .5; 3 SOLUTION RESPIRATORY (INHALATION) at 21:58

## 2024-09-23 NOTE — PLAN OF CARE
Goal Outcome Evaluation:   Pt did fine throughout the night. Pt is on 3L NC. Pt still has decreased appetite. Pt banuelos is no longer putting out bloody urine. Pt call light within reach, plan of care ongoing.

## 2024-09-23 NOTE — PROGRESS NOTES
[facility] MEDICINE SERVICE  DAILY PROGRESS NOTE    NAME: Drake Beth  : 1950  MRN: [id number]      LOS: 3 days     PROVIDER OF SERVICE: Elisha Shaikh MD    Chief Complaint: Pneumonia    Subjective:     Interval History:    Patient seen and evaluated at bedside.  No complaints this morning.  Continuing antibiotics for pneumonia.    Treatment plan discussed with patient. All questions addressed.     Review of Systems:   Denies fevers, chills  Denies chest pain, edema, palpitations  Denies shortness of breath, cough  Denies nausea, vomiting, diarrhea  Denies dysuria, hematuria    Objective:     Vital Signs  Temp:  [97.3 °F (36.3 °C)-97.5 °F (36.4 °C)] 97.5 °F (36.4 °C)  Heart Rate:  [] 86  Resp:  [15-22] 18  BP: (103-120)/(66-76) 108/68  Flow (L/min):  [1-5] 1   Body mass index is 17.44 kg/m².    Physical Exam   General: No acute distress,   Neuro: Awake and alert, no focal deficits appreciated  HEENT: EOMI, moist mucus membranes  CV: RRR, no murmurs appreciated, no peripheral edema  Pulm: CTAB, no increased work of breathing  Abd: Soft, nontender, nondistended  Skin: Warm, dry and intact  Psych: Appropriate mood and affect    Scheduled Meds   apixaban, 5 mg, Oral, Q12H  atorvastatin, 10 mg, Oral, Daily  budesonide, 0.5 mg, Nebulization, BID - RT  cefTRIAXone, 2,000 mg, Intravenous, Q24H  furosemide, 40 mg, Oral, BID  ipratropium-albuterol, 3 mL, Nebulization, 4x Daily - RT  megestrol, 40 mg, Oral, Daily  mirtazapine, 15 mg, Oral, Nightly  montelukast, 10 mg, Oral, Daily  multivitamin with minerals, 1 tablet, Oral, Daily  pantoprazole, 40 mg, Oral, Nightly  sodium chloride, 10 mL, Intravenous, Q12H  sotalol, 80 mg, Oral, Q12H       PRN Meds     acetaminophen **OR** [DISCONTINUED] acetaminophen **OR** [DISCONTINUED] acetaminophen    senna-docusate sodium **AND** polyethylene glycol **AND** bisacodyl **AND** bisacodyl    guaifenesin    lactulose    melatonin    ondansetron ODT **OR**  ondansetron    Potassium Replacement - Follow Nurse / BPA Driven Protocol    sodium chloride    sodium chloride    sodium chloride   Infusions         Diagnostic Data    Results from last 7 days   Lab Units 09/23/24  0908 09/19/24  2247 09/19/24  1353   WBC 10*3/mm3 10.35   < > 11.08*   HEMOGLOBIN g/dL 10.2*   < > 11.4*   HEMATOCRIT % 31.4*   < > 34.7*   PLATELETS 10*3/mm3 167   < > 217   GLUCOSE mg/dL 65   < > 98   CREATININE mg/dL 1.03   < > 1.14   BUN mg/dL 26*   < > 27*   SODIUM mmol/L 146*   < > 139   POTASSIUM mmol/L 3.9   < > 4.1   AST (SGOT) U/L  --   --  33   ALT (SGPT) U/L  --   --  7   ALK PHOS U/L  --   --  83   BILIRUBIN mg/dL  --   --  0.9   ANION GAP mmol/L 14.9   < > 9.1    < > = values in this interval not displayed.       No radiology results for the last day    Interval results reviewed.    Assessment/Plan:     Pneumonia  - Continue ceftriaxone, day 4 today  - Wean oxygen as appropriate  - Transition to oral antibiotics prior to discharge    Acute kidney injury  - Likely prerenal  - Home Lasix resumed, monitor renal function closely  - Repeat BMP in a.m.    Hyponatremia  - New problems, possibly secondary to resuming Lasix and poor p.o. intake  - Repeat BMP in a.m.; may need to decrease Lasix dosage    Hypertension  - BP controlled at this time  - Hold off on resuming home meds due to low normal BPs during admission    Atrial fibrillation  - Rate controlled  - Resume home sotalol and Eliquis    Malnutrition secondary to comorbidities  - Family concerned about patient's chronic poor intake  - Dietitian consult placed  - Continue home Megace    Treatment plan discussed with nursing staff, case management.    VTE Prophylaxis:  Pharmacologic & mechanical VTE prophylaxis orders are present.    Code status is   Code Status and Medical Interventions: CPR (Attempt to Resuscitate); Full Support   Ordered at: 09/19/24 1634     Code Status (Patient has no pulse and is not breathing):    CPR (Attempt to  Resuscitate)     Medical Interventions (Patient has pulse or is breathing):    Full Support       Plan for disposition: Home 9/24/2024    Barriers to discharge: Wean supplemental oxygen    Time: 35+ minutes     Signature: Electronically signed by Elisha Shaikh MD, 09/23/24, 13:44 EDT.  [facility] Hospitalist Team

## 2024-09-23 NOTE — CASE MANAGEMENT/SOCIAL WORK
Continued Stay Note   [facility]     Patient Name: Drake Beth  MRN: [id number]  Today's Date: 9/23/2024    Admit Date: 9/19/2024    Plan: DC PLAN: Return home with Brother. Declines Home Health.     Discharge Plan       Row Name 09/23/24 1500       Plan    Plan DC PLAN: Return home with Brother. Declines Home Health.      Patient/Family in Agreement with Plan yes    Plan Comments DC BARRIERS: IV abx, Cultures pending. Anticipate discharge tomorrow 9/24.                      Expected Discharge Date and Time       Expected Discharge Date Expected Discharge Time    Sep 23, 2024           Tianna Benitez RN   Case Management  [phone]

## 2024-09-23 NOTE — PLAN OF CARE
"Goal Outcome Evaluation:    Assessment: Drake Beth presents with functional mobility impairments which indicate the need for skilled intervention. Pt is on continued abx for pneumonia. PT discussed with pt's brother that pt will need to use rolling walker at home. Pt has not been agreeable to HH PT but pt would benefit from it. Pt ambulates with FF posture with decreased step length BLE. Pt exhibited no dyspnea with exertion with use of O2. Tolerating session today without incident. Will continue to follow and progress as tolerated.     Plan/Recommendations:   If medically appropriate, Low Intensity Therapy recommended post-acute care - This is recommended as therapy feels this patient would require 2-3 visits per week. OP or HH would be the best option depending on patient's home bound status. Consider, if the patient has other  \"skilled\" needs such as wounds, IV antibiotics, etc. Combined with \"low intensity\" could also equate to a SNF. If patient is medically complex, consider LTAC. Pt requires no DME at discharge.     Pt desires Home with family assist at discharge. Pt cooperative; agreeable to therapeutic recommendations and plan of care.                                               "

## 2024-09-23 NOTE — PLAN OF CARE
Goal Outcome Evaluation:               Pt disoriented to time and situation. Catheter removed per protocol. Safety precautions in place, call light within reach. Plan of care ongoing.

## 2024-09-23 NOTE — THERAPY TREATMENT NOTE
"Subjective: Pt agreeable to therapeutic plan of care.    Objective:     Precautions - HFR; desats with activity    Bed mobility - N/A or Not attempted.  Transfers - SBA  Ambulation - 120 feet CGA and with rolling walker    Therapeutic Exercise - 10 Reps B LE AROM supported sitting / chair    Vitals: WNL with O2 at 2 LPM    Pain: 0 VAS   Location: N/A  Intervention for pain: N/A    Education: Provided education on the importance of mobility in the acute care setting, Verbal/Tactile Cues, and Energy conservation strategies    Assessment: Drake Beth presents with functional mobility impairments which indicate the need for skilled intervention. Pt is on continued abx for pneumonia. PT discussed with pt's brother that pt will need to use rolling walker at home. Pt has not been agreeable to HH PT but pt would benefit from it. Pt ambulates with FF posture with decreased step length BLE. Pt exhibited no dyspnea with exertion with use of O2. Tolerating session today without incident. Will continue to follow and progress as tolerated.     Plan/Recommendations:   If medically appropriate, Low Intensity Therapy recommended post-acute care - This is recommended as therapy feels this patient would require 2-3 visits per week. OP or HH would be the best option depending on patient's home bound status. Consider, if the patient has other  \"skilled\" needs such as wounds, IV antibiotics, etc. Combined with \"low intensity\" could also equate to a SNF. If patient is medically complex, consider LTAC. Pt requires no DME at discharge.     Pt desires Home with family assist at discharge. Pt cooperative; agreeable to therapeutic recommendations and plan of care.         Basic Mobility 6-click:  Rollin = Total, A lot = 2, A little = 3; 4 = None  Supine>Sit:   1 = Total, A lot = 2, A little = 3; 4 = None   Sit>Stand with arms:  1 = Total, A lot = 2, A little = 3; 4 = None  Bed>Chair:   1 = Total, A lot = 2, A little = 3; 4 = " None  Ambulate in room:  1 = Total, A lot = 2, A little = 3; 4 = None  3-5 Steps with railin = Total, A lot = 2, A little = 3; 4 = None  Score: 21    Modified Taliaferro: N/A = No pre-op stroke/TIA    Post-Tx Position: Up in Chair, Alarms activated, and Call light and personal items within reach  PPE: gloves and gown

## 2024-09-24 ENCOUNTER — READMISSION MANAGEMENT (OUTPATIENT)
Dept: CALL CENTER | Facility: HOSPITAL | Age: 74
End: 2024-09-24
Payer: MEDICARE

## 2024-09-24 ENCOUNTER — TELEPHONE (OUTPATIENT)
Dept: FAMILY MEDICINE CLINIC | Facility: CLINIC | Age: 74
End: 2024-09-24
Payer: MEDICARE

## 2024-09-24 VITALS
WEIGHT: 89.29 LBS | BODY MASS INDEX: 17.53 KG/M2 | RESPIRATION RATE: 16 BRPM | OXYGEN SATURATION: 100 % | SYSTOLIC BLOOD PRESSURE: 134 MMHG | HEART RATE: 87 BPM | HEIGHT: 60 IN | TEMPERATURE: 97.5 F | DIASTOLIC BLOOD PRESSURE: 80 MMHG

## 2024-09-24 LAB
ANION GAP SERPL CALCULATED.3IONS-SCNC: 9.1 MMOL/L (ref 5–15)
BACTERIA SPEC AEROBE CULT: NORMAL
BACTERIA SPEC AEROBE CULT: NORMAL
BASOPHILS # BLD AUTO: 0.04 10*3/MM3 (ref 0–0.2)
BASOPHILS NFR BLD AUTO: 0.3 % (ref 0–1.5)
BUN SERPL-MCNC: 26 MG/DL (ref 8–23)
BUN/CREAT SERPL: 23.2 (ref 7–25)
CALCIUM SPEC-SCNC: 9.2 MG/DL (ref 8.6–10.5)
CHLORIDE SERPL-SCNC: 103 MMOL/L (ref 98–107)
CO2 SERPL-SCNC: 30.9 MMOL/L (ref 22–29)
CREAT SERPL-MCNC: 1.12 MG/DL (ref 0.76–1.27)
DEPRECATED RDW RBC AUTO: 54.9 FL (ref 37–54)
EGFRCR SERPLBLD CKD-EPI 2021: 69.8 ML/MIN/1.73
EOSINOPHIL # BLD AUTO: 0.56 10*3/MM3 (ref 0–0.4)
EOSINOPHIL NFR BLD AUTO: 4.8 % (ref 0.3–6.2)
ERYTHROCYTE [DISTWIDTH] IN BLOOD BY AUTOMATED COUNT: 14.7 % (ref 12.3–15.4)
GLUCOSE SERPL-MCNC: 104 MG/DL (ref 65–99)
HCT VFR BLD AUTO: 34.6 % (ref 37.5–51)
HGB BLD-MCNC: 11.2 G/DL (ref 13–17.7)
IMM GRANULOCYTES # BLD AUTO: 0.08 10*3/MM3 (ref 0–0.05)
IMM GRANULOCYTES NFR BLD AUTO: 0.7 % (ref 0–0.5)
LYMPHOCYTES # BLD AUTO: 1.59 10*3/MM3 (ref 0.7–3.1)
LYMPHOCYTES NFR BLD AUTO: 13.6 % (ref 19.6–45.3)
MCH RBC QN AUTO: 33.4 PG (ref 26.6–33)
MCHC RBC AUTO-ENTMCNC: 32.4 G/DL (ref 31.5–35.7)
MCV RBC AUTO: 103.3 FL (ref 79–97)
MONOCYTES # BLD AUTO: 0.87 10*3/MM3 (ref 0.1–0.9)
MONOCYTES NFR BLD AUTO: 7.4 % (ref 5–12)
NEUTROPHILS NFR BLD AUTO: 73.2 % (ref 42.7–76)
NEUTROPHILS NFR BLD AUTO: 8.58 10*3/MM3 (ref 1.7–7)
NRBC BLD AUTO-RTO: 0.3 /100 WBC (ref 0–0.2)
PLATELET # BLD AUTO: 190 10*3/MM3 (ref 140–450)
PMV BLD AUTO: 9.6 FL (ref 6–12)
POTASSIUM SERPL-SCNC: 3.2 MMOL/L (ref 3.5–5.2)
QT INTERVAL: 364 MS
QTC INTERVAL: 476 MS
RBC # BLD AUTO: 3.35 10*6/MM3 (ref 4.14–5.8)
SODIUM SERPL-SCNC: 143 MMOL/L (ref 136–145)
WBC NRBC COR # BLD AUTO: 11.72 10*3/MM3 (ref 3.4–10.8)

## 2024-09-24 PROCEDURE — 94799 UNLISTED PULMONARY SVC/PX: CPT

## 2024-09-24 PROCEDURE — 80048 BASIC METABOLIC PNL TOTAL CA: CPT | Performed by: STUDENT IN AN ORGANIZED HEALTH CARE EDUCATION/TRAINING PROGRAM

## 2024-09-24 PROCEDURE — 25010000002 POTASSIUM CHLORIDE 10 MEQ/100ML SOLUTION: Performed by: STUDENT IN AN ORGANIZED HEALTH CARE EDUCATION/TRAINING PROGRAM

## 2024-09-24 PROCEDURE — 85025 COMPLETE CBC W/AUTO DIFF WBC: CPT | Performed by: STUDENT IN AN ORGANIZED HEALTH CARE EDUCATION/TRAINING PROGRAM

## 2024-09-24 PROCEDURE — 94664 DEMO&/EVAL PT USE INHALER: CPT

## 2024-09-24 RX ORDER — POTASSIUM CHLORIDE 7.45 MG/ML
10 INJECTION INTRAVENOUS
Status: COMPLETED | OUTPATIENT
Start: 2024-09-24 | End: 2024-09-24

## 2024-09-24 RX ORDER — GUAIFENESIN 200 MG/10ML
200 LIQUID ORAL EVERY 4 HOURS PRN
Qty: 180 ML | Refills: 0 | Status: SHIPPED | OUTPATIENT
Start: 2024-09-24 | End: 2024-10-01

## 2024-09-24 RX ADMIN — SOTALOL HYDROCHLORIDE 80 MG: 80 TABLET ORAL at 08:12

## 2024-09-24 RX ADMIN — IPRATROPIUM BROMIDE AND ALBUTEROL SULFATE 3 ML: .5; 3 SOLUTION RESPIRATORY (INHALATION) at 11:41

## 2024-09-24 RX ADMIN — MONTELUKAST 10 MG: 10 TABLET, FILM COATED ORAL at 08:12

## 2024-09-24 RX ADMIN — POTASSIUM CHLORIDE 10 MEQ: 7.46 INJECTION, SOLUTION INTRAVENOUS at 06:00

## 2024-09-24 RX ADMIN — IPRATROPIUM BROMIDE AND ALBUTEROL SULFATE 3 ML: .5; 3 SOLUTION RESPIRATORY (INHALATION) at 07:22

## 2024-09-24 RX ADMIN — FUROSEMIDE 40 MG: 40 TABLET ORAL at 08:12

## 2024-09-24 RX ADMIN — BUDESONIDE 0.5 MG: 0.5 INHALANT RESPIRATORY (INHALATION) at 07:26

## 2024-09-24 RX ADMIN — MEGESTROL ACETATE 40 MG: 40 TABLET ORAL at 08:12

## 2024-09-24 RX ADMIN — POTASSIUM CHLORIDE 10 MEQ: 7.46 INJECTION, SOLUTION INTRAVENOUS at 07:06

## 2024-09-24 RX ADMIN — APIXABAN 5 MG: 5 TABLET, FILM COATED ORAL at 08:12

## 2024-09-24 RX ADMIN — Medication 10 ML: at 08:13

## 2024-09-24 RX ADMIN — Medication 1 TABLET: at 08:12

## 2024-09-24 RX ADMIN — ATORVASTATIN CALCIUM 10 MG: 10 TABLET, FILM COATED ORAL at 08:12

## 2024-09-24 RX ADMIN — POTASSIUM CHLORIDE 10 MEQ: 7.46 INJECTION, SOLUTION INTRAVENOUS at 08:12

## 2024-09-24 RX ADMIN — POTASSIUM CHLORIDE 10 MEQ: 7.46 INJECTION, SOLUTION INTRAVENOUS at 09:35

## 2024-09-24 NOTE — TELEPHONE ENCOUNTER
Please call patient's brother who is patient's caregiver to see what questions he has.  Patient was recently admitted to the hospital and he should be scheduling his hospital follow-up appointment with me down the road.  Thank you.

## 2024-09-24 NOTE — CASE MANAGEMENT/SOCIAL WORK
Case Management Discharge Note           Transportation Services  Private: Car (with brother.)    Final Discharge Disposition Code: 01 - home or self-care

## 2024-09-24 NOTE — PLAN OF CARE
Goal Outcome Evaluation:   Pt is disoriented to time and situation. Pt gets up x1 to the bsc and has great urine output as well as bowel movements. VSS, call light within reach, and plan of care ongoing.

## 2024-09-24 NOTE — TELEPHONE ENCOUNTER
Caller: PETEY FLEMING    Relationship: Emergency Contact    Best call back number:     PETEY FLEMING () [phone] (Mobile)       What was the call regarding:     HE IS WANTING TO DISCUSS MARILEE'S MEDICATION     CAN YOU CALL AND ADVISE     HE ALSO STATED THAT HIS ADDRESS NEEDED TO BE   [address] [place] [address]     CAN YOU CONFIRM?     Is it okay if the provider responds through MyChart:

## 2024-09-24 NOTE — DISCHARGE SUMMARY
"             [facility]  Discharge Summary    Date of Service: 24  Patient Name: Drake Beth  : 1950  MRN: [id number]    Date of Admission: 2024  Discharge Diagnosis: Altered mental status secondary to right lower lobe pneumonia  Date of Discharge:  24  Primary Care Physician: Amada Haskins MD    Presenting Problem:   Altered mental status [R41.82]  Pneumonia [J18.9]  Pneumonia of right lower lobe due to infectious organism [J18.9]  Altered mental status, unspecified altered mental status type [R41.82]    Active and Resolved Hospital Problems:  Active Hospital Problems    Diagnosis POA    **Pneumonia [J18.9] Yes    Altered mental status [R41.82] Yes      Resolved Hospital Problems   No resolved problems to display.         Hospital Course     HPI:  Per the H&P written by Zabrina Gomez, dated 24:  \"A 73 y.o. old male patient with PMH of atrial fibrillation heart failure CAD hypertension hyperlipidemia presents to the hospital with complaints of altered mental status.  Patient has abdominal pain and issues with constipation as well per the report review.   Patient has leukocytosis of 11.  Lactic acid normal.  RVP negative.  Chest x-ray with right lower lobe airspace disease.  CRP procal pending.  KUB pending.       Patient will be admitted for pneumonia management will need inpatient stay.\"    Hospital Course:  Patient admitted as above. Treated empirically for community acquired pneumonia with improvement in clinical status. Patient declined home health. Appropriate for discharge home with outpatient follow up.    DISCHARGE Follow Up Recommendations for labs and diagnostics:   - Follow up with PCP within 2 weeks    Reasons For Change In Medications and Indications for New Medications:  - Augmentin, guaifenesin for treatment of pneumonia    Day of Discharge     Vital Signs:  Temp:  [97.2 °F (36.2 °C)-97.5 °F (36.4 °C)] 97.5 °F (36.4 °C)  Heart Rate:  [] " 87  Resp:  [14-22] 16  BP: (120-138)/(70-88) 134/80  Flow (L/min):  [0-1] 0    Physical Exam:  General: No acute distress,   Neuro: Awake and alert, no focal deficits appreciated  HEENT: EOMI, moist mucus membranes  CV: RRR, no murmurs appreciated, no peripheral edema  Pulm: CTAB, no increased work of breathing  Abd: Soft, nontender, nondistended  Skin: Warm, dry and intact  Psych: Appropriate mood and affect    Pertinent  and/or Most Recent Results     LAB RESULTS:      Lab 09/24/24 0440 09/23/24 0908 09/21/24 2250 09/21/24 0226 09/19/24 2247 09/19/24  1429 09/19/24  1353   WBC 11.72* 10.35 13.05* 11.65* 5.42  --  11.08*   HEMOGLOBIN 11.2* 10.2* 10.0* 9.7* 10.9*  --  11.4*   HEMATOCRIT 34.6* 31.4* 30.9* 29.0* 32.7*  --  34.7*   PLATELETS 190 167 182 180 193  --  217   NEUTROS ABS 8.58*  --  10.83* 8.95* 4.72  --  8.51*   IMMATURE GRANS (ABS) 0.08*  --  0.06* 0.05  --   --  0.05   LYMPHS ABS 1.59  --  0.93 1.57  --   --  1.32   MONOS ABS 0.87  --  0.76 0.91*  --   --  0.75   EOS ABS 0.56*  --  0.42* 0.14  --   --  0.41*   .3* 106.1* 105.8* 103.6* 101.6*  --  102.4*   CRP  --   --   --   --   --   --  0.79*   PROCALCITONIN  --   --   --   --   --   --  0.04   LACTATE  --   --   --   --   --  0.8  --          Lab 09/24/24 0440 09/23/24 0908 09/21/24 2250 09/21/24 0226 09/19/24 2247   SODIUM 143 146* 140 141 137   POTASSIUM 3.2* 3.9 4.3 4.3 4.3   CHLORIDE 103 107 105 102 96*   CO2 30.9* 24.1 28.1 30.6* 30.0*   ANION GAP 9.1 14.9 6.9 8.4 11.0   BUN 26* 26* 31* 34* 28*   CREATININE 1.12 1.03 0.91 1.04 1.54*   EGFR 69.8 77.2 89.5 76.3 47.6*   GLUCOSE 104* 65 113* 113* 276*   CALCIUM 9.2 8.8 8.6 8.7 8.9   MAGNESIUM  --   --  2.2 2.3 2.2   PHOSPHORUS  --   --  3.5 3.6 2.5         Lab 09/19/24  1353   TOTAL PROTEIN 7.6   ALBUMIN 4.0   GLOBULIN 3.6   ALT (SGPT) 7   AST (SGOT) 33   BILIRUBIN 0.9   ALK PHOS 83                     Brief Urine Lab Results  (Last result in the past 365 days)        Color   Clarity    Blood   Leuk Est   Nitrite   Protein   CREAT   Urine HCG        09/22/24 0919 Red  Comment: Any Substance that causes an abnormal urine color can alter the accuracy of the chemical reactions.   Turbid   Large (3+)   --  Comment: The Leukoesterase test cannot be performed due to the centrifugation of the specimen. \   Negative   >=300 mg/dL (3+)                 Microbiology Results (last 10 days)       Procedure Component Value - Date/Time    S. Pneumo Ag Urine or CSF - Urine, Indwelling Urethral Catheter [[id number]]  (Normal) Collected: 09/22/24 0919    Lab Status: Final result Specimen: Urine from Indwelling Urethral Catheter Updated: 09/22/24 1001     Strep Pneumo Ag Negative    Legionella Antigen, Urine - Urine, Indwelling Urethral Catheter [[id number]]  (Normal) Collected: 09/22/24 0919    Lab Status: Final result Specimen: Urine from Indwelling Urethral Catheter Updated: 09/22/24 1001     LEGIONELLA ANTIGEN, URINE Negative    CANDIDA AURIS PCR - Swab, Axilla Right, Axilla Left and Groin [[id number]]  (Normal) Collected: 09/21/24 0932    Lab Status: Final result Specimen: Swab from Axilla Right, Axilla Left and Groin Updated: 09/22/24 1043     CANDIDA AURIS PCR Not Detected    MRSA Screen, PCR (Inpatient) - Swab, Nares [[id number]]  (Normal) Collected: 09/19/24 2221    Lab Status: Final result Specimen: Swab from Nares Updated: 09/20/24 0015     MRSA PCR No MRSA Detected    Narrative:      The negative predictive value of this diagnostic test is high and should only be used to consider de-escalating anti-MRSA therapy. A positive result may indicate colonization with MRSA and must be correlated clinically.    Blood Culture - Blood, Arm, Left [[id number]]  (Normal) Collected: 09/19/24 1501    Lab Status: Final result Specimen: Blood from Arm, Left Updated: 09/24/24 1515     Blood Culture No growth at 5 days    Narrative:      Less than seven (7) mL's of blood was collected.  Insufficient quantity may yield false  negative results.    Blood Culture - Blood, Arm, Right [[id number]]  (Normal) Collected: 09/19/24 1426    Lab Status: Final result Specimen: Blood from Arm, Right Updated: 09/24/24 1430     Blood Culture No growth at 5 days    Respiratory Panel PCR w/COVID-19(SARS-CoV-2) [facility] In-House, NP Swab in UTM/VTM, 2 HR TAT - Swab, Nasopharynx [[id number]]  (Normal) Collected: 09/19/24 1416    Lab Status: Final result Specimen: Swab from Nasopharynx Updated: 09/19/24 1505     ADENOVIRUS, PCR Not Detected     Coronavirus 229E Not Detected     Coronavirus HKU1 Not Detected     Coronavirus NL63 Not Detected     Coronavirus OC43 Not Detected     COVID19 Not Detected     Human Metapneumovirus Not Detected     Human Rhinovirus/Enterovirus Not Detected     Influenza A PCR Not Detected     Influenza B PCR Not Detected     Parainfluenza Virus 1 Not Detected     Parainfluenza Virus 2 Not Detected     Parainfluenza Virus 3 Not Detected     Parainfluenza Virus 4 Not Detected     RSV, PCR Not Detected     Bordetella pertussis pcr Not Detected     Bordetella parapertussis PCR Not Detected     Chlamydophila pneumoniae PCR Not Detected     Mycoplasma pneumo by PCR Not Detected    Narrative:      In the setting of a positive respiratory panel with a viral infection PLUS a negative procalcitonin without other underlying concern for bacterial infection, consider observing off antibiotics or discontinuation of antibiotics and continue supportive care. If the respiratory panel is positive for atypical bacterial infection (Bordetella pertussis, Chlamydophila pneumoniae, or Mycoplasma pneumoniae), consider antibiotic de-escalation to target atypical bacterial infection.            XR Abdomen KUB    Result Date: 9/19/2024  Impression: Impression:  No acute findings in the abdomen. Nonspecific but nonobstructive bowel gas pattern. Electronically Signed: Tamie Ivy MD  9/19/2024 4:48 PM EDT  Workstation ID: [id number]    XR Chest 1  View    Result Date: 9/19/2024  Impression: Impression: Right perihilar right lower lobe airspace disease. Correlate for pneumonia. Small right basilar pleural effusion. Electronically Signed: Tamie Ivy MD  9/19/2024 2:28 PM EDT  Workstation ID: [id number]             Results for orders placed during the hospital encounter of 07/11/24    Adult Transthoracic Echo Complete w/ Color, Spectral and Contrast if Necessary Per Protocol    Interpretation Summary    Left ventricular systolic function is normal. Left ventricular ejection fraction appears to be 51 - 55%.    Left ventricular diastolic function was indeterminate.    The left atrial cavity is moderately dilated.    Left atrial volume is moderately increased.    There is mild calcification of the aortic valve.    A mitral valve mass is present.    Moderate to severe mitral valve regurgitation is present.    There is a bioprosthetic mitral valve present. Transvalvular regurgitation is present in the prosthetic mitral valve.    Moderate tricuspid valve regurgitation is present.    Estimated right ventricular systolic pressure from tricuspid regurgitation is markedly elevated (>55 mmHg).    Moderate to severe pulmonary hypertension is present.    Abnormal study  Recommend transesophageal echo imaging for evaluation of bioprosthetic mitral valve which appears severely regurgitant .,  See documented other abnormalities and findings      Labs Pending at Discharge:  None    Procedures Performed  None       Consults:   Consults       Date and Time Order Name Status Description    9/21/2024  8:57 AM Inpatient Urology Consult Completed             Discharge Details        Discharge Medications        New Medications        Instructions Start Date   amoxicillin-clavulanate 875-125 MG per tablet  Commonly known as: AUGMENTIN   1 tablet, Oral, 2 Times Daily      Monik-Tussin 100 MG/5ML liquid  Generic drug: guaifenesin   200 mg, Oral, Every 4 Hours PRN             Continue  These Medications        Instructions Start Date   amLODIPine 2.5 MG tablet  Commonly known as: NORVASC   TAKE 1 TABLET BY MOUTH DAILY      atorvastatin 10 MG tablet  Commonly known as: LIPITOR   10 mg, Oral, Daily      Eliquis 5 MG tablet tablet  Generic drug: apixaban   5 mg, Oral, 2 Times Daily      furosemide 40 MG tablet  Commonly known as: LASIX   40 mg, Oral, Daily      lactulose 10 GM/15ML solution  Commonly known as: CHRONULAC   15 mL, Oral, 2 Times Daily PRN      megestrol 40 MG tablet  Commonly known as: MEGACE   40 mg, Oral, Daily      mirtazapine 15 MG tablet  Commonly known as: REMERON   15 mg, Oral, Nightly      montelukast 10 MG tablet  Commonly known as: SINGULAIR   10 mg, Oral, Daily      multivitamin with minerals tablet tablet   1 tablet, Oral, Daily      pantoprazole 40 MG EC tablet  Commonly known as: PROTONIX   40 mg, Oral, Nightly      potassium chloride 20 MEQ CR tablet  Commonly known as: KLOR-CON M20   20 mEq, Oral, Daily      sotalol 80 MG tablet tablet  Commonly known as: BETAPACE AF   TAKE 1 TABLET BY MOUTH TWICE DAILY               No Known Allergies    Discharge Disposition:   Home    Diet:  Pre admission diet    Discharge Activity:   As tolerated    CODE STATUS:  Code Status and Medical Interventions: CPR (Attempt to Resuscitate); Full Support   Ordered at: 09/19/24 1632     Code Status (Patient has no pulse and is not breathing):    CPR (Attempt to Resuscitate)     Medical Interventions (Patient has pulse or is breathing):    Full Support       Future Appointments   Date Time Provider Department Center   10/1/2024 11:30 AM Amada Haskins MD [facility]   10/9/2024  1:00 PM [facility]       Additional Instructions for the Follow-ups that You Need to Schedule       Call MD With Problems / Concerns   As directed      Discharge Follow-up with PCP   As directed       Currently Documented PCP:    Amada Haskins MD    PCP Phone Number:    [phone]      Follow Up Details: Follow up with PCP within 2 weeks of discharge                Time spent on Discharge including face to face service:  >30 minutes    Signature: Electronically signed by Elisha Shaikh MD, 09/24/24, 15:37 EDT.  [facility] Hospitalist Team

## 2024-09-25 ENCOUNTER — TRANSITIONAL CARE MANAGEMENT TELEPHONE ENCOUNTER (OUTPATIENT)
Dept: CALL CENTER | Facility: HOSPITAL | Age: 74
End: 2024-09-25
Payer: MEDICARE

## 2024-09-25 NOTE — TELEPHONE ENCOUNTER
Patients brother (cholo) was notified . He asked you could in sent a refill for  Megestrol and the mitazapine patient almost out. Furosemide needs refilled as well

## 2024-09-25 NOTE — TELEPHONE ENCOUNTER
Patient brother (Llano) is want to know  if patient should keep taking Mirtazapine and Megestrol. The brother says it not helping patients appetite, wants to know if its ok for patient to stop the medication or take them every other  I did advise patient that he could address this at at his appointment next Tuesday . But does not want to wait till then.  Please advise

## 2024-09-25 NOTE — TELEPHONE ENCOUNTER
Please advise the patient that he may discontinue these medications if they are not helping.  He does not need to wean himself off.  He simply cannot stop taking both mirtazapine and megestrol.  I will address it further at his follow-up appointment next week.  Thank you.

## 2024-09-25 NOTE — TELEPHONE ENCOUNTER
So he is now asking for refill for Megestrol  and mirtazapine, but earlier today he inquired about discontinuation of these medications.  Please clarify with the patient's brother.  Thank you.

## 2024-09-26 NOTE — TELEPHONE ENCOUNTER
He can stop taking these medicines without weaning himself off.  I will address it in more details at the follow-up appointment next week.  Thank you.

## 2024-09-26 NOTE — TELEPHONE ENCOUNTER
If you don't want him wean himself off the medication. He would need the refill until patient come in for his appointment?

## 2024-09-27 DIAGNOSIS — I10 PRIMARY HYPERTENSION: Primary | ICD-10-CM

## 2024-09-27 RX ORDER — FUROSEMIDE 40 MG
40 TABLET ORAL DAILY
Qty: 90 TABLET | Refills: 1 | Status: SHIPPED | OUTPATIENT
Start: 2024-09-27

## 2024-09-27 RX ORDER — FUROSEMIDE 40 MG
40 TABLET ORAL DAILY
Qty: 30 TABLET | Refills: 3 | Status: SHIPPED | OUTPATIENT
Start: 2024-09-27 | End: 2024-09-27

## 2024-09-28 NOTE — PROGRESS NOTES
"Enter Query Response Below      Query Response: Metabolic encephalopathy due to pneumonia              If applicable, please update the problem list.   Patient: Drake Bteh        : 1950  Account: [id number]           Admit Date: 2024        How to Respond to this query:       a. Click New Note     b. Answer query within the yellow box.                c. Update the Problem List, if applicable.      If you have any questions about this query contact me at: [email or web address]  [phone]      Dr. Shaikh:     73 y M with discharge diagnosis () of \"altered mental status secondary to right lower lobe pneumonia Treated empirically for community acquired pneumonia with improvement in clinical status.\" He was noted to be \"disoriented to person, time, situation\" in ED (@ 1409 ). H&P noted, \"pt is awake and alert but cannot answer much of the questions.\" Subsequent progress notes (-) included, \"He is alert and oriented to person, place, and time.\"     Please clarify if patient treated/monitored for one or more of the following:    Metabolic encephalopathy due to pneumonia  Other- specify__________    By submitting this query, we are merely seeking further clarification of documentation to accurately reflect all conditions that you are monitoring, evaluating, treating or that extend the hospitalization or utilize additional resources of care. Please utilize your independent clinical judgment when addressing the question(s) above.     This query and your response, once completed, will be entered into the legal medical record.    Sincerely,    STEFFEN Burnham, RN, CCDS  Clinical Documentation Integrity Program     "

## 2024-09-28 NOTE — PROGRESS NOTES
"Enter Query Response Below      Query Response: hyponatremia was not supported              If applicable, please update the problem list.   Patient: Drake Beth        : 1950  Account: [id number]           Admit Date: 2024        How to Respond to this query:       a. Click New Note     b. Answer query within the yellow box.                c. Update the Problem List, if applicable.      If you have any questions about this query contact me at: [email or web address]  [phone]      Dr. Shaikh:    73 y M admitted () for pneumonia. Subsequent progress note () documented, \" Hyponatremia - New problems, possibly secondary to resuming Lasix and poor p.o. intake - Repeat BMP in a.m.; may need to decrease Lasix dosage.\" Na 146 (), 143 ().    After study, was hyponatremia clinically supported during this admission?    hyponatremia was supported with additional clinical indicators: ____________  hyponatremia was not supported  Other- specify_____________  Unable to determine    By submitting this query, we are merely seeking further clarification of documentation to accurately reflect all conditions that you are monitoring, evaluating, treating or that extend the hospitalization or utilize additional resources of care. Please utilize your independent clinical judgment when addressing the question(s) above.     This query and your response, once completed, will be entered into the legal medical record.    Sincerely,    STEFFEN Burnham, RN, CCDS  Clinical Documentation Integrity Program     "

## 2024-10-01 ENCOUNTER — OFFICE VISIT (OUTPATIENT)
Dept: FAMILY MEDICINE CLINIC | Facility: CLINIC | Age: 74
End: 2024-10-01
Payer: MEDICARE

## 2024-10-01 ENCOUNTER — LAB (OUTPATIENT)
Dept: FAMILY MEDICINE CLINIC | Facility: CLINIC | Age: 74
End: 2024-10-01
Payer: MEDICARE

## 2024-10-01 ENCOUNTER — TELEPHONE (OUTPATIENT)
Dept: FAMILY MEDICINE CLINIC | Facility: CLINIC | Age: 74
End: 2024-10-01

## 2024-10-01 VITALS
TEMPERATURE: 97.4 F | DIASTOLIC BLOOD PRESSURE: 70 MMHG | WEIGHT: 93 LBS | SYSTOLIC BLOOD PRESSURE: 120 MMHG | OXYGEN SATURATION: 100 % | HEIGHT: 60 IN | RESPIRATION RATE: 16 BRPM | HEART RATE: 44 BPM | BODY MASS INDEX: 18.26 KG/M2

## 2024-10-01 DIAGNOSIS — I10 PRIMARY HYPERTENSION: Primary | ICD-10-CM

## 2024-10-01 DIAGNOSIS — R05.2 SUBACUTE COUGH: ICD-10-CM

## 2024-10-01 DIAGNOSIS — I48.0 PAROXYSMAL ATRIAL FIBRILLATION: ICD-10-CM

## 2024-10-01 DIAGNOSIS — I34.0 NONRHEUMATIC MITRAL VALVE REGURGITATION: ICD-10-CM

## 2024-10-01 DIAGNOSIS — J18.9 PNEUMONIA OF RIGHT LOWER LOBE DUE TO INFECTIOUS ORGANISM: ICD-10-CM

## 2024-10-01 PROBLEM — J06.9 ACUTE URI: Status: RESOLVED | Noted: 2022-12-08 | Resolved: 2024-10-01

## 2024-10-01 LAB
ALBUMIN SERPL-MCNC: 4 G/DL (ref 3.5–5.2)
ALBUMIN/GLOB SERPL: 1.1 G/DL
ALP SERPL-CCNC: 101 U/L (ref 39–117)
ALT SERPL W P-5'-P-CCNC: 19 U/L (ref 1–41)
ANION GAP SERPL CALCULATED.3IONS-SCNC: 11 MMOL/L (ref 5–15)
AST SERPL-CCNC: 31 U/L (ref 1–40)
BASOPHILS # BLD AUTO: 0.05 10*3/MM3 (ref 0–0.2)
BASOPHILS NFR BLD AUTO: 0.4 % (ref 0–1.5)
BILIRUB SERPL-MCNC: 0.8 MG/DL (ref 0–1.2)
BUN SERPL-MCNC: 26 MG/DL (ref 8–23)
BUN/CREAT SERPL: 18.7 (ref 7–25)
CALCIUM SPEC-SCNC: 9.4 MG/DL (ref 8.6–10.5)
CHLORIDE SERPL-SCNC: 98 MMOL/L (ref 98–107)
CO2 SERPL-SCNC: 27 MMOL/L (ref 22–29)
CREAT SERPL-MCNC: 1.39 MG/DL (ref 0.76–1.27)
DEPRECATED RDW RBC AUTO: 51 FL (ref 37–54)
EGFRCR SERPLBLD CKD-EPI 2021: 53.9 ML/MIN/1.73
EOSINOPHIL # BLD AUTO: 0.32 10*3/MM3 (ref 0–0.4)
EOSINOPHIL NFR BLD AUTO: 2.8 % (ref 0.3–6.2)
ERYTHROCYTE [DISTWIDTH] IN BLOOD BY AUTOMATED COUNT: 13.5 % (ref 12.3–15.4)
GLOBULIN UR ELPH-MCNC: 3.6 GM/DL
GLUCOSE SERPL-MCNC: 105 MG/DL (ref 65–99)
HCT VFR BLD AUTO: 33.9 % (ref 37.5–51)
HGB BLD-MCNC: 11.4 G/DL (ref 13–17.7)
IMM GRANULOCYTES # BLD AUTO: 0.05 10*3/MM3 (ref 0–0.05)
IMM GRANULOCYTES NFR BLD AUTO: 0.4 % (ref 0–0.5)
LYMPHOCYTES # BLD AUTO: 1.72 10*3/MM3 (ref 0.7–3.1)
LYMPHOCYTES NFR BLD AUTO: 15.3 % (ref 19.6–45.3)
MCH RBC QN AUTO: 34.3 PG (ref 26.6–33)
MCHC RBC AUTO-ENTMCNC: 33.6 G/DL (ref 31.5–35.7)
MCV RBC AUTO: 102.1 FL (ref 79–97)
MONOCYTES # BLD AUTO: 1.19 10*3/MM3 (ref 0.1–0.9)
MONOCYTES NFR BLD AUTO: 10.6 % (ref 5–12)
NEUTROPHILS NFR BLD AUTO: 7.93 10*3/MM3 (ref 1.7–7)
NEUTROPHILS NFR BLD AUTO: 70.5 % (ref 42.7–76)
NRBC BLD AUTO-RTO: 0 /100 WBC (ref 0–0.2)
PLATELET # BLD AUTO: 248 10*3/MM3 (ref 140–450)
PMV BLD AUTO: 10.5 FL (ref 6–12)
POTASSIUM SERPL-SCNC: 4.5 MMOL/L (ref 3.5–5.2)
PROT SERPL-MCNC: 7.6 G/DL (ref 6–8.5)
RBC # BLD AUTO: 3.32 10*6/MM3 (ref 4.14–5.8)
SODIUM SERPL-SCNC: 136 MMOL/L (ref 136–145)
WBC NRBC COR # BLD AUTO: 11.26 10*3/MM3 (ref 3.4–10.8)

## 2024-10-01 PROCEDURE — 80053 COMPREHEN METABOLIC PANEL: CPT | Performed by: FAMILY MEDICINE

## 2024-10-01 PROCEDURE — 99495 TRANSJ CARE MGMT MOD F2F 14D: CPT | Performed by: FAMILY MEDICINE

## 2024-10-01 PROCEDURE — 3078F DIAST BP <80 MM HG: CPT | Performed by: FAMILY MEDICINE

## 2024-10-01 PROCEDURE — 1111F DSCHRG MED/CURRENT MED MERGE: CPT | Performed by: FAMILY MEDICINE

## 2024-10-01 PROCEDURE — 3074F SYST BP LT 130 MM HG: CPT | Performed by: FAMILY MEDICINE

## 2024-10-01 PROCEDURE — 36415 COLL VENOUS BLD VENIPUNCTURE: CPT | Performed by: FAMILY MEDICINE

## 2024-10-01 PROCEDURE — 85025 COMPLETE CBC W/AUTO DIFF WBC: CPT | Performed by: FAMILY MEDICINE

## 2024-10-01 PROCEDURE — 1126F AMNT PAIN NOTED NONE PRSNT: CPT | Performed by: FAMILY MEDICINE

## 2024-10-01 RX ORDER — GUAIFENESIN 200 MG/10ML
200 LIQUID ORAL EVERY 4 HOURS PRN
Qty: 180 ML | Refills: 0 | Status: SHIPPED | OUTPATIENT
Start: 2024-10-01 | End: 2024-10-08

## 2024-10-01 RX ORDER — IPRATROPIUM BROMIDE AND ALBUTEROL SULFATE 2.5; .5 MG/3ML; MG/3ML
3 SOLUTION RESPIRATORY (INHALATION)
Qty: 360 ML | Refills: 0 | Status: SHIPPED | OUTPATIENT
Start: 2024-10-01

## 2024-10-01 RX ORDER — IPRATROPIUM BROMIDE AND ALBUTEROL SULFATE 2.5; .5 MG/3ML; MG/3ML
SOLUTION RESPIRATORY (INHALATION)
OUTPATIENT
Start: 2024-10-01

## 2024-10-01 NOTE — TELEPHONE ENCOUNTER
Caller: PETEY FLEMING    Relationship: Emergency Contact    Best call back number: [phone]    What medication are you requesting:   Ipratropium-Albuterol 0.5-2.5 mg/3 ml MIX 1 VIAL VIA NEBULIZER FOUR TIMES DAILY     What are your current symptoms: BREATHING    Have you had these symptoms before:    [x] Yes  [] No    Have you been treated for these symptoms before:   [x] Yes  [] No    If a prescription is needed, what is your preferred pharmacy and phone number: [id number] - [place], [place] - [address] - [phone] [facility] [phone]

## 2024-10-01 NOTE — PROGRESS NOTES
Transitional Care Follow Up Visit  Subjective     Chief Complaint   Patient presents with    Hospital Follow Up Visit    Pneumonia    Fatigue    Coronary Artery Disease    Atrial Fibrillation    GI Problem       Drake Beth is a 73 y.o. male who presents for a transitional care management visit.    Within 48 business hours after discharge our office contacted him via telephone to coordinate his care and needs.      I reviewed and discussed the details of that call along with the discharge summary, hospital problems, inpatient lab results, inpatient diagnostic studies, and consultation reports with Drake.     Current outpatient and discharge medications have been reconciled for the patient.  Reviewed by: Amada Haskins MD          9/24/2024     7:40 PM   Date of TCM Phone Call   [facility]   Date of Admission 9/19/2024   Date of Discharge 9/24/2024   Discharge Disposition Home or Self Care     Risk for Readmission (LACE) Score: 11 (9/24/2024  6:00 AM)      History of Present Illness   Course During Hospital Stay: Patient is here today with his brother and his niece.  He was recently admitted to [facility] due to some breathing problems and he was diagnosed with pneumonia.  He was discharged home with a prescription for some cough medicine and Augmentin, he already finished his antibiotic.  He has been home now for 1 week.  Patient is not a good historian due to his mental disability.  The history is mainly provided by his brother and his niece.  They report that after hospital discharge for couple of days he seemed to be acting back to his baseline, but lately again he seems to be less active, he does not really do tasks which he typically does.  His appetite is lower.  Patient is already followed by cardiologist due to CAD and atrial fibrillation.  He had to cancel his follow-up appointment scheduled for 7/2024 as he was hospitalized at that time as well.  Patient previously had mitral valve  "replaced in 2008.     The following portions of the patient's history were reviewed and updated as appropriate: allergies, current medications, past family history, past medical history, past social history, past surgical history, and problem list.    Review of Systems   Constitutional:  Negative for chills, fatigue and fever.   Respiratory:  Negative for choking, chest tightness, shortness of breath and wheezing.    Cardiovascular:  Negative for chest pain.   Gastrointestinal:  Positive for constipation. Negative for abdominal pain, diarrhea, nausea and vomiting.       Objective   /70 (BP Location: Left arm, Patient Position: Sitting, Cuff Size: Small Adult)   Pulse (!) 44   Temp 97.4 °F (36.3 °C) (Infrared)   Resp 16   Ht 152.4 cm (60\")   Wt 42.2 kg (93 lb)   SpO2 100%   BMI 18.16 kg/m²     Physical Exam  Vitals and nursing note reviewed.   Constitutional:       General: He is not in acute distress.     Appearance: Normal appearance. He is well-developed. He is not ill-appearing.      Comments: He is pleasant cooperative, he is in no acute distress.  He answers simple questions appropriately, but he is not able to provide good history and this is being provided by his brother and niece who are here with the patient for the appointment.   HENT:      Head: Normocephalic and atraumatic.   Cardiovascular:      Rate and Rhythm: Normal rate and regular rhythm.      Heart sounds: Normal heart sounds. No murmur heard.     No gallop.   Pulmonary:      Effort: Pulmonary effort is normal. No respiratory distress.      Breath sounds: Normal breath sounds. No wheezing, rhonchi or rales.   Chest:      Chest wall: No tenderness.   Musculoskeletal:      Cervical back: Normal range of motion and neck supple.   Neurological:      General: No focal deficit present.      Mental Status: He is alert and oriented to person, place, and time. Mental status is at baseline.   Psychiatric:         Mood and Affect: Mood normal. "       CBC          9/21/2024    02:26 9/21/2024    22:50 9/23/2024    09:08 9/24/2024    04:40   CBC   WBC 11.65  13.05  10.35  11.72    RBC 2.80  2.92  2.96  3.35    Hemoglobin 9.7  10.0  10.2  11.2    Hematocrit 29.0  30.9  31.4  34.6    .6  105.8  106.1  103.3    MCH 34.6  34.2  34.5  33.4    MCHC 33.4  32.4  32.5  32.4    RDW 14.7  15.1  15.2  14.7    Platelets 180  182  167  190      CMP          9/21/2024    02:26 9/21/2024    22:50 9/23/2024    09:08 9/24/2024    04:40   CMP   Glucose 113  113  65  104    BUN 34  31  26  26    Creatinine 1.04  0.91  1.03  1.12    EGFR 76.3  89.5  77.2  69.8    Sodium 141  140  146  143    Potassium 4.3  4.3  3.9  3.2    Chloride 102  105  107  103    Calcium 8.7  8.6  8.8  9.2    BUN/Creatinine Ratio 32.7  34.1  25.2  23.2    Anion Gap 8.4  6.9  14.9  9.1      UA          7/11/2024    15:47 7/19/2024    00:31 9/22/2024    09:19   Urinalysis   Squamous Epithelial Cells, UA  0-2  0-2    Specific Gravity, UA 1.046  1.024  1.024    Ketones, UA Negative  Trace  Negative    Blood, UA Negative  Large (3+)  Large (3+)    Leukocytes, UA Negative  Trace     Nitrite, UA Negative  Negative  Negative    RBC, UA  Too Numerous to Count  Too Numerous to Count    WBC, UA  11-20  6-10    Bacteria, UA  Trace  Trace        Assessment & Plan   Diagnoses and all orders for this visit:    1. Primary hypertension (Primary)  -     Comprehensive Metabolic Panel  -     CBC Auto Differential    2. Paroxysmal atrial fibrillation    3. Nonrheumatic mitral valve regurgitation    4. Pneumonia of right lower lobe due to infectious organism  -     guaifenesin (ROBITUSSIN) 100 MG/5ML liquid; Take 10 mL by mouth Every 4 (Four) Hours As Needed for Cough for up to 7 days.  Dispense: 180 mL; Refill: 0    5. Subacute cough  -     guaifenesin (ROBITUSSIN) 100 MG/5ML liquid; Take 10 mL by mouth Every 4 (Four) Hours As Needed for Cough for up to 7 days.  Dispense: 180 mL; Refill: 0  -     ipratropium-albuterol  (DUO-NEB) 0.5-2.5 mg/3 ml nebulizer; Take 3 mL by nebulization 4 (Four) Times a Day.  Dispense: 360 mL; Refill: 0      I reviewed his available hospital records including blood work and imaging.  Patient was treated for the right lower lobe pneumonia and finished the antibiotic at this time.  He may continue Robitussin as needed for cough and I also refilled her is nebulizing treatment.  I also reviewed his echo from 7/2024 which showed some calcifications of the aortic valve, moderate to severe mitral valve regurgitation.  Patient is a status post bioprosthetic mitral valve replacement in 2008.  Patient is to follow-up with his cardiologist and his brother will call to make an appointment to further address his cardiac issues.      Return in about 3 months (around 1/1/2025) for Next scheduled follow up.      Requested Prescriptions     Signed Prescriptions Disp Refills    guaifenesin (ROBITUSSIN) 100 MG/5ML liquid 180 mL 0     Sig: Take 10 mL by mouth Every 4 (Four) Hours As Needed for Cough for up to 7 days.    ipratropium-albuterol (DUO-NEB) 0.5-2.5 mg/3 ml nebulizer 360 mL 0     Sig: Take 3 mL by nebulization 4 (Four) Times a Day.     Amada Haskins MD  10/01/2024

## 2024-10-02 DIAGNOSIS — D64.9 ANEMIA, UNSPECIFIED TYPE: Primary | ICD-10-CM

## 2024-10-02 DIAGNOSIS — I10 PRIMARY HYPERTENSION: Primary | ICD-10-CM

## 2024-10-04 ENCOUNTER — READMISSION MANAGEMENT (OUTPATIENT)
Dept: CALL CENTER | Facility: HOSPITAL | Age: 74
End: 2024-10-04
Payer: MEDICARE

## 2024-10-04 NOTE — OUTREACH NOTE
COPD/PN Week 2 Survey      Flowsheet Row Responses   [facility] patient discharged from? [facility]   Does the patient have one of the following disease processes/diagnoses(primary or secondary)? Pneumonia   Week 2 attempt successful? Yes   Call start time 1609   Call end time 1615   Discharge diagnosis PNA   Is patient permission given to speak with other caregiver? Yes   List who call center can speak with Richi   Person spoke with today (if not patient) and relationship Richi   Is the patient taking all medications as directed (includes completed medication regime)? Yes   Has the patient kept scheduled appointments due by today? Yes   Comments Pt lives with both brothers alternating between them per Richi. Pt has improved some but not much. RN educated on importance of doing cough/deep breathing at home frequently, Rcihi reports pt difficult being compliant.   What is the patient's perception of their health status since discharge? Improving   Nursing Interventions Nurse provided patient education   Is the patient/caregiver able to teach back signs and symptoms of worsening condition: Fever/chills, Shortness of breath, Chest pain   Is the patient/caregiver able to teach back importance of completing antibiotic course of treatment? Yes   Week 2 call completed? Yes   Call end time 1615            Jeannie LIMA - Registered Nurse

## 2024-10-08 ENCOUNTER — PREP FOR SURGERY (OUTPATIENT)
Dept: OTHER | Facility: HOSPITAL | Age: 74
End: 2024-10-08
Payer: MEDICARE

## 2024-10-08 ENCOUNTER — OFFICE VISIT (OUTPATIENT)
Dept: CARDIOLOGY | Facility: CLINIC | Age: 74
End: 2024-10-08
Payer: MEDICARE

## 2024-10-08 VITALS
BODY MASS INDEX: 17.67 KG/M2 | HEIGHT: 60 IN | SYSTOLIC BLOOD PRESSURE: 128 MMHG | WEIGHT: 90 LBS | DIASTOLIC BLOOD PRESSURE: 73 MMHG | HEART RATE: 48 BPM

## 2024-10-08 DIAGNOSIS — I34.0 NONRHEUMATIC MITRAL VALVE REGURGITATION: ICD-10-CM

## 2024-10-08 DIAGNOSIS — I48.0 PAROXYSMAL ATRIAL FIBRILLATION: Primary | ICD-10-CM

## 2024-10-08 DIAGNOSIS — I48.92 ATRIAL FLUTTER WITH CONTROLLED RESPONSE: ICD-10-CM

## 2024-10-08 DIAGNOSIS — Z95.2 MITRAL VALVE REPLACED: Primary | ICD-10-CM

## 2024-10-08 PROCEDURE — 3074F SYST BP LT 130 MM HG: CPT | Performed by: INTERNAL MEDICINE

## 2024-10-08 PROCEDURE — 93000 ELECTROCARDIOGRAM COMPLETE: CPT | Performed by: INTERNAL MEDICINE

## 2024-10-08 PROCEDURE — 1160F RVW MEDS BY RX/DR IN RCRD: CPT | Performed by: INTERNAL MEDICINE

## 2024-10-08 PROCEDURE — 99213 OFFICE O/P EST LOW 20 MIN: CPT | Performed by: INTERNAL MEDICINE

## 2024-10-08 PROCEDURE — 1159F MED LIST DOCD IN RCRD: CPT | Performed by: INTERNAL MEDICINE

## 2024-10-08 PROCEDURE — 3078F DIAST BP <80 MM HG: CPT | Performed by: INTERNAL MEDICINE

## 2024-10-08 NOTE — PROGRESS NOTES
Progress note      Name: Drake Beth ADMIT: (Not on file)   : 1950  PCP: Amada Haskins MD    MRN: [id number] LOS: 0 days   AGE/SEX: 73 y.o. male  ROOM: Room/bed info not found     Chief Complaint   Patient presents with    Follow-up       Subjective       History of present illness  Drake Beth is a 73-year-old male patient who has developmental delay, coronary artery disease status post bypass as well as bioprosthetic mitral valve in , hypertension, paroxysmal atrial fibrillation, previously he had dual-chamber pacemaker implanted on 3/11/2022 at an outside facility, 2 weeks later he developed pocket infection and the pacemaker was extracted on 2022.  Patient had been on sotalol 40 mg twice a day, however on 2023 he was noted to be in atypical atrial flutter and the dose was increased to 80 mg twice a day.    Here lately the patient has been experiencing fatigue and lack of energy and family thinks that this is extremely unusual for him.  His heart rate has been slow.    Past Medical History:   Diagnosis Date    Allergic rhinitis     Anemia     Atrial fibrillation     CHF (congestive heart failure)     Chronic constipation     Coronary artery disease     GERD (gastroesophageal reflux disease)     Hyperlipidemia     Hypertension     Mentally challenged     MVP (mitral valve prolapse)      Past Surgical History:   Procedure Laterality Date    CARDIAC ELECTROPHYSIOLOGY PROCEDURE N/A 2022    Procedure: PACEMAKER EXTRACTION;  Surgeon: Rj Dominique MD;  Location: [facility] INVASIVE LOCATION;  Service: Cardiology;  Laterality: N/A;    COLONOSCOPY      He has never had the test done.  His family is refusing any form of colon cancer screening for him.    CORONARY ARTERY BYPASS GRAFT      DR SWEAT    MITRAL VALVE REPLACEMENT  2008    WITH BIOPROSTHETIC TISSUE VALVE     Family History   Problem Relation Age of Onset    Coronary artery disease Mother 64    Hyperlipidemia  Sister     Liver disease Sister         FATTY LIVER    Rheum arthritis Sister     Lung cancer Sister     Hypertension Brother     Other Brother     Rheum arthritis Brother      Social History     Tobacco Use    Smoking status: Never     Passive exposure: Never    Smokeless tobacco: Never   Vaping Use    Vaping status: Never Used   Substance Use Topics    Alcohol use: Never    Drug use: Never       Current Outpatient Medications:     amLODIPine (NORVASC) 2.5 MG tablet, TAKE 1 TABLET BY MOUTH DAILY, Disp: 90 tablet, Rfl: 3    apixaban (Eliquis) 5 MG tablet tablet, TAKE 1 TABLET BY MOUTH TWICE DAILY, Disp: 180 tablet, Rfl: 3    atorvastatin (LIPITOR) 10 MG tablet, TAKE 1 TABLET BY MOUTH DAILY, Disp: 90 tablet, Rfl: 3    furosemide (LASIX) 40 MG tablet, TAKE 1 TABLET BY MOUTH DAILY, Disp: 90 tablet, Rfl: 1    guaifenesin (ROBITUSSIN) 100 MG/5ML liquid, Take 10 mL by mouth Every 4 (Four) Hours As Needed for Cough for up to 7 days., Disp: 180 mL, Rfl: 0    ipratropium-albuterol (DUO-NEB) 0.5-2.5 mg/3 ml nebulizer, Take 3 mL by nebulization 4 (Four) Times a Day., Disp: 360 mL, Rfl: 0    montelukast (SINGULAIR) 10 MG tablet, TAKE 1 TABLET BY MOUTH DAILY, Disp: 90 tablet, Rfl: 1    multivitamin with minerals tablet tablet, Take 1 tablet by mouth Daily., Disp: , Rfl:     pantoprazole (PROTONIX) 40 MG EC tablet, Take 1 tablet by mouth Every Night., Disp: 30 tablet, Rfl: 0    potassium chloride (KLOR-CON M20) 20 MEQ CR tablet, TAKE 1 TABLET BY MOUTH DAILY, Disp: 90 tablet, Rfl: 0    sotalol (BETAPACE AF) 80 MG tablet tablet, TAKE 1 TABLET BY MOUTH TWICE DAILY, Disp: 180 tablet, Rfl: 1  Allergies:  Patient has no known allergies.      Physical Exam  VITALS REVIEWED    General:      well developed, in no acute distress.    Head:      normocephalic and atraumatic.    Eyes:      PERRL/EOM intact, conjunctiva and sclera clear with out nystagmus.    Neck:      no masses, thyromegaly,  trachea central with normal respiratory effort  and PMI displaced laterally  Lungs:      Clear to auscultation bilaterally  Heart:       Bradycardic  Msk:      no deformity or scoliosis noted of thoracic or lumbar spine.    Pulses:      pulses normal in all 4 extremities.    Extremities:       No lower extremity edema  Neurologic:      no focal deficits.   alert oriented x3  Skin:      intact without lesions or rashes.    Psych:      alert and cooperative; normal mood and affect; normal attention span and concentration.      Result Review :               Pertinent cardiac workup    EKG 7/25/2023 and 9/6/2023 atrial flutter.  Echo 7/13/2024 ejection fraction 55%, presence of bioprosthetic mitral valve, moderate MR, some degree of mitral stenosis.      ECG 12 Lead    Date/Time: 10/8/2024 3:22 PM  Performed by: Rj Dominique MD    Authorized by: Rj Dominique MD  Comparison: compared with previous ECG   Rhythm: sinus bradycardia  Rate: normal  BPM: 48  Conduction: conduction normal  ST Segments: ST segments normal  QRS axis: normal  Other findings: non-specific ST-T wave changes              Assessment and Plan      Drake Beth is a 74-year-old male patient who has developmental delay but also complex cardiac history, he has had CABG and mitral valve repair in 2008, also had paroxysmal atrial fibrillation and atrial flutter, previously he had a dual-chamber pacemaker implanted at an outside facility in 2 weeks later it had to be removed in April 2022 due to infection.  Afterwards the patient had issues with atypical atrial flutter and had been on sotalol 80 mg twice a day which seems to be keeping him in normal rhythm for the most part.  Patient however is now experiencing fatigue and lack of energy and has been bradycardic in the 40s.  Today's EKG shows sinus bradycardia.  Also the echocardiogram that was done in July 2024 showed growth on the mitral valve most likely tissue growth rather than infection with some degree of mitral regurgitation and  mitral stenosis.  So his symptoms could be multifactorial but certainly mitral valve disease and bradycardia standout as culprits.  We decided to proceed with TATUM to have a better assessment of the mitral valve.  After that if the valve is still okay then he will most likely need a pacemaker.  Another option is to go for flutter ablation and then decrease or even stop the sotalol.  But for now we will proceed with TATUM.    Diagnoses and all orders for this visit:    1. Paroxysmal atrial fibrillation (Primary)  -     ECG 12 Lead    2. Atrial flutter with controlled response    3. Nonrheumatic mitral valve regurgitation           No follow-ups on file.  Patient was given instructions and counseling regarding his condition or for health maintenance advice. Please see specific information pulled into the AVS if appropriate.

## 2024-10-15 ENCOUNTER — READMISSION MANAGEMENT (OUTPATIENT)
Dept: CALL CENTER | Facility: HOSPITAL | Age: 74
End: 2024-10-15
Payer: MEDICARE

## 2024-10-15 NOTE — OUTREACH NOTE
COPD/PN Week 3 Survey      Flowsheet Row Responses   [facility] discharged [facility]? [facility]   Does the patient have one of the following disease processes/diagnoses(primary or secondary)? Pneumonia   Week 3 attempt successful? No   Unsuccessful attempts Attempt 1   Discharge diagnosis ANDREA GOMES - Registered Nurse

## 2024-10-24 ENCOUNTER — ANESTHESIA (OUTPATIENT)
Dept: CARDIOLOGY | Facility: HOSPITAL | Age: 74
End: 2024-10-24
Payer: MEDICARE

## 2024-10-24 ENCOUNTER — ANESTHESIA EVENT (OUTPATIENT)
Dept: CARDIOLOGY | Facility: HOSPITAL | Age: 74
End: 2024-10-24
Payer: MEDICARE

## 2024-10-24 ENCOUNTER — HOSPITAL ENCOUNTER (OUTPATIENT)
Dept: CARDIOLOGY | Facility: HOSPITAL | Age: 74
Setting detail: HOSPITAL OUTPATIENT SURGERY
Discharge: HOME | End: 2024-10-24
Payer: MEDICARE

## 2024-10-24 VITALS — DIASTOLIC BLOOD PRESSURE: 65 MMHG | OXYGEN SATURATION: 100 % | SYSTOLIC BLOOD PRESSURE: 106 MMHG

## 2024-10-24 VITALS
HEART RATE: 90 BPM | DIASTOLIC BLOOD PRESSURE: 76 MMHG | OXYGEN SATURATION: 100 % | RESPIRATION RATE: 16 BRPM | SYSTOLIC BLOOD PRESSURE: 120 MMHG

## 2024-10-24 DIAGNOSIS — Z95.2 MITRAL VALVE REPLACED: ICD-10-CM

## 2024-10-24 DIAGNOSIS — I50.33 ACUTE ON CHRONIC DIASTOLIC HEART FAILURE: Primary | ICD-10-CM

## 2024-10-24 PROCEDURE — 93321 DOPPLER ECHO F-UP/LMTD STD: CPT

## 2024-10-24 PROCEDURE — 25810000003 SODIUM CHLORIDE 0.9 % SOLUTION: Performed by: ANESTHESIOLOGIST ASSISTANT

## 2024-10-24 PROCEDURE — 25010000002 PROPOFOL 200 MG/20ML EMULSION: Performed by: ANESTHESIOLOGIST ASSISTANT

## 2024-10-24 PROCEDURE — 25010000002 PHENYLEPHRINE 10 MG/ML SOLUTION: Performed by: ANESTHESIOLOGIST ASSISTANT

## 2024-10-24 PROCEDURE — 93325 DOPPLER ECHO COLOR FLOW MAPG: CPT

## 2024-10-24 PROCEDURE — 92960 CARDIOVERSION ELECTRIC EXT: CPT

## 2024-10-24 RX ORDER — SODIUM CHLORIDE 9 MG/ML
INJECTION, SOLUTION INTRAVENOUS CONTINUOUS PRN
Status: DISCONTINUED | OUTPATIENT
Start: 2024-10-24 | End: 2024-10-24 | Stop reason: SURG

## 2024-10-24 RX ORDER — PHENYLEPHRINE HYDROCHLORIDE 10 MG/ML
INJECTION INTRAVENOUS AS NEEDED
Status: DISCONTINUED | OUTPATIENT
Start: 2024-10-24 | End: 2024-10-24 | Stop reason: SURG

## 2024-10-24 RX ORDER — PROPOFOL 10 MG/ML
INJECTION, EMULSION INTRAVENOUS AS NEEDED
Status: DISCONTINUED | OUTPATIENT
Start: 2024-10-24 | End: 2024-10-24 | Stop reason: SURG

## 2024-10-24 RX ADMIN — PHENYLEPHRINE HYDROCHLORIDE 200 MCG: 10 INJECTION INTRAVENOUS at 12:33

## 2024-10-24 RX ADMIN — PHENYLEPHRINE HYDROCHLORIDE 200 MCG: 10 INJECTION INTRAVENOUS at 12:42

## 2024-10-24 RX ADMIN — PROPOFOL 50 MG: 10 INJECTION, EMULSION INTRAVENOUS at 12:29

## 2024-10-24 RX ADMIN — PHENYLEPHRINE HYDROCHLORIDE 300 MCG: 10 INJECTION INTRAVENOUS at 12:39

## 2024-10-24 RX ADMIN — PHENYLEPHRINE HYDROCHLORIDE 300 MCG: 10 INJECTION INTRAVENOUS at 12:36

## 2024-10-24 RX ADMIN — SODIUM CHLORIDE: 9 INJECTION, SOLUTION INTRAVENOUS at 12:26

## 2024-10-24 RX ADMIN — PROPOFOL 50 MG: 10 INJECTION, EMULSION INTRAVENOUS at 12:33

## 2024-10-24 NOTE — ANESTHESIA POSTPROCEDURE EVALUATION
Patient: Drake Beth    Procedure Summary       Date: 10/24/24 Room / Location: [facility]    Anesthesia Start: 1226 Anesthesia Stop: 1243    Procedure: ADULT TRANSESOPHAGEAL ECHO (TATUM) W/ CONT IF NECESSARY PER PROTOCOL Diagnosis:       Mitral valve replaced      (Cardiac Source of Emboli)      (Valvular Disease)    Scheduled Providers: Rj Dominique MD Provider: Mark Meier MD    Anesthesia Type: MAC ASA Status: 3            Anesthesia Type: MAC    Vitals  Vitals Value Taken Time   /85 10/24/24 1332   Temp     Pulse 90 10/24/24 1338   Resp 16 10/24/24 1300   SpO2 96 % 10/24/24 1337   Vitals shown include unfiled device data.        Post Anesthesia Care and Evaluation    Patient location during evaluation: PACU  Patient participation: complete - patient participated  Level of consciousness: awake  Pain scale: See nurse's notes for pain score.  Pain management: adequate    Airway patency: patent  Anesthetic complications: No anesthetic complications  PONV Status: none  Cardiovascular status: acceptable  Respiratory status: acceptable and spontaneous ventilation  Hydration status: acceptable    Comments: Patient seen and examined postoperatively; vital signs stable; SpO2 greater than or equal to 90%; cardiopulmonary status stable; nausea/vomiting adequately controlled; pain adequately controlled; no apparent anesthesia complications; patient discharged from anesthesia care when discharge criteria were met

## 2024-10-24 NOTE — ANESTHESIA PREPROCEDURE EVALUATION
Anesthesia Evaluation     NPO Solid Status: > 8 hours  NPO Liquid Status: > 8 hours           Airway   Mallampati: II  TM distance: >3 FB  Neck ROM: full  No difficulty expected  Dental - normal exam     Pulmonary - normal exam   Cardiovascular - normal exam    (+) hypertension, valvular problems/murmurs, CAD, CABG, dysrhythmias Atrial Fib, CHF , hyperlipidemia      Neuro/Psych  GI/Hepatic/Renal/Endo    (+) GERD    Musculoskeletal     Abdominal  - normal exam    Bowel sounds: normal.   Substance History      OB/GYN          Other                    Anesthesia Plan    ASA 3     MAC   total IV anesthesia  intravenous induction     Anesthetic plan, risks, benefits, and alternatives have been provided, discussed and informed consent has been obtained with: patient.  Pre-procedure education provided  Plan discussed with CAA.    CODE STATUS:

## 2024-10-24 NOTE — CONSULTS
Patient Care Team:  Amada Haskins MD as PCP - Josr Delgado DPM as Consulting Physician (Podiatry)  Rj Dominique MD as Consulting Physician (Cardiology)  Referring Provider:  Dr. Dominique  Reason for consultation: Severe mitral regurgitation     Chief complaint:  Fatigue     Subjective     History of Present Illness:  Mr Drake Beth is a 74 yr old male with pmhx developmental delay, CAD s/p CABG and bioprosthetic mitral valve (2008), HFpEF, pAF on Eliquis, HTN, HLD, PPM placement (03/11/22 followed by removal d/t infection 04/26/22 and not replaced), anemia, GERD presented to [facility] for an outpatient TATUM that demonstrated severe mitral valve regurgitation due to structural degenerative disease.     Per patients brothers who are his caregivers, he's been more fatigued the last three months. Patient has been in the hospital a couple of times in the last few months for abdominal pain, dyspnea, and possible pneumonia. He's reportedly lost 10-15 lbs in the last few months given he hasn't had an appetite and too tired. 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     Cardiac surgery was consulted for surgical evaluation.        Review of Systems   Constitutional:  Positive for fatigue.   Respiratory:  Positive for shortness of breath.    Cardiovascular:  Positive for leg swelling.        Past Medical History:   Diagnosis Date    Allergic rhinitis     Anemia     Atrial fibrillation     CHF (congestive heart failure)     Chronic constipation     Coronary artery disease     GERD (gastroesophageal reflux disease)     Hyperlipidemia     Hypertension     Mentally challenged     MVP (mitral valve prolapse)      Past Surgical History:   Procedure Laterality Date     CARDIAC ELECTROPHYSIOLOGY PROCEDURE N/A 4/26/2022    Procedure: PACEMAKER EXTRACTION;  Surgeon: Rj Dominique MD;  Location: [facility] CATH INVASIVE LOCATION;  Service: Cardiology;  Laterality: N/A;    COLONOSCOPY      He has never had the test done.  His family is refusing any form of colon cancer screening for him.    CORONARY ARTERY BYPASS GRAFT  2008     SWEAT    MITRAL VALVE REPLACEMENT  04/2008    WITH BIOPROSTHETIC TISSUE VALVE     Family History   Problem Relation Age of Onset    Coronary artery disease Mother 64    Hyperlipidemia Sister     Liver disease Sister         FATTY LIVER    Rheum arthritis Sister     Lung cancer Sister     Hypertension Brother     Other Brother     Rheum arthritis Brother      Social History     Tobacco Use    Smoking status: Never     Passive exposure: Never    Smokeless tobacco: Never   Vaping Use    Vaping status: Never Used   Substance Use Topics    Alcohol use: Never    Drug use: Never     (Not in a hospital admission)      Allergies:  Patient has no known allergies.    Objective      Vital Signs  Heart Rate:  [] 93  Resp:  [14-17] 16  BP: ()/(44-87) 106/62           Physical Exam  Constitutional:       General: He is not in acute distress.  HENT:      Head: Normocephalic and atraumatic.      Mouth/Throat:      Mouth: Mucous membranes are moist.      Pharynx: Oropharynx is clear.   Cardiovascular:      Rate and Rhythm: Rhythm irregular.      Heart sounds: Murmur heard.   Pulmonary:      Effort: Pulmonary effort is normal. No respiratory distress.   Abdominal:      General: Abdomen is flat.   Musculoskeletal:      Cervical back: Normal range of motion.      Right lower leg: No edema.      Left lower leg: No edema.   Skin:     General: Skin is warm and dry.      Coloration: Skin is pale.   Neurological:      Mental Status: He is alert. Mental status is at baseline.         Results Review:   Lab Results (last 24 hours)       ** No results found for the last 24  hours. **          ECHO 07/13/24          Assessment & Plan       * No active hospital problems. *      Assessment & Plan  Severe MR in setting of structural degenerative disease  CAD s/p CABG and bioprosthetic mitral valve (2008)  HFpEF  pAF on Eliquis and sotalol   HTN   HLD  PPM placement 03/11/22 s/p removal d/t infection 04/26/22 and not replaced  Anemia  GERD     Plan  Recommend patient follow up with cardiology to arrange cardiac cath along with TAVR CTA for further surgical evaluation.     Discussed case with nursing, cardiology, and Dr. Fuentes    Thank you for allowing us to participate in the care of this patient.      Jessica Carpenter, IZAIAH  10/24/24  13:58 EDT    **all problems new to this examiner

## 2024-10-24 NOTE — DISCHARGE INSTRUCTIONS
TATUM DC Instructions  The medication, which was used to put you to sleep, will be acting in your body for the next twenty-four (24) hours, so you might feel a little sleepy; this feeling will slowly wear off.  Because the medicine is still in your system for the next twenty-four (24) hours:  Do not drive a car, operate machinery, or power tools  Do not drink any alcoholic drinks (not even beer)  Make any important decisions such as to sign important papers    We strongly suggest that a responsible adult be with the patient the rest of the day.    What to do for pain: acetaminophen (Tylenol) and eating cool, soothing foods (e.g. ice cream, smoothies) can help with a sore throat. If your pain is unmanageable with these methods, call the Cardiologist's office.     It may be better to start with liquids such as water, then soups, and graduate up to solid foods  If medication was used to numb your throat, do not eat or drink anything for 2 hours.     Call the cardiologist with any problems of:  Excessive mucus  Spitting up blood  Sore throat more than 72 hours    Go to the nearest Emergency Department if you're vomiting blood or having difficulty breathing.

## 2024-10-25 ENCOUNTER — TELEPHONE (OUTPATIENT)
Dept: CARDIOLOGY | Facility: CLINIC | Age: 74
End: 2024-10-25
Payer: MEDICARE

## 2024-10-25 DIAGNOSIS — Z95.3 HISTORY OF MITRAL VALVE REPLACEMENT WITH BIOPROSTHETIC VALVE: ICD-10-CM

## 2024-10-25 DIAGNOSIS — I25.10 CORONARY ARTERY DISEASE INVOLVING NATIVE CORONARY ARTERY OF NATIVE HEART WITHOUT ANGINA PECTORIS: ICD-10-CM

## 2024-10-25 DIAGNOSIS — I34.0 NONRHEUMATIC MITRAL VALVE REGURGITATION: Primary | ICD-10-CM

## 2024-10-25 DIAGNOSIS — I50.33 ACUTE ON CHRONIC DIASTOLIC HEART FAILURE: ICD-10-CM

## 2024-10-25 DIAGNOSIS — Z95.1 HX OF CABG: ICD-10-CM

## 2024-10-25 LAB — BH CV ECHO SHUNT ASSESSMENT PERFORMED (HIDDEN SCRIPTING): 1

## 2024-10-25 NOTE — TELEPHONE ENCOUNTER
Please schedule patient for a right and left heart cath with Dr. Malloy. To be scheduled for 11/11/24 or later. Pre-procedure labs ordered. He will need to stop his Eliquis 3 days prior to his procedure.

## 2024-10-28 DIAGNOSIS — I34.0 SEVERE MITRAL REGURGITATION: Primary | ICD-10-CM

## 2024-10-28 PROBLEM — Z95.3 HISTORY OF MITRAL VALVE REPLACEMENT WITH BIOPROSTHETIC VALVE: Status: ACTIVE | Noted: 2024-10-25

## 2024-10-28 PROBLEM — Z95.1 HX OF CABG: Status: ACTIVE | Noted: 2024-10-25

## 2024-10-29 ENCOUNTER — TELEPHONE (OUTPATIENT)
Dept: CARDIOLOGY | Facility: HOSPITAL | Age: 74
End: 2024-10-29
Payer: MEDICARE

## 2024-10-29 NOTE — TELEPHONE ENCOUNTER
Called and s/w patient's brother.  Patient scheduled for TAVR CTA for possible TMVR on Tuesday 11/5/24.  Patient to arrive to main lobby at 1300 for 1400 appt.  NPO 3 hours prior to scan, but instructed to still take morning meds with sips of water.  Instructions emailed to [email or web address].

## 2024-11-05 ENCOUNTER — HOSPITAL ENCOUNTER (OUTPATIENT)
Dept: CT IMAGING | Facility: HOSPITAL | Age: 74
Discharge: HOME | End: 2024-11-05
Admitting: INTERNAL MEDICINE
Payer: MEDICARE

## 2024-11-05 DIAGNOSIS — Z01.818 PREOP EXAMINATION: ICD-10-CM

## 2024-11-05 LAB
CREAT BLDA-MCNC: 1.5 MG/DL (ref 0.6–1.3)
EGFRCR SERPLBLD CKD-EPI 2021: 48.9 ML/MIN/1.73

## 2024-11-05 PROCEDURE — 25510000001 IOPAMIDOL PER 1 ML: Performed by: INTERNAL MEDICINE

## 2024-11-05 PROCEDURE — 71275 CT ANGIOGRAPHY CHEST: CPT

## 2024-11-05 PROCEDURE — 82565 ASSAY OF CREATININE: CPT

## 2024-11-05 PROCEDURE — 74174 CTA ABD&PLVS W/CONTRAST: CPT

## 2024-11-05 RX ORDER — IOPAMIDOL 755 MG/ML
100 INJECTION, SOLUTION INTRAVASCULAR
Status: COMPLETED | OUTPATIENT
Start: 2024-11-05 | End: 2024-11-05

## 2024-11-05 RX ADMIN — IOPAMIDOL 100 ML: 755 INJECTION, SOLUTION INTRAVENOUS at 14:01

## 2024-11-05 NOTE — NURSING NOTE
1320 Arrived in IR pre/post  1325 HR 53  1330 20g IV in R AC- Creatinine sent  1340 To CT  1353 Back from CT  1355 IV dc'd  1400 Pt discharged with instructions to increase fluid intake over the next 24 hours and follow up with cardiology.

## 2024-11-11 ENCOUNTER — LAB (OUTPATIENT)
Dept: LAB | Facility: HOSPITAL | Age: 74
End: 2024-11-11
Payer: MEDICARE

## 2024-11-11 ENCOUNTER — HOSPITAL ENCOUNTER (OUTPATIENT)
Facility: HOSPITAL | Age: 74
Setting detail: HOSPITAL OUTPATIENT SURGERY
Discharge: HOME | End: 2024-11-11
Attending: INTERNAL MEDICINE | Admitting: INTERNAL MEDICINE
Payer: MEDICARE

## 2024-11-11 VITALS
TEMPERATURE: 97.6 F | RESPIRATION RATE: 16 BRPM | OXYGEN SATURATION: 92 % | DIASTOLIC BLOOD PRESSURE: 75 MMHG | SYSTOLIC BLOOD PRESSURE: 144 MMHG | HEART RATE: 65 BPM | WEIGHT: 88.4 LBS | HEIGHT: 60 IN | BODY MASS INDEX: 17.36 KG/M2

## 2024-11-11 DIAGNOSIS — I50.33 ACUTE ON CHRONIC DIASTOLIC HEART FAILURE: ICD-10-CM

## 2024-11-11 DIAGNOSIS — I25.10 CORONARY ARTERY DISEASE INVOLVING NATIVE CORONARY ARTERY OF NATIVE HEART WITHOUT ANGINA PECTORIS: ICD-10-CM

## 2024-11-11 DIAGNOSIS — I34.0 NONRHEUMATIC MITRAL VALVE REGURGITATION: ICD-10-CM

## 2024-11-11 DIAGNOSIS — Z95.3 HISTORY OF MITRAL VALVE REPLACEMENT WITH BIOPROSTHETIC VALVE: ICD-10-CM

## 2024-11-11 DIAGNOSIS — Z95.1 HX OF CABG: ICD-10-CM

## 2024-11-11 LAB
ANION GAP SERPL CALCULATED.3IONS-SCNC: 12.2 MMOL/L (ref 5–15)
BASE DEFICIT: ABNORMAL
BASE DEFICIT: ABNORMAL
BASE EXCESS BLDA CALC-SCNC: 1 MMOL/L (ref 0–3)
BASE EXCESS BLDV CALC-SCNC: 3 MMOL/L (ref 0–3)
BASOPHILS # BLD AUTO: 0.05 10*3/MM3 (ref 0–0.2)
BASOPHILS NFR BLD AUTO: 0.5 % (ref 0–1.5)
BUN SERPL-MCNC: 32 MG/DL (ref 8–23)
BUN/CREAT SERPL: 24.8 (ref 7–25)
CA-I BLDA-SCNC: 1.14 MMOL/L (ref 1.12–1.32)
CA-I BLDA-SCNC: 1.19 MMOL/L (ref 1.12–1.32)
CALCIUM SPEC-SCNC: 10 MG/DL (ref 8.6–10.5)
CHLORIDE SERPL-SCNC: 98 MMOL/L (ref 98–107)
CO2 BLDA-SCNC: 28 MMOL/L (ref 23–27)
CO2 CONTENT VENOUS: 30 MMOL/L (ref 24–29)
CO2 SERPL-SCNC: 28.8 MMOL/L (ref 22–29)
CREAT SERPL-MCNC: 1.29 MG/DL (ref 0.76–1.27)
DEPRECATED RDW RBC AUTO: 53 FL (ref 37–54)
EGFRCR SERPLBLD CKD-EPI 2021: 58.5 ML/MIN/1.73
EOSINOPHIL # BLD AUTO: 0.68 10*3/MM3 (ref 0–0.4)
EOSINOPHIL NFR BLD AUTO: 7.3 % (ref 0.3–6.2)
ERYTHROCYTE [DISTWIDTH] IN BLOOD BY AUTOMATED COUNT: 14.2 % (ref 12.3–15.4)
GLUCOSE BLDC GLUCOMTR-MCNC: 100 MG/DL (ref 70–105)
GLUCOSE BLDC GLUCOMTR-MCNC: 102 MG/DL (ref 70–105)
GLUCOSE SERPL-MCNC: 122 MG/DL (ref 65–99)
HCO3 BLDA-SCNC: 26.6 MMOL/L (ref 22–26)
HCO3 BLDV-SCNC: 28.4 MMOL/L (ref 23–28)
HCT VFR BLD AUTO: 37 % (ref 37.5–51)
HCT VFR BLDA CALC: 31 % (ref 38–51)
HCT VFR BLDA CALC: 35 % (ref 38–51)
HGB BLD-MCNC: 11.9 G/DL (ref 13–17.7)
HGB BLDA-MCNC: 10.5 G/DL (ref 12–17)
HGB BLDA-MCNC: 11.9 G/DL (ref 12–17)
IMM GRANULOCYTES # BLD AUTO: 0.03 10*3/MM3 (ref 0–0.05)
IMM GRANULOCYTES NFR BLD AUTO: 0.3 % (ref 0–0.5)
LYMPHOCYTES # BLD AUTO: 1.43 10*3/MM3 (ref 0.7–3.1)
LYMPHOCYTES NFR BLD AUTO: 15.4 % (ref 19.6–45.3)
MCH RBC QN AUTO: 32.7 PG (ref 26.6–33)
MCHC RBC AUTO-ENTMCNC: 32.2 G/DL (ref 31.5–35.7)
MCV RBC AUTO: 101.6 FL (ref 79–97)
MONOCYTES # BLD AUTO: 0.71 10*3/MM3 (ref 0.1–0.9)
MONOCYTES NFR BLD AUTO: 7.6 % (ref 5–12)
NEUTROPHILS NFR BLD AUTO: 6.41 10*3/MM3 (ref 1.7–7)
NEUTROPHILS NFR BLD AUTO: 68.9 % (ref 42.7–76)
NRBC BLD AUTO-RTO: 0 /100 WBC (ref 0–0.2)
PCO2 BLDA: 46.8 MM HG (ref 35–45)
PCO2 BLDV: 52.9 MM HG (ref 41–51)
PH BLDA: 7.36 PH UNITS (ref 7.35–7.45)
PH BLDV: 7.34 PH UNITS (ref 7.31–7.41)
PLATELET # BLD AUTO: 237 10*3/MM3 (ref 140–450)
PMV BLD AUTO: 9.6 FL (ref 6–12)
PO2 BLDA: 159 MM HG (ref 80–105)
PO2 BLDV: 38 MM HG (ref 35–42)
POTASSIUM BLDA-SCNC: 3.6 MMOL/L (ref 3.5–4.9)
POTASSIUM BLDA-SCNC: 3.7 MMOL/L (ref 3.5–4.9)
POTASSIUM SERPL-SCNC: 4.7 MMOL/L (ref 3.5–5.2)
RBC # BLD AUTO: 3.64 10*6/MM3 (ref 4.14–5.8)
SAO2 % BLDCOA: 99 % (ref 95–98)
SAO2 % BLDCOV: ABNORMAL %
SODIUM BLD-SCNC: 141 MMOL/L (ref 138–146)
SODIUM BLD-SCNC: 141 MMOL/L (ref 138–146)
SODIUM SERPL-SCNC: 139 MMOL/L (ref 136–145)
WBC NRBC COR # BLD AUTO: 9.31 10*3/MM3 (ref 3.4–10.8)

## 2024-11-11 PROCEDURE — C1769 GUIDE WIRE: HCPCS | Performed by: INTERNAL MEDICINE

## 2024-11-11 PROCEDURE — C1760 CLOSURE DEV, VASC: HCPCS | Performed by: INTERNAL MEDICINE

## 2024-11-11 PROCEDURE — 80048 BASIC METABOLIC PNL TOTAL CA: CPT | Performed by: INTERNAL MEDICINE

## 2024-11-11 PROCEDURE — 84132 ASSAY OF SERUM POTASSIUM: CPT

## 2024-11-11 PROCEDURE — 84295 ASSAY OF SERUM SODIUM: CPT

## 2024-11-11 PROCEDURE — 99153 MOD SED SAME PHYS/QHP EA: CPT | Performed by: INTERNAL MEDICINE

## 2024-11-11 PROCEDURE — 85025 COMPLETE CBC W/AUTO DIFF WBC: CPT | Performed by: INTERNAL MEDICINE

## 2024-11-11 PROCEDURE — 99152 MOD SED SAME PHYS/QHP 5/>YRS: CPT | Performed by: INTERNAL MEDICINE

## 2024-11-11 PROCEDURE — 25010000002 FENTANYL CITRATE (PF) 100 MCG/2ML SOLUTION: Performed by: INTERNAL MEDICINE

## 2024-11-11 PROCEDURE — 93461 R&L HRT ART/VENTRICLE ANGIO: CPT | Performed by: INTERNAL MEDICINE

## 2024-11-11 PROCEDURE — 85014 HEMATOCRIT: CPT

## 2024-11-11 PROCEDURE — C1894 INTRO/SHEATH, NON-LASER: HCPCS | Performed by: INTERNAL MEDICINE

## 2024-11-11 PROCEDURE — C1887 CATHETER, GUIDING: HCPCS | Performed by: INTERNAL MEDICINE

## 2024-11-11 PROCEDURE — 25810000003 SODIUM CHLORIDE 0.9 % SOLUTION: Performed by: INTERNAL MEDICINE

## 2024-11-11 PROCEDURE — 25510000001 IOPAMIDOL PER 1 ML: Performed by: INTERNAL MEDICINE

## 2024-11-11 PROCEDURE — S0260 H&P FOR SURGERY: HCPCS | Performed by: INTERNAL MEDICINE

## 2024-11-11 PROCEDURE — 82330 ASSAY OF CALCIUM: CPT

## 2024-11-11 PROCEDURE — 25010000002 MIDAZOLAM PER 1 MG: Performed by: INTERNAL MEDICINE

## 2024-11-11 PROCEDURE — 25010000002 LIDOCAINE 1 % SOLUTION: Performed by: INTERNAL MEDICINE

## 2024-11-11 PROCEDURE — 82803 BLOOD GASES ANY COMBINATION: CPT

## 2024-11-11 PROCEDURE — C1751 CATH, INF, PER/CENT/MIDLINE: HCPCS | Performed by: INTERNAL MEDICINE

## 2024-11-11 PROCEDURE — 82947 ASSAY GLUCOSE BLOOD QUANT: CPT

## 2024-11-11 RX ORDER — NITROGLYCERIN 0.4 MG/1
0.4 TABLET SUBLINGUAL
Status: DISCONTINUED | OUTPATIENT
Start: 2024-11-11 | End: 2024-11-11 | Stop reason: HOSPADM

## 2024-11-11 RX ORDER — ONDANSETRON 2 MG/ML
4 INJECTION INTRAMUSCULAR; INTRAVENOUS EVERY 6 HOURS PRN
Status: DISCONTINUED | OUTPATIENT
Start: 2024-11-11 | End: 2024-11-11 | Stop reason: HOSPADM

## 2024-11-11 RX ORDER — FENTANYL CITRATE 50 UG/ML
INJECTION, SOLUTION INTRAMUSCULAR; INTRAVENOUS
Status: DISCONTINUED | OUTPATIENT
Start: 2024-11-11 | End: 2024-11-11 | Stop reason: HOSPADM

## 2024-11-11 RX ORDER — IOPAMIDOL 755 MG/ML
INJECTION, SOLUTION INTRAVASCULAR
Status: DISCONTINUED | OUTPATIENT
Start: 2024-11-11 | End: 2024-11-11 | Stop reason: HOSPADM

## 2024-11-11 RX ORDER — DIPHENHYDRAMINE HCL 25 MG
25 CAPSULE ORAL EVERY 6 HOURS PRN
Status: DISCONTINUED | OUTPATIENT
Start: 2024-11-11 | End: 2024-11-11 | Stop reason: HOSPADM

## 2024-11-11 RX ORDER — MIDAZOLAM HYDROCHLORIDE 1 MG/ML
INJECTION, SOLUTION INTRAMUSCULAR; INTRAVENOUS
Status: DISCONTINUED | OUTPATIENT
Start: 2024-11-11 | End: 2024-11-11 | Stop reason: HOSPADM

## 2024-11-11 RX ORDER — SODIUM CHLORIDE 9 MG/ML
INJECTION, SOLUTION INTRAVENOUS
Status: COMPLETED | OUTPATIENT
Start: 2024-11-11 | End: 2024-11-11

## 2024-11-11 RX ORDER — LIDOCAINE HYDROCHLORIDE 10 MG/ML
INJECTION, SOLUTION INFILTRATION; PERINEURAL
Status: DISCONTINUED | OUTPATIENT
Start: 2024-11-11 | End: 2024-11-11 | Stop reason: HOSPADM

## 2024-11-11 RX ORDER — ONDANSETRON 4 MG/1
4 TABLET, ORALLY DISINTEGRATING ORAL EVERY 6 HOURS PRN
Status: DISCONTINUED | OUTPATIENT
Start: 2024-11-11 | End: 2024-11-11 | Stop reason: HOSPADM

## 2024-11-11 RX ORDER — ACETAMINOPHEN 325 MG/1
650 TABLET ORAL EVERY 4 HOURS PRN
Status: DISCONTINUED | OUTPATIENT
Start: 2024-11-11 | End: 2024-11-11 | Stop reason: HOSPADM

## 2024-11-11 NOTE — H&P
Reason for Consultation: Severe mitral regurgitation      Patient Care Team:  Amada Haskins MD as PCP - General  Josr Rodriguez DPM as Consulting Physician (Podiatry)  Rj Dominique MD as Consulting Physician (Cardiology)      SUBJECTIVE     Chief Complaint: CHF, NYHA class III, coronary artery disease, mitral regurgitation    History of present illness:  Drake Beth is a 74 y.o. male with hypertension, hyperlipidemia, nonobstructive coronary artery disease, status post bioprosthetic mitral valve replacement in 2008 with 31 mm Biocor stentless valve, paroxysmal atrial fibrillation, permanent pacemaker who has been noted to have severe bioprosthetic mitral valve regurgitation.  He has been referred to structural heart clinic to discuss transcatheter mitral valve replacement.  Today he comes in for right and left heart cath      Review of systems:    Constitutional: No weakness, fatigue, fever, rigors, chills   Eyes: No vision changes, eye pain   ENT/oropharynx: No difficulty swallowing, sore throat, epistaxis, changes in hearing   Cardiovascular: No chest pain, chest tightness, palpitations, paroxysmal nocturnal dyspnea, orthopnea, diaphoresis, dizziness / syncopal episode   Respiratory: + shortness of breath, dyspnea on exertion, cough, wheezing, hemoptysis   Gastrointestinal: No abdominal pain, nausea, vomiting, diarrhea, bloody stools   Genitourinary: No hematuria, dysuria   Neurological: No headache, tremors, numbness, one-sided weakness    Musculoskeletal: No cramps, myalgias, joint pain, joint swelling   Integument: No rash, edema        Personal History:      Past Medical History:   Diagnosis Date    Allergic rhinitis     Anemia     Atrial fibrillation     CHF (congestive heart failure)     Chronic constipation     Coronary artery disease     GERD (gastroesophageal reflux disease)     Hyperlipidemia     Hypertension     Mentally challenged     MVP (mitral valve prolapse)        Past  Surgical History:   Procedure Laterality Date    CARDIAC ELECTROPHYSIOLOGY PROCEDURE N/A 04/26/2022    Procedure: PACEMAKER EXTRACTION;  Surgeon: Rj Dominique MD;  Location: [facility] INVASIVE LOCATION;  Service: Cardiology;  Laterality: N/A;    COLONOSCOPY      He has never had the test done.  His family is refusing any form of colon cancer screening for him.    CORONARY ARTERY BYPASS GRAFT  2008    DR SWEAT    MITRAL VALVE REPLACEMENT  04/2008    WITH BIOPROSTHETIC TISSUE VALVE       Family History   Problem Relation Age of Onset    Coronary artery disease Mother 64    Hyperlipidemia Sister     Liver disease Sister         FATTY LIVER    Rheum arthritis Sister     Lung cancer Sister     Hypertension Brother     Other Brother     Rheum arthritis Brother        Social History     Tobacco Use    Smoking status: Never     Passive exposure: Never    Smokeless tobacco: Never   Vaping Use    Vaping status: Never Used   Substance Use Topics    Alcohol use: Never    Drug use: Never        Home meds:  Prior to Admission medications    Medication Sig Start Date End Date Taking? Authorizing Provider   amLODIPine (NORVASC) 2.5 MG tablet TAKE 1 TABLET BY MOUTH DAILY 7/12/24   Rj Dominique MD   apixaban (Eliquis) 5 MG tablet tablet TAKE 1 TABLET BY MOUTH TWICE DAILY 1/25/24   Rj Dominique MD   atorvastatin (LIPITOR) 10 MG tablet TAKE 1 TABLET BY MOUTH DAILY 6/7/24   Ansley Tomlin APRN   furosemide (LASIX) 40 MG tablet TAKE 1 TABLET BY MOUTH DAILY 9/27/24   Rj Dominique MD   ipratropium-albuterol (DUO-NEB) 0.5-2.5 mg/3 ml nebulizer Take 3 mL by nebulization 4 (Four) Times a Day. 10/1/24   Amada Haskins MD   montelukast (SINGULAIR) 10 MG tablet TAKE 1 TABLET BY MOUTH DAILY 6/7/24   Amada Haskins MD   multivitamin with minerals tablet tablet Take 1 tablet by mouth Daily.    ProviderMary MD   omeprazole (priLOSEC) 20 MG capsule Take 1 capsule by mouth Daily.    ProviderMary MD    potassium chloride (KLOR-CON M20) 20 MEQ CR tablet TAKE 1 TABLET BY MOUTH DAILY 9/18/24   Amada Haskins MD   sotalol (BETAPACE AF) 80 MG tablet tablet TAKE 1 TABLET BY MOUTH TWICE DAILY 5/13/24   Rj Dominique MD       Allergies:     Patient has no known allergies.    Scheduled Meds:  Continuous Infusions:No current facility-administered medications for this encounter.    PRN Meds:      OBJECTIVE    Vital Signs  There were no vitals filed for this visit.        No intake or output data in the 24 hours ending 11/11/24 1213     Telemetry: Sinus rhythm    Physical Exam:  The patient is alert, oriented and in no distress.  Vital signs as noted above.  Head and neck revealed no carotid bruits or jugular venous distention.  No thyromegaly or lymphadenopathy is present  Lungs clear.  No wheezing.  Breath sounds are normal bilaterally.  Heart: Normal first and second heart sounds. No murmur.  No precordial rub is present.  No gallop is present.  Abdomen: Soft and nontender.  No organomegaly is present.  Extremities with good peripheral pulses without any pedal edema.  Skin: Warm and dry.  Musculoskeletal system is grossly normal.  CNS grossly normal.       Results Review:  I have personally reviewed the results from the time of this admission to 11/11/2024 12:13 EST and agree with these findings:  []  Laboratory  []  Microbiology  []  Radiology  []  EKG/Telemetry   []  Cardiology/Vascular   []  Pathology  []  Old records  []  Other:    Most notable findings include:     Lab Results (last 24 hours)       ** No results found for the last 24 hours. **            Imaging Results (Last 24 Hours)       ** No results found for the last 24 hours. **            LAB RESULTS (LAST 7 DAYS)    CBC        BMP  Results from last 7 days   Lab Units 11/05/24  1337   CREATININE mg/dL 1.50*       CMP   Results from last 7 days   Lab Units 11/05/24  1337   CREATININE mg/dL 1.50*       BNP        TROPONIN        CoAg         Creatinine Clearance  CrCl cannot be calculated (Unknown ideal weight.).    ABG          Radiology  No radiology results for the last day      EKG  I personally viewed and interpreted the patient's EKG/Telemetry data:  No orders to display         Echocardiogram:    Results for orders placed during the hospital encounter of 10/24/24    Adult Transesophageal Echo (TATUM) W/ Cont if Necessary Per Protocol    Interpretation Summary    Left ventricular ejection fraction appears to be 61 - 65%.    The left atrial cavity is moderately dilated.    The right atrial cavity is mildly  dilated.    Moderate tricuspid valve regurgitation is present.    Estimated right ventricular systolic pressure from tricuspid regurgitation is normal (<35 mmHg).    Presence of a bioprosthetic mitral valve with evidence of valve failure and severe mitral regurgitation.    No thrombus was visualized in the left atrial appendage.    No cardioversion was performed due to presence of severe mitral regurgitation.        Electronically signed by Rj Dominique MD, 10/25/24, 12:49 PM EDT.        Stress Test:        Cardiac Catheterization:  No results found for this or any previous visit.        Other:      ASSESSMENT & PLAN:    Active Problems:    Coronary artery disease    Acute on chronic diastolic heart failure    Nonrheumatic mitral valve regurgitation    History of mitral valve replacement with bioprosthetic valve    Hx of CABG    Severe bioprosthetic mitral valve regurgitation  Status post mitral valve replacement with stentless 31 mm Biocor device  TATUM has been completed  TMVR protocol CT scan has been completed  Right heart cath shows pulmonary hypertension with large V waves.  I have discussed transcatheter mitral valve in valve procedure in details with the patient and explained the risk and benefit of the procedure  Anatomical candidacy to be determined by CT scan: Efrem LVOT >335    Isolated MVR   Operative Mortality 9.96%   Morbidity  & Mortality 37.9%   Stroke 2.44%   Renal Failure 7.18%   Reoperation 10.6%   Prolonged Ventilation 30.6%   Deep Sternal Wound Infection 0.044%   Long Hospital Stay (>14 days) 26.4%   Short Hospital Stay (<6 days)* 11%     Coronary artery disease  No evidence of CABG  Coronary angiogram shows patent coronaries with 40 to 50% stenosis in the LAD.  Continue risk factors modification.    Atrial fibrillation  [id number] score is 4  Continue Eliquis: Eliquis will be stopped 3 days prior to TMVR.  Sotalol for rate and rhythm control    HFpEF  Secondary to hypertension, atrial fibrillation, coronary artery disease and valvular heart disease  Continue furosemide    Permanent pacemaker  Pacemaker removal secondary to pocket infection in April 2022  Timing for repeat pacemaker placement per EP    Hypertension  Continue amlodipine    Hyperlipidemia  Continue atorvastatin      Isaías Malloy MD  11/11/24  12:13 EST

## 2024-11-11 NOTE — H&P (VIEW-ONLY)
Reason for Consultation: Severe mitral regurgitation      Patient Care Team:  Amada Haskins MD as PCP - General  Josr Rodriguez DPM as Consulting Physician (Podiatry)  Rj Dominique MD as Consulting Physician (Cardiology)      SUBJECTIVE     Chief Complaint: CHF, NYHA class III, coronary artery disease, mitral regurgitation    History of present illness:  Drake Beth is a 74 y.o. male with hypertension, hyperlipidemia, nonobstructive coronary artery disease, status post bioprosthetic mitral valve replacement in 2008 with 31 mm Biocor stentless valve, paroxysmal atrial fibrillation, permanent pacemaker who has been noted to have severe bioprosthetic mitral valve regurgitation.  He has been referred to structural heart clinic to discuss transcatheter mitral valve replacement.  Today he comes in for right and left heart cath      Review of systems:    Constitutional: No weakness, fatigue, fever, rigors, chills   Eyes: No vision changes, eye pain   ENT/oropharynx: No difficulty swallowing, sore throat, epistaxis, changes in hearing   Cardiovascular: No chest pain, chest tightness, palpitations, paroxysmal nocturnal dyspnea, orthopnea, diaphoresis, dizziness / syncopal episode   Respiratory: + shortness of breath, dyspnea on exertion, cough, wheezing, hemoptysis   Gastrointestinal: No abdominal pain, nausea, vomiting, diarrhea, bloody stools   Genitourinary: No hematuria, dysuria   Neurological: No headache, tremors, numbness, one-sided weakness    Musculoskeletal: No cramps, myalgias, joint pain, joint swelling   Integument: No rash, edema        Personal History:      Past Medical History:   Diagnosis Date    Allergic rhinitis     Anemia     Atrial fibrillation     CHF (congestive heart failure)     Chronic constipation     Coronary artery disease     GERD (gastroesophageal reflux disease)     Hyperlipidemia     Hypertension     Mentally challenged     MVP (mitral valve prolapse)        Past  Surgical History:   Procedure Laterality Date    CARDIAC ELECTROPHYSIOLOGY PROCEDURE N/A 04/26/2022    Procedure: PACEMAKER EXTRACTION;  Surgeon: Rj Dominique MD;  Location:  INVASIVE LOCATION;  Service: Cardiology;  Laterality: N/A;    COLONOSCOPY      He has never had the test done.  His family is refusing any form of colon cancer screening for him.    CORONARY ARTERY BYPASS GRAFT  2008    DR SWEAT    MITRAL VALVE REPLACEMENT  04/2008    WITH BIOPROSTHETIC TISSUE VALVE       Family History   Problem Relation Age of Onset    Coronary artery disease Mother 64    Hyperlipidemia Sister     Liver disease Sister         FATTY LIVER    Rheum arthritis Sister     Lung cancer Sister     Hypertension Brother     Other Brother     Rheum arthritis Brother        Social History     Tobacco Use    Smoking status: Never     Passive exposure: Never    Smokeless tobacco: Never   Vaping Use    Vaping status: Never Used   Substance Use Topics    Alcohol use: Never    Drug use: Never        Home meds:  Prior to Admission medications    Medication Sig Start Date End Date Taking? Authorizing Provider   amLODIPine (NORVASC) 2.5 MG tablet TAKE 1 TABLET BY MOUTH DAILY 7/12/24   Rj Dominique MD   apixaban (Eliquis) 5 MG tablet tablet TAKE 1 TABLET BY MOUTH TWICE DAILY 1/25/24   Rj Dominique MD   atorvastatin (LIPITOR) 10 MG tablet TAKE 1 TABLET BY MOUTH DAILY 6/7/24   Ansley Tomlin APRN   furosemide (LASIX) 40 MG tablet TAKE 1 TABLET BY MOUTH DAILY 9/27/24   Rj Dominique MD   ipratropium-albuterol (DUO-NEB) 0.5-2.5 mg/3 ml nebulizer Take 3 mL by nebulization 4 (Four) Times a Day. 10/1/24   Amada Haskins MD   montelukast (SINGULAIR) 10 MG tablet TAKE 1 TABLET BY MOUTH DAILY 6/7/24   Amada Haskins MD   multivitamin with minerals tablet tablet Take 1 tablet by mouth Daily.    ProviderMary MD   omeprazole (priLOSEC) 20 MG capsule Take 1 capsule by mouth Daily.    ProviderMary MD    potassium chloride (KLOR-CON M20) 20 MEQ CR tablet TAKE 1 TABLET BY MOUTH DAILY 9/18/24   Amada Haskins MD   sotalol (BETAPACE AF) 80 MG tablet tablet TAKE 1 TABLET BY MOUTH TWICE DAILY 5/13/24   Rj Dominique MD       Allergies:     Patient has no known allergies.    Scheduled Meds:  Continuous Infusions:No current facility-administered medications for this encounter.    PRN Meds:      OBJECTIVE    Vital Signs  There were no vitals filed for this visit.        No intake or output data in the 24 hours ending 11/11/24 1213     Telemetry: Sinus rhythm    Physical Exam:  The patient is alert, oriented and in no distress.  Vital signs as noted above.  Head and neck revealed no carotid bruits or jugular venous distention.  No thyromegaly or lymphadenopathy is present  Lungs clear.  No wheezing.  Breath sounds are normal bilaterally.  Heart: Normal first and second heart sounds. No murmur.  No precordial rub is present.  No gallop is present.  Abdomen: Soft and nontender.  No organomegaly is present.  Extremities with good peripheral pulses without any pedal edema.  Skin: Warm and dry.  Musculoskeletal system is grossly normal.  CNS grossly normal.       Results Review:  I have personally reviewed the results from the time of this admission to 11/11/2024 12:13 EST and agree with these findings:  []  Laboratory  []  Microbiology  []  Radiology  []  EKG/Telemetry   []  Cardiology/Vascular   []  Pathology  []  Old records  []  Other:    Most notable findings include:     Lab Results (last 24 hours)       ** No results found for the last 24 hours. **            Imaging Results (Last 24 Hours)       ** No results found for the last 24 hours. **            LAB RESULTS (LAST 7 DAYS)    CBC        BMP  Results from last 7 days   Lab Units 11/05/24  1337   CREATININE mg/dL 1.50*       CMP   Results from last 7 days   Lab Units 11/05/24  1337   CREATININE mg/dL 1.50*       BNP        TROPONIN        CoAg         Creatinine Clearance  CrCl cannot be calculated (Unknown ideal weight.).    ABG          Radiology  No radiology results for the last day      EKG  I personally viewed and interpreted the patient's EKG/Telemetry data:  No orders to display         Echocardiogram:    Results for orders placed during the hospital encounter of 10/24/24    Adult Transesophageal Echo (TATUM) W/ Cont if Necessary Per Protocol    Interpretation Summary    Left ventricular ejection fraction appears to be 61 - 65%.    The left atrial cavity is moderately dilated.    The right atrial cavity is mildly  dilated.    Moderate tricuspid valve regurgitation is present.    Estimated right ventricular systolic pressure from tricuspid regurgitation is normal (<35 mmHg).    Presence of a bioprosthetic mitral valve with evidence of valve failure and severe mitral regurgitation.    No thrombus was visualized in the left atrial appendage.    No cardioversion was performed due to presence of severe mitral regurgitation.        Electronically signed by Rj Dominique MD, 10/25/24, 12:49 PM EDT.        Stress Test:        Cardiac Catheterization:  No results found for this or any previous visit.        Other:      ASSESSMENT & PLAN:    Active Problems:    Coronary artery disease    Acute on chronic diastolic heart failure    Nonrheumatic mitral valve regurgitation    History of mitral valve replacement with bioprosthetic valve    Hx of CABG    Severe bioprosthetic mitral valve regurgitation  Status post mitral valve replacement with stentless 31 mm Biocor device  TATUM has been completed  TMVR protocol CT scan has been completed  Right heart cath shows pulmonary hypertension with large V waves.  I have discussed transcatheter mitral valve in valve procedure in details with the patient and explained the risk and benefit of the procedure  Anatomical candidacy to be determined by CT scan: Efrem LVOT >335    Isolated MVR   Operative Mortality 9.96%   Morbidity  & Mortality 37.9%   Stroke 2.44%   Renal Failure 7.18%   Reoperation 10.6%   Prolonged Ventilation 30.6%   Deep Sternal Wound Infection 0.044%   Long Hospital Stay (>14 days) 26.4%   Short Hospital Stay (<6 days)* 11%     Coronary artery disease  No evidence of CABG  Coronary angiogram shows patent coronaries with 40 to 50% stenosis in the LAD.  Continue risk factors modification.    Atrial fibrillation  [id number] score is 4  Continue Eliquis: Eliquis will be stopped 3 days prior to TMVR.  Sotalol for rate and rhythm control    HFpEF  Secondary to hypertension, atrial fibrillation, coronary artery disease and valvular heart disease  Continue furosemide    Permanent pacemaker  Pacemaker removal secondary to pocket infection in April 2022  Timing for repeat pacemaker placement per EP    Hypertension  Continue amlodipine    Hyperlipidemia  Continue atorvastatin      Isaías Malloy MD  11/11/24  12:13 EST

## 2024-11-11 NOTE — Clinical Note
A 7 fr sheath was successfully inserted using micropuncture technique with ultrasound guidance into the right femoral vein.

## 2024-11-12 NOTE — DISCHARGE INSTRUCTIONS
Post Cath Instructions  Drink plenty of water for the next 24 hours. This helps to eliminate the dye used in your procedure through urination.  You may resume a normal diet; however, try to avoid foods that would cause gas or constipation.    What to do for pain: acetaminophen (Tylenol), not ibuprofen (Advil) can be used for puncture site tenderness. If your pain is unmanageable with this method, call the Cardiologist's office.     Sedative medication (Anesthesia) given to you during your catheterization may decrease your judgement and reaction time for up to 24-48 hours.  Therefore:  DO NOT drive, operate hazardous machinery, or consume alcoholic beverages for 24 hours.   DO NOT make any important/legal decisions for 24 hours.   Have someone stay with you for at least 24 hours.    For the next 48 hours (to allow proper healing and prevent bleeding):  Avoid excessive bending at wound site  Avoid straining (anything that would tense up muscles around the affected puncture site)  Avoid lifting, pushing, or pulling objects greater than 5 pounds, for 5 days  For Arm Cases:  No flexing at the puncture site, such as hammering, golfing, bowling, or swinging any objects  For Groin Cases:  Refrain from running, vigorous walking, and sexual activity  No prolonged sitting or standing   Limit stair climbing as much as possible    Keep the puncture site clean and dry.  After 24 hours, remove the dressing and replace it with a Band-Aid for at least one additional day.  Gently clean the site with mild soap and water.  No scrubbing/rubbing, and lightly pat the area dry.  Showers are acceptable; however, avoid submerging in water (tub baths, hot tubs, pools, dishwater, etc…) for at least one week.  The site should be completely healed before resuming these activities to reduce the risk of infection. Watch for signs and symptoms of infection and notify the physician of any of the following:  Bleeding or an increase in swelling,  redness, or warmth at the puncture site  Fever  Increased soreness around puncture site  Foul odor or significant drainage from the puncture site  **A bruise or small “pea sized” lump under the skin at the puncture site is not unusual.  This should disappear within 3-4 weeks.**    CONTACT YOUR PHYSICIAN OR CALL 911 IF YOU EXPERIENCE ANY OF THE FOLLOWING:  Increased angina (chest pain) or frequent sensations of pressure, burning, pain, or other discomfort in the chest, arm, jaws, or stomach  Lightheadedness, dizziness, faint feeling, sweating, or difficulty breathing  Odd changes in sedation (like numbness, tingling, coldness, or pain) or color (pale/bluish) in the arm or leg in which the catheter was inserted.    IMPORTANT:  Although this occurs very rarely, if you should develop bright red or excessive bleeding, feel a “pop” inside at the insertion site, or notice a sudden increase in swelling larger than a walnut, you should call 911.  Hold continuous firm pressure to the access site until emergency personnel arrive.  It is best if someone else can do this for you.

## 2024-11-14 ENCOUNTER — PREP FOR SURGERY (OUTPATIENT)
Dept: OTHER | Facility: HOSPITAL | Age: 74
End: 2024-11-14
Payer: MEDICARE

## 2024-11-14 DIAGNOSIS — Z95.3 HISTORY OF MITRAL VALVE REPLACEMENT WITH BIOPROSTHETIC VALVE: ICD-10-CM

## 2024-11-14 DIAGNOSIS — I34.0 SEVERE MITRAL REGURGITATION: Primary | ICD-10-CM

## 2024-11-14 DIAGNOSIS — I50.32 CONGESTIVE HEART FAILURE, NYHA CLASS 3, CHRONIC, DIASTOLIC: ICD-10-CM

## 2024-11-14 DIAGNOSIS — I34.0 NONRHEUMATIC MITRAL VALVE REGURGITATION: ICD-10-CM

## 2024-11-14 DIAGNOSIS — I10 PRIMARY HYPERTENSION: ICD-10-CM

## 2024-11-14 RX ORDER — CHLORHEXIDINE GLUCONATE 500 MG/1
CLOTH TOPICAL EVERY 12 HOURS PRN
OUTPATIENT
Start: 2024-11-14

## 2024-11-14 RX ORDER — SODIUM CHLORIDE 0.9 % (FLUSH) 0.9 %
10 SYRINGE (ML) INJECTION AS NEEDED
OUTPATIENT
Start: 2024-11-14

## 2024-11-14 RX ORDER — SODIUM CHLORIDE 0.9 % (FLUSH) 0.9 %
10 SYRINGE (ML) INJECTION EVERY 12 HOURS SCHEDULED
OUTPATIENT
Start: 2024-11-14

## 2024-11-15 ENCOUNTER — TELEPHONE (OUTPATIENT)
Dept: CARDIOLOGY | Facility: HOSPITAL | Age: 74
End: 2024-11-15
Payer: MEDICARE

## 2024-11-15 NOTE — TELEPHONE ENCOUNTER
Telephoned patient's brother, Richi.  Scheduled TMVR procedure for 12/9/24 at 0800.  Medication instructions given verbally and mailed.  PAT will call to schedule.

## 2024-11-19 RX ORDER — SOTALOL HYDROCHLORIDE 80 MG/1
TABLET ORAL
Qty: 60 TABLET | Refills: 6 | Status: SHIPPED | OUTPATIENT
Start: 2024-11-19

## 2024-11-19 NOTE — TELEPHONE ENCOUNTER
Rx Refill Note  Requested Prescriptions     Signed Prescriptions Disp Refills    sotalol (BETAPACE AF) 80 MG tablet tablet 60 tablet 6     Sig: TAKE 1 TABLET BY MOUTH TWICE DAILY     Authorizing Provider: NEY GONZALEZ     Ordering User: YOCASTA AMBROSE      Last office visit with prescribing clinician: 10/8/2024   Last telemedicine visit with prescribing clinician: Visit date not found   Next office visit with prescribing clinician: Visit date not found                         Would you like a call back once the refill request has been completed: [] Yes [] No    If the office needs to give you a call back, can they leave a voicemail: [] Yes [] No    Yocasta Ambrose MA  11/19/24, 13:16 EST

## 2024-12-05 ENCOUNTER — ANESTHESIA EVENT (OUTPATIENT)
Dept: PERIOP | Facility: HOSPITAL | Age: 74
DRG: 266 | End: 2024-12-05
Payer: MEDICARE

## 2024-12-05 DIAGNOSIS — Z95.2 S/P TRANSCATHETER MITRAL VALVE REPLACEMENT (TMVR): ICD-10-CM

## 2024-12-05 DIAGNOSIS — I34.0 SEVERE MITRAL REGURGITATION: Primary | ICD-10-CM

## 2024-12-06 ENCOUNTER — PRE-ADMISSION TESTING (OUTPATIENT)
Dept: PREADMISSION TESTING | Facility: HOSPITAL | Age: 74
DRG: 267 | End: 2024-12-06
Payer: MEDICARE

## 2024-12-06 ENCOUNTER — HOSPITAL ENCOUNTER (OUTPATIENT)
Dept: CARDIOLOGY | Facility: HOSPITAL | Age: 74
Discharge: HOME | End: 2024-12-06
Payer: MEDICARE

## 2024-12-06 ENCOUNTER — LAB (OUTPATIENT)
Dept: LAB | Facility: HOSPITAL | Age: 74
End: 2024-12-06
Payer: MEDICARE

## 2024-12-06 ENCOUNTER — HOSPITAL ENCOUNTER (OUTPATIENT)
Dept: GENERAL RADIOLOGY | Facility: HOSPITAL | Age: 74
Discharge: HOME | End: 2024-12-06
Payer: MEDICARE

## 2024-12-06 VITALS
SYSTOLIC BLOOD PRESSURE: 126 MMHG | WEIGHT: 89.8 LBS | HEART RATE: 53 BPM | BODY MASS INDEX: 17.63 KG/M2 | DIASTOLIC BLOOD PRESSURE: 66 MMHG | RESPIRATION RATE: 16 BRPM | TEMPERATURE: 97 F | OXYGEN SATURATION: 94 % | HEIGHT: 60 IN

## 2024-12-06 DIAGNOSIS — I50.32 CONGESTIVE HEART FAILURE, NYHA CLASS 3, CHRONIC, DIASTOLIC: ICD-10-CM

## 2024-12-06 DIAGNOSIS — Z95.3 HISTORY OF MITRAL VALVE REPLACEMENT WITH BIOPROSTHETIC VALVE: ICD-10-CM

## 2024-12-06 DIAGNOSIS — I34.0 SEVERE MITRAL REGURGITATION: ICD-10-CM

## 2024-12-06 DIAGNOSIS — I10 PRIMARY HYPERTENSION: ICD-10-CM

## 2024-12-06 LAB
ABO GROUP BLD: NORMAL
BASOPHILS # BLD AUTO: 0.04 10*3/MM3 (ref 0–0.2)
BASOPHILS NFR BLD AUTO: 0.5 % (ref 0–1.5)
BLD GP AB SCN SERPL QL: NEGATIVE
DEPRECATED RDW RBC AUTO: 53.5 FL (ref 37–54)
EOSINOPHIL # BLD AUTO: 0.65 10*3/MM3 (ref 0–0.4)
EOSINOPHIL NFR BLD AUTO: 8.9 % (ref 0.3–6.2)
ERYTHROCYTE [DISTWIDTH] IN BLOOD BY AUTOMATED COUNT: 14.5 % (ref 12.3–15.4)
HCT VFR BLD AUTO: 37.3 % (ref 37.5–51)
HGB BLD-MCNC: 11.8 G/DL (ref 13–17.7)
IMM GRANULOCYTES # BLD AUTO: 0.02 10*3/MM3 (ref 0–0.05)
IMM GRANULOCYTES NFR BLD AUTO: 0.3 % (ref 0–0.5)
INR PPP: 1.16 (ref 0.9–1.1)
LYMPHOCYTES # BLD AUTO: 1.54 10*3/MM3 (ref 0.7–3.1)
LYMPHOCYTES NFR BLD AUTO: 21.1 % (ref 19.6–45.3)
MCH RBC QN AUTO: 32 PG (ref 26.6–33)
MCHC RBC AUTO-ENTMCNC: 31.6 G/DL (ref 31.5–35.7)
MCV RBC AUTO: 101.1 FL (ref 79–97)
MONOCYTES # BLD AUTO: 0.68 10*3/MM3 (ref 0.1–0.9)
MONOCYTES NFR BLD AUTO: 9.3 % (ref 5–12)
MRSA DNA SPEC QL NAA+PROBE: NORMAL
NEUTROPHILS NFR BLD AUTO: 4.38 10*3/MM3 (ref 1.7–7)
NEUTROPHILS NFR BLD AUTO: 59.9 % (ref 42.7–76)
NRBC BLD AUTO-RTO: 0 /100 WBC (ref 0–0.2)
NT-PROBNP SERPL-MCNC: 2679 PG/ML (ref 0–900)
PLATELET # BLD AUTO: 174 10*3/MM3 (ref 140–450)
PMV BLD AUTO: 11.3 FL (ref 6–12)
PROTHROMBIN TIME: 14.8 SECONDS (ref 11.7–14.2)
RBC # BLD AUTO: 3.69 10*6/MM3 (ref 4.14–5.8)
RH BLD: POSITIVE
SARS-COV-2 RNA RESP QL NAA+PROBE: NOT DETECTED
T&S EXPIRATION DATE: NORMAL
WBC NRBC COR # BLD AUTO: 7.31 10*3/MM3 (ref 3.4–10.8)

## 2024-12-06 PROCEDURE — 86901 BLOOD TYPING SEROLOGIC RH(D): CPT

## 2024-12-06 PROCEDURE — 83880 ASSAY OF NATRIURETIC PEPTIDE: CPT

## 2024-12-06 PROCEDURE — 86900 BLOOD TYPING SEROLOGIC ABO: CPT

## 2024-12-06 PROCEDURE — 71046 X-RAY EXAM CHEST 2 VIEWS: CPT

## 2024-12-06 PROCEDURE — 36415 COLL VENOUS BLD VENIPUNCTURE: CPT

## 2024-12-06 PROCEDURE — 87635 SARS-COV-2 COVID-19 AMP PRB: CPT

## 2024-12-06 PROCEDURE — 85610 PROTHROMBIN TIME: CPT

## 2024-12-06 PROCEDURE — 86923 COMPATIBILITY TEST ELECTRIC: CPT

## 2024-12-06 PROCEDURE — 85025 COMPLETE CBC W/AUTO DIFF WBC: CPT

## 2024-12-06 PROCEDURE — 93005 ELECTROCARDIOGRAM TRACING: CPT | Performed by: NURSE PRACTITIONER

## 2024-12-06 PROCEDURE — 87641 MR-STAPH DNA AMP PROBE: CPT

## 2024-12-06 PROCEDURE — 86850 RBC ANTIBODY SCREEN: CPT

## 2024-12-06 RX ORDER — CHLORHEXIDINE GLUCONATE 500 MG/1
CLOTH TOPICAL EVERY 12 HOURS PRN
Status: ACTIVE | OUTPATIENT
Start: 2024-12-06

## 2024-12-07 ENCOUNTER — DOCUMENTATION (OUTPATIENT)
Dept: CARDIOLOGY | Facility: HOSPITAL | Age: 74
End: 2024-12-07
Payer: MEDICARE

## 2024-12-07 NOTE — NURSING NOTE
Patient's brother anshul called.  Patient's brothers Wingina and Anshul, along with patient agree that patient would not like to be an open chest rescue, in an emergency for TMVR on Monday 12/9/24. Will notify Dr. Ríos.

## 2024-12-08 NOTE — ANESTHESIA PREPROCEDURE EVALUATION
Anesthesia Evaluation     Patient summary reviewed and Nursing notes reviewed   NPO Solid Status: > 8 hours  NPO Liquid Status: > 8 hours           Airway   Mallampati: II  TM distance: >3 FB  Neck ROM: full  Possible difficult intubation  Dental - normal exam     Pulmonary - normal exam    breath sounds clear to auscultation  (+) pneumonia ,  (-) not a smoker  Cardiovascular - normal exam  Exercise tolerance: unable to assess    ECG reviewed  Rhythm: regular  Rate: normal    (+) hypertension, valvular problems/murmurs (S/P MVR, Severe MR) MR, past MI , CAD, CABG, dysrhythmias Paroxysmal Atrial Fib, CHF Diastolic >=55%, hyperlipidemia    ROS comment: ECHO  ·  Left ventricular ejection fraction appears to be 61 - 65%.  ·  The left atrial cavity is moderately dilated.  ·  The right atrial cavity is mildly  dilated.  ·  Moderate tricuspid valve regurgitation is present.  ·  Estimated right ventricular systolic pressure from tricuspid regurgitation is normal (<35 mmHg).  ·  Presence of a bioprosthetic mitral valve with evidence of valve failure and severe mitral regurgitation.  ·  No thrombus was visualized in the left atrial appendage.  ·  No cardioversion was performed due to presence of severe mitral regurgitation.        CATH  IMPRESSIONS  Non obstructive CAD  No CABG, LIMA is then used  Low cardiac output and index  Moderate to severe pulmonary hypertension with elevated PVR     RECOMMENDATIONS  -Proceed with TMVR             Neuro/Psych  (+) dementia (Mentally delayed)  GI/Hepatic/Renal/Endo    (+) GERD, renal disease- CRI    Musculoskeletal (-) negative ROS    Abdominal  - normal exam   Substance History - negative use     OB/GYN negative ob/gyn ROS         Other - negative ROS       ROS/Med Hx Other: ASSESSMENT & PLAN:     Active Problems:    Coronary artery disease    Acute on chronic diastolic heart failure    Nonrheumatic mitral valve regurgitation    History of mitral valve replacement with bioprosthetic  valve    Hx of CABG     Severe bioprosthetic mitral valve regurgitation  Status post mitral valve replacement with stentless 31 mm Biocor device  TATUM has been completed  TMVR protocol CT scan has been completed  Right heart cath shows pulmonary hypertension with large V waves.  I have discussed transcatheter mitral valve in valve procedure in details with the patient and explained the risk and benefit of the procedure  Anatomical candidacy to be determined by CT scan: Efrem LVOT >335     Isolated MVR   Operative Mortality 9.96%   Morbidity & Mortality 37.9%   Stroke 2.44%   Renal Failure 7.18%   Reoperation 10.6%   Prolonged Ventilation 30.6%   Deep Sternal Wound Infection 0.044%   Long Hospital Stay (>14 days) 26.4%   Short Hospital Stay (<6 days)* 11%      Coronary artery disease  No evidence of CABG  Coronary angiogram shows patent coronaries with 40 to 50% stenosis in the LAD.  Continue risk factors modification.     Atrial fibrillation  [id number] score is 4  Continue Eliquis: Eliquis will be stopped 3 days prior to TMVR.  Sotalol for rate and rhythm control     HFpEF  Secondary to hypertension, atrial fibrillation, coronary artery disease and valvular heart disease  Continue furosemide     Permanent pacemaker  Pacemaker removal secondary to pocket infection in April 2022  Timing for repeat pacemaker placement per EP     Hypertension  Continue amlodipine     Hyperlipidemia  Continue atorvastatin                        Anesthesia Plan    ASA 4     general, Burson and CVL     (? TATUM interpretation)  intravenous induction     Anesthetic plan, risks, benefits, and alternatives have been provided, discussed and informed consent has been obtained with: patient.    CODE STATUS:

## 2024-12-09 ENCOUNTER — HOSPITAL ENCOUNTER (INPATIENT)
Facility: HOSPITAL | Age: 74
LOS: 1 days | Discharge: HOME | DRG: 267 | End: 2024-12-10
Attending: INTERNAL MEDICINE | Admitting: THORACIC SURGERY (CARDIOTHORACIC VASCULAR SURGERY)
Payer: MEDICARE

## 2024-12-09 ENCOUNTER — APPOINTMENT (OUTPATIENT)
Dept: CARDIOLOGY | Facility: HOSPITAL | Age: 74
DRG: 267 | End: 2024-12-09
Payer: MEDICARE

## 2024-12-09 ENCOUNTER — ANESTHESIA (OUTPATIENT)
Dept: PERIOP | Facility: HOSPITAL | Age: 74
DRG: 266 | End: 2024-12-09
Payer: MEDICARE

## 2024-12-09 DIAGNOSIS — Z95.2 S/P TRANSCATHETER MITRAL VALVE REPLACEMENT (TMVR): Primary | ICD-10-CM

## 2024-12-09 DIAGNOSIS — Z95.3 HISTORY OF MITRAL VALVE REPLACEMENT WITH BIOPROSTHETIC VALVE: ICD-10-CM

## 2024-12-09 DIAGNOSIS — I34.0 SEVERE MITRAL REGURGITATION: ICD-10-CM

## 2024-12-09 DIAGNOSIS — I50.32 CONGESTIVE HEART FAILURE, NYHA CLASS 3, CHRONIC, DIASTOLIC: ICD-10-CM

## 2024-12-09 DIAGNOSIS — I34.0 NONRHEUMATIC MITRAL VALVE REGURGITATION: ICD-10-CM

## 2024-12-09 LAB
ACT BLD: 118 SECONDS (ref 89–137)
ACT BLD: 135 SECONDS (ref 89–137)
ACT BLD: 273 SECONDS (ref 89–137)
ACT BLD: 297 SECONDS (ref 89–137)
ACT BLD: 320 SECONDS (ref 89–137)
ACT BLD: 331 SECONDS (ref 89–137)
ALBUMIN SERPL-MCNC: 4 G/DL (ref 3.5–5.2)
ALBUMIN/GLOB SERPL: 1 G/DL
ALP SERPL-CCNC: 71 U/L (ref 39–117)
ALT SERPL W P-5'-P-CCNC: 5 U/L (ref 1–41)
ANION GAP SERPL CALCULATED.3IONS-SCNC: 13.6 MMOL/L (ref 5–15)
ARTERIAL PATENCY WRIST A: ABNORMAL
ARTERIAL PATENCY WRIST A: ABNORMAL
AST SERPL-CCNC: 39 U/L (ref 1–40)
ATMOSPHERIC PRESS: ABNORMAL MM[HG]
ATMOSPHERIC PRESS: ABNORMAL MM[HG]
BASE DEFICIT: ABNORMAL
BASE EXCESS BLDA CALC-SCNC: 1.7 MMOL/L (ref 0–3)
BASE EXCESS BLDA CALC-SCNC: 2 MMOL/L (ref 0–3)
BASE EXCESS BLDA CALC-SCNC: 2.6 MMOL/L (ref 0–3)
BDY SITE: ABNORMAL
BDY SITE: ABNORMAL
BH BB BLOOD EXPIRATION DATE: NORMAL
BH BB BLOOD EXPIRATION DATE: NORMAL
BH BB BLOOD TYPE BARCODE: 5100
BH BB BLOOD TYPE BARCODE: 5100
BH BB DISPENSE STATUS: NORMAL
BH BB DISPENSE STATUS: NORMAL
BH BB PRODUCT CODE: NORMAL
BH BB PRODUCT CODE: NORMAL
BH BB UNIT NUMBER: NORMAL
BH BB UNIT NUMBER: NORMAL
BILIRUB SERPL-MCNC: 0.9 MG/DL (ref 0–1.2)
BUN SERPL-MCNC: 31 MG/DL (ref 8–23)
BUN/CREAT SERPL: 23.7 (ref 7–25)
CA-I BLDA-SCNC: 1.11 MMOL/L (ref 1.12–1.32)
CA-I BLDA-SCNC: 1.21 MMOL/L (ref 1.15–1.33)
CA-I BLDA-SCNC: 1.23 MMOL/L (ref 1.15–1.33)
CALCIUM SPEC-SCNC: 9.3 MG/DL (ref 8.6–10.5)
CHLORIDE SERPL-SCNC: 101 MMOL/L (ref 98–107)
CO2 BLDA-SCNC: 28 MMOL/L (ref 23–27)
CO2 SERPL-SCNC: 23.4 MMOL/L (ref 22–29)
CREAT SERPL-MCNC: 1.31 MG/DL (ref 0.76–1.27)
CROSSMATCH INTERPRETATION: NORMAL
CROSSMATCH INTERPRETATION: NORMAL
EGFRCR SERPLBLD CKD-EPI 2021: 57.5 ML/MIN/1.73
GLOBULIN UR ELPH-MCNC: 4.2 GM/DL
GLUCOSE BLDC GLUCOMTR-MCNC: 104 MG/DL (ref 70–105)
GLUCOSE BLDC GLUCOMTR-MCNC: 136 MG/DL (ref 70–105)
GLUCOSE BLDC GLUCOMTR-MCNC: 141 MG/DL (ref 74–100)
GLUCOSE BLDC GLUCOMTR-MCNC: 141 MG/DL (ref 74–100)
GLUCOSE BLDC GLUCOMTR-MCNC: 144 MG/DL (ref 74–100)
GLUCOSE BLDC GLUCOMTR-MCNC: 144 MG/DL (ref 74–100)
GLUCOSE SERPL-MCNC: 106 MG/DL (ref 65–99)
HCO3 BLDA-SCNC: 26.6 MMOL/L (ref 22–26)
HCO3 BLDA-SCNC: 28.7 MMOL/L (ref 21–28)
HCO3 BLDA-SCNC: 29.3 MMOL/L (ref 21–28)
HCT VFR BLDA CALC: 30 % (ref 38–51)
HCT VFR BLDA CALC: 31 % (ref 38–51)
HCT VFR BLDA CALC: 33 % (ref 38–51)
HEMODILUTION: NO
HEMODILUTION: NO
HGB BLDA-MCNC: 10.2 G/DL (ref 12–17)
HGB BLDA-MCNC: 10.5 G/DL (ref 12–17)
HGB BLDA-MCNC: 11.1 G/DL (ref 12–17)
INHALED O2 CONCENTRATION: 40 %
INHALED O2 CONCENTRATION: 40 %
MODALITY: ABNORMAL
MODALITY: ABNORMAL
PCO2 BLDA: 39.7 MM HG (ref 35–45)
PCO2 BLDA: 50.8 MM HG (ref 35–48)
PCO2 BLDA: 59.9 MM HG (ref 35–48)
PEEP RESPIRATORY: 5 CM[H2O]
PEEP RESPIRATORY: 5 CM[H2O]
PH BLDA: 7.3 PH UNITS (ref 7.35–7.45)
PH BLDA: 7.36 PH UNITS (ref 7.35–7.45)
PH BLDA: 7.44 PH UNITS (ref 7.35–7.45)
PO2 BLD: 216 MM[HG] (ref 0–500)
PO2 BLD: 293 MM[HG] (ref 0–500)
PO2 BLDA: 117.1 MM HG (ref 83–108)
PO2 BLDA: 519 MM HG (ref 80–105)
PO2 BLDA: 86.3 MM HG (ref 83–108)
POTASSIUM BLDA-SCNC: 3.4 MMOL/L (ref 3.5–4.9)
POTASSIUM BLDA-SCNC: 3.7 MMOL/L (ref 3.5–4.5)
POTASSIUM BLDA-SCNC: 3.8 MMOL/L (ref 3.5–4.5)
POTASSIUM SERPL-SCNC: 4.4 MMOL/L (ref 3.5–5.2)
PROT SERPL-MCNC: 8.2 G/DL (ref 6–8.5)
PSV: 10 CMH2O
QT INTERVAL: 513 MS
QTC INTERVAL: 480 MS
RESPIRATORY RATE: 14
SAO2 % BLDCOA: 100 % (ref 95–98)
SAO2 % BLDCOA: 95.1 % (ref 94–98)
SAO2 % BLDCOA: 98.3 % (ref 94–98)
SODIUM BLD-SCNC: 141 MMOL/L (ref 138–146)
SODIUM BLD-SCNC: 141 MMOL/L (ref 138–146)
SODIUM BLD-SCNC: 142 MMOL/L (ref 138–146)
SODIUM SERPL-SCNC: 138 MMOL/L (ref 136–145)
UNIT  ABO: NORMAL
UNIT  ABO: NORMAL
UNIT  RH: NORMAL
UNIT  RH: NORMAL
VENTILATOR MODE: ABNORMAL
VENTILATOR MODE: AC
VT ON VENT VENT: 350 ML

## 2024-12-09 PROCEDURE — C1889 IMPLANT/INSERT DEVICE, NOC: HCPCS | Performed by: INTERNAL MEDICINE

## 2024-12-09 PROCEDURE — 0483T TMVI PERCUTANEOUS APPROACH: CPT | Performed by: INTERNAL MEDICINE

## 2024-12-09 PROCEDURE — 80053 COMPREHEN METABOLIC PANEL: CPT | Performed by: INTERNAL MEDICINE

## 2024-12-09 PROCEDURE — 93355 ECHO TRANSESOPHAGEAL (TEE): CPT | Performed by: INTERNAL MEDICINE

## 2024-12-09 PROCEDURE — 82803 BLOOD GASES ANY COMBINATION: CPT | Performed by: INTERNAL MEDICINE

## 2024-12-09 PROCEDURE — C1769 GUIDE WIRE: HCPCS | Performed by: INTERNAL MEDICINE

## 2024-12-09 PROCEDURE — 94799 UNLISTED PULMONARY SVC/PX: CPT

## 2024-12-09 PROCEDURE — 25010000002 MIDAZOLAM PER 1 MG: Performed by: ANESTHESIOLOGY

## 2024-12-09 PROCEDURE — 85018 HEMOGLOBIN: CPT

## 2024-12-09 PROCEDURE — 02163Z7 BYPASS RIGHT ATRIUM TO LEFT ATRIUM, PERCUTANEOUS APPROACH: ICD-10-PCS | Performed by: THORACIC SURGERY (CARDIOTHORACIC VASCULAR SURGERY)

## 2024-12-09 PROCEDURE — 85014 HEMATOCRIT: CPT | Performed by: INTERNAL MEDICINE

## 2024-12-09 PROCEDURE — B24BZZ4 ULTRASONOGRAPHY OF HEART WITH AORTA, TRANSESOPHAGEAL: ICD-10-PCS | Performed by: THORACIC SURGERY (CARDIOTHORACIC VASCULAR SURGERY)

## 2024-12-09 PROCEDURE — 25010000002 PROPOFOL 200 MG/20ML EMULSION: Performed by: ANESTHESIOLOGY

## 2024-12-09 PROCEDURE — 25010000002 HEPARIN (PORCINE) PER 1000 UNITS: Performed by: INTERNAL MEDICINE

## 2024-12-09 PROCEDURE — 25010000002 PHENYLEPHRINE 10 MG/ML SOLUTION 5 ML VIAL: Performed by: ANESTHESIOLOGY

## 2024-12-09 PROCEDURE — 25010000002 CEFAZOLIN PER 500 MG: Performed by: NURSE PRACTITIONER

## 2024-12-09 PROCEDURE — 94002 VENT MGMT INPAT INIT DAY: CPT

## 2024-12-09 PROCEDURE — 25010000002 FENTANYL CITRATE (PF) 100 MCG/2ML SOLUTION: Performed by: ANESTHESIOLOGY

## 2024-12-09 PROCEDURE — 82330 ASSAY OF CALCIUM: CPT | Performed by: INTERNAL MEDICINE

## 2024-12-09 PROCEDURE — C1725 CATH, TRANSLUMIN NON-LASER: HCPCS | Performed by: INTERNAL MEDICINE

## 2024-12-09 PROCEDURE — C1894 INTRO/SHEATH, NON-LASER: HCPCS | Performed by: INTERNAL MEDICINE

## 2024-12-09 PROCEDURE — 0483T TMVI PERCUTANEOUS APPROACH: CPT | Performed by: THORACIC SURGERY (CARDIOTHORACIC VASCULAR SURGERY)

## 2024-12-09 PROCEDURE — 82947 ASSAY GLUCOSE BLOOD QUANT: CPT | Performed by: INTERNAL MEDICINE

## 2024-12-09 PROCEDURE — 94640 AIRWAY INHALATION TREATMENT: CPT

## 2024-12-09 PROCEDURE — 82803 BLOOD GASES ANY COMBINATION: CPT

## 2024-12-09 PROCEDURE — 93355 ECHO TRANSESOPHAGEAL (TEE): CPT

## 2024-12-09 PROCEDURE — 25810000003 SODIUM CHLORIDE 0.9 % SOLUTION: Performed by: INTERNAL MEDICINE

## 2024-12-09 PROCEDURE — C1760 CLOSURE DEV, VASC: HCPCS | Performed by: INTERNAL MEDICINE

## 2024-12-09 PROCEDURE — 25010000002 NICARDIPINE 2.5 MG/ML SOLUTION: Performed by: ANESTHESIOLOGY

## 2024-12-09 PROCEDURE — 02RG38Z REPLACEMENT OF MITRAL VALVE WITH ZOOPLASTIC TISSUE, PERCUTANEOUS APPROACH: ICD-10-PCS | Performed by: THORACIC SURGERY (CARDIOTHORACIC VASCULAR SURGERY)

## 2024-12-09 PROCEDURE — 25010000002 ONDANSETRON PER 1 MG: Performed by: ANESTHESIOLOGY

## 2024-12-09 PROCEDURE — 25010000002 EPINEPHRINE 1 MG/ML SOLUTION 30 ML VIAL: Performed by: ANESTHESIOLOGY

## 2024-12-09 PROCEDURE — 25810000003 SODIUM CHLORIDE 0.9 % SOLUTION: Performed by: ANESTHESIOLOGY

## 2024-12-09 PROCEDURE — 84295 ASSAY OF SERUM SODIUM: CPT | Performed by: INTERNAL MEDICINE

## 2024-12-09 PROCEDURE — 25810000003 SODIUM CHLORIDE 0.9 % SOLUTION 250 ML FLEX CONT: Performed by: ANESTHESIOLOGY

## 2024-12-09 PROCEDURE — 94761 N-INVAS EAR/PLS OXIMETRY MLT: CPT

## 2024-12-09 PROCEDURE — 25010000002 LIDOCAINE 1 % SOLUTION: Performed by: INTERNAL MEDICINE

## 2024-12-09 PROCEDURE — 82948 REAGENT STRIP/BLOOD GLUCOSE: CPT

## 2024-12-09 PROCEDURE — 25010000002 HYDROCORTISONE SOD SUC (PF) 100 MG RECONSTITUTED SOLUTION: Performed by: ANESTHESIOLOGY

## 2024-12-09 PROCEDURE — 84132 ASSAY OF SERUM POTASSIUM: CPT | Performed by: INTERNAL MEDICINE

## 2024-12-09 PROCEDURE — 25010000002 PROTAMINE SULFATE PER 10 MG: Performed by: ANESTHESIOLOGY

## 2024-12-09 PROCEDURE — 82330 ASSAY OF CALCIUM: CPT

## 2024-12-09 PROCEDURE — C1766 INTRO/SHEATH,STRBLE,NON-PEEL: HCPCS | Performed by: INTERNAL MEDICINE

## 2024-12-09 PROCEDURE — 80051 ELECTROLYTE PANEL: CPT

## 2024-12-09 PROCEDURE — 4A023N7 MEASUREMENT OF CARDIAC SAMPLING AND PRESSURE, LEFT HEART, PERCUTANEOUS APPROACH: ICD-10-PCS | Performed by: THORACIC SURGERY (CARDIOTHORACIC VASCULAR SURGERY)

## 2024-12-09 PROCEDURE — 85347 COAGULATION TIME ACTIVATED: CPT | Performed by: INTERNAL MEDICINE

## 2024-12-09 PROCEDURE — 25010000002 HEPARIN (PORCINE) PER 1000 UNITS: Performed by: ANESTHESIOLOGY

## 2024-12-09 RX ORDER — ONDANSETRON 2 MG/ML
4 INJECTION INTRAMUSCULAR; INTRAVENOUS ONCE AS NEEDED
Status: DISCONTINUED | OUTPATIENT
Start: 2024-12-09 | End: 2024-12-10 | Stop reason: HOSPADM

## 2024-12-09 RX ORDER — ACETAMINOPHEN 650 MG/1
650 SUPPOSITORY RECTAL EVERY 4 HOURS PRN
Status: DISCONTINUED | OUTPATIENT
Start: 2024-12-09 | End: 2024-12-10 | Stop reason: HOSPADM

## 2024-12-09 RX ORDER — ONDANSETRON 2 MG/ML
4 INJECTION INTRAMUSCULAR; INTRAVENOUS EVERY 6 HOURS PRN
Status: DISCONTINUED | OUTPATIENT
Start: 2024-12-09 | End: 2024-12-10 | Stop reason: HOSPADM

## 2024-12-09 RX ORDER — HYDROCORTISONE SODIUM SUCCINATE 100 MG/2ML
INJECTION INTRAMUSCULAR; INTRAVENOUS AS NEEDED
Status: DISCONTINUED | OUTPATIENT
Start: 2024-12-09 | End: 2024-12-09 | Stop reason: SURG

## 2024-12-09 RX ORDER — MONTELUKAST SODIUM 10 MG/1
10 TABLET ORAL DAILY
Status: DISCONTINUED | OUTPATIENT
Start: 2024-12-09 | End: 2024-12-10 | Stop reason: HOSPADM

## 2024-12-09 RX ORDER — ONDANSETRON 2 MG/ML
INJECTION INTRAMUSCULAR; INTRAVENOUS AS NEEDED
Status: DISCONTINUED | OUTPATIENT
Start: 2024-12-09 | End: 2024-12-09 | Stop reason: SURG

## 2024-12-09 RX ORDER — ATORVASTATIN CALCIUM 10 MG/1
10 TABLET, FILM COATED ORAL DAILY
Status: DISCONTINUED | OUTPATIENT
Start: 2024-12-09 | End: 2024-12-10 | Stop reason: HOSPADM

## 2024-12-09 RX ORDER — IOPAMIDOL 755 MG/ML
INJECTION, SOLUTION INTRAVASCULAR AS NEEDED
Status: DISCONTINUED | OUTPATIENT
Start: 2024-12-09 | End: 2024-12-09 | Stop reason: HOSPADM

## 2024-12-09 RX ORDER — LORAZEPAM 2 MG/ML
1 INJECTION INTRAMUSCULAR
Status: DISCONTINUED | OUTPATIENT
Start: 2024-12-09 | End: 2024-12-10 | Stop reason: HOSPADM

## 2024-12-09 RX ORDER — FENTANYL CITRATE 50 UG/ML
INJECTION, SOLUTION INTRAMUSCULAR; INTRAVENOUS AS NEEDED
Status: DISCONTINUED | OUTPATIENT
Start: 2024-12-09 | End: 2024-12-09 | Stop reason: SURG

## 2024-12-09 RX ORDER — SODIUM CHLORIDE 0.9 % (FLUSH) 0.9 %
10 SYRINGE (ML) INJECTION EVERY 12 HOURS SCHEDULED
Status: DISCONTINUED | OUTPATIENT
Start: 2024-12-09 | End: 2024-12-09 | Stop reason: HOSPADM

## 2024-12-09 RX ORDER — NALOXONE HCL 0.4 MG/ML
0.4 VIAL (ML) INJECTION AS NEEDED
Status: DISCONTINUED | OUTPATIENT
Start: 2024-12-09 | End: 2024-12-10 | Stop reason: HOSPADM

## 2024-12-09 RX ORDER — DEXMEDETOMIDINE HYDROCHLORIDE 4 UG/ML
.2-1.5 INJECTION, SOLUTION INTRAVENOUS
Status: DISCONTINUED | OUTPATIENT
Start: 2024-12-09 | End: 2024-12-10

## 2024-12-09 RX ORDER — DIPHENHYDRAMINE HYDROCHLORIDE 50 MG/ML
12.5 INJECTION INTRAMUSCULAR; INTRAVENOUS
Status: DISCONTINUED | OUTPATIENT
Start: 2024-12-09 | End: 2024-12-10 | Stop reason: HOSPADM

## 2024-12-09 RX ORDER — HYDROMORPHONE HYDROCHLORIDE 1 MG/ML
0.5 INJECTION, SOLUTION INTRAMUSCULAR; INTRAVENOUS; SUBCUTANEOUS
Status: DISCONTINUED | OUTPATIENT
Start: 2024-12-09 | End: 2024-12-10 | Stop reason: HOSPADM

## 2024-12-09 RX ORDER — ACETAMINOPHEN 325 MG/1
650 TABLET ORAL EVERY 4 HOURS PRN
Status: DISCONTINUED | OUTPATIENT
Start: 2024-12-09 | End: 2024-12-10 | Stop reason: HOSPADM

## 2024-12-09 RX ORDER — HEPARIN SODIUM 1000 [USP'U]/ML
INJECTION, SOLUTION INTRAVENOUS; SUBCUTANEOUS AS NEEDED
Status: DISCONTINUED | OUTPATIENT
Start: 2024-12-09 | End: 2024-12-09 | Stop reason: SURG

## 2024-12-09 RX ORDER — POTASSIUM CHLORIDE 1500 MG/1
20 TABLET, EXTENDED RELEASE ORAL DAILY
Status: DISCONTINUED | OUTPATIENT
Start: 2024-12-09 | End: 2024-12-10 | Stop reason: HOSPADM

## 2024-12-09 RX ORDER — MIDAZOLAM HYDROCHLORIDE 1 MG/ML
INJECTION, SOLUTION INTRAMUSCULAR; INTRAVENOUS AS NEEDED
Status: DISCONTINUED | OUTPATIENT
Start: 2024-12-09 | End: 2024-12-09 | Stop reason: SURG

## 2024-12-09 RX ORDER — DOCUSATE SODIUM 100 MG/1
100 CAPSULE, LIQUID FILLED ORAL 2 TIMES DAILY PRN
Status: DISCONTINUED | OUTPATIENT
Start: 2024-12-09 | End: 2024-12-10 | Stop reason: HOSPADM

## 2024-12-09 RX ORDER — SODIUM CHLORIDE 9 MG/ML
INJECTION, SOLUTION INTRAVENOUS CONTINUOUS PRN
Status: DISCONTINUED | OUTPATIENT
Start: 2024-12-09 | End: 2024-12-09 | Stop reason: SURG

## 2024-12-09 RX ORDER — PROPOFOL 10 MG/ML
INJECTION, EMULSION INTRAVENOUS AS NEEDED
Status: DISCONTINUED | OUTPATIENT
Start: 2024-12-09 | End: 2024-12-09 | Stop reason: SURG

## 2024-12-09 RX ORDER — IPRATROPIUM BROMIDE AND ALBUTEROL SULFATE 2.5; .5 MG/3ML; MG/3ML
3 SOLUTION RESPIRATORY (INHALATION)
Status: DISCONTINUED | OUTPATIENT
Start: 2024-12-09 | End: 2024-12-10 | Stop reason: HOSPADM

## 2024-12-09 RX ORDER — LIDOCAINE HYDROCHLORIDE 10 MG/ML
INJECTION, SOLUTION INFILTRATION; PERINEURAL AS NEEDED
Status: DISCONTINUED | OUTPATIENT
Start: 2024-12-09 | End: 2024-12-09 | Stop reason: HOSPADM

## 2024-12-09 RX ORDER — FUROSEMIDE 40 MG/1
40 TABLET ORAL DAILY
Status: DISCONTINUED | OUTPATIENT
Start: 2024-12-09 | End: 2024-12-10 | Stop reason: HOSPADM

## 2024-12-09 RX ORDER — EPHEDRINE SULFATE 5 MG/ML
INJECTION INTRAVENOUS AS NEEDED
Status: DISCONTINUED | OUTPATIENT
Start: 2024-12-09 | End: 2024-12-09 | Stop reason: SURG

## 2024-12-09 RX ORDER — ALBUTEROL SULFATE 0.83 MG/ML
2.5 SOLUTION RESPIRATORY (INHALATION) ONCE AS NEEDED
Status: DISCONTINUED | OUTPATIENT
Start: 2024-12-09 | End: 2024-12-10 | Stop reason: HOSPADM

## 2024-12-09 RX ORDER — FLUMAZENIL 0.1 MG/ML
0.2 INJECTION INTRAVENOUS AS NEEDED
Status: DISCONTINUED | OUTPATIENT
Start: 2024-12-09 | End: 2024-12-10 | Stop reason: HOSPADM

## 2024-12-09 RX ORDER — PANTOPRAZOLE SODIUM 40 MG/10ML
40 INJECTION, POWDER, LYOPHILIZED, FOR SOLUTION INTRAVENOUS
Status: DISCONTINUED | OUTPATIENT
Start: 2024-12-10 | End: 2024-12-10

## 2024-12-09 RX ORDER — PHENYLEPHRINE HCL IN 0.9% NACL 1 MG/10 ML
SYRINGE (ML) INTRAVENOUS AS NEEDED
Status: DISCONTINUED | OUTPATIENT
Start: 2024-12-09 | End: 2024-12-09 | Stop reason: SURG

## 2024-12-09 RX ORDER — BISACODYL 10 MG
10 SUPPOSITORY, RECTAL RECTAL DAILY PRN
Status: DISCONTINUED | OUTPATIENT
Start: 2024-12-10 | End: 2024-12-10 | Stop reason: HOSPADM

## 2024-12-09 RX ORDER — MIDAZOLAM HYDROCHLORIDE 1 MG/ML
1 INJECTION, SOLUTION INTRAMUSCULAR; INTRAVENOUS
Status: DISCONTINUED | OUTPATIENT
Start: 2024-12-09 | End: 2024-12-10 | Stop reason: HOSPADM

## 2024-12-09 RX ORDER — NALBUPHINE HYDROCHLORIDE 10 MG/ML
2 INJECTION INTRAMUSCULAR; INTRAVENOUS; SUBCUTANEOUS EVERY 4 HOURS PRN
Status: DISCONTINUED | OUTPATIENT
Start: 2024-12-09 | End: 2024-12-09

## 2024-12-09 RX ORDER — NICARDIPINE HYDROCHLORIDE 2.5 MG/ML
INJECTION INTRAVENOUS AS NEEDED
Status: DISCONTINUED | OUTPATIENT
Start: 2024-12-09 | End: 2024-12-09 | Stop reason: SURG

## 2024-12-09 RX ORDER — NITROGLYCERIN 0.4 MG/1
0.4 TABLET SUBLINGUAL
Status: DISCONTINUED | OUTPATIENT
Start: 2024-12-09 | End: 2024-12-10 | Stop reason: HOSPADM

## 2024-12-09 RX ORDER — PROTAMINE SULFATE 10 MG/ML
INJECTION, SOLUTION INTRAVENOUS AS NEEDED
Status: DISCONTINUED | OUTPATIENT
Start: 2024-12-09 | End: 2024-12-09 | Stop reason: SURG

## 2024-12-09 RX ORDER — ROCURONIUM BROMIDE 10 MG/ML
INJECTION, SOLUTION INTRAVENOUS AS NEEDED
Status: DISCONTINUED | OUTPATIENT
Start: 2024-12-09 | End: 2024-12-09 | Stop reason: SURG

## 2024-12-09 RX ORDER — SODIUM CHLORIDE 9 MG/ML
30 INJECTION, SOLUTION INTRAVENOUS CONTINUOUS
Status: DISPENSED | OUTPATIENT
Start: 2024-12-09 | End: 2024-12-09

## 2024-12-09 RX ORDER — POTASSIUM CHLORIDE 750 MG/1
10 TABLET, FILM COATED, EXTENDED RELEASE ORAL ONCE
Status: COMPLETED | OUTPATIENT
Start: 2024-12-09 | End: 2024-12-09

## 2024-12-09 RX ORDER — SODIUM CHLORIDE 0.9 % (FLUSH) 0.9 %
10 SYRINGE (ML) INJECTION AS NEEDED
Status: DISCONTINUED | OUTPATIENT
Start: 2024-12-09 | End: 2024-12-09 | Stop reason: HOSPADM

## 2024-12-09 RX ORDER — NITROGLYCERIN 0.4 MG/1
0.4 TABLET SUBLINGUAL
Status: DISCONTINUED | OUTPATIENT
Start: 2024-12-09 | End: 2024-12-09

## 2024-12-09 RX ORDER — DEXMEDETOMIDINE HYDROCHLORIDE 4 UG/ML
INJECTION, SOLUTION INTRAVENOUS
Status: COMPLETED
Start: 2024-12-09 | End: 2024-12-09

## 2024-12-09 RX ORDER — NALBUPHINE HYDROCHLORIDE 10 MG/ML
10 INJECTION INTRAMUSCULAR; INTRAVENOUS; SUBCUTANEOUS EVERY 4 HOURS PRN
Status: DISCONTINUED | OUTPATIENT
Start: 2024-12-09 | End: 2024-12-09

## 2024-12-09 RX ADMIN — ROCURONIUM BROMIDE 50 MG: 10 INJECTION, SOLUTION INTRAVENOUS at 08:00

## 2024-12-09 RX ADMIN — SODIUM CHLORIDE 0.04 MCG/KG/MIN: 9 INJECTION, SOLUTION INTRAVENOUS at 09:30

## 2024-12-09 RX ADMIN — IPRATROPIUM BROMIDE AND ALBUTEROL SULFATE 3 ML: .5; 3 SOLUTION RESPIRATORY (INHALATION) at 11:42

## 2024-12-09 RX ADMIN — Medication 25 MG: at 07:43

## 2024-12-09 RX ADMIN — DEXMEDETOMIDINE HYDROCHLORIDE 0.1 MCG/KG/HR: 4 INJECTION, SOLUTION INTRAVENOUS at 10:50

## 2024-12-09 RX ADMIN — Medication 25 MG: at 07:38

## 2024-12-09 RX ADMIN — SODIUM CHLORIDE: 9 INJECTION, SOLUTION INTRAVENOUS at 07:04

## 2024-12-09 RX ADMIN — IPRATROPIUM BROMIDE AND ALBUTEROL SULFATE 3 ML: .5; 3 SOLUTION RESPIRATORY (INHALATION) at 19:29

## 2024-12-09 RX ADMIN — POTASSIUM CHLORIDE 10 MEQ: 750 TABLET, EXTENDED RELEASE ORAL at 20:14

## 2024-12-09 RX ADMIN — SODIUM CHLORIDE 30 ML/HR: 9 INJECTION, SOLUTION INTRAVENOUS at 10:51

## 2024-12-09 RX ADMIN — HEPARIN SODIUM 5000 UNITS: 1000 INJECTION INTRAVENOUS; SUBCUTANEOUS at 08:30

## 2024-12-09 RX ADMIN — FENTANYL CITRATE 50 MCG: 50 INJECTION, SOLUTION INTRAMUSCULAR; INTRAVENOUS at 09:05

## 2024-12-09 RX ADMIN — DEXMEDETOMIDINE HYDROCHLORIDE IN SODIUM CHLORIDE 0.1 MCG/KG/HR: 4 INJECTION INTRAVENOUS at 10:50

## 2024-12-09 RX ADMIN — Medication 200 MCG: at 09:35

## 2024-12-09 RX ADMIN — NICARDIPINE HYDROCHLORIDE 0.5 MCG: 25 INJECTION INTRAVENOUS at 09:43

## 2024-12-09 RX ADMIN — HYDROCORTISONE SODIUM SUCCINATE 100 MG: 100 INJECTION, POWDER, FOR SOLUTION INTRAMUSCULAR; INTRAVENOUS at 09:47

## 2024-12-09 RX ADMIN — HEPARIN SODIUM 5000 UNITS: 1000 INJECTION INTRAVENOUS; SUBCUTANEOUS at 08:24

## 2024-12-09 RX ADMIN — MONTELUKAST 10 MG: 10 TABLET, FILM COATED ORAL at 17:00

## 2024-12-09 RX ADMIN — PROPOFOL 20 MG: 10 INJECTION, EMULSION INTRAVENOUS at 08:00

## 2024-12-09 RX ADMIN — FENTANYL CITRATE 50 MCG: 50 INJECTION, SOLUTION INTRAMUSCULAR; INTRAVENOUS at 08:00

## 2024-12-09 RX ADMIN — EPHEDRINE SULFATE 10 MG: 5 INJECTION INTRAVENOUS at 09:06

## 2024-12-09 RX ADMIN — ATORVASTATIN CALCIUM 10 MG: 10 TABLET, FILM COATED ORAL at 17:00

## 2024-12-09 RX ADMIN — MIDAZOLAM 5 MG: 1 INJECTION INTRAMUSCULAR; INTRAVENOUS at 07:38

## 2024-12-09 RX ADMIN — POTASSIUM CHLORIDE 20 MEQ: 1500 TABLET, EXTENDED RELEASE ORAL at 18:03

## 2024-12-09 RX ADMIN — Medication 100 MCG: at 09:22

## 2024-12-09 RX ADMIN — PHENYLEPHRINE HYDROCHLORIDE 0.5 MCG/MIN: 10 INJECTION INTRAVENOUS at 09:24

## 2024-12-09 RX ADMIN — ONDANSETRON 4 MG: 2 INJECTION, SOLUTION INTRAMUSCULAR; INTRAVENOUS at 08:28

## 2024-12-09 RX ADMIN — CEFAZOLIN 2 G: 2 INJECTION, POWDER, FOR SOLUTION INTRAMUSCULAR; INTRAVENOUS at 07:46

## 2024-12-09 RX ADMIN — PROTAMINE SULFATE 10 MG: 10 INJECTION, SOLUTION INTRAVENOUS at 09:50

## 2024-12-09 NOTE — OP NOTE
Primary Operators  Isaías Ríos MD    Indication  Severe symptomatic bioprosthetic mitral valve regurgitation  Congestive heart failure, NYHA class III    Procedures performed  Ultrasound-guided femoral venous access  Transseptal left heart catheterization   Interatrial balloon septostomy  Transcatheter transseptal mitral valve replacement 29 mm Silveira BEATA  Perclose deployment in the right femoral venous access     Procedure in details  Under US guidance and using a micropuncture access kit, a 7F introducer was placed into the right femoral vein. The venotomy was preclosed using 2 Perclose Proglide devices. An Amplatz extra stiff wire was advanced via the RFV and the 16F Silveira delivery sheath was inserted over the Amplatz extra stiff wire.    The [place] sheath was advanced into the superior vena cava over an 0.035 wire. Under TATUM and fluoroscopic guidance, successful transseptal puncture was performed using a IQuum cross system. Correct position in the left atrium was confirmed by pressure tracing.     Heparin was administered and ACTs were followed with additional heparin being given to keep the ACT > 300.  Medic Trace curved wire was advanced through the W[place]lock sheath into the left upper pulmonary vein.   Interatrial balloon septostomy was then performed by 14 x 40 mm [place] balloon.  After 2 septostomy inflations no more waist was noted across interatrial septum.    We then advanced internal mammary catheter into the left atrium in an attempt to cross the bioprosthetic mitral valve.  This was very challenging due to patient's anatomy.  We then used to 9 Albanian Agilis sheath to direct a J-tip wire across the bioprosthetic mitral valve into the apex of left ventricle.  Over this wire we then advanced a 6 Albanian pigtail catheter.    A 0.035 Amplatz extra-stiff wire was then advanced into the pigtail catheter.  We then advanced 29 mm S3 valve which was prepared to be placed at mitral  position.  We had significant difficulty placing this valve across the mitral prosthesis and required use of several wires.  We used Lunderquist wire which created a bias and we finally used curved tip [place] wire which facilitated positioning of the valve across the bioprosthetic mitral valve  After confirming correct position of the valve by fluoroscopy and TATUM, the valve was implanted with rapid ventricular pacing . After valve deployment there was no paravalvular leak noted by TATUM.    The 16F right femoral sheath was removed and the site was closed using the previously deployed Perclose sutures.  Protamine was administered for heparin reversal  Patient was transferred to CCU for postop recovery and extubation.    Mely Malloy and [place] performed the TAVR procedure.     Estimated blood loss  20 mL.    Complications  None.    Recommendations  Resume anticoagulation  Cardiac rehab  Echocardiogram in morning      Electronically signed by Isaías Malloy MD, 12/09/24, 10:09 AM EST.

## 2024-12-09 NOTE — DISCHARGE INSTRUCTIONS
TMVR Discharge Instructions    Medications  Take all of the medications prescribed to you. When you go home, you may be prescribed different medications than what you were taking at home prior to your procedure.  Your medications may be adjusted further by the valve clinic or your primary cardiologist at your follow-up appointment.  If you need to stop taking you medications, please notify your physician.  Antiplatelet medications (blood thinners) are usually prescribed to prevent blood clots from forming on your new valve which could cause a stroke. These medications increase your chance of bleeding. Please notify your cardiologist of any of the following signs of bleeding:  black, tarry stools, red/pink urine, black or dark vomit, nose bleeds, increased bruising, or bleeding gums.       TMVR site incision care  Keep incision sites clean and dry. You may remove your dressings and take a shower when you are home.   Clean your incisions with normal soap and water. Pat your incisions dry with a clean towel. Do not rub them. Do not soak or lay in water until all incisions are completely healed. Do not apply lotion, cream, powder, or ointment to the incision until the scab has fallen off.  If drainage occurs, cover the incision with dry gauze and change the gauze at least twice a day, or more often if needed.  Bruising is common around the incision site, and you may notice a pea-sized lump. This is normal and usually disappears within a few weeks.  Look at your incision every day. Call your doctor's office right away if you notice any of the following signs of infection:  pain, redness, swelling, drainage, bleeding, chills, or a fever of 100.4° or higher.    Discomfort  Mild amounts of discomfort after your procedure is expected and may continue for a few weeks. Often, patients can obtain adequate pain relief from taking acetaminophen (Tylenol).   A sore, scratchy throat can occur from the insertion of your breathing  tube and may last for several weeks. You may also notice some hoarseness in your voice. This should improve by your follow-up appointment.     Swelling  Elevate your legs 2-3 times daily for 30-60 minutes. Avoid sitting for prolonged periods of time.   If you have been prescribed a diuretic (water pill), you should weigh yourself every day and record it. Weigh yourself at the same time of day wearing same amount of clothing and call your doctor's office with any weight gain or loss of 3 pounds per day or 5 pounds in a week.     Activity & Cardiac Rehab  Walk 3-4 times per day. Pace your activities, increase gradually. Daily exercise and adequate rest are important.  Climb stairs slowly as tolerated.  Sexual activity can be resumed in 2-3 weeks after being discharged or when you can climb a flight of stairs without becoming short of breath.  No lifting, pushing, or pulling objects heavier than 10 pounds for 1 week after your procedure.  Driving can be resumed in one week after release from hospital.  You may return to work one week after surgery or as directed by your cardiologist, depending on your needs and type of work.     Notify your cardiologist if you develop  Fever and chills with temperature 100.4° or higher  Shortness of breath  Dizziness or fainting spells  Increased swelling in feet and ankles  Prolonged increase in fatigue, weakness, nausea or vomiting  Irregular or rapid heart rate  If you have questions regarding your cardiac medications     SEEK CARE IMMEDIATELY (CALL 911) IF YOU EXPERIENCE  Sudden decrease in strength and sensation (numbness and tingling)  A change in skin color or temperature (coolness to touch) of the legs or arms  Sudden swelling, bleeding, and/or acute pain in one of your groins  Sudden onset of chest, back, or abdominal pain  Sudden shortness of breath    Follow up appointments  Follow-up appointments with your doctor help to make sure you are recovering well. You will be  required to be seen in the office for a one month follow-up. At the one month follow-up, you will have an echocardiogram to ensure that your valve is functioning correctly.  You will be required to be seen in the doctor's office again at a one year appointment with another echocardiogram.   Please notify your cardiologist/TMVR heart team if you are hospitalized within the first year.     Smoking  It is highly recommended that you refrain from smoking.    Dental visits  After having a TMVR, try to avoid routine cleanings or non-emergent work on your teeth for 3 months following your procedure. An infection known as bacterial endocarditis is an ongoing potential complication after heart valve surgery. Bacterial endocarditis can damage your heart valve.  If you need to have a dental cleaning or any other dental work, you will need to take an antibiotic 1 hour prior to your procedure. Please call Dr. Malloy's office to get an antibiotic prescribed [phone].          **Please measure your blood pressure and heart rate daily. Keep a log and bring to your follow up appointment

## 2024-12-09 NOTE — INTERVAL H&P NOTE
H&P reviewed. The patient was examined and there are no changes to the H&P.        Proceed with transcatheter transseptal mitral valve replacement.

## 2024-12-09 NOTE — ANESTHESIA PROCEDURE NOTES
Airway  Urgency: elective    Date/Time: 12/9/2024 7:59 AM  Airway not difficult    General Information and Staff    Patient location during procedure: OR  Anesthesiologist: Darrel Jamil MD    Indications and Patient Condition  Indications for airway management: airway protection    Preoxygenated: yes  Mask difficulty assessment: 1 - vent by mask    Final Airway Details  Final airway type: endotracheal airway      Successful airway: ETT  Cuffed: yes   Successful intubation technique: video laryngoscopy  Facilitating devices/methods: intubating stylet  Endotracheal tube insertion site: oral  Blade: Benton  Blade size: 3  ETT size (mm): 7.0  Cormack-Lehane Classification: grade I - full view of glottis  Placement verified by: chest auscultation, capnometry and palpation of cuff   Measured from: lips  Number of attempts at approach: 1  Assessment: lips, teeth, and gum same as pre-op and atraumatic intubation

## 2024-12-09 NOTE — ANESTHESIA PROCEDURE NOTES
Arterial Line      Patient reassessed immediately prior to procedure    Patient location during procedure: OR  Start time: 12/9/2024 7:47 AM  Stop Time:12/9/2024 7:52 AM       Line placed for hemodynamic monitoring.  Performed By   Anesthesiologist: Darrel Jamil MD   Preanesthetic Checklist  Completed: patient identified, IV checked, site marked, risks and benefits discussed, surgical consent, monitors and equipment checked, pre-op evaluation and timeout performed  Arterial Line Prep    Sterile Tech: gloves  Prep: ChloraPrep  Patient monitoring: continuous pulse oximetry, EKG and blood pressure monitoring  Arterial Line Procedure   Laterality:left  Location:  radial artery  Catheter size: 20 G   Guidance: ultrasound guided  Number of attempts: 1  Successful placement: yes   Post Assessment   Dressing Type: occlusive dressing applied, secured with tape and wrist guard applied.   Complications no  Circ/Move/Sens Assessment: normal.   Patient Tolerance: patient tolerated the procedure well with no apparent complications

## 2024-12-09 NOTE — ANESTHESIA POSTPROCEDURE EVALUATION
Patient: Drake Beth    Procedure Summary       Date: 12/09/24 Room / Location: [facility] OR  / [facility] HYBRID OR    Anesthesia Start: 0736 Anesthesia Stop: 1020    Procedures:       Transcatheter Mitral Valve Insertion      TRANSCATHETER MITRAL VALVE IMPLANTATION/REPLACEMENT with 29mm Diagnosis:       History of mitral valve replacement with bioprosthetic valve      Severe mitral regurgitation      Congestive heart failure, NYHA class 3, chronic, diastolic      (History of mitral valve replacement with bioprosthetic valve [Z95.3])      (Severe mitral regurgitation [I34.0])      (Congestive heart failure, NYHA class 3, chronic, diastolic [I50.32])    Surgeons: Isaías Malloy MD; Markos Ríos MD Provider: Darrel Jamil MD    Anesthesia Type: general, Irons, CVL ASA Status: 4            Anesthesia Type: general, Irons, CVL    Vitals  Vitals Value Taken Time   /84 12/09/24 1111   Temp     Pulse 53 12/09/24 1113   Resp     SpO2 100 % 12/09/24 1113   Vitals shown include unfiled device data.        Post Anesthesia Care and Evaluation    Patient location during evaluation: ICU  Patient participation: complete - patient cannot participate  Level of consciousness: obtunded/minimal responses  Pain scale: See nurse's notes for pain score.  Pain management: adequate    Airway patency: patent  Anesthetic complications: No anesthetic complications  PONV Status: none  Cardiovascular status: acceptable  Respiratory status: acceptable, ventilator and ETT  Hydration status: acceptable    Comments: Patient seen and examined postoperatively; vital signs stable; SpO2 greater than or equal to 90%; cardiopulmonary status stable; nausea/vomiting adequately controlled; pain adequately controlled; no apparent anesthesia complications; patient discharged from anesthesia care when discharge criteria were met

## 2024-12-10 ENCOUNTER — DOCUMENTATION (OUTPATIENT)
Dept: CARDIOLOGY | Facility: HOSPITAL | Age: 74
End: 2024-12-10
Payer: MEDICARE

## 2024-12-10 ENCOUNTER — APPOINTMENT (OUTPATIENT)
Dept: CARDIOLOGY | Facility: HOSPITAL | Age: 74
DRG: 267 | End: 2024-12-10
Payer: MEDICARE

## 2024-12-10 ENCOUNTER — READMISSION MANAGEMENT (OUTPATIENT)
Dept: CALL CENTER | Facility: HOSPITAL | Age: 74
End: 2024-12-10
Payer: MEDICARE

## 2024-12-10 VITALS
WEIGHT: 88 LBS | TEMPERATURE: 98.2 F | HEIGHT: 60 IN | OXYGEN SATURATION: 100 % | SYSTOLIC BLOOD PRESSURE: 103 MMHG | DIASTOLIC BLOOD PRESSURE: 61 MMHG | HEART RATE: 64 BPM | RESPIRATION RATE: 16 BRPM | BODY MASS INDEX: 17.28 KG/M2

## 2024-12-10 PROBLEM — I34.0 NONRHEUMATIC MITRAL VALVE REGURGITATION: Status: ACTIVE | Noted: 2024-10-01

## 2024-12-10 LAB
ANION GAP SERPL CALCULATED.3IONS-SCNC: 9 MMOL/L (ref 5–15)
BH CV ECHO MEAS - MV DEC SLOPE: 489.7 CM/SEC2
BH CV ECHO MEAS - MV MAX PG: 9.3 MMHG
BH CV ECHO MEAS - MV MEAN PG: 2.33 MMHG
BH CV ECHO MEAS - MV P1/2T: 96.1 MSEC
BH CV ECHO MEAS - MV V2 VTI: 49.6 CM
BH CV ECHO MEAS - MVA(P1/2T): 2.29 CM2
BUN SERPL-MCNC: 25 MG/DL (ref 8–23)
BUN/CREAT SERPL: 24.5 (ref 7–25)
CALCIUM SPEC-SCNC: 8.7 MG/DL (ref 8.6–10.5)
CHLORIDE SERPL-SCNC: 105 MMOL/L (ref 98–107)
CO2 SERPL-SCNC: 25 MMOL/L (ref 22–29)
CREAT SERPL-MCNC: 1.02 MG/DL (ref 0.76–1.27)
DEPRECATED RDW RBC AUTO: 52.4 FL (ref 37–54)
EGFRCR SERPLBLD CKD-EPI 2021: 77.6 ML/MIN/1.73
ERYTHROCYTE [DISTWIDTH] IN BLOOD BY AUTOMATED COUNT: 14.4 % (ref 12.3–15.4)
GLUCOSE SERPL-MCNC: 113 MG/DL (ref 65–99)
HCT VFR BLD AUTO: 25.8 % (ref 37.5–51)
HGB BLD-MCNC: 8.3 G/DL (ref 13–17.7)
MAGNESIUM SERPL-MCNC: 2.2 MG/DL (ref 1.6–2.4)
MCH RBC QN AUTO: 32.4 PG (ref 26.6–33)
MCHC RBC AUTO-ENTMCNC: 32.2 G/DL (ref 31.5–35.7)
MCV RBC AUTO: 100.8 FL (ref 79–97)
PLATELET # BLD AUTO: 126 10*3/MM3 (ref 140–450)
PMV BLD AUTO: 10 FL (ref 6–12)
POTASSIUM SERPL-SCNC: 4.3 MMOL/L (ref 3.5–5.2)
QT INTERVAL: 535 MS
QTC INTERVAL: 493 MS
RBC # BLD AUTO: 2.56 10*6/MM3 (ref 4.14–5.8)
SODIUM SERPL-SCNC: 139 MMOL/L (ref 136–145)
WBC NRBC COR # BLD AUTO: 9.71 10*3/MM3 (ref 3.4–10.8)

## 2024-12-10 PROCEDURE — 83735 ASSAY OF MAGNESIUM: CPT | Performed by: INTERNAL MEDICINE

## 2024-12-10 PROCEDURE — 93308 TTE F-UP OR LMTD: CPT

## 2024-12-10 PROCEDURE — 94799 UNLISTED PULMONARY SVC/PX: CPT

## 2024-12-10 PROCEDURE — 80048 BASIC METABOLIC PNL TOTAL CA: CPT | Performed by: INTERNAL MEDICINE

## 2024-12-10 PROCEDURE — 93325 DOPPLER ECHO COLOR FLOW MAPG: CPT | Performed by: INTERNAL MEDICINE

## 2024-12-10 PROCEDURE — 94664 DEMO&/EVAL PT USE INHALER: CPT

## 2024-12-10 PROCEDURE — 93321 DOPPLER ECHO F-UP/LMTD STD: CPT

## 2024-12-10 PROCEDURE — 93010 ELECTROCARDIOGRAM REPORT: CPT | Performed by: INTERNAL MEDICINE

## 2024-12-10 PROCEDURE — 93321 DOPPLER ECHO F-UP/LMTD STD: CPT | Performed by: INTERNAL MEDICINE

## 2024-12-10 PROCEDURE — 94761 N-INVAS EAR/PLS OXIMETRY MLT: CPT

## 2024-12-10 PROCEDURE — 99239 HOSP IP/OBS DSCHRG MGMT >30: CPT | Performed by: INTERNAL MEDICINE

## 2024-12-10 PROCEDURE — 93325 DOPPLER ECHO COLOR FLOW MAPG: CPT

## 2024-12-10 PROCEDURE — 93308 TTE F-UP OR LMTD: CPT | Performed by: INTERNAL MEDICINE

## 2024-12-10 PROCEDURE — 93005 ELECTROCARDIOGRAM TRACING: CPT | Performed by: INTERNAL MEDICINE

## 2024-12-10 PROCEDURE — 85027 COMPLETE CBC AUTOMATED: CPT | Performed by: INTERNAL MEDICINE

## 2024-12-10 RX ORDER — SOTALOL HYDROCHLORIDE 80 MG/1
40 TABLET ORAL 2 TIMES DAILY
Qty: 30 TABLET | Refills: 1 | Status: SHIPPED | OUTPATIENT
Start: 2024-12-10 | End: 2024-12-11 | Stop reason: SDUPTHER

## 2024-12-10 RX ORDER — PANTOPRAZOLE SODIUM 40 MG/1
40 TABLET, DELAYED RELEASE ORAL
Status: DISCONTINUED | OUTPATIENT
Start: 2024-12-11 | End: 2024-12-10 | Stop reason: HOSPADM

## 2024-12-10 RX ADMIN — IPRATROPIUM BROMIDE AND ALBUTEROL SULFATE 3 ML: .5; 3 SOLUTION RESPIRATORY (INHALATION) at 15:00

## 2024-12-10 RX ADMIN — IPRATROPIUM BROMIDE AND ALBUTEROL SULFATE 3 ML: .5; 3 SOLUTION RESPIRATORY (INHALATION) at 08:04

## 2024-12-10 RX ADMIN — IPRATROPIUM BROMIDE AND ALBUTEROL SULFATE 3 ML: .5; 3 SOLUTION RESPIRATORY (INHALATION) at 10:45

## 2024-12-10 RX ADMIN — MONTELUKAST 10 MG: 10 TABLET, FILM COATED ORAL at 09:58

## 2024-12-10 RX ADMIN — POTASSIUM CHLORIDE 20 MEQ: 1500 TABLET, EXTENDED RELEASE ORAL at 09:58

## 2024-12-10 RX ADMIN — ATORVASTATIN CALCIUM 10 MG: 10 TABLET, FILM COATED ORAL at 09:58

## 2024-12-10 RX ADMIN — FUROSEMIDE 40 MG: 40 TABLET ORAL at 09:58

## 2024-12-10 RX ADMIN — PANTOPRAZOLE SODIUM 40 MG: 40 INJECTION, POWDER, FOR SOLUTION INTRAVENOUS at 05:23

## 2024-12-10 NOTE — CASE MANAGEMENT/SOCIAL WORK
Case Management Discharge Note           Transportation Services  Private: Car (with brother)    Final Discharge Disposition Code: 01 - home or self-care

## 2024-12-10 NOTE — CONSULTS
Nutrition Services    Patient Name: Drake Beth  YOB: 1950  MRN: [id number]  Admission date: 12/9/2024    Comment:  -- Continue current diet and encourage good PO intakes    -- Add Boost Original BID (Provides 480 kcals, 20 g protein if consumed)       CLINICAL NUTRITION ASSESSMENT      Reason for Assessment 12/10: BMI less than 19, Consult for Nursing Admission Screen, MST of 3      H&P      Past Medical History:   Diagnosis Date    Allergic rhinitis     Anemia     Atrial fibrillation     CHF (congestive heart failure)     Chronic constipation     Coronary artery disease     GERD (gastroesophageal reflux disease)     Hyperlipidemia     Hypertension     Mentally challenged     MVP (mitral valve prolapse)        Past Surgical History:   Procedure Laterality Date    CARDIAC CATHETERIZATION N/A 11/11/2024    Procedure: Right and Left Heart Cath with coronary angiography;  Surgeon: Isaías Malloy MD;  Location: [facility] CATH INVASIVE LOCATION;  Service: Cardiovascular;  Laterality: N/A;    CARDIAC ELECTROPHYSIOLOGY PROCEDURE N/A 04/26/2022    Procedure: PACEMAKER EXTRACTION;  Surgeon: Rj Dominique MD;  Location:  [facility] CATH INVASIVE LOCATION;  Service: Cardiology;  Laterality: N/A;    COLONOSCOPY      He has never had the test done.  His family is refusing any form of colon cancer screening for him.    CORONARY ARTERY BYPASS GRAFT  2008    DR SWEAT    MITRAL VALVE REPLACEMENT  04/2008    WITH BIOPROSTHETIC TISSUE VALVE        Current Problems   Severe bioprosthetic mitral valve regurgitation  - cardiology following  - S/P transcatheter mitral valve insertion     Coronary artery disease     Atrial fibrillation     HFpEF     Permanent pacemaker     Hypertension     Hyperlipidemia       Encounter Information        Trending Narrative     12/10: Admitted for scheduled cardiology procedure.  RD visited patient at bedside.  No family present and patient is not appropriate for interview.  Discussed with  "nursing that patient has been eating well this admission, family acted surprised as if that is not normal for patient at home.  Nursing brought patient multiple Boost shakes to try multiple flavors.  NFPE completed and not consistent with nutrition diagnosis of malnutrition at this time using AND/ASPEN criteria.         Anthropometrics        Current Height, Weight Height: 147.3 cm (58\")  Weight: 39.9 kg (88 lb) (12/10/24 0650)       Usual Body Weight (UBW) Unable to obtain from patient        Trending Weight Hx     This admission: 12/10: 88#             PTA: 5.3% weight loss x 3 months     Wt Readings from Last 30 Encounters:   12/10/24 0650 39.9 kg (88 lb)   12/09/24 0430 40.2 kg (88 lb 10 oz)   12/06/24 1450 40.7 kg (89 lb 12.8 oz)   11/11/24 1225 40.1 kg (88 lb 6.5 oz)   10/08/24 1358 40.8 kg (90 lb)   10/01/24 1136 42.2 kg (93 lb)   09/19/24 2100 40.5 kg (89 lb 4.6 oz)   09/19/24 1335 42.2 kg (93 lb 0.6 oz)   09/19/24 1234 42.2 kg (93 lb)   08/28/24 1136 42.9 kg (94 lb 8 oz)   07/24/24 0243 47.6 kg (104 lb 15 oz)   07/19/24 2339 47 kg (103 lb 9.9 oz)   07/18/24 2328 46.3 kg (102 lb 1.2 oz)   07/13/24 1041 46.3 kg (102 lb)   07/11/24 1848 46.3 kg (102 lb 1.2 oz)   07/11/24 1259 46.3 kg (102 lb 1.2 oz)   04/09/24 1407 46.3 kg (102 lb)   02/05/24 1408 45.8 kg (101 lb)   01/23/24 1539 46.4 kg (102 lb 6.4 oz)   09/06/23 1143 44.5 kg (98 lb)   09/06/23 1237 45.6 kg (100 lb 8 oz)   07/25/23 1420 44.7 kg (98 lb 9.6 oz)   12/27/22 1517 44.9 kg (99 lb)   12/27/22 1053 45 kg (99 lb 3.2 oz)   12/08/22 1414 45.3 kg (99 lb 12.8 oz)   11/29/22 1603 47.6 kg (105 lb)   06/07/22 1325 46.8 kg (103 lb 4 oz)   05/12/22 1329 47.5 kg (104 lb 12.8 oz)   04/26/22 1319 46.7 kg (102 lb 15.3 oz)   04/22/22 1317 47.2 kg (104 lb)   02/17/22 1507 48.3 kg (106 lb 6.4 oz)   02/05/21 1344 49 kg (108 lb)   12/14/20 1340 49.9 kg (110 lb)   02/04/20 1110 49 kg (108 lb)   04/18/19 0621 49.4 kg (109 lb)   01/18/19 1132 49 kg (108 lb)      BMI kg/m2 " "Body mass index is 18.39 kg/m².       Labs        Pertinent Labs    Results from last 7 days   Lab Units 12/10/24  0350 12/09/24  0647   SODIUM mmol/L 139 138   POTASSIUM mmol/L 4.3 4.4   CHLORIDE mmol/L 105 101   CO2 mmol/L 25.0 23.4   BUN mg/dL 25* 31*   CREATININE mg/dL 1.02 1.31*   CALCIUM mg/dL 8.7 9.3   BILIRUBIN mg/dL  --  0.9   ALK PHOS U/L  --  71   ALT (SGPT) U/L  --  5   AST (SGOT) U/L  --  39   GLUCOSE mg/dL 113* 106*     Results from last 7 days   Lab Units 12/10/24  0350   MAGNESIUM mg/dL 2.2   HEMOGLOBIN g/dL 8.3*   HEMATOCRIT % 25.8*     No results found for: \"HGBA1C\"     Medications    Scheduled Medications atorvastatin, 10 mg, Oral, Daily  furosemide, 40 mg, Oral, Daily  ipratropium-albuterol, 3 mL, Nebulization, 4x Daily - RT  montelukast, 10 mg, Oral, Daily  pantoprazole, 40 mg, Intravenous, Q AM  potassium chloride, 20 mEq, Oral, Daily        Infusions dexmedetomidine, 0.2-1.5 mcg/kg/hr, Last Rate: 0.1 mcg/kg/hr (12/09/24 1050)        PRN Medications   acetaminophen **OR** acetaminophen    albuterol    atropine    bisacodyl    Calcium Replacement - Follow Nurse / BPA Driven Protocol    diphenhydrAMINE    docusate sodium    flumazenil    HYDROmorphone    HYDROmorphone    lidocaine (cardiac)    LORazepam    Magnesium Cardiology Dose Replacement - Follow Nurse / BPA Driven Protocol    midazolam    naloxone    nitroglycerin    nitroglycerin    ondansetron    ondansetron    Phosphorus Replacement - Follow Nurse / BPA Driven Protocol    Potassium Replacement - Follow Nurse / BPA Driven Protocol     Physical Findings        Trending Physical   Appearance, NFPE 12/10: NFPE completed and not consistent with nutrition diagnosis of malnutrition at this time using AND/ASPEN criteria.  Prominent knees noted.       --  Edema  No edema documented      Bowel Function No BM since admission (yesterday)     Tubes No feeding tube      Chewing/Swallowing No known issues      Skin Intact      --  Current Nutrition " Orders & Evaluation of Intake       Oral Nutrition     Food Allergies NKFA   Current PO Diet Diet: Cardiac; Healthy Heart (2-3 Na+); Fluid Consistency: Thin (IDDSI 0)   Supplement None ordered    PO Evaluation     Trending % PO Intake 12/10: 75% x 1 documented meal since admission    --  Nutritional Risk Screening        NRS-2002 Score          Nutrition Diagnosis         Nutrition Dx Problem 1 Underweight related to intake less than needs as evidenced by BMI less than 19.        Nutrition Dx Problem 2        Intervention Goal         Intervention Goal(s) Accept ONS  No weight loss  PO intakes continue at least 75%     Nutrition Intervention        RD Action Add ONS  Completed NFPE     Nutrition Prescription          Diet Prescription Heart Healthy   Supplement Prescription Boost Original BID   --  Monitor/Evaluation        Monitor Per protocol, I&O, PO intake, Supplement intake, Pertinent labs, Weight       Electronically signed by:  Kasandra Parson RD  12/10/24 08:26 EST

## 2024-12-10 NOTE — PLAN OF CARE
Goal Outcome Evaluation:      Patient ready to go home with his family. Education completed over the phone by Chun with the family.

## 2024-12-10 NOTE — PROGRESS NOTES
Patient doing well.   CXR and ECHO reviewed.   Incisions clean, dry, and no hematoma noted.   Ok for discharge from [facility] standpoint.

## 2024-12-10 NOTE — DISCHARGE SUMMARY
Date of Discharge:  12/10/2024    Discharge Diagnosis: Severe bioprosthetic mitral regurgitation status post transcatheter mitral valve replacement    Presenting Problem(s)  Active Hospital Problems    Diagnosis  POA    **S/P transcatheter mitral valve replacement (TMVR) [Z95.2]  Not Applicable    Congestive heart failure, NYHA class 3, chronic, diastolic [I50.32]  Yes    History of mitral valve replacement with bioprosthetic valve [Z95.3]  Not Applicable    Nonrheumatic mitral valve regurgitation [I34.0]  Yes      Resolved Hospital Problems   No resolved problems to display.       Drake Beth is a 74 y.o. male with hypertension, hyperlipidemia, nonobstructive coronary artery disease, status post bioprosthetic mitral valve replacement in 2008 with 31 mm Biocor stentless valve, paroxysmal atrial fibrillation, permanent pacemaker who has been noted to have severe bioprosthetic mitral valve regurgitation.    He was noted to have severe bioprosthetic mitral valve regurgitation and presented to the hospital for transcatheter mitral valve replacement    Hospital Course  Severe bioprosthetic mitral valve regurgitation  Status post transcatheter transseptal mitral valve replacement on 12/9/2024.  29 mm Silveira BEATA valve was successfully placed.  Postprocedure echocardiogram shows a well-seated valve with no PVL and no significant gradient.  Resume Eliquis for atrial fibrillation  Remove central line and A-line  Cardiac rehab referral  PT OT     Coronary artery disease  Coronary angiogram shows patent coronaries with 40 to 50% stenosis in the LAD.  Continue risk factors modification.     Atrial fibrillation  [id number] score is 4  Continue Eliquis  Continue sotalol for rate and rhythm control  We will reduce the dose of sotalol due to bradycardia.  QTc is 493 ms.     HFpEF  Secondary to hypertension, atrial fibrillation, coronary artery disease and valvular heart disease  Continue furosemide     Permanent  pacemaker  Pacemaker removal secondary to pocket infection in April 2022  Timing for repeat pacemaker placement per EP     Hypertension  Continue amlodipine     Hyperlipidemia  Continue atorvastatin    Procedures Performed    Procedure(s):  Transcatheter Mitral Valve Insertion  TRANSCATHETER MITRAL VALVE IMPLANTATION/REPLACEMENT with 29mm  -------------------       Consults:   Consults       No orders found for last 30 day(s).            Pertinent Cardiac Test Results:     ECG:   ECG 12 Lead Rhythm Change   Final Result   HEART RATE=51  bpm   RR Ivyrfchx=9059  ms   KY Cpvjsyqf=308  ms   P Horizontal Axis=46  deg   P Front Axis=72  deg   QRSD Interval=88  ms   QT Rttkmwtj=586  ms   OFcT=068  ms   QRS Axis=13  deg   T Wave Axis=24  deg   - ABNORMAL ECG -   Sinus bradycardia   Consider  left ventricular hypertrophy   ST depr, consider ischemia, anterolateral lds   When compared with ECG of 06-Dec-2024 14:40:13,   Nonspecific significant change   Electronically Signed By: Isaías Malloy (MC) 2024-12-10 07:23:01   Date and Time of Study:2024-12-10 03:46:32      Telemetry Scan   Final Result      Telemetry Scan   Final Result      Telemetry Scan   Final Result           Echocardiogram: Results for orders placed during the hospital encounter of 12/09/24    Adult Transthoracic Echo Limited with Contrast if Necessary Per Protocol POD #1    Interpretation Summary    Left ventricular ejection fraction appears to be 56 - 60%.    There is a TMVR valve present.    29 mm Silveira BEATA valve at the mitral position.  There is no PVL.  There is no MR or MS.      Stress Test:        Vital Signs  Temp:  [97.4 °F (36.3 °C)-97.8 °F (36.6 °C)] 97.6 °F (36.4 °C)  Heart Rate:  [50-62] 60  Resp:  [15-17] 16  BP: ()/(40-95) 110/59  Arterial Line BP: (112-137)/(42-52) 114/43  FiO2 (%):  [40 %] 40 %    Physical Exam:  The patient is alert, oriented and in no distress.  Vital signs as noted above.  Head and neck revealed no carotid bruits  or jugular venous distention.  No thyromegaly or lymph adenopathy is present  Lungs clear.  No wheezing.  Breath sounds are normal bilaterally.  Heart normal first and second heart sounds.  No precordial rub is present.  No gallop is present.  Abdomen soft and nontender.  No organomegaly is present.  Extremities with good peripheral pulses without any pedal edema.  Skin warm and dry.  Musculoskeletal system is grossly normal  CNS grossly normal    Condition on Discharge: Stable      Discharge Disposition: Home with family  Home or Self Care    Discharge Medications     Discharge Medications        New Medications        Instructions Start Date   Jardiance 10 MG tablet tablet  Generic drug: empagliflozin   10 mg, Oral, Daily             Changes to Medications        Instructions Start Date   sotalol 80 MG tablet tablet  Commonly known as: BETAPACE AF  What changed: how much to take   40 mg, Oral, 2 Times Daily             Continue These Medications        Instructions Start Date   atorvastatin 10 MG tablet  Commonly known as: LIPITOR   10 mg, Oral, Daily      Eliquis 5 MG tablet tablet  Generic drug: apixaban   5 mg, Oral, 2 Times Daily      furosemide 40 MG tablet  Commonly known as: LASIX   40 mg, Oral, Daily      ipratropium-albuterol 0.5-2.5 mg/3 ml nebulizer  Commonly known as: DUO-NEB   3 mL, Nebulization, 4 Times Daily - RT      montelukast 10 MG tablet  Commonly known as: SINGULAIR   10 mg, Oral, Daily      multivitamin with minerals tablet tablet   1 tablet, Daily      omeprazole 20 MG capsule  Commonly known as: priLOSEC   20 mg, Daily      potassium chloride 20 MEQ CR tablet  Commonly known as: KLOR-CON M20   20 mEq, Oral, Daily             Stop These Medications      amLODIPine 2.5 MG tablet  Commonly known as: NORVASC                    Discharge Diet:   Diet Instructions       Diet: Cardiac Diets; Healthy Heart (2-3 Na+); Thin (IDDSI 0)      Discharge Diet: Cardiac Diets    Cardiac Diet: Healthy Heart  (2-3 Na+)    Fluid Consistency: Thin (IDDSI 0)        Cardiac    Activity at Discharge:   Activity Instructions       Activity as Tolerated      Bathing Restrictions      Type of Restriction: Bathing    Bathing Restrictions:  No Tub Bath  Other       Explain Bathing Restrictions: okay to shower tomorrow    Driving Restrictions      Type of Restriction: Driving    Driving Restrictions: No Driving (Time Limited)    Length: 1 Week    Lifting Restrictions      Type of Restriction: Lifting    Lifting Restrictions: Lifting Restriction (Indicate Limit)    Weight Limit (Pounds): 10    Length of Lifting Restriction: 1 week    Work Restrictions      Type of Restriction: Work    May Return to Work: In 1 Week    With / Without Restrictions: Without Restrictions        As tolerated    Follow-up Appointments  Future Appointments   Date Time Provider Department Center   12/19/2024  3:00 PM Kaley Coleman APRN [facility]   1/21/2025  8:30 AM [facility]   1/21/2025  9:30 AM Rj Dominique MD [facility]   11/12/2025 12:00 PM [facility]   11/12/2025 12:45 PM Rj Dominique MD [facility]     Additional Instructions for the Follow-ups that You Need to Schedule       Ambulatory Referral to Cardiac Rehab   As directed      Call MD With Problems / Concerns   As directed      Instructions: Call [phone] for problems or concerns.    Order Comments: Instructions: Call [phone] for problems or concerns.         Discharge Follow-up with PCP   As directed       Currently Documented PCP:    Amada Haskins MD    PCP Phone Number:    [phone]     Follow Up Details: as needed        Discharge Follow-up with Specified Provider: Structural Heart Nurse Kaley Acevedo   As directed      To: Structural Heart Nurse Kaley Acevedo   Follow Up Details: 12/19/24                Test Results Pending at Discharge       Isaías  MD Gama  12/10/24  11:59 EST    Time: Discharge 35 min    Post transcatheter mitral valve replacement discharge instructions provided to the patient.  We discussed residual ASD.  Discussed need for close follow-up with echocardiogram.  Follow-up appointments were made and discharge medication reconciliation was completed.  Cardiac rehab referral made

## 2024-12-10 NOTE — CASE MANAGEMENT/SOCIAL WORK
Discharge Planning Assessment   [facility]     Patient Name: Drake Beth  MRN: [id number]  Today's Date: 12/10/2024    Admit Date: 12/9/2024    Plan: JOHN Plan: Anticipate routine home with family. [facility] 12/9/2024   Discharge Needs Assessment       Row Name 12/10/24 1048       Living Environment    People in Home sibling(s)    Name(s) of People in Home Brother Jv Beth    Current Living Arrangements home    Potentially Unsafe Housing Conditions none    In the past 12 months has the electric, gas, oil, or water company threatened to shut off services in your home? No    Primary Care Provided by self    Provides Primary Care For no one, unable/limited ability to care for self    Family Caregiver if Needed sibling(s)    Family Caregiver Names Zach Carias and Richi    Quality of Family Relationships helpful;involved;supportive    Able to Return to Prior Arrangements yes       Resource/Environmental Concerns    Resource/Environmental Concerns none    Transportation Concerns none       Transportation Needs    In the past 12 months, has lack of transportation kept you from medical appointments or from getting medications? no    In the past 12 months, has lack of transportation kept you from meetings, work, or from getting things needed for daily living? No       Food Insecurity    Within the past 12 months, you worried that your food would run out before you got the money to buy more. Never true    Within the past 12 months, the food you bought just didn't last and you didn't have money to get more. Never true       Transition Planning    Patient/Family Anticipates Transition to home with family    Patient/Family Anticipated Services at Transition none    Transportation Anticipated family or friend will provide       Discharge Needs Assessment    Readmission Within the Last 30 Days no previous admission in last 30 days    Equipment Currently Used at Home nebulizer    Concerns to be Addressed no discharge needs  identified;denies needs/concerns at this time    Anticipated Changes Related to Illness none    Equipment Needed After Discharge none    Provided Post Acute Provider List? N/A    Provided Post Acute Provider Quality & Resource List? N/A    Current Discharge Risk other (see comments)  Intellectual Deficit                   Discharge Plan       Row Name 12/10/24 1051       Plan    Plan DC Plan: Anticipate routine home with family. TMVR 12/9/2024    Patient/Family in Agreement with Plan yes    Provided Post Acute Provider List? N/A    Provided Post Acute Provider Quality & Resource List? N/A    Plan Comments CM spoke with patient at bedside to discuss admission assessment and discharge planning. Patient has unable to answer all questions appropriately. Upon review of Medical History, patient is intellectually challenged. Patient states he live with his Brother Jv. CM reached out to [place] via telephone and completed assessment. [place] confirms PCP and pharmacy. Patient is already enrolled in meds to bed program. [place] denies any difficulty affording medications or food at this time. [place] denies any additional needs for services or DME at this time. [place] reports patient is independent with ADL's but does not drive. Patient family will provide transportation when ready for DC.CM reviewed chart documentation for clinical updates. DC anticipated today. No addiional service needs identified. DC Barrier: O2@1L nc, Central line, Arterial line, and pending echocardiogram.               Expected Discharge Date and Time       Expected Discharge Date Expected Discharge Time    Dec 10, 2024            Demographic Summary       Row Name 12/10/24 1048       General Information    Admission Type inpatient    Arrived From home;operating room    Required Notices Provided Important Message from Medicare    Referral Source admission list    Reason for Consult discharge planning    Preferred Language English       Contact  Information    Permission Granted to Share Info With                    Functional Status       Row Name 12/10/24 1048       Functional Status    Usual Activity Tolerance good    Current Activity Tolerance good       Physical Activity    On average, how many days per week do you engage in moderate to strenuous exercise (like a brisk walk)? 0 days    On average, how many minutes do you engage in exercise at this level? 0 min    Number of minutes of exercise per week 0       Functional Status, IADL    Medications independent    Meal Preparation independent    Housekeeping independent    Laundry independent    Shopping independent       Mental Status    General Appearance WDL WDL       Mental Status Summary    Recent Changes in Mental Status/Cognitive Functioning no changes       Employment/    Employment Status disabled    Current or Previous Occupation not applicable                 Met with patient in room   Maintain distance greater than six feet and spent less than fifteen minutes in the room.      Kristina Hurley RN     Office Phone: [phone]  Office Cell:     [phone]

## 2024-12-10 NOTE — NURSING NOTE
Met with patient at bedside and Spoke with Madison winters, on the phone.  TMVR education and postop instructions given with handouts. Full medication review and changes verbalized. Start jardiance, stop amlodipine, and decreased Sotalol to 40 mg BID.  Temporary valve ID card given.  Follow up appointments scheduled.  First appointment is scheduled on 12/19/24 at 1500 with IZAIAH Hewitt.  Information on cardiac rehab given with pamphlet.  Instructed patient and family member to call with any questions.

## 2024-12-10 NOTE — OUTREACH NOTE
Prep Survey      Flowsheet Row Responses   [facility] in [place] [facility] discharged from? [facility]   Is LACE score < 7 ? No   Eligibility [facility]   Date of Admission 12/09/24   Date of Discharge 12/10/24   Discharge Disposition Home or Self Care   Discharge diagnosis S/P transcatheter mitral valve replacement (TMVR)  [Transcatheter Mitral Valve Insertion TRANSCATHETER MITRAL VALVE IMPLANTATION/REPLACEMENT with 29mm]   Does the patient have one of the following disease processes/diagnoses(primary or secondary)? General Surgery   Does the patient have Home health ordered? No   Is there a DME ordered? No   Comments regarding appointments Ambulatory Referral to Cardiac Rehab   Medication alerts for this patient see AVS   Prep survey completed? Yes            Reina ROBERT - Registered Nurse

## 2024-12-11 ENCOUNTER — TRANSITIONAL CARE MANAGEMENT TELEPHONE ENCOUNTER (OUTPATIENT)
Dept: CALL CENTER | Facility: HOSPITAL | Age: 74
End: 2024-12-11
Payer: MEDICARE

## 2024-12-11 RX ORDER — SOTALOL HYDROCHLORIDE 80 MG/1
40 TABLET ORAL 2 TIMES DAILY
Qty: 30 TABLET | Refills: 1 | Status: CANCELLED | OUTPATIENT
Start: 2024-12-11

## 2024-12-11 RX ORDER — SOTALOL HYDROCHLORIDE 80 MG/1
40 TABLET ORAL 2 TIMES DAILY
Qty: 30 TABLET | Refills: 1 | Status: SHIPPED | OUTPATIENT
Start: 2024-12-11

## 2024-12-11 NOTE — OUTREACH NOTE
Call Center TCM Note      Flowsheet Row Responses   [facility] patient discharged from? [facility]   Does the patient have one of the following disease processes/diagnoses(primary or secondary)? General Surgery   TCM attempt successful? Yes   Call start time 1152   Call end time 1158   Discharge diagnosis S/P transcatheter mitral valve replacement (TMVR)   Is patient permission given to speak with other caregiver? Yes   List who call center can speak with Demetrio brother   Person spoke with today (if not patient) and relationship Demetrio brother   Meds reviewed with patient/caregiver? Yes   Is the patient having any side effects they believe may be caused by any medication additions or changes? No   Does the patient have all medications related to this admission filled (includes all antibiotics, pain medications, etc.) Yes   Is the patient taking all medications as directed (includes completed medication regime)? Yes   Medication comments Brother plans to call the pt's pharmacist/cardiologist re: Sotalol RX question   Does the patient have an appointment with their PCP within 7-14 days of discharge? No   Nursing Interventions Patient declined scheduling/rescheduling appointment at this time, Routed TCM call to PCP office, PCP office requested to make appointment - message sent   Has home health visited the patient within 72 hours of discharge? N/A   Psychosocial issues? No   Did the patient receive a copy of their discharge instructions? Yes   Nursing interventions Reviewed instructions with patient   What is the patient's perception of their health status since discharge? Improving   Nursing interventions Nurse provided patient education   Is the patient /caregiver able to teach back basic post-op care? Drive as instructed by MD in discharge instructions, Take showers only when approved by MD-sponge bathe until then, No tub bath, swimming, or hot tub until instructed by MD, Keep incision areas clean,dry and protected,  Lifting as instructed by MD in discharge instructions   Is the patient/caregiver able to teach back signs and symptoms of incisional infection? Increased redness, swelling or pain at the incisonal site, Increased drainage or bleeding, Incisional warmth, Pus or odor from incision   Is the patient/caregiver able to teach back steps to recovery at home? Set small, achievable goals for return to baseline health, Rest and rebuild strength, gradually increase activity, Weigh daily, Practice good oral hygiene, Eat a well-balance diet, Make a list of questions for surgeon's appointment   If the patient is a current smoker, are they able to teach back resources for cessation? Not a smoker   Is the patient/caregiver able to teach back the hierarchy of who to call/visit for symptoms/problems? PCP, Specialist, Home health nurse, Urgent Care, ED, 911 Yes   TCM call completed? Yes   Call end time [id number]            Radha Graham RN    12/11/2024, 12:00 EST

## 2024-12-11 NOTE — OUTREACH NOTE
Call Center TCM Note      Flowsheet Row Responses   [facility] facility patient discharged from? [facility]   Does the patient have one of the following disease processes/diagnoses(primary or secondary)? General Surgery   TCM attempt successful? No   Unsuccessful attempts Attempt 1  [Demetrio on verbal release-no answer]            Radah Graham RN    12/11/2024, 09:39 EST

## 2024-12-11 NOTE — TELEPHONE ENCOUNTER
Rx Refill Note  Requested Prescriptions     Pending Prescriptions Disp Refills    sotalol (BETAPACE AF) 80 MG tablet tablet 30 tablet 1     Sig: Take 0.5 tablets by mouth 2 (Two) Times a Day.      Last office visit with prescribing clinician: Visit date not found   Last telemedicine visit with prescribing clinician: Visit date not found   Next office visit with prescribing clinician: Visit date not found                         Would you like a call back once the refill request has been completed: [] Yes [] No    If the office needs to give you a call back, can they leave a voicemail: [] Yes [] No    Leslye Maldonado MA  12/11/24, 16:06 EST

## 2024-12-11 NOTE — TELEPHONE ENCOUNTER
[facility] contacted, pharmacist states that they do not make a 40 mg tablet. Jv made aware. He would like the refill sent to [facility], not [facility].     [facility] contacted, order cancelled.

## 2024-12-11 NOTE — TELEPHONE ENCOUNTER
Caller: PETEY FLEMING    Relationship: Emergency Contact    Best call back number: [phone]    Requested Prescriptions:   Requested Prescriptions     Pending Prescriptions Disp Refills    sotalol (BETAPACE AF) 80 MG tablet tablet 30 tablet 1     Sig: Take 0.5 tablets by mouth 2 (Two) Times a Day.        Pharm[facility] where [facility] should be sent[id number] - [place], [place] - [address] AT SEC OF [facility] [address], [address] [phone] [facility] [phone]      Last office visit with prescribing clinician: Visit date not found   Last telemedicine visit with prescribing clinician: Visit date not found   Next office visit with prescribing clinician: Visit date not found     Additional details provided by patient: NEEDS THE RX TO BE 40MG AS THE 80S ARE NOT EASY TO CUT IN HALF    Does the patient have less than a 3 day supply:  [] Yes  [] No    Would you like a call back once the refill request has been completed: [] Yes [] No    If the office needs to give you a call back, can they leave a voicemail: [] Yes [] No    David Loredo Rep   12/11/24 13:51 EST

## 2024-12-13 NOTE — OP NOTE
Operative Note    Date of Dictation: 12/13/24    Date of Procedure: 12/09/2024    Referring Physician: Rj Meade MD    Preoperative diagnosis:   1.  Severe symptomatic bioprosthetic mitral valve regurgitation  2.  Heart failure, chronic, New York Heart Association class III    Postoperative diagnosis:   Same    Procedure:   1.  Ultrasound-guided femoral venous access  2.  Transseptal left heart catheterization and puncture  3.  Inter-atrial balloon septostomy  4.  Transcatheter transseptal mitral valve replacement with a 29 mm Silveira BEATA  5.  Perclose deployment in the right femoral venous access    Co-operators: Markos Ríos MD and Isaías Malloy MD    Anesthesia: General endotracheal anesthesia and TATUM (Dr. Kim)    Findings:  Calcific bioprosthesis with rigid leaflets    Estimated Blood Loss: Minimal    STS Data:    The patient was explained the risks , benefits and alternatives of surgery and agreed to proceed. The antibiotics and b blockers were given in the STS required window.    Description of the procedure:     The patient was placed supine on the operative table. Anesthesia was given and lines placed. The patient was prepped and draped using the usual sterile technique.  Under ultrasound guidance and using a micropuncture access kit, a 7 Micronesian introducer was placed into the right femoral vein.  The venotomy was preclosed using 2 Perclose ProGlide devices.  An Amplatz extra-stiff wire was advanced via the right femoral vein and the 16 Micronesian Silveira delivery sheath was inserted over the Amplatz wire.  The [place] sheath was advanced into the superior vena cava over a 0.035 wire.  Under TATUM and fluoroscopy guidance, successful transseptal puncture was performed using the [place] versa cross system.  Correct position in the left atrium was confirmed by pressure tracing.  Heparin was administered and ACT's were followed with additional heparin being given to keep the ACT greater than 300.  The  [place] curved wire was advanced through the [place] sheath into the left upper pulmonary vein.  ImmTher atrial balloon septostomy was then performed with a 10 x 40 mm [place] balloon.  After 2 septostomy inflations no more waist was noted across the interatrial septum.  We then advanced the internal mammary catheter into the left atrium in an attempt to cross the bioprosthetic mitral valve.  This was very challenging due to patient's anatomy.  We then used the 9 Upper sorbian Agilis sheath to direct the J-tip wire across the bioprosthetic mitral valve into the episode the left ventricle.  Over this wire we then advanced the 6 Upper sorbian pigtail catheter.  Following, a 0.035 Amplatz extra-stiff wire was then advanced into the pigtail.  We then advanced a 29 mm BEATA 3 valve which was prepared to be placed at mitral position.  We had significant difficulty placing this valve across the mitral valve prosthesis and required use of several wires.  We used a Lunderquist wire which was created a bias and we finally used a curved tip [place] wire which facilitated positioning of the valve across the bioprosthetic mitral valve.  After confirming correct position of the valve by fluoroscopy and TATUM the valve was implanted with rapid ventricular pacing.  After deployment there was no paravalvular leak noted by TATUM.  There was a transit.  Of hemodynamic instability with low blood pressure requiring a small dose of pressors with then normalization of the hemodynamics.  The 16 Upper sorbian right femoral sheath was removed and the site was closed using the previously deployed Perclose sutures.  Protamine was administered for heparin reversal.  The patient was transferred to CCU for postoperative recovery and extubation.  Dr. Malloy and Michoacano performed these complex procedure.    Recommendations: Resume anticoagulation, cardiac rehab and echocardiogram in the morning    Complications:  none           Disposition: Cardiovascular ICU            Condition: Critical but stable.

## 2024-12-18 ENCOUNTER — TELEPHONE (OUTPATIENT)
Dept: CARDIAC REHAB | Facility: HOSPITAL | Age: 74
End: 2024-12-18
Payer: MEDICARE

## 2024-12-18 PROBLEM — N39.0 ACUTE UTI (URINARY TRACT INFECTION): Status: RESOLVED | Noted: 2024-07-19 | Resolved: 2024-12-18

## 2024-12-18 PROBLEM — J96.02 ACUTE RESPIRATORY FAILURE WITH HYPOXIA AND HYPERCAPNIA: Status: RESOLVED | Noted: 2024-07-19 | Resolved: 2024-12-18

## 2024-12-18 PROBLEM — I10 PRIMARY HYPERTENSION: Chronic | Status: ACTIVE | Noted: 2020-02-03

## 2024-12-18 PROBLEM — J96.01 ACUTE RESPIRATORY FAILURE WITH HYPOXIA AND HYPERCAPNIA: Status: RESOLVED | Noted: 2024-07-19 | Resolved: 2024-12-18

## 2024-12-18 PROBLEM — I50.32 CHRONIC HEART FAILURE WITH PRESERVED EJECTION FRACTION (HFPEF): Chronic | Status: ACTIVE | Noted: 2024-11-14

## 2024-12-18 PROBLEM — E87.5 HYPERKALEMIA: Status: RESOLVED | Noted: 2024-07-19 | Resolved: 2024-12-18

## 2024-12-18 PROBLEM — A41.9 SEPSIS: Status: RESOLVED | Noted: 2024-07-19 | Resolved: 2024-12-18

## 2024-12-18 PROBLEM — I50.33 ACUTE ON CHRONIC DIASTOLIC HEART FAILURE: Status: RESOLVED | Noted: 2024-07-19 | Resolved: 2024-12-18

## 2024-12-18 PROBLEM — J18.9 PNEUMONIA: Status: RESOLVED | Noted: 2024-07-11 | Resolved: 2024-12-18

## 2024-12-18 PROBLEM — E87.1 HYPONATREMIA: Status: RESOLVED | Noted: 2024-07-19 | Resolved: 2024-12-18

## 2024-12-18 NOTE — TELEPHONE ENCOUNTER
Spoke with brother regarding Cardiac Rehab.  Interested.  Will speak with other brother to see if transportation can be worked out to bring him.

## 2024-12-18 NOTE — PROGRESS NOTES
Subjective:     Encounter Date:12/19/2024        Patient ID: Drake Beth  1950    Chief Complaint:  hospital discharge follow-up    History of Present Illness:  Drake Beth is a 74 y.o. male with a history of bioprosthetic mitral valve replacement in 2008 with 31 mm Biocor stentless valve who was recently diagnosed with severe mitral valve regurgitation. On 12/9/2024 he underwent transcatheter mitral valve replacement by Mely Malloy and Michoacano at [facility]. The patient presents to the office today for follow-up EKG and wound check. He is accompanied by his brother. He states he has a rash all over his upper body. He denies any itching or pain. He is unsure how long it has been there. The only new medication he is on is Jardiance. He denies any changes in detergent, soap, body wash, or lotion.       Review of systems:  Review of Systems   Constitutional: Positive for malaise/fatigue.   Cardiovascular:  Negative for chest pain, dyspnea on exertion, leg swelling and palpitations.   Respiratory:  Negative for cough and shortness of breath.    Gastrointestinal:  Negative for abdominal pain, nausea and vomiting.   Neurological:  Negative for dizziness, focal weakness, headaches, light-headedness and numbness.   All other systems reviewed and are negative.        The following portions of the patient's history were reviewed and updated as appropriate: allergies, current medications, past family history, past medical history, past social history, past surgical history, and problem list.    Past Medical History:  Past Medical History:   Diagnosis Date    Allergic rhinitis     Anemia     Atrial fibrillation     CHF (congestive heart failure)     Chronic constipation     Congestive heart failure, NYHA class 3, chronic, diastolic 11/14/2024    Coronary artery disease     GERD (gastroesophageal reflux disease)     Hyperlipidemia     Hypertension     Mentally challenged     MVP (mitral valve prolapse)      S/P transcatheter mitral valve replacement (TMVR) 12/09/2024    Severe mitral regurgitation 11/14/2024       Past Surgical History:  Past Surgical History:   Procedure Laterality Date    CARDIAC CATHETERIZATION N/A 11/11/2024    Procedure: Right and Left Heart Cath with coronary angiography;  Surgeon: Isaías Malloy MD;  Location: [facility] CATH INVASIVE LOCATION;  Service: Cardiovascular;  Laterality: N/A;    CARDIAC ELECTROPHYSIOLOGY PROCEDURE N/A 04/26/2022    Procedure: PACEMAKER EXTRACTION;  Surgeon: Rj Dominique MD;  Location: [facility] CATH INVASIVE LOCATION;  Service: Cardiology;  Laterality: N/A;    COLONOSCOPY      He has never had the test done.  His family is refusing any form of colon cancer screening for him.    CORONARY ARTERY BYPASS GRAFT  2008    DR SWEAT    MITRAL VALVE REPLACEMENT  04/2008    WITH BIOPROSTHETIC TISSUE VALVE    MITRAL VALVE REPLACEMENT N/A 12/9/2024    Procedure: Transcatheter Mitral Valve Insertion;  Surgeon: Isaías Malloy MD;  Location: [facility] HYBRID OR;  Service: Cardiovascular;  Laterality: N/A;    MITRAL VALVE REPLACEMENT N/A 12/9/2024    Procedure: TRANSCATHETER MITRAL VALVE IMPLANTATION/REPLACEMENT with 29mm;  Surgeon: Markos Ríos MD;  Location: [facility] HYBRID OR;  Service: Cardiothoracic;  Laterality: N/A;        Allergies:  Allergies   Allergen Reactions    Jardiance [Empagliflozin] Rash       Current Meds:     Current Outpatient Medications:     apixaban (Eliquis) 5 MG tablet tablet, TAKE 1 TABLET BY MOUTH TWICE DAILY, Disp: 180 tablet, Rfl: 3    atorvastatin (LIPITOR) 10 MG tablet, TAKE 1 TABLET BY MOUTH DAILY, Disp: 90 tablet, Rfl: 3    furosemide (LASIX) 40 MG tablet, TAKE 1 TABLET BY MOUTH DAILY, Disp: 90 tablet, Rfl: 1    ipratropium-albuterol (DUO-NEB) 0.5-2.5 mg/3 ml nebulizer, Take 3 mL by nebulization 4 (Four) Times a Day., Disp: 360 mL, Rfl: 0    montelukast (SINGULAIR) 10 MG tablet, TAKE 1 TABLET BY MOUTH DAILY, Disp: 90 tablet, Rfl: 1    multivitamin  with minerals tablet tablet, Take 1 tablet by mouth Daily., Disp: , Rfl:     omeprazole (priLOSEC) 20 MG capsule, Take 1 capsule by mouth Daily., Disp: , Rfl:     potassium chloride (KLOR-CON M20) 20 MEQ CR tablet, TAKE 1 TABLET BY MOUTH DAILY, Disp: 90 tablet, Rfl: 0    sotalol (BETAPACE AF) 80 MG tablet tablet, Take 0.5 tablets by mouth 2 (Two) Times a Day., Disp: 30 tablet, Rfl: 1    diphenhydrAMINE (BENADRYL) 25 MG tablet, Take 1 tablet by mouth Every 6 (Six) Hours As Needed for Itching., Disp: , Rfl:   No current facility-administered medications for this visit.    Facility-Administered Medications Ordered in Other Visits:     Chlorhexidine Gluconate Cloth 2 % pads, , Topical, Q12H PRN, [place], Kaley, IZAIAH    Social History:   Social History     Tobacco Use    Smoking status: Never     Passive exposure: Never    Smokeless tobacco: Never   Substance Use Topics    Alcohol use: Never        Family History:  Family History   Problem Relation Age of Onset    Coronary artery disease Mother 64    Hyperlipidemia Sister     Liver disease Sister         FATTY LIVER    Rheum arthritis Sister     Lung cancer Sister     Hypertension Brother     Other Brother     Rheum arthritis Brother                  Objective:         Physical Exam  Vitals reviewed.   Eyes:      Conjunctiva/sclera: Conjunctivae normal.      Pupils: Pupils are equal, round, and reactive to light.   HENT:      Head: Normocephalic and atraumatic.    Mouth/Throat:      Lips: Pink.      Pharynx: Oropharynx is clear.   Pulmonary:      Effort: Pulmonary effort is normal.      Breath sounds: Normal breath sounds.   Cardiovascular:      PMI at left midclavicular line. Normal rate. Regular rhythm.   Pulses:     Intact distal pulses.   Edema:     Peripheral edema absent.   Abdominal:      General: Bowel sounds are normal.      Palpations: Abdomen is soft.   Musculoskeletal: Normal range of motion.      Cervical back: Normal range of motion. Skin:     General: Skin  "is warm and dry.      Findings: Rash present.      Comments: Patient has an erythematous rash on his abdomen, chest, and back. His groin sites are soft and dry.    Neurological:      Mental Status: Alert.      Comments: Oriented to person and place   Psychiatric:         Mood and Affect: Mood normal.         Behavior: Behavior normal.         Vital Signs  /74 (BP Location: Left arm, Patient Position: Sitting, Cuff Size: Adult)   Pulse 65   Ht 147.3 cm (58\")   Wt 42.6 kg (94 lb)   SpO2 99%   BMI 19.65 kg/m²       Labs reviewed:    CBC        BMP        CMP       BNP        TROPONIN        CoAg        Creatinine Clearance  Estimated Creatinine Clearance: 38.9 mL/min (by C-G formula based on SCr of 1.02 mg/dL).      Cardiology results reviewed:    EKG    ECG 12 Lead    Date/Time: 12/19/2024 3:37 PM  Performed by: Kaley Coleman APRN    Authorized by: Kaley Coleman APRN  Comparison: compared with previous ECG from 12/10/2024  Rhythm: sinus rhythm  Ectopy: atrial premature contractions  Rate: normal    Clinical impression: abnormal EKG  Comments: HR 65 bpm,  ms,  ms, QTc 457 ms            Stress test      Echocardiogram  Results for orders placed during the hospital encounter of 12/09/24    Adult Transthoracic Echo Limited with Contrast if Necessary Per Protocol POD #1    Interpretation Summary    Left ventricular ejection fraction appears to be 56 - 60%.    There is a TMVR valve present.    29 mm Silveira BEATA valve at the mitral position.  There is no PVL.  There is no MR or MS.      Cardiac catheterization  Results for orders placed during the hospital encounter of 12/09/24    Cardiac Catheterization/Vascular Study    Conclusion  Primary Operators  Isaías Ríos MD    Indication  Severe symptomatic bioprosthetic mitral valve regurgitation  Congestive heart failure, NYHA class III    Procedures performed  Ultrasound-guided femoral venous access  Transseptal left heart " catheterization  Interatrial balloon septostomy  Transcatheter transseptal mitral valve replacement 29 mm Silveira BEATA  Perclose deployment in the right femoral venous access    Procedure in details  Under US guidance and using a micropuncture access kit, a 7F introducer was placed into the right femoral vein. The venotomy was preclosed using 2 Perclose Proglide devices. An Amplatz extra stiff wire was advanced via the RFV and the 16F Silveira delivery sheath was inserted over the Amplatz extra stiff wire.    The [place] sheath was advanced into the superior vena cava over an 0.035 wire. Under TATUM and fluoroscopic guidance, successful transseptal puncture was performed using a [place] cross system. Correct position in the left atrium was confirmed by pressure tracing.    Heparin was administered and ACTs were followed with additional heparin being given to keep the ACT > 300.  [place] curved wire was advanced through the [place] sheath into the left upper pulmonary vein.  Interatrial balloon septostomy was then performed by 14 x 40 mm [place] balloon.  After 2 septostomy inflations no more waist was noted across interatrial septum.    We then advanced internal mammary catheter into the left atrium in an attempt to cross the bioprosthetic mitral valve.  This was very challenging due to patient's anatomy.  We then used to 9 Albanian Agilis sheath to direct a J-tip wire across the bioprosthetic mitral valve into the apex of left ventricle.  Over this wire we then advanced a 6 [place] pigtail catheter.    A 0.035 Amplatz extra-stiff wire was then advanced into the pigtail catheter.  We then advanced 29 mm S3 valve which was prepared to be placed at mitral position.  We had significant difficulty placing this valve across the mitral prosthesis and required use of several wires.  We used Lunderquist wire which created a bias and we finally used curved tip [place] wire which facilitated positioning of the valve across the  bioprosthetic mitral valve  After confirming correct position of the valve by fluoroscopy and TATUM, the valve was implanted with rapid ventricular pacing . After valve deployment there was no paravalvular leak noted by TATUM.    The 16F right femoral sheath was removed and the site was closed using the previously deployed Perclose sutures.  Protamine was administered for heparin reversal  Patient was transferred to CCU for postop recovery and extubation.    Mely Malloy and Michoacano performed the TAVR procedure.    Estimated blood loss  20 mL.    Complications  None.    Recommendations  Resume anticoagulation  Cardiac rehab  Echocardiogram in morning                Assessment:         Diagnoses and all orders for this visit:    1. Nonrheumatic mitral valve regurgitation (Primary)  -     CBC (No Diff); Future  -     Basic Metabolic Panel; Future  -     CBC (No Diff); Future  -     Basic Metabolic Panel; Future    2. History of mitral valve replacement with bioprosthetic valve  -     CBC (No Diff); Future  -     Basic Metabolic Panel; Future  -     CBC (No Diff); Future  -     Basic Metabolic Panel; Future    3. S/P transcatheter mitral valve replacement (TMVR)  -     CBC (No Diff); Future  -     Basic Metabolic Panel; Future  -     CBC (No Diff); Future  -     Basic Metabolic Panel; Future    4. Chronic heart failure with preserved ejection fraction (HFpEF)    5. Paroxysmal atrial fibrillation    6. Presence of cardiac pacemaker    7. Coronary artery disease of native artery of native heart with stable angina pectoris    8. Mixed hyperlipidemia    9. Primary hypertension    10. Hospital discharge follow-up    11. Rash    Other orders  -     diphenhydrAMINE (BENADRYL) 25 MG tablet; Take 1 tablet by mouth Every 6 (Six) Hours As Needed for Itching.                Plan:       Nonrheumatic mitral valve regurgitation   History of mitral valve replacement with bioprosthetic valve  Status post TMVR  Post-procedure echocardiogram  shows 29 mm Silveira BEATA valve at the mitral position.  There is no PVL.  There is no MR or MS.  12-lead EKG reviewed  Wound sites checked  Continue Eliquis   Will obtain 30 day and 1 year post-TAVR CBC, BMP, EKG, and TTE  30 day and 1 year post-TAVR appointments scheduled  Patient advised to resume normal activities as tolerated   Cardiac rehab referral pending    Chronic heart failure with preserved ejection fraction (HFpEF)  Patient appears euvolemic  I will discontinue Jardiance due to possible allergic reaction.  Continue Lasix    Paroxysmal atrial fibrillation  Presence of cardiac pacemaker  Followed by EP, Dr. Dominique   Continue sotalol   [id number] score is 4  Continue Eliquis     Coronary artery disease of native artery of native heart with stable angina pectoris  Continue Eliquis and high intensity statin     Mixed hyperlipidemia  Continue atorvastatin     Primary hypertension, chronic  Blood pressure is controlled  Continue Lasix    Hospital discharge follow-up  Patient encouraged to participate in cardiac rehab.     Rash   Diffuse rash on abdomen, chest and back.  Patient denies itching or pain.  He was advised to take Benadryl 25 mg every 6 hours as needed for itching.  He was also advised to follow up with his PCP for further evaluation.       Electronically signed by IZAIAH Hewitt, 12/19/24, 3:45 PM EST.

## 2024-12-19 ENCOUNTER — READMISSION MANAGEMENT (OUTPATIENT)
Dept: CALL CENTER | Facility: HOSPITAL | Age: 74
End: 2024-12-19
Payer: MEDICARE

## 2024-12-19 ENCOUNTER — OFFICE VISIT (OUTPATIENT)
Dept: CARDIOLOGY | Facility: CLINIC | Age: 74
End: 2024-12-19
Payer: MEDICARE

## 2024-12-19 VITALS
DIASTOLIC BLOOD PRESSURE: 74 MMHG | HEART RATE: 65 BPM | OXYGEN SATURATION: 99 % | SYSTOLIC BLOOD PRESSURE: 140 MMHG | BODY MASS INDEX: 18.46 KG/M2 | HEIGHT: 60 IN | WEIGHT: 94 LBS

## 2024-12-19 DIAGNOSIS — Z95.0 PRESENCE OF CARDIAC PACEMAKER: Chronic | ICD-10-CM

## 2024-12-19 DIAGNOSIS — Z95.2 S/P TRANSCATHETER MITRAL VALVE REPLACEMENT (TMVR): ICD-10-CM

## 2024-12-19 DIAGNOSIS — I48.0 PAROXYSMAL ATRIAL FIBRILLATION: Chronic | ICD-10-CM

## 2024-12-19 DIAGNOSIS — I25.118 CORONARY ARTERY DISEASE OF NATIVE ARTERY OF NATIVE HEART WITH STABLE ANGINA PECTORIS: Chronic | ICD-10-CM

## 2024-12-19 DIAGNOSIS — Z09 HOSPITAL DISCHARGE FOLLOW-UP: ICD-10-CM

## 2024-12-19 DIAGNOSIS — I50.32 CHRONIC HEART FAILURE WITH PRESERVED EJECTION FRACTION (HFPEF): Chronic | ICD-10-CM

## 2024-12-19 DIAGNOSIS — Z95.3 HISTORY OF MITRAL VALVE REPLACEMENT WITH BIOPROSTHETIC VALVE: Chronic | ICD-10-CM

## 2024-12-19 DIAGNOSIS — I10 PRIMARY HYPERTENSION: Chronic | ICD-10-CM

## 2024-12-19 DIAGNOSIS — R21 RASH: ICD-10-CM

## 2024-12-19 DIAGNOSIS — I34.0 NONRHEUMATIC MITRAL VALVE REGURGITATION: Primary | Chronic | ICD-10-CM

## 2024-12-19 DIAGNOSIS — E78.2 MIXED HYPERLIPIDEMIA: Chronic | ICD-10-CM

## 2024-12-19 RX ORDER — DIPHENHYDRAMINE HCL 25 MG
25 TABLET ORAL EVERY 6 HOURS PRN
Start: 2024-12-19

## 2024-12-19 NOTE — OUTREACH NOTE
General Surgery Week 2 Survey      Flowsheet Row Responses   [facility] facility patient discharged from? [facility]   Does the patient have one of the following disease processes/diagnoses(primary or secondary)? General Surgery   Week 2 attempt successful? No   Unsuccessful attempts Attempt 1            Yanet GRIER - Registered Nurse

## 2024-12-20 ENCOUNTER — TELEPHONE (OUTPATIENT)
Dept: CARDIAC REHAB | Facility: HOSPITAL | Age: 74
End: 2024-12-20
Payer: MEDICARE

## 2024-12-26 RX ORDER — POTASSIUM CHLORIDE 1500 MG/1
20 TABLET, EXTENDED RELEASE ORAL DAILY
Qty: 90 TABLET | Refills: 0 | Status: SHIPPED | OUTPATIENT
Start: 2024-12-26

## 2025-01-09 DIAGNOSIS — I10 PRIMARY HYPERTENSION: ICD-10-CM

## 2025-01-09 RX ORDER — FUROSEMIDE 40 MG/1
40 TABLET ORAL DAILY
Qty: 30 TABLET | Refills: 6 | Status: SHIPPED | OUTPATIENT
Start: 2025-01-09

## 2025-01-09 NOTE — TELEPHONE ENCOUNTER
Rx Refill Note  Requested Prescriptions     Pending Prescriptions Disp Refills    furosemide (LASIX) 40 MG tablet [Pharmacy Med Name: FUROSEMIDE 40MG TABLETS] 30 tablet      Sig: TAKE 1 TABLET BY MOUTH DAILY      Last office visit with prescribing clinician: 10/8/2024   Last telemedicine visit with prescribing clinician: Visit date not found   Next office visit with prescribing clinician: 1/28/2025                         Would you like a call back once the refill request has been completed: [] Yes [] No    If the office needs to give you a call back, can they leave a voicemail: [] Yes [] No    Zarina Ambrose MA  01/09/25, 10:38 EST

## 2025-01-17 ENCOUNTER — OFFICE VISIT (OUTPATIENT)
Dept: CARDIAC REHAB | Facility: HOSPITAL | Age: 75
End: 2025-01-17
Payer: MEDICARE

## 2025-01-17 DIAGNOSIS — Z95.2 S/P TRANSCATHETER MITRAL VALVE REPLACEMENT (TMVR): Primary | ICD-10-CM

## 2025-01-17 PROCEDURE — 93798 PHYS/QHP OP CAR RHAB W/ECG: CPT

## 2025-01-21 ENCOUNTER — LAB (OUTPATIENT)
Dept: LAB | Facility: HOSPITAL | Age: 75
End: 2025-01-21
Payer: MEDICARE

## 2025-01-21 ENCOUNTER — TREATMENT (OUTPATIENT)
Dept: CARDIAC REHAB | Facility: HOSPITAL | Age: 75
End: 2025-01-21
Payer: MEDICARE

## 2025-01-21 DIAGNOSIS — Z95.3 HISTORY OF MITRAL VALVE REPLACEMENT WITH BIOPROSTHETIC VALVE: Chronic | ICD-10-CM

## 2025-01-21 DIAGNOSIS — Z95.2 S/P TRANSCATHETER MITRAL VALVE REPLACEMENT (TMVR): Primary | ICD-10-CM

## 2025-01-21 DIAGNOSIS — I34.0 NONRHEUMATIC MITRAL VALVE REGURGITATION: Chronic | ICD-10-CM

## 2025-01-21 DIAGNOSIS — Z95.2 S/P TRANSCATHETER MITRAL VALVE REPLACEMENT (TMVR): ICD-10-CM

## 2025-01-21 LAB
ANION GAP SERPL CALCULATED.3IONS-SCNC: 12 MMOL/L (ref 5–15)
BUN SERPL-MCNC: 24 MG/DL (ref 8–23)
BUN/CREAT SERPL: 18.5 (ref 7–25)
CALCIUM SPEC-SCNC: 9.4 MG/DL (ref 8.6–10.5)
CHLORIDE SERPL-SCNC: 97 MMOL/L (ref 98–107)
CO2 SERPL-SCNC: 29 MMOL/L (ref 22–29)
CREAT SERPL-MCNC: 1.3 MG/DL (ref 0.76–1.27)
DEPRECATED RDW RBC AUTO: 46.2 FL (ref 37–54)
EGFRCR SERPLBLD CKD-EPI 2021: 58 ML/MIN/1.73
ERYTHROCYTE [DISTWIDTH] IN BLOOD BY AUTOMATED COUNT: 13.5 % (ref 12.3–15.4)
GLUCOSE SERPL-MCNC: 91 MG/DL (ref 65–99)
HCT VFR BLD AUTO: 38.6 % (ref 37.5–51)
HGB BLD-MCNC: 12 G/DL (ref 13–17.7)
MCH RBC QN AUTO: 29.2 PG (ref 26.6–33)
MCHC RBC AUTO-ENTMCNC: 31.1 G/DL (ref 31.5–35.7)
MCV RBC AUTO: 93.9 FL (ref 79–97)
PLATELET # BLD AUTO: 250 10*3/MM3 (ref 140–450)
PMV BLD AUTO: 9.5 FL (ref 6–12)
POTASSIUM SERPL-SCNC: 4.4 MMOL/L (ref 3.5–5.2)
RBC # BLD AUTO: 4.11 10*6/MM3 (ref 4.14–5.8)
SODIUM SERPL-SCNC: 138 MMOL/L (ref 136–145)
WBC NRBC COR # BLD AUTO: 7.91 10*3/MM3 (ref 3.4–10.8)

## 2025-01-21 PROCEDURE — 36415 COLL VENOUS BLD VENIPUNCTURE: CPT

## 2025-01-21 PROCEDURE — 85027 COMPLETE CBC AUTOMATED: CPT

## 2025-01-21 PROCEDURE — 93798 PHYS/QHP OP CAR RHAB W/ECG: CPT

## 2025-01-21 PROCEDURE — 80048 BASIC METABOLIC PNL TOTAL CA: CPT

## 2025-01-23 ENCOUNTER — TREATMENT (OUTPATIENT)
Dept: CARDIAC REHAB | Facility: HOSPITAL | Age: 75
End: 2025-01-23
Payer: MEDICARE

## 2025-01-23 DIAGNOSIS — Z95.2 S/P TRANSCATHETER MITRAL VALVE REPLACEMENT (TMVR): Primary | ICD-10-CM

## 2025-01-23 PROCEDURE — 93798 PHYS/QHP OP CAR RHAB W/ECG: CPT

## 2025-01-24 ENCOUNTER — TREATMENT (OUTPATIENT)
Dept: CARDIAC REHAB | Facility: HOSPITAL | Age: 75
End: 2025-01-24
Payer: MEDICARE

## 2025-01-24 DIAGNOSIS — Z95.2 S/P TRANSCATHETER MITRAL VALVE REPLACEMENT (TMVR): Primary | ICD-10-CM

## 2025-01-24 PROCEDURE — 93798 PHYS/QHP OP CAR RHAB W/ECG: CPT

## 2025-01-28 ENCOUNTER — TREATMENT (OUTPATIENT)
Dept: CARDIAC REHAB | Facility: HOSPITAL | Age: 75
End: 2025-01-28
Payer: MEDICARE

## 2025-01-28 ENCOUNTER — OFFICE VISIT (OUTPATIENT)
Dept: CARDIOLOGY | Facility: CLINIC | Age: 75
End: 2025-01-28
Payer: MEDICARE

## 2025-01-28 ENCOUNTER — HOSPITAL ENCOUNTER (OUTPATIENT)
Dept: CARDIOLOGY | Facility: HOSPITAL | Age: 75
Discharge: HOME | End: 2025-01-28
Admitting: INTERNAL MEDICINE
Payer: MEDICARE

## 2025-01-28 VITALS
HEART RATE: 95 BPM | SYSTOLIC BLOOD PRESSURE: 117 MMHG | BODY MASS INDEX: 19.48 KG/M2 | HEIGHT: 60 IN | WEIGHT: 99.2 LBS | DIASTOLIC BLOOD PRESSURE: 71 MMHG | OXYGEN SATURATION: 99 %

## 2025-01-28 VITALS
DIASTOLIC BLOOD PRESSURE: 74 MMHG | WEIGHT: 98 LBS | HEART RATE: 92 BPM | BODY MASS INDEX: 19.24 KG/M2 | SYSTOLIC BLOOD PRESSURE: 144 MMHG | HEIGHT: 60 IN

## 2025-01-28 DIAGNOSIS — I50.32 CHRONIC HEART FAILURE WITH PRESERVED EJECTION FRACTION (HFPEF): Chronic | ICD-10-CM

## 2025-01-28 DIAGNOSIS — I34.0 SEVERE MITRAL REGURGITATION: ICD-10-CM

## 2025-01-28 DIAGNOSIS — Z95.2 S/P TRANSCATHETER MITRAL VALVE REPLACEMENT (TMVR): Primary | ICD-10-CM

## 2025-01-28 DIAGNOSIS — I10 PRIMARY HYPERTENSION: Chronic | ICD-10-CM

## 2025-01-28 DIAGNOSIS — I48.0 PAROXYSMAL ATRIAL FIBRILLATION: ICD-10-CM

## 2025-01-28 DIAGNOSIS — Z95.2 S/P TRANSCATHETER MITRAL VALVE REPLACEMENT (TMVR): ICD-10-CM

## 2025-01-28 LAB
AV MEAN PRESS GRAD SYS DOP V1V2: 3.2 MMHG
AV VMAX SYS DOP: 133.8 CM/SEC
BH CV ECHO MEAS - AO MAX PG: 7.2 MMHG
BH CV ECHO MEAS - AO ROOT DIAM: 2.9 CM
BH CV ECHO MEAS - AO V2 VTI: 21 CM
BH CV ECHO MEAS - AVA(I,D): 1.32 CM2
BH CV ECHO MEAS - EDV(CUBED): 90.7 ML
BH CV ECHO MEAS - EDV(MOD-SP2): 31 ML
BH CV ECHO MEAS - EDV(MOD-SP4): 29.7 ML
BH CV ECHO MEAS - EF(MOD-SP2): 56.8 %
BH CV ECHO MEAS - EF(MOD-SP4): 39.5 %
BH CV ECHO MEAS - ESV(CUBED): 48.1 ML
BH CV ECHO MEAS - ESV(MOD-SP2): 13.4 ML
BH CV ECHO MEAS - ESV(MOD-SP4): 17.9 ML
BH CV ECHO MEAS - FS: 19 %
BH CV ECHO MEAS - IVS/LVPW: 1 CM
BH CV ECHO MEAS - IVSD: 1.03 CM
BH CV ECHO MEAS - LV MASS(C)D: 158.1 GRAMS
BH CV ECHO MEAS - LV MAX PG: 3.3 MMHG
BH CV ECHO MEAS - LV MEAN PG: 1.62 MMHG
BH CV ECHO MEAS - LV V1 MAX: 91.1 CM/SEC
BH CV ECHO MEAS - LV V1 VTI: 12.7 CM
BH CV ECHO MEAS - LVIDD: 4.5 CM
BH CV ECHO MEAS - LVIDS: 3.6 CM
BH CV ECHO MEAS - LVOT AREA: 2.17 CM2
BH CV ECHO MEAS - LVOT DIAM: 1.66 CM
BH CV ECHO MEAS - LVPWD: 1.02 CM
BH CV ECHO MEAS - RAP SYSTOLE: 3 MMHG
BH CV ECHO MEAS - RVSP: 57.7 MMHG
BH CV ECHO MEAS - SV(LVOT): 27.7 ML
BH CV ECHO MEAS - SV(MOD-SP2): 17.6 ML
BH CV ECHO MEAS - SV(MOD-SP4): 11.7 ML
BH CV ECHO MEAS - TAPSE (>1.6): 0.85 CM
BH CV ECHO MEAS - TR MAX PG: 54.7 MMHG
BH CV ECHO MEAS - TR MAX VEL: 369.9 CM/SEC

## 2025-01-28 PROCEDURE — 93798 PHYS/QHP OP CAR RHAB W/ECG: CPT

## 2025-01-28 PROCEDURE — 93306 TTE W/DOPPLER COMPLETE: CPT

## 2025-01-28 PROCEDURE — 93306 TTE W/DOPPLER COMPLETE: CPT | Performed by: INTERNAL MEDICINE

## 2025-01-28 NOTE — PROGRESS NOTES
Progress note      Name: Drake Beth ADMIT: (Not on file)   : 1950  PCP: Amada Haskins MD    MRN: [id number] LOS: 0 days   AGE/SEX: 74 y.o. male  ROOM: Room/bed info not found     Chief Complaint   Patient presents with    Follow-up     30 day TMVR F/u with ECHO       Subjective       History of present illness  Drake Beth is a 74-year-old male patient who has developmental delay, coronary artery disease status post bypass as well as bioprosthetic mitral valve in , hypertension, paroxysmal atrial fibrillation, previously he had dual-chamber pacemaker implanted on 3/11/2022 at an outside facility, 2 weeks later he developed pocket infection and the pacemaker was extracted on 2022.  Patient had been on sotalol 40 mg twice a day, however on 2023 he was noted to be in atypical atrial flutter and the dose was increased to 80 mg twice a day.    Patient started experiencing heart failure symptoms and was noted to have severe mitral regurgitation.  He underwent TMVR on 2024 and is here today for follow-up.  He is doing well denies having any chest pain or shortness of breath, going to cardiac rehab without issues.    Past Medical History:   Diagnosis Date    Allergic rhinitis     Anemia     Atrial fibrillation     CHF (congestive heart failure)     Chronic constipation     Congestive heart failure, NYHA class 3, chronic, diastolic 2024    Coronary artery disease     GERD (gastroesophageal reflux disease)     Hyperlipidemia     Hypertension     Mentally challenged     MVP (mitral valve prolapse)     S/P transcatheter mitral valve replacement (TMVR) 2024    Severe mitral regurgitation 2024     Past Surgical History:   Procedure Laterality Date    CARDIAC CATHETERIZATION N/A 2024    Procedure: Right and Left Heart Cath with coronary angiography;  Surgeon: Isaías Malloy MD;  Location: [facility] CATH INVASIVE LOCATION;  Service: Cardiovascular;  Laterality: N/A;     CARDIAC ELECTROPHYSIOLOGY PROCEDURE N/A 04/26/2022    Procedure: PACEMAKER EXTRACTION;  Surgeon: Rj Dominique MD;  Location: [facility] CATH INVASIVE LOCATION;  Service: Cardiology;  Laterality: N/A;    COLONOSCOPY      He has never had the test done.  His family is refusing any form of colon cancer screening for him.    CORONARY ARTERY BYPASS GRAFT  2008    DR SWEAT    MITRAL VALVE REPLACEMENT  04/2008    WITH BIOPROSTHETIC TISSUE VALVE    MITRAL VALVE REPLACEMENT N/A 12/9/2024    Procedure: Transcatheter Mitral Valve Insertion;  Surgeon: Isaías Malloy MD;  Location: [facility] HYBRID OR;  Service: Cardiovascular;  Laterality: N/A;    MITRAL VALVE REPLACEMENT N/A 12/9/2024    Procedure: TRANSCATHETER MITRAL VALVE IMPLANTATION/REPLACEMENT with 29mm;  Surgeon: Markos Ríos MD;  Location: [facility] HYBRID OR;  Service: Cardiothoracic;  Laterality: N/A;     Family History   Problem Relation Age of Onset    Coronary artery disease Mother 64    Hyperlipidemia Sister     Liver disease Sister         FATTY LIVER    Rheum arthritis Sister     Lung cancer Sister     Hypertension Brother     Other Brother     Rheum arthritis Brother      Social History     Tobacco Use    Smoking status: Never     Passive exposure: Never    Smokeless tobacco: Never   Vaping Use    Vaping status: Never Used   Substance Use Topics    Alcohol use: Never    Drug use: Never       Current Outpatient Medications:     apixaban (Eliquis) 5 MG tablet tablet, TAKE 1 TABLET BY MOUTH TWICE DAILY, Disp: 180 tablet, Rfl: 3    atorvastatin (LIPITOR) 10 MG tablet, TAKE 1 TABLET BY MOUTH DAILY, Disp: 90 tablet, Rfl: 3    furosemide (LASIX) 40 MG tablet, TAKE 1 TABLET BY MOUTH DAILY, Disp: 30 tablet, Rfl: 6    ipratropium-albuterol (DUO-NEB) 0.5-2.5 mg/3 ml nebulizer, Take 3 mL by nebulization 4 (Four) Times a Day., Disp: 360 mL, Rfl: 0    montelukast (SINGULAIR) 10 MG tablet, TAKE 1 TABLET BY MOUTH DAILY, Disp: 90 tablet, Rfl: 1    multivitamin with minerals  tablet tablet, Take 1 tablet by mouth Daily., Disp: , Rfl:     omeprazole (priLOSEC) 20 MG capsule, Take 1 capsule by mouth Daily., Disp: , Rfl:     potassium chloride (KLOR-CON M20) 20 MEQ CR tablet, TAKE 1 TABLET BY MOUTH DAILY, Disp: 90 tablet, Rfl: 0    sotalol (BETAPACE AF) 80 MG tablet tablet, Take 0.5 tablets by mouth 2 (Two) Times a Day., Disp: 30 tablet, Rfl: 1  No current facility-administered medications for this visit.    Facility-Administered Medications Ordered in Other Visits:     Chlorhexidine Gluconate Cloth 2 % pads, , Topical, Q12H PRN, Coleman, Kaley, APRN  Allergies:  Jardiance [empagliflozin]      Physical Exam  VITALS REVIEWED    General:      well developed, in no acute distress.    Head:      normocephalic and atraumatic.    Eyes:      PERRL/EOM intact, conjunctiva and sclera clear with out nystagmus.    Neck:      no masses, thyromegaly,  trachea central with normal respiratory effort and PMI displaced laterally  Lungs:      Clear to auscultation bilaterally  Heart:       Regular rate and rhythm  Msk:      no deformity or scoliosis noted of thoracic or lumbar spine.    Pulses:      pulses normal in all 4 extremities.    Extremities:       No lower extremity edema   neurologic:      no focal deficits.   alert oriented x3  Skin:      intact without lesions or rashes.    Psych:      alert and cooperative; normal mood and affect; normal attention span and concentration.      Result Review :               Pertinent cardiac workup    EKG 7/25/2023 and 9/6/2023 atrial flutter.   Echo 12/10/2024 ejection fraction 55 to 60%, presence of TMVR, no MR or MS  Heart cath 11/11/2024, no significant CAD      Procedures        Assessment and Plan      Drake eBth is a 74-year-old male patient who has history of bioprosthetic mitral valve replacement in 2008, no significant CAD.  Patient has history of bradycardia, previously pacemaker implanted which had to be removed due to infection on 4/26/2022.  No  reimplant so far.  Patient had several instances of what seem to be atypical atrial flutter but currently in sinus rhythm on sotalol.  He underwent TMVR on 12/9/2024 due to worsening mitral regurgitation and CHF.  Follow-up echo shows normal ejection fraction and normal functioning mitral valve.  Patient is doing well no anginal symptoms no CHF symptoms, he is going to cardiac rehab without any issues.  No changes today, follow-up in 6 months.    Diagnoses and all orders for this visit:    1. S/P transcatheter mitral valve replacement (TMVR) (Primary)    2. Paroxysmal atrial fibrillation    3. Chronic heart failure with preserved ejection fraction (HFpEF)    4. Primary hypertension           Return in about 6 months (around 7/28/2025).  Patient was given instructions and counseling regarding his condition or for health maintenance advice. Please see specific information pulled into the AVS if appropriate.         Electronically signed by Rj Dominique MD, 01/28/25, 4:01 PM EST.

## 2025-01-30 ENCOUNTER — TREATMENT (OUTPATIENT)
Dept: CARDIAC REHAB | Facility: HOSPITAL | Age: 75
End: 2025-01-30
Payer: MEDICARE

## 2025-01-30 DIAGNOSIS — Z95.2 S/P TRANSCATHETER MITRAL VALVE REPLACEMENT (TMVR): Primary | ICD-10-CM

## 2025-01-30 PROCEDURE — 93798 PHYS/QHP OP CAR RHAB W/ECG: CPT

## 2025-01-31 ENCOUNTER — TREATMENT (OUTPATIENT)
Dept: CARDIAC REHAB | Facility: HOSPITAL | Age: 75
End: 2025-01-31
Payer: MEDICARE

## 2025-01-31 DIAGNOSIS — Z95.2 S/P TRANSCATHETER MITRAL VALVE REPLACEMENT (TMVR): Primary | ICD-10-CM

## 2025-01-31 PROCEDURE — 93798 PHYS/QHP OP CAR RHAB W/ECG: CPT

## 2025-02-04 ENCOUNTER — TREATMENT (OUTPATIENT)
Dept: CARDIAC REHAB | Facility: HOSPITAL | Age: 75
End: 2025-02-04
Payer: MEDICARE

## 2025-02-04 DIAGNOSIS — Z95.2 S/P TRANSCATHETER MITRAL VALVE REPLACEMENT (TMVR): Primary | ICD-10-CM

## 2025-02-04 PROCEDURE — 93798 PHYS/QHP OP CAR RHAB W/ECG: CPT

## 2025-02-06 ENCOUNTER — TREATMENT (OUTPATIENT)
Dept: CARDIAC REHAB | Facility: HOSPITAL | Age: 75
End: 2025-02-06
Payer: MEDICARE

## 2025-02-06 DIAGNOSIS — Z95.2 S/P TRANSCATHETER MITRAL VALVE REPLACEMENT (TMVR): Primary | ICD-10-CM

## 2025-02-06 PROCEDURE — 93798 PHYS/QHP OP CAR RHAB W/ECG: CPT

## 2025-02-07 ENCOUNTER — TREATMENT (OUTPATIENT)
Dept: CARDIAC REHAB | Facility: HOSPITAL | Age: 75
End: 2025-02-07
Payer: MEDICARE

## 2025-02-07 DIAGNOSIS — Z95.2 S/P TRANSCATHETER MITRAL VALVE REPLACEMENT (TMVR): Primary | ICD-10-CM

## 2025-02-07 PROCEDURE — 93798 PHYS/QHP OP CAR RHAB W/ECG: CPT

## 2025-02-10 RX ORDER — SOTALOL HYDROCHLORIDE 80 MG/1
40 TABLET ORAL DAILY
Qty: 60 TABLET | Refills: 5 | Status: SHIPPED | OUTPATIENT
Start: 2025-02-10

## 2025-02-11 ENCOUNTER — TREATMENT (OUTPATIENT)
Dept: CARDIAC REHAB | Facility: HOSPITAL | Age: 75
End: 2025-02-11
Payer: MEDICARE

## 2025-02-11 DIAGNOSIS — Z95.2 S/P TRANSCATHETER MITRAL VALVE REPLACEMENT (TMVR): Primary | ICD-10-CM

## 2025-02-11 DIAGNOSIS — J30.1 SEASONAL ALLERGIC RHINITIS DUE TO POLLEN: ICD-10-CM

## 2025-02-11 PROCEDURE — 93798 PHYS/QHP OP CAR RHAB W/ECG: CPT

## 2025-02-11 RX ORDER — MONTELUKAST SODIUM 10 MG/1
10 TABLET ORAL DAILY
Qty: 90 TABLET | Refills: 1 | Status: SHIPPED | OUTPATIENT
Start: 2025-02-11

## 2025-02-13 ENCOUNTER — TREATMENT (OUTPATIENT)
Dept: CARDIAC REHAB | Facility: HOSPITAL | Age: 75
End: 2025-02-13
Payer: MEDICARE

## 2025-02-13 DIAGNOSIS — Z95.2 S/P TRANSCATHETER MITRAL VALVE REPLACEMENT (TMVR): Primary | ICD-10-CM

## 2025-02-13 PROCEDURE — 93798 PHYS/QHP OP CAR RHAB W/ECG: CPT

## 2025-02-14 ENCOUNTER — TREATMENT (OUTPATIENT)
Dept: CARDIAC REHAB | Facility: HOSPITAL | Age: 75
End: 2025-02-14
Payer: MEDICARE

## 2025-02-14 DIAGNOSIS — Z95.2 S/P TRANSCATHETER MITRAL VALVE REPLACEMENT (TMVR): Primary | ICD-10-CM

## 2025-02-14 PROCEDURE — 93798 PHYS/QHP OP CAR RHAB W/ECG: CPT

## 2025-02-18 ENCOUNTER — APPOINTMENT (OUTPATIENT)
Dept: CARDIAC REHAB | Facility: HOSPITAL | Age: 75
End: 2025-02-18
Payer: MEDICARE

## 2025-02-20 ENCOUNTER — TREATMENT (OUTPATIENT)
Dept: CARDIAC REHAB | Facility: HOSPITAL | Age: 75
End: 2025-02-20
Payer: MEDICARE

## 2025-02-20 DIAGNOSIS — Z95.2 S/P TRANSCATHETER MITRAL VALVE REPLACEMENT (TMVR): Primary | ICD-10-CM

## 2025-02-20 PROCEDURE — 93798 PHYS/QHP OP CAR RHAB W/ECG: CPT

## 2025-02-21 ENCOUNTER — TELEPHONE (OUTPATIENT)
Dept: FAMILY MEDICINE CLINIC | Facility: CLINIC | Age: 75
End: 2025-02-21

## 2025-02-21 ENCOUNTER — TREATMENT (OUTPATIENT)
Dept: CARDIAC REHAB | Facility: HOSPITAL | Age: 75
End: 2025-02-21
Payer: MEDICARE

## 2025-02-21 DIAGNOSIS — Z95.2 S/P TRANSCATHETER MITRAL VALVE REPLACEMENT (TMVR): Primary | ICD-10-CM

## 2025-02-21 PROCEDURE — 93798 PHYS/QHP OP CAR RHAB W/ECG: CPT

## 2025-02-21 NOTE — TELEPHONE ENCOUNTER
Caller: PETEY FLEMING    Relationship: Emergency Contact    Best call back number: [phone]     PATIENT REQUESTING ALL NEW WRITTEN PRESCRIPTIONS FOR HIS ACTIVE MEDICATIONS BE SENT TO [facility] IN [place].  [facility].

## 2025-02-21 NOTE — TELEPHONE ENCOUNTER
I looked at his medication prescription history and it looks like the only prescriptions he gets from me is Singulair and potassium.  Please confirm with the patient that this is all what he needs.  He is also overdue for his Medicare wellness exam with me and I would like him to schedule it.  Thank you.

## 2025-02-24 NOTE — TELEPHONE ENCOUNTER
Patients brother is aware that you only fill two of his medications, no refills are needed. Its just a pharmacy change. He will make an appointment for the patient today

## 2025-02-25 ENCOUNTER — TREATMENT (OUTPATIENT)
Dept: CARDIAC REHAB | Facility: HOSPITAL | Age: 75
End: 2025-02-25
Payer: MEDICARE

## 2025-02-25 DIAGNOSIS — Z95.2 S/P TRANSCATHETER MITRAL VALVE REPLACEMENT (TMVR): Primary | ICD-10-CM

## 2025-02-25 PROCEDURE — 93798 PHYS/QHP OP CAR RHAB W/ECG: CPT

## 2025-02-27 ENCOUNTER — TREATMENT (OUTPATIENT)
Dept: CARDIAC REHAB | Facility: HOSPITAL | Age: 75
End: 2025-02-27
Payer: MEDICARE

## 2025-02-27 DIAGNOSIS — Z95.2 S/P TRANSCATHETER MITRAL VALVE REPLACEMENT (TMVR): Primary | ICD-10-CM

## 2025-02-27 PROCEDURE — 93798 PHYS/QHP OP CAR RHAB W/ECG: CPT

## 2025-02-28 ENCOUNTER — TREATMENT (OUTPATIENT)
Dept: CARDIAC REHAB | Facility: HOSPITAL | Age: 75
End: 2025-02-28
Payer: MEDICARE

## 2025-02-28 DIAGNOSIS — Z95.2 S/P TRANSCATHETER MITRAL VALVE REPLACEMENT (TMVR): Primary | ICD-10-CM

## 2025-02-28 PROCEDURE — 93798 PHYS/QHP OP CAR RHAB W/ECG: CPT

## 2025-03-10 RX ORDER — APIXABAN 5 MG/1
5 TABLET, FILM COATED ORAL 2 TIMES DAILY
Qty: 180 TABLET | Refills: 3 | Status: SHIPPED | OUTPATIENT
Start: 2025-03-10

## 2025-03-10 RX ORDER — POTASSIUM CHLORIDE 1500 MG/1
20 TABLET, EXTENDED RELEASE ORAL DAILY
Qty: 90 TABLET | Refills: 0 | Status: SHIPPED | OUTPATIENT
Start: 2025-03-10

## 2025-03-10 NOTE — TELEPHONE ENCOUNTER
Rx Refill Note  Requested Prescriptions     Pending Prescriptions Disp Refills    Eliquis 5 MG tablet tablet [Pharmacy Med Name: ELIQUIS 5MG TABLETS] 180 tablet 3     Sig: TAKE 1 TABLET BY MOUTH TWICE DAILY      Last office visit with prescribing clinician: 1/28/2025   Last telemedicine visit with prescribing clinician: Visit date not found   Next office visit with prescribing clinician: 7/29/2025                         Would you like a call back once the refill request has been completed: [] Yes [] No    If the office needs to give you a call back, can they leave a voicemail: [] Yes [] No    Sylvie Guerrero MA  03/10/25, 11:39 EDT

## 2025-03-17 DIAGNOSIS — I10 PRIMARY HYPERTENSION: ICD-10-CM

## 2025-03-17 RX ORDER — FUROSEMIDE 40 MG/1
40 TABLET ORAL DAILY
Qty: 30 TABLET | Refills: 6 | Status: SHIPPED | OUTPATIENT
Start: 2025-03-17

## 2025-03-17 NOTE — TELEPHONE ENCOUNTER
Caller: Drake Beth    Relationship: Self    Best call back number: [phone]    Requested Prescriptions:   Requested Prescriptions     Pending Prescriptions Disp Refills    furosemide (LASIX) 40 MG tablet 30 tablet 6     Sig: Take 1 tablet by mouth Daily.        Pharmacy where request should be sent: [facility] [id number] [facility], [place] - [address] - [phone] PH - [phone] FX     Last office visit with prescribing clinician: 1/28/2025   Last telemedicine visit with prescribing clinician: Visit date not found   Next office visit with prescribing clinician: 7/29/2025     Additional details provided by patient: 3-4 DAYS    Does the patient have less than a 3 day supply:  [] Yes  [x] No    Would you like a call back once the refill request has been completed: [] Yes [x] No    If the office needs to give you a call back, can they leave a voicemail: [x] Yes [] No    David Thomason Rep   03/17/25 10:45 EDT

## 2025-03-24 NOTE — TELEPHONE ENCOUNTER
In an effort to ensure that our patients [employer]Well, a Team Member has reviewed your chart and identified an opportunity to provide the best care possible. An attempt was made to discuss or schedule due or overdue Preventive or Chronic Condition care.Care Gaps identified: Wellness Visits.    The Outcome was Contact was not made, no answer/busy. We are attempting to schedule a yearly wellness visit. If you have any questions or need help with scheduling, contact our Health Outreach Team at [phone].   Type of Appointment needed: Medicare Wellness Visit   Rx Refill Note  Requested Prescriptions     Pending Prescriptions Disp Refills    atorvastatin (LIPITOR) 10 MG tablet [Pharmacy Med Name: ATORVASTATIN 10MG TABLETS] 90 tablet 3     Sig: TAKE 1 TABLET BY MOUTH DAILY      Last office visit with prescribing clinician: Dr. Dominique 1/23/2024  Last telemedicine visit with prescribing clinician: Visit date not found   Next office visit with prescribing clinician: Dr. Dominique  7/23/2024                         Would you like a call back once the refill request has been completed: [] Yes [] No    If the office needs to give you a call back, can they leave a voicemail: [] Yes [] No    Adwoa Escalante MA  06/07/24, 09:44 EDT

## 2025-04-14 DIAGNOSIS — I10 PRIMARY HYPERTENSION: ICD-10-CM

## 2025-04-14 RX ORDER — FUROSEMIDE 40 MG/1
40 TABLET ORAL DAILY
Qty: 30 TABLET | Refills: 6 | Status: SHIPPED | OUTPATIENT
Start: 2025-04-14

## 2025-04-14 NOTE — TELEPHONE ENCOUNTER
Caller: Drake Beth    Relationship: Self    Best call back number:[phone]    Requested Prescriptions:   Requested Prescriptions     Pending Prescriptions Disp Refills    furosemide (LASIX) 40 MG tablet 30 tablet 6     Sig: Take 1 tablet by mouth Daily.        Pharmacy where request should be sent: [facility] [id number] [facility], [place] - [address] - [phone] PH - [phone] FX     Last office visit with prescribing clinician: 1/28/2025   Last telemedicine visit with prescribing clinician: Visit date not found   Next office visit with prescribing clinician: 7/29/2025     Additional details provided by patient: FOUR DAYS LEFT    Does the patient have less than a 3 day supply:  [] Yes  [x] No    Would you like a call back once the refill request has been completed: [] Yes [x] No    If the office needs to give you a call back, can they leave a voicemail: [x] Yes [] No    David Thomason Rep   04/14/25 09:38 EDT

## 2025-04-23 RX ORDER — SOTALOL HYDROCHLORIDE 80 MG/1
40 TABLET ORAL DAILY
Qty: 60 TABLET | Refills: 5 | Status: SHIPPED | OUTPATIENT
Start: 2025-04-23

## 2025-04-23 NOTE — TELEPHONE ENCOUNTER
Caller: Drake Beth    Relationship: Self    Best call back number: [phone]     Requested Prescriptions:   Requested Prescriptions     Pending Prescriptions Disp Refills    sotalol (BETAPACE AF) 80 MG tablet tablet 60 tablet 5     Sig: Take 0.5 tablets by mouth Daily.        Pharmacy where request should be sent: [facility] [id number], [place] [address] - [phone]  - [phone] FX     Last office visit with prescribing clinician: 1/28/2025   Last telemedicine visit with prescribing clinician: Visit date not found   Next office visit with prescribing clinician: 7/29/2025     Additional details provided by patient: PT NEEDING A 90 DAY REFILL SENT IN TO PHARMACY - PT WOULD PREFER THAT DUE TO LONG TERM PLAN FOR IT - HE IS DOWN TO 3 DAYS OR LESS    Does the patient have less than a 3 day supply:  [x] Yes  [] No    David Green Rep   04/23/25 14:34 EDT

## 2025-04-23 NOTE — TELEPHONE ENCOUNTER
Rx Refill Note  Requested Prescriptions     Pending Prescriptions Disp Refills    sotalol (BETAPACE AF) 80 MG tablet tablet 60 tablet 5     Sig: Take 0.5 tablets by mouth Daily.      Last office visit with prescribing clinician: 1/28/2025   Last telemedicine visit with prescribing clinician: Visit date not found   Next office visit with prescribing clinician: 7/29/2025                         Would you like a call back once the refill request has been completed: [] Yes [] No    If the office needs to give you a call back, can they leave a voicemail: [] Yes [] No    Sylvie Guerrero MA  04/23/25, 14:35 EDT

## 2025-04-29 ENCOUNTER — TELEPHONE (OUTPATIENT)
Dept: CARDIOLOGY | Facility: CLINIC | Age: 75
End: 2025-04-29
Payer: MEDICARE

## 2025-04-29 RX ORDER — SOTALOL HYDROCHLORIDE 80 MG/1
40 TABLET ORAL 2 TIMES DAILY
Qty: 60 TABLET | Refills: 5 | Status: SHIPPED | OUTPATIENT
Start: 2025-04-29

## 2025-04-29 NOTE — TELEPHONE ENCOUNTER
Called pt brother advised I Will change sig on Rx on refill sent in the correct Sig on prescription

## 2025-04-29 NOTE — TELEPHONE ENCOUNTER
Caller: PETEY FLEMING    Relationship to patient: Emergency Contact    Best call back number: [phone]    Patient is needing: PT'S BROTHER STATES THAT DOSAGE INSTRUCTIONS ON sotalol (BETAPACE AF) 80 MG tablet tablet ARE INCORRECT. PT'S BROTHER STATES IT IS SUPPOSED TO BE A 0.5 TABLET 2X A DAY. INSTRUCTIONS STATE 0.5 TABLET DAILY. PLEASE ADJUST NEXT TIME RX IS SENT IN.

## 2025-05-13 DIAGNOSIS — J30.1 SEASONAL ALLERGIC RHINITIS DUE TO POLLEN: ICD-10-CM

## 2025-05-13 RX ORDER — MONTELUKAST SODIUM 10 MG/1
10 TABLET ORAL DAILY
Qty: 90 TABLET | Refills: 0 | Status: SHIPPED | OUTPATIENT
Start: 2025-05-13

## 2025-05-13 NOTE — TELEPHONE ENCOUNTER
Caller: PETEY FLEMING    Relationship: Emergency Contact    Best call back number: [phone]    Requested Prescriptions:   Requested Prescriptions     Pending Prescriptions Disp Refills    montelukast (SINGULAIR) 10 MG tablet 90 tablet 1     Sig: Take 1 tablet by mouth Daily.        Pharmacy where request should be sent: [facility] [id number] [facility], [place] - [address] - [phone]  - [phone] FX     Last office visit with prescribing clinician: 10/1/2024   Last telemedicine visit with prescribing clinician: Visit date not found   Next office visit with prescribing clinician: 5/21/2025     Additional details provided by patient: 2 DAYS LEFT.     Does the patient have less than a 3 day supply:  [x] Yes  [] No    Would you like a call back once the refill request has been completed: [x] Yes [] No    If the office needs to give you a call back, can they leave a voicemail: [x] Yes [] No    David Glover Rep   05/13/25 09:31 EDT

## 2025-05-21 ENCOUNTER — OFFICE VISIT (OUTPATIENT)
Dept: FAMILY MEDICINE CLINIC | Facility: CLINIC | Age: 75
End: 2025-05-21
Payer: MEDICARE

## 2025-05-21 DIAGNOSIS — Z91.199 NO-SHOW FOR APPOINTMENT: Primary | ICD-10-CM

## 2025-05-21 RX ORDER — AMLODIPINE BESYLATE 2.5 MG/1
1 TABLET ORAL DAILY
COMMUNITY
Start: 2025-03-10

## 2025-05-22 PROBLEM — Z91.199 NO-SHOW FOR APPOINTMENT: Status: ACTIVE | Noted: 2025-05-22

## 2025-06-14 DIAGNOSIS — J30.1 SEASONAL ALLERGIC RHINITIS DUE TO POLLEN: ICD-10-CM

## 2025-06-15 RX ORDER — MONTELUKAST SODIUM 10 MG/1
10 TABLET ORAL DAILY
Qty: 90 TABLET | Refills: 0 | Status: SHIPPED | OUTPATIENT
Start: 2025-06-15

## 2025-06-24 DIAGNOSIS — E78.2 MIXED HYPERLIPIDEMIA: ICD-10-CM

## 2025-06-24 RX ORDER — ATORVASTATIN CALCIUM 10 MG/1
10 TABLET, FILM COATED ORAL DAILY
Qty: 90 TABLET | Refills: 3 | Status: SHIPPED | OUTPATIENT
Start: 2025-06-24

## 2025-06-24 RX ORDER — POTASSIUM CHLORIDE 1500 MG/1
20 TABLET, EXTENDED RELEASE ORAL DAILY
Qty: 90 TABLET | Refills: 0 | Status: SHIPPED | OUTPATIENT
Start: 2025-06-24

## 2025-06-24 NOTE — TELEPHONE ENCOUNTER
[facility] / 90 DAY SUPPLY         Caller:       Drake Beth (Self) [phone] (Mobile)       Requested Prescriptions:   Requested Prescriptions     Pending Prescriptions Disp Refills    potassium chloride (KLOR-CON M20) 20 MEQ CR tablet 90 tablet 0     Sig: Take 1 tablet by mouth Daily.        Pharmacy where request should be sent: [facility] [id number] - [place], [place] [address] - [phone]  - [phone] FX     Last office visit with prescribing clinician: 10/1/2024   Last telemedicine visit with prescribing clinician: Visit date not found   Next office visit with prescribing clinician: 8/21/2025     Additional details provided by patient:     Does the patient have less than a 3 day supply:  [x] Yes  [] No    Would you like a call back once the refill request has been completed: [] Yes [] No    If the office needs to give you a call back, can they leave a voicemail: [] Yes [] No    David Yi   06/24/25 09:43 EDT

## 2025-06-24 NOTE — TELEPHONE ENCOUNTER
Rx Refill Note  Requested Prescriptions     Pending Prescriptions Disp Refills    atorvastatin (LIPITOR) 10 MG tablet 90 tablet 3     Sig: Take 1 tablet by mouth Daily.      Last office visit with prescribing clinician: 1/28/2025   Last telemedicine visit with prescribing clinician: Visit date not found   Next office visit with prescribing clinician: 7/29/2025                         Would you like a call back once the refill request has been completed: [] Yes [] No    If the office needs to give you a call back, can they leave a voicemail: [] Yes [] No    Zarina Ambrose MA  06/24/25, 09:47 EDT

## 2025-07-12 RX ORDER — POTASSIUM CHLORIDE 1500 MG/1
20 TABLET, EXTENDED RELEASE ORAL DAILY
Qty: 90 TABLET | Refills: 0 | Status: SHIPPED | OUTPATIENT
Start: 2025-07-12

## 2025-07-15 NOTE — TELEPHONE ENCOUNTER
CHANGE IN PHARMACY FOR THIS MEDICATION     UNABLE TO GET THE PHARMACY TO TRANSFER MEDICATION TO [facility]     Caller: Richi Beth    Relationship: Emergency Contact    Best call back number:     [phone] (Mobile)       Requested Prescriptions:   Requested Prescriptions     Pending Prescriptions Disp Refills    apixaban (Eliquis) 5 MG tablet tablet 180 tablet 3     Sig: Take 1 tablet by mouth 2 (Two) Times a Day.        Pharmacy where request should be sent: [facility] [id number] [facility], [place] [address] - [phone]  - [phone] FX     Last office visit with prescribing clinician: 10/1/2024   Last telemedicine visit with prescribing clinician: Visit date not found   Next office visit with prescribing clinician: 8/21/2025     Additional details provided by patient:     Does the patient have less than a 3 day supply:  [x] Yes  [] No    Would you like a call back once the refill request has been completed: [] Yes [] No    If the office needs to give you a call back, can they leave a voicemail: [] Yes [] No    David Yi Rep   07/15/25 15:42 EDT

## 2025-07-16 NOTE — TELEPHONE ENCOUNTER
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

## 2025-07-28 ENCOUNTER — TELEPHONE (OUTPATIENT)
Dept: CARDIOLOGY | Facility: CLINIC | Age: 75
End: 2025-07-28
Payer: MEDICARE

## 2025-07-28 NOTE — TELEPHONE ENCOUNTER
Caller: Drake Beth    Relationship to patient: Self    Best call back number: [phone]    Type of visit: ECHO AND FU    Requested date: NEEDS A TUESDAY OR THURSDAY IN THE AFTERNOON     If rescheduling, when is the original appointment: 11.12.25     Additional notes:PATIENT'S BROTHER CALLED IN TO RESCHEDULE ECHO AND FU FOR SAME DAY NEEDS A TUESDAY OR A THURSDAY AS HE CAREGIVES THOSE DAYS FOR HIM AND CAN BRING HIM. PLEASE CALL AND ADVISE.

## 2025-08-21 ENCOUNTER — LAB (OUTPATIENT)
Dept: FAMILY MEDICINE CLINIC | Facility: CLINIC | Age: 75
End: 2025-08-21
Payer: MEDICARE

## 2025-08-21 ENCOUNTER — OFFICE VISIT (OUTPATIENT)
Dept: FAMILY MEDICINE CLINIC | Facility: CLINIC | Age: 75
End: 2025-08-21
Payer: MEDICARE

## 2025-08-21 VITALS
RESPIRATION RATE: 16 BRPM | DIASTOLIC BLOOD PRESSURE: 71 MMHG | TEMPERATURE: 97.7 F | OXYGEN SATURATION: 95 % | SYSTOLIC BLOOD PRESSURE: 137 MMHG | BODY MASS INDEX: 21.99 KG/M2 | HEART RATE: 51 BPM | WEIGHT: 112 LBS | HEIGHT: 60 IN

## 2025-08-21 DIAGNOSIS — I25.118 CORONARY ARTERY DISEASE OF NATIVE ARTERY OF NATIVE HEART WITH STABLE ANGINA PECTORIS: ICD-10-CM

## 2025-08-21 DIAGNOSIS — E78.2 MIXED HYPERLIPIDEMIA: ICD-10-CM

## 2025-08-21 DIAGNOSIS — Z00.00 MEDICARE ANNUAL WELLNESS VISIT, SUBSEQUENT: Primary | ICD-10-CM

## 2025-08-21 DIAGNOSIS — Z12.5 PROSTATE CANCER SCREENING: ICD-10-CM

## 2025-08-21 DIAGNOSIS — E55.9 VITAMIN D DEFICIENCY: ICD-10-CM

## 2025-08-21 PROBLEM — R41.82 ALTERED MENTAL STATUS: Status: RESOLVED | Noted: 2024-09-19 | Resolved: 2025-08-21

## 2025-08-21 PROBLEM — R05.2 SUBACUTE COUGH: Status: RESOLVED | Noted: 2018-12-12 | Resolved: 2025-08-21

## 2025-08-21 LAB
25(OH)D3 SERPL-MCNC: 53.2 NG/ML (ref 30–100)
ALBUMIN SERPL-MCNC: 4 G/DL (ref 3.5–5.2)
ALBUMIN/GLOB SERPL: 0.9 G/DL
ALP SERPL-CCNC: 77 U/L (ref 39–117)
ALT SERPL W P-5'-P-CCNC: 14 U/L (ref 1–41)
ANION GAP SERPL CALCULATED.3IONS-SCNC: 9.5 MMOL/L (ref 5–15)
AST SERPL-CCNC: 29 U/L (ref 1–40)
BASOPHILS # BLD AUTO: 0.02 10*3/MM3 (ref 0–0.2)
BASOPHILS NFR BLD AUTO: 0.3 % (ref 0–1.5)
BILIRUB SERPL-MCNC: 0.5 MG/DL (ref 0–1.2)
BUN SERPL-MCNC: 29 MG/DL (ref 8–23)
BUN/CREAT SERPL: 22.7 (ref 7–25)
CALCIUM SPEC-SCNC: 9.2 MG/DL (ref 8.6–10.5)
CHLORIDE SERPL-SCNC: 99 MMOL/L (ref 98–107)
CHOLEST SERPL-MCNC: 178 MG/DL (ref 0–200)
CO2 SERPL-SCNC: 29.5 MMOL/L (ref 22–29)
CREAT SERPL-MCNC: 1.28 MG/DL (ref 0.76–1.27)
DEPRECATED RDW RBC AUTO: 48 FL (ref 37–54)
EGFRCR SERPLBLD CKD-EPI 2021: 59.1 ML/MIN/1.73
EOSINOPHIL # BLD AUTO: 0.27 10*3/MM3 (ref 0–0.4)
EOSINOPHIL NFR BLD AUTO: 4.1 % (ref 0.3–6.2)
ERYTHROCYTE [DISTWIDTH] IN BLOOD BY AUTOMATED COUNT: 13.1 % (ref 12.3–15.4)
GLOBULIN UR ELPH-MCNC: 4.3 GM/DL
GLUCOSE SERPL-MCNC: 70 MG/DL (ref 65–99)
HCT VFR BLD AUTO: 42.8 % (ref 37.5–51)
HDLC SERPL-MCNC: 38 MG/DL (ref 40–60)
HGB BLD-MCNC: 14.8 G/DL (ref 13–17.7)
IMM GRANULOCYTES # BLD AUTO: 0.03 10*3/MM3 (ref 0–0.05)
IMM GRANULOCYTES NFR BLD AUTO: 0.5 % (ref 0–0.5)
LDLC SERPL CALC-MCNC: 98 MG/DL (ref 0–100)
LDLC/HDLC SERPL: 2.38 {RATIO}
LYMPHOCYTES # BLD AUTO: 1.55 10*3/MM3 (ref 0.7–3.1)
LYMPHOCYTES NFR BLD AUTO: 23.4 % (ref 19.6–45.3)
MCH RBC QN AUTO: 34.4 PG (ref 26.6–33)
MCHC RBC AUTO-ENTMCNC: 34.6 G/DL (ref 31.5–35.7)
MCV RBC AUTO: 99.5 FL (ref 79–97)
MONOCYTES # BLD AUTO: 0.92 10*3/MM3 (ref 0.1–0.9)
MONOCYTES NFR BLD AUTO: 13.9 % (ref 5–12)
NEUTROPHILS NFR BLD AUTO: 3.83 10*3/MM3 (ref 1.7–7)
NEUTROPHILS NFR BLD AUTO: 57.8 % (ref 42.7–76)
NRBC BLD AUTO-RTO: 0 /100 WBC (ref 0–0.2)
PLATELET # BLD AUTO: 148 10*3/MM3 (ref 140–450)
PMV BLD AUTO: 10.1 FL (ref 6–12)
POTASSIUM SERPL-SCNC: 4.5 MMOL/L (ref 3.5–5.2)
PROT SERPL-MCNC: 8.3 G/DL (ref 6–8.5)
PSA SERPL-MCNC: 5.01 NG/ML (ref 0–4)
RBC # BLD AUTO: 4.3 10*6/MM3 (ref 4.14–5.8)
SODIUM SERPL-SCNC: 138 MMOL/L (ref 136–145)
TRIGL SERPL-MCNC: 248 MG/DL (ref 0–150)
TSH SERPL DL<=0.05 MIU/L-ACNC: 2.48 UIU/ML (ref 0.27–4.2)
VIT B12 BLD-MCNC: 664 PG/ML (ref 211–946)
VLDLC SERPL-MCNC: 42 MG/DL (ref 5–40)
WBC NRBC COR # BLD AUTO: 6.62 10*3/MM3 (ref 3.4–10.8)

## (undated) DEVICE — PINNACLE INTRODUCER SHEATH: Brand: PINNACLE

## (undated) DEVICE — CATH DIAG IMPULSE PIG .056 6F 110CM

## (undated) DEVICE — 1 X VERSACROSS LARGE ACCESS TRANSSEPTAL DILATOR (INCLUDING 1 X J-TIP MECHANICAL GUIDEWIRE); 1 X VERSACROSS RF WIRE (INCLUDING 1 X CONNECTOR CABLE (SINGLE USE)); 1 X DISPERSIVE ELECTRODE: Brand: VERSACROSS LARGE ACCESS SOLUTION

## (undated) DEVICE — SNAP KOVER: Brand: UNBRANDED

## (undated) DEVICE — SWAN-GANZ BIPOLAR PACING CATHETER: Brand: SWAN-GANZ

## (undated) DEVICE — MYNXGRIP 6F/7F: Brand: MYNXGRIP

## (undated) DEVICE — VLV HEART TRNSCATH SAPIEN3 29MM: Type: IMPLANTABLE DEVICE | Site: HEART | Status: NON-FUNCTIONAL

## (undated) DEVICE — INTRO STEER AGILIS NXT MED/CURL 8.5F

## (undated) DEVICE — RADIFOCUS GLIDEWIRE: Brand: GLIDEWIRE

## (undated) DEVICE — CATH DIAG IMPULSE FR4 6F 100CM

## (undated) DEVICE — SUT ETHIB 0/0 MO6 I8IN CX45D

## (undated) DEVICE — ADHS SKIN PREMIERPRO EXOFIN TOPICAL HI/VISC .5ML

## (undated) DEVICE — CABL BIPOL W/ALLGTR CLIP/SM 12FT

## (undated) DEVICE — DGW .035 FC J3MM 150CM TEF HEP: Brand: EMERALD

## (undated) DEVICE — GW XCHG AMPLTZ XSTIF PTFE CRV .035 3X260

## (undated) DEVICE — DGW .035 FC J3MM 260CM TEF: Brand: EMERALD

## (undated) DEVICE — UNDYED BRAIDED (POLYGLACTIN 910), SYNTHETIC ABSORBABLE SUTURE: Brand: COATED VICRYL

## (undated) DEVICE — ELECTRD DEFIB M/FUNC PROPADZ RADIOL 2PK

## (undated) DEVICE — DRSNG WND BORDR/ADHS NONADHR/GZ LF 4X4IN STRL

## (undated) DEVICE — VIOLET BRAIDED (POLYGLACTIN 910), SYNTHETIC ABSORBABLE SUTURE: Brand: COATED VICRYL

## (undated) DEVICE — LN INJ CONTRST FLXCIL HP F/M LL 1200PSI72

## (undated) DEVICE — Device

## (undated) DEVICE — CATH DIAG IMPULSE FL4 6F 100CM

## (undated) DEVICE — CATH F6INF TL IM 100CM: Brand: INFINITI

## (undated) DEVICE — SWAN-GANZ THERMODILUTION CATHETER: Brand: SWAN-GANZ

## (undated) DEVICE — TBG PENCL TELESCP MEGADYNE SMOKE EVAC 10FT

## (undated) DEVICE — SUT SILK 2/0 FS BLK 18IN 685G

## (undated) DEVICE — PERCLOSE™ PROSTYLE™ SUTURE-MEDIATED CLOSURE AND REPAIR SYSTEM: Brand: PERCLOSE™ PROSTYLE™

## (undated) DEVICE — PK TRY HEART CATH 50

## (undated) DEVICE — DESTINATION RENAL GUIDING SHEATH: Brand: DESTINATION

## (undated) DEVICE — TAVR: Brand: MEDLINE INDUSTRIES, INC.

## (undated) DEVICE — ST ACC MICROPUNCTURE STFF/CANN PLAT/TP 4F 21G 40CM

## (undated) DEVICE — CATH DIAG IMPULSE AL1 6F 100CM

## (undated) DEVICE — TBG NAMIC PRESS MONTR A/ F/M 12IN

## (undated) DEVICE — COVER,LIGHT HANDLE,FLX,1/PK: Brand: MEDLINE INDUSTRIES, INC.

## (undated) DEVICE — ANTIBACTERIAL UNDYED BRAIDED (POLYGLACTIN 910), SYNTHETIC ABSORBABLE SUTURE: Brand: COATED VICRYL

## (undated) DEVICE — GUIDE CATHETER: Brand: RUNWAY™

## (undated) DEVICE — SNAP KAP: Brand: UNBRANDED

## (undated) DEVICE — CATH DIAG IMPULSE IMT 6F 100CM

## (undated) DEVICE — 3M™ IOBAN™ 2 ANTIMICROBIAL INCISE DRAPE 6650EZ: Brand: IOBAN™ 2

## (undated) DEVICE — 3M™ STERI-STRIP™ REINFORCED ADHESIVE SKIN CLOSURES, R1547, 1/2 IN X 4 IN (12 MM X 100 MM), 6 STRIPS/ENVELOPE: Brand: 3M™ STERI-STRIP™

## (undated) DEVICE — KT MANIFLD NAMIC HEART/LT INTERGRATED/COMPENSATOR W/SYR/10ML

## (undated) DEVICE — PACEMAKER CDS: Brand: MEDLINE INDUSTRIES, INC.

## (undated) DEVICE — PROVE COVER: Brand: UNBRANDED

## (undated) DEVICE — ARMADA 35 PTA CATHETER 14 MM X 40 MM X 135 CM / OVER-THE-WIRE: Brand: ARMADA

## (undated) DEVICE — ANGIO-SEAL VIP VASCULAR CLOSURE DEVICE: Brand: ANGIO-SEAL

## (undated) DEVICE — NDLHLDR TEMP NEEDLENEST 2 W/SD/FM ADHS STRL

## (undated) DEVICE — 3M™ PATIENT PLATE, CORDED, SPLIT, LARGE, 40 PER CASE, 1179: Brand: 3M™